# Patient Record
Sex: FEMALE | Race: WHITE | NOT HISPANIC OR LATINO | Employment: OTHER | URBAN - METROPOLITAN AREA
[De-identification: names, ages, dates, MRNs, and addresses within clinical notes are randomized per-mention and may not be internally consistent; named-entity substitution may affect disease eponyms.]

---

## 2017-01-18 ENCOUNTER — HOSPITAL ENCOUNTER (OUTPATIENT)
Dept: RADIOLOGY | Facility: MEDICAL CENTER | Age: 82
End: 2017-01-18
Attending: FAMILY MEDICINE
Payer: MEDICARE

## 2017-01-18 DIAGNOSIS — Z12.31 VISIT FOR SCREENING MAMMOGRAM: ICD-10-CM

## 2017-01-18 PROCEDURE — 77063 BREAST TOMOSYNTHESIS BI: CPT

## 2017-01-23 RX ORDER — DIAZEPAM 5 MG/1
5 TABLET ORAL
Qty: 20 TAB | Refills: 0 | OUTPATIENT
Start: 2017-01-23

## 2017-01-24 ENCOUNTER — TELEPHONE (OUTPATIENT)
Dept: MEDICAL GROUP | Age: 82
End: 2017-01-24

## 2017-01-24 DIAGNOSIS — F41.1 GAD (GENERALIZED ANXIETY DISORDER): ICD-10-CM

## 2017-01-24 NOTE — TELEPHONE ENCOUNTER
Was the patient seen in the last year in this department? Yes     Does patient have an active prescription for medications requested? No     Received Request Via: Patient       1. Caller Name: Ellen Srivastava                      Call Back Number: [phone] (home) [phone] (work)    2. Message: Patient states she is still having anxiety and would like to try Valium    3. Patient approves office to leave a detailed voicemail/MyChart message: N\A

## 2017-01-24 NOTE — TELEPHONE ENCOUNTER
Phone Number Called: [phone] (home) [phone] (work)      Message: left message to call back    Left Message for patient to call back: yes

## 2017-01-24 NOTE — TELEPHONE ENCOUNTER
I'm sorry.  I can not prescribe valium because it interacts with her other medicines.  I am happy to refer to a psychiatrist if she is interested in other options for her anxiety.  TAMARA

## 2017-01-24 NOTE — TELEPHONE ENCOUNTER
1. Caller Name: Ellen                                         Call Back Number: [phone] (home) [phone] (work)        Patient approves a detailed voicemail message: no    Patient requesting to have Dr. Page take her off Paxil and Buspirone meds and wants to try Lexapro due to her anxiety.  Please advise.

## 2017-01-24 NOTE — TELEPHONE ENCOUNTER
1. Caller Name: self                                         Call Back Number: [phone] (home) [phone] (work)      Patient approves a detailed voicemail message: N\A    pt called back and was notified of information below, she does not want to go to a Psychiatrist.

## 2017-01-26 RX ORDER — ESCITALOPRAM OXALATE 10 MG/1
10 TABLET ORAL DAILY
Qty: 30 TAB | Refills: 2 | Status: SHIPPED | OUTPATIENT
Start: 2017-01-26 | End: 2017-04-24 | Stop reason: SDUPTHER

## 2017-01-26 NOTE — TELEPHONE ENCOUNTER
Ok - pt can stop Paxil and Buspirone and start Lexapro.  It is not necessary to taper medicines  Nidhi Page MD  [facility] [address] [place] [address]

## 2017-01-26 NOTE — TELEPHONE ENCOUNTER
C[facility] ONLY      Phone Number Called: [phone]     Message: Pt notified of Dr. Page's message below. Pt is very thankful.    Left Message for patient to call back: no

## 2017-03-06 ENCOUNTER — OFFICE VISIT (OUTPATIENT)
Dept: MEDICAL GROUP | Age: 82
End: 2017-03-06
Payer: MEDICARE

## 2017-03-06 ENCOUNTER — HOSPITAL ENCOUNTER (OUTPATIENT)
Dept: RADIOLOGY | Facility: MEDICAL CENTER | Age: 82
End: 2017-03-06
Attending: FAMILY MEDICINE
Payer: MEDICARE

## 2017-03-06 VITALS
WEIGHT: 173.6 LBS | SYSTOLIC BLOOD PRESSURE: 138 MMHG | TEMPERATURE: 99.7 F | BODY MASS INDEX: 25.71 KG/M2 | OXYGEN SATURATION: 95 % | DIASTOLIC BLOOD PRESSURE: 88 MMHG | HEART RATE: 81 BPM | HEIGHT: 69 IN

## 2017-03-06 DIAGNOSIS — R06.09 DOE (DYSPNEA ON EXERTION): ICD-10-CM

## 2017-03-06 DIAGNOSIS — F11.20 NARCOTIC DEPENDENCE (HCC): ICD-10-CM

## 2017-03-06 DIAGNOSIS — F41.1 GAD (GENERALIZED ANXIETY DISORDER): ICD-10-CM

## 2017-03-06 DIAGNOSIS — M54.50 CHRONIC BILATERAL LOW BACK PAIN WITHOUT SCIATICA: ICD-10-CM

## 2017-03-06 DIAGNOSIS — G89.29 CHRONIC BILATERAL LOW BACK PAIN WITHOUT SCIATICA: ICD-10-CM

## 2017-03-06 PROCEDURE — G8432 DEP SCR NOT DOC, RNG: HCPCS | Performed by: FAMILY MEDICINE

## 2017-03-06 PROCEDURE — 99214 OFFICE O/P EST MOD 30 MIN: CPT | Performed by: FAMILY MEDICINE

## 2017-03-06 PROCEDURE — 4040F PNEUMOC VAC/ADMIN/RCVD: CPT | Performed by: FAMILY MEDICINE

## 2017-03-06 PROCEDURE — G8482 FLU IMMUNIZE ORDER/ADMIN: HCPCS | Performed by: FAMILY MEDICINE

## 2017-03-06 PROCEDURE — G8420 CALC BMI NORM PARAMETERS: HCPCS | Performed by: FAMILY MEDICINE

## 2017-03-06 PROCEDURE — 0518F FALL PLAN OF CARE DOCD: CPT | Mod: 8P | Performed by: FAMILY MEDICINE

## 2017-03-06 PROCEDURE — 3288F FALL RISK ASSESSMENT DOCD: CPT | Performed by: FAMILY MEDICINE

## 2017-03-06 PROCEDURE — 1036F TOBACCO NON-USER: CPT | Performed by: FAMILY MEDICINE

## 2017-03-06 PROCEDURE — 1100F PTFALLS ASSESS-DOCD GE2>/YR: CPT | Performed by: FAMILY MEDICINE

## 2017-03-06 RX ORDER — HYDROCODONE BITARTRATE AND ACETAMINOPHEN 10; 325 MG/1; MG/1
1-2 TABLET ORAL
Qty: 60 TAB | Refills: 0 | Status: SHIPPED | OUTPATIENT
Start: 2017-03-06 | End: 2017-03-06 | Stop reason: SDUPTHER

## 2017-03-06 RX ORDER — PANTOPRAZOLE SODIUM 40 MG/1
40 TABLET, DELAYED RELEASE ORAL DAILY
COMMUNITY
End: 2018-03-05 | Stop reason: SDUPTHER

## 2017-03-06 RX ORDER — HYDROCODONE BITARTRATE AND ACETAMINOPHEN 10; 325 MG/1; MG/1
1-2 TABLET ORAL
Qty: 60 TAB | Refills: 0 | Status: SHIPPED | OUTPATIENT
Start: 2017-03-06 | End: 2017-06-14 | Stop reason: SDUPTHER

## 2017-03-06 RX ORDER — BUSPIRONE HYDROCHLORIDE 15 MG/1
15 TABLET ORAL 2 TIMES DAILY
Qty: 60 TAB | Refills: 5 | Status: SHIPPED | OUTPATIENT
Start: 2017-03-06 | End: 2017-06-14

## 2017-03-06 ASSESSMENT — LIFESTYLE VARIABLES: HISTORY_ALCOHOL_USE: 0

## 2017-03-06 ASSESSMENT — ENCOUNTER SYMPTOMS: DEPRESSION: 0

## 2017-03-06 NOTE — PATIENT INSTRUCTIONS
Current Outpatient Prescriptions   Medication Sig Dispense Refill   • pantoprazole (PROTONIX) 40 MG Tablet Delayed Response Take 40 mg by mouth every day.     • escitalopram (LEXAPRO) 10 MG Tab Take 1 Tab by mouth every day. 30 Tab 2   • hydrocodone/acetaminophen (NORCO)  MG Tab Take 1-2 Tabs by mouth 1 time daily as needed. 60 Tab 0   • rosuvastatin (CRESTOR) 20 MG Tab TAKE 1 TABLET BY MOUTH EVERY EVENING 30 Tab 3   • metoprolol (LOPRESSOR) 50 MG Tab Take 25 mg by mouth 2 times a day.     • tizanidine (ZANAFLEX) 4 MG Tab Take 1 Tab by mouth 2 times a day as needed (SPASM). 60 Tab 5   • cyanocobalamin (VITAMIN B-12) 100 MCG Tab TAKE ONE TABLET BY MOUTH DAILY 30 Tab 11   • vitamin D (VITAMIND D3) 1000 UNIT Tab Take 1 Tab by mouth every day. 30 Tab      No current facility-administered medications for this visit.

## 2017-03-06 NOTE — MR AVS SNAPSHOT
"        Ellen Srivastava   3/6/2017 9:50 AM   Office Visit   MRN: [id number]    Department:  [address] [place] [address]   Dept Phone:  [phone]    Description:  Female : 1935   Provider:  Nidhi Page M.D.           Reason for Visit     Back Pain med refill norco      Allergies as of 3/6/2017     Allergen Noted Reactions    Asa [Aspirin] 09/15/2015   Unspecified    Headache      You were diagnosed with     LEVY (dyspnea on exertion)   [657403]       ANUPAMA (generalized anxiety disorder)   [682640]   discussed need to limit benzo use and discussed optimizing doses of non-benzo meds    Chronic bilateral low back pain without sciatica   [7201765]   continue analgesics      Vital Signs     Blood Pressure Pulse Temperature Height Weight Body Mass Index    138/88 mmHg 81 37.6 °C (99.7 °F) 1.752 m (5' 8.98\") 78.744 kg (173 lb 9.6 oz) 25.65 kg/m2    Oxygen Saturation Last Menstrual Period Breastfeeding? Smoking Status          95% 1970 No Never Smoker         Basic Information     Date Of Birth Sex Race Ethnicity Preferred Language    1935 Female White Non- English      Your appointments     2017 10:30 AM   Established Patient with Nidhi Page M.D.   [facility] [address] [place] [id number] [phone]           You will be receiving a confirmation call a few days before your appointment from our automated call confirmation system.              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   2010 - Present    Preventative health care Z00.00   3/16/2010 - " Present    Osteopenia M85.80   4/26/2010 - Present    Chronic back pain (Chronic) M54.9, G89.29   8/23/2011 - Present    Narcotic dependence (CMS-[facility]) F11.20   4/28/2012 - Present    GERD (gastroesophageal reflux disease) K21.9   10/12/2015 - Present    Paraesophageal hiatal hernia s/p robotic repair 10/12 K44.9 Low  10/22/2015 - Present    Depression with anxiety F41.8 High  10/22/2015 - Present    Esophageal perforation K22.3 High  10/22/2015 - Present    Vitamin D deficiency E55.9 Medium  12/2/2015 - Present    Falls W19.XXXA   12/9/2015 - Present    Chronic use of opiate drugs therapeutic purposes Z79.899   9/16/2016 - Present    Syncope R55 High  9/20/2016 - Present    Closed right ankle fracture S82.891A High  9/20/2016 - Present    Leukocytosis D72.829   9/20/2016 - Present    Risk for falls Z91.81   12/16/2016 - Present      Health Maintenance        Date Due Completion Dates    IMM DTaP/Tdap/Td Vaccine (1 - Tdap) [birth year]/13/1956 ---    BONE DENSITY 3/26/2018 3/26/2013, 4/23/2010    MAMMOGRAM 12/18/2018 1/18/2017, 7/31/2015, 5/21/2012 (Done)    Override on 5/21/2012: Done            Current Immunizations     13-VALENT PCV PREVNAR 11/13/2014    Influenza TIV (IM) 9/16/2009    Influenza Vaccine Adult HD 10/21/2016, 10/13/2015  4:39 PM, 11/13/2014    Pneumococcal polysaccharide vaccine (PPSV-23) 12/5/2015  9:53 AM, 12/5/2015    SHINGLES VACCINE 2/10/2009    Tetanus Vaccine 10/29/2012      Below and/or attached are the medications your provider expects you to take. Review all of your home medications and newly ordered medications with your provider and/or pharmacist. Follow medication instructions as directed by your provider and/or pharmacist. Please keep your medication list with you and share with your provider. Update the information when medications are discontinued, doses are changed, or new medications (including over-the-counter products) are added; and carry medication information at all times in the event of  emergency situations     Allergies:  ASA - Unspecified               Medications  Valid as of: March 06, 2017 - 10:21 AM    Generic Name Brand Name Tablet Size Instructions for use    BusPIRone HCl (Tab) BUSPAR 15 MG Take 1 Tab by mouth 2 times a day.        Cholecalciferol (Tab) VITAMIND D3 1000 UNIT Take 1 Tab by mouth every day.        Cyanocobalamin (Tab) VITAMIN B-12 100 MCG TAKE ONE TABLET BY MOUTH DAILY        Escitalopram Oxalate (Tab) LEXAPRO 10 MG Take 1 Tab by mouth every day.        Hydrocodone-Acetaminophen (Tab) NORCO  MG Take 1-2 Tabs by mouth 1 time daily as needed.        Metoprolol Tartrate (Tab) LOPRESSOR 50 MG Take 25 mg by mouth 2 times a day.        Pantoprazole Sodium (Tablet Delayed Response) PROTONIX 40 MG Take 40 mg by mouth every day.        Rosuvastatin Calcium (Tab) CRESTOR 20 MG TAKE 1 TABLET BY MOUTH EVERY EVENING        TiZANidine HCl (Tab) ZANAFLEX 4 MG Take 1 Tab by mouth 2 times a day as needed (SPASM).        .                 Medicines prescribed today were sent to:     [facility] #[id number] [place], [place] - [address] [address]    Phone: [phone] Fax: [phone]    Open 24 Hours?: No      Medication refill instructions:       If your prescription bottle indicates you have medication refills left, it is not necessary to call your provider’s office. Please contact your pharmacy and they will refill your medication.    If your prescription bottle indicates you do not have any refills left, you may request refills at any time through one of the following ways: The online [employer] system (except Urgent Care), by calling your provider’s office, or by asking your pharmacy to contact your provider’s office with a refill request. Medication refills are processed only during regular business hours and may not be available until the next business day. Your provider may request additional information or to have a follow-up visit with  you prior to refilling your medication.   *Please Note: Medication refills are assigned a new Rx number when refilled electronically. Your pharmacy may indicate that no refills were authorized even though a new prescription for the same medication is available at the pharmacy. Please request the medicine by name with the pharmacy before contacting your provider for a refill.        Your To Do List     Future Labs/Procedures Complete By Expires    DX-CHEST-2 VIEWS  As directed 3/6/2018      Instructions    Current Outpatient Prescriptions   Medication Sig Dispense Refill   • pantoprazole (PROTONIX) 40 MG Tablet Delayed Response Take 40 mg by mouth every day.     • escitalopram (LEXAPRO) 10 MG Tab Take 1 Tab by mouth every day. 30 Tab 2   • hydrocodone/acetaminophen (NORCO)  MG Tab Take 1-2 Tabs by mouth 1 time daily as needed. 60 Tab 0   • rosuvastatin (CRESTOR) 20 MG Tab TAKE 1 TABLET BY MOUTH EVERY EVENING 30 Tab 3   • metoprolol (LOPRESSOR) 50 MG Tab Take 25 mg by mouth 2 times a day.     • tizanidine (ZANAFLEX) 4 MG Tab Take 1 Tab by mouth 2 times a day as needed (SPASM). 60 Tab 5   • cyanocobalamin (VITAMIN B-12) 100 MCG Tab TAKE ONE TABLET BY MOUTH DAILY 30 Tab 11   • vitamin D (VITAMIND D3) 1000 UNIT Tab Take 1 Tab by mouth every day. 30 Tab      No current facility-administered medications for this visit.            Other Notes About Your Plan     Update HCM dates.            The Local Access Code: Activation code not generated  Current The Local Status: Active

## 2017-03-06 NOTE — PROGRESS NOTES
"Subjective:     Chief Complaint   Patient presents with   • Back Pain     med refill norco     Ellen Srivastava is a 81 y.o. female here today for issues listed below    Difficulty breathing a few months. Getting worse.   Happens with walking or exertion (walks up 1 flight stairs). No cough or wheeze. NO CP or pressure.   No asthma as a child, never smoked. Dad smoked. Never Rx inhaler. Does not feel ill. No cough.    Throat does not feel right. Last night had choking and emesis. After eating rice. Was chewing well. Has tried to cut down PPI because she is on \"too many meds\"      Still having anxiety. Takes lexapro every day. Mood is overall improved with this med. No side effects. No SI. No yelena.Also having breakthrough anxiety. Buspar was maybe better. Than current hydroxyzine.     Chronic pain recheck: chronic low back pain.   Last dose of controlled substance: 4:30 yesterday  Chronic pain treated with Norco taken 1-2 times a day (mostly 2 times per day)    She  reports that she does not drink alcohol.  She  reports that she does not use illicit drugs.    Consequences of Chronic Opiate therapy:  (5 A's)  Analgesia: Compared to no treatment or prior treatment, pain is currently not changed  Activity: not changed  Adverse Events: She denies constipation, dry mouth, itchy skin, nausea and sedation  Aberrant Behaviors: She reports she is taking medication as prescribed and is not veering from agreed treatment regimen. There have been no inappropriate refills or lost/stolen meds reported.   Affect/Mood: Pain is impacting patient's mood.  Patient reports depression/anxiety.- taking Lexapro.    Nonnarcotic treatments that are being used: muscle relaxers.   Treatment goals discussed.    Opioid Risk Score: 0    Interpretation of Opioid Risk Score   Score 0-3 = Low risk of abuse. Do UDS at least once per year.    Last order of CONTROLLED SUBSTANCE TREATMENT AGREEMENT was found on 12/16/2016 from Office Visit on " 12/16/2016     UDS Summary                URINE DRUG SCREEN Next Due 9/15/2017      Done 9/20/2016 URINE DRUG SCREEN (A)     Patient has more history with this topic...        Most recent UDS reviewed today and is consistent with prescribed medications.    I have reviewed the medical records, the Prescription Monitoring Program and I have determined that controlled substance treatment is medically indicated.     HTN - Chronic condition stable. Currently taking all meds as directed.   She is not monitoring BP at home.   Denies symptoms low BP: light-headed, tunnel-vision, unusual fatigue.   Denies symptoms high BP:pounding headache, visual changes, palpitations, flushed face.   Denies medicine side effects: unusual fatigue, slow heartbeat, foot/leg swelling, cough.        Allergies: Asa  Current medicines (including changes today)  Current Outpatient Prescriptions   Medication Sig Dispense Refill   • pantoprazole (PROTONIX) 40 MG Tablet Delayed Response Take 40 mg by mouth every day.     • busPIRone (BUSPAR) 15 MG tablet Take 1 Tab by mouth 2 times a day. 60 Tab 5   • hydrocodone/acetaminophen (NORCO)  MG Tab Take 1-2 Tabs by mouth 1 time daily as needed. 60 Tab 0   • escitalopram (LEXAPRO) 10 MG Tab Take 1 Tab by mouth every day. 30 Tab 2   • rosuvastatin (CRESTOR) 20 MG Tab TAKE 1 TABLET BY MOUTH EVERY EVENING 30 Tab 3   • metoprolol (LOPRESSOR) 50 MG Tab Take 25 mg by mouth 2 times a day.     • tizanidine (ZANAFLEX) 4 MG Tab Take 1 Tab by mouth 2 times a day as needed (SPASM). 60 Tab 5   • cyanocobalamin (VITAMIN B-12) 100 MCG Tab TAKE ONE TABLET BY MOUTH DAILY 30 Tab 11   • vitamin D (VITAMIND D3) 1000 UNIT Tab Take 1 Tab by mouth every day. 30 Tab      No current facility-administered medications for this visit.       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  Health Maintenance: mammo is current  ROS  Per HPI     Objective:     Blood pressure 138/88, pulse 81, temperature 37.6 °C (99.7 °F), height 1.752 m (5' 8.98\"), weight 78.744 kg (173 lb 9.6 oz), last menstrual period 03/13/1970, SpO2 95 %, not currently breastfeeding. Body mass index is 25.65 kg/(m^2).  Physical Exam:  Alert, oriented in no acute distress.  Eye contact is good, speech goal directed, affect calm  HEENT: conjunctiva non-injected, sclera non-icteric.  Pinna normal without skin lesions. TM pearly gray.   Oral mucous membranes pink and moist with no lesions.  Neck No adenopathy or masses in the neck or supraclavicular regions.  No carotid bruits. No thyromegaly  Lungs: clear to auscultation bilaterally with good excursion.  CV: regular rate and rhythm.  Abdomen No CVAT  Ext: no edema, no tenderness to palpation over shins    Has not done labs ordered 2016.    Assessment and Plan:     Patient generally stable but needs to do labs to assess control of IFG, lipids, thyroid.     Treatment Changes: None  Ellen was seen today for back pain.    Diagnoses and all orders for this visit:    LEVY (dyspnea on exertion)  Comments:  suspect multifactorial. Advise CXR and spirometry. Pt will consider these.   Orders:  -     DX-CHEST-2 VIEWS; Future  -     PULMONARY FUNCTION TESTS Test requested: Spirometry with-out & with Bronchodilator    ANUPAMA (generalized anxiety disorder)  Comments:  continue to optimize doses of non-benzo meds  Orders:  -     busPIRone (BUSPAR) " 15 MG tablet; Take 1 Tab by mouth 2 times a day.    Chronic bilateral low back pain without sciatica  Comments:  continue analgesics  Orders:  -     Discontinue: hydrocodone/acetaminophen (NORCO)  MG Tab; Take 1-2 Tabs by mouth 1 time daily as needed.  -     Discontinue: hydrocodone/acetaminophen (NORCO)  MG Tab; Take 1-2 Tabs by mouth 1 time daily as needed.  -     hydrocodone/acetaminophen (NORCO)  MG Tab; Take 1-2 Tabs by mouth 1 time daily as needed.    Narcotic dependence (CMS-[facility])  Comments:  pt aware of dependence on med.        Followup: Return in about 3 months (around 6/6/2017) for Short - pain med RF. sooner should new symptoms or problems arise.

## 2017-03-14 ENCOUNTER — HOSPITAL ENCOUNTER (OUTPATIENT)
Dept: RADIOLOGY | Facility: MEDICAL CENTER | Age: 82
End: 2017-03-14
Attending: FAMILY MEDICINE
Payer: MEDICARE

## 2017-03-14 ENCOUNTER — HOSPITAL ENCOUNTER (OUTPATIENT)
Dept: LAB | Facility: MEDICAL CENTER | Age: 82
End: 2017-03-14
Attending: FAMILY MEDICINE
Payer: MEDICARE

## 2017-03-14 DIAGNOSIS — I10 ESSENTIAL HYPERTENSION: ICD-10-CM

## 2017-03-14 DIAGNOSIS — R73.01 IFG (IMPAIRED FASTING GLUCOSE): ICD-10-CM

## 2017-03-14 DIAGNOSIS — E78.2 MIXED HYPERLIPIDEMIA: ICD-10-CM

## 2017-03-14 LAB
ALBUMIN SERPL BCP-MCNC: 3.7 G/DL (ref 3.2–4.9)
ALBUMIN/GLOB SERPL: 1.1 G/DL
ALP SERPL-CCNC: 79 U/L (ref 30–99)
ALT SERPL-CCNC: 19 U/L (ref 2–50)
ANION GAP SERPL CALC-SCNC: 9 MMOL/L (ref 0–11.9)
AST SERPL-CCNC: 23 U/L (ref 12–45)
BILIRUB SERPL-MCNC: 0.6 MG/DL (ref 0.1–1.5)
BUN SERPL-MCNC: 13 MG/DL (ref 8–22)
CALCIUM SERPL-MCNC: 8.8 MG/DL (ref 8.4–10.2)
CHLORIDE SERPL-SCNC: 104 MMOL/L (ref 96–112)
CHOLEST SERPL-MCNC: 102 MG/DL (ref 100–199)
CO2 SERPL-SCNC: 23 MMOL/L (ref 20–33)
CREAT SERPL-MCNC: 0.84 MG/DL (ref 0.5–1.4)
CREAT UR-MCNC: 161.5 MG/DL
EST. AVERAGE GLUCOSE BLD GHB EST-MCNC: 123 MG/DL
GFR SERPL CREATININE-BSD FRML MDRD: >60 ML/MIN/1.73 M 2
GLOBULIN SER CALC-MCNC: 3.3 G/DL (ref 1.9–3.5)
GLUCOSE SERPL-MCNC: 108 MG/DL (ref 65–99)
HBA1C MFR BLD: 5.9 % (ref 0–5.6)
HDLC SERPL-MCNC: 37 MG/DL
LDLC SERPL CALC-MCNC: 42 MG/DL
MICROALBUMIN UR-MCNC: 1.3 MG/DL
MICROALBUMIN/CREAT UR: 8 MG/G (ref 0–30)
POTASSIUM SERPL-SCNC: 3.6 MMOL/L (ref 3.6–5.5)
PROT SERPL-MCNC: 7 G/DL (ref 6–8.2)
SODIUM SERPL-SCNC: 136 MMOL/L (ref 135–145)
TRIGL SERPL-MCNC: 115 MG/DL (ref 0–149)
TSH SERPL DL<=0.005 MIU/L-ACNC: 1.58 UIU/ML (ref 0.35–5.5)

## 2017-03-14 PROCEDURE — 83036 HEMOGLOBIN GLYCOSYLATED A1C: CPT | Mod: GA

## 2017-03-14 PROCEDURE — 71020 DX-CHEST-2 VIEWS: CPT

## 2017-03-14 PROCEDURE — 82043 UR ALBUMIN QUANTITATIVE: CPT

## 2017-03-14 PROCEDURE — 82570 ASSAY OF URINE CREATININE: CPT

## 2017-03-14 PROCEDURE — 80061 LIPID PANEL: CPT

## 2017-03-14 PROCEDURE — 83525 ASSAY OF INSULIN: CPT

## 2017-03-14 PROCEDURE — 36415 COLL VENOUS BLD VENIPUNCTURE: CPT

## 2017-03-14 PROCEDURE — 84443 ASSAY THYROID STIM HORMONE: CPT

## 2017-03-14 PROCEDURE — 80053 COMPREHEN METABOLIC PANEL: CPT

## 2017-03-15 LAB — INSULIN P FAST SERPL-ACNC: 18 UIU/ML (ref 3–19)

## 2017-03-17 ENCOUNTER — OFFICE VISIT (OUTPATIENT)
Dept: MEDICAL GROUP | Age: 82
End: 2017-03-17
Payer: MEDICARE

## 2017-03-17 VITALS
SYSTOLIC BLOOD PRESSURE: 100 MMHG | BODY MASS INDEX: 24.31 KG/M2 | DIASTOLIC BLOOD PRESSURE: 89 MMHG | OXYGEN SATURATION: 94 % | WEIGHT: 169.8 LBS | HEART RATE: 95 BPM | HEIGHT: 70 IN | TEMPERATURE: 98.7 F

## 2017-03-17 DIAGNOSIS — J69.0 ASPIRATION PNEUMONIA, UNSPECIFIED ASPIRATION PNEUMONIA TYPE, UNSPECIFIED LATERALITY, UNSPECIFIED PART OF LUNG (HCC): ICD-10-CM

## 2017-03-17 DIAGNOSIS — Z87.19 H/O HIATAL HERNIA: ICD-10-CM

## 2017-03-17 PROCEDURE — 99214 OFFICE O/P EST MOD 30 MIN: CPT | Performed by: FAMILY MEDICINE

## 2017-03-17 RX ORDER — AMOXICILLIN AND CLAVULANATE POTASSIUM 875; 125 MG/1; MG/1
1 TABLET, FILM COATED ORAL 2 TIMES DAILY
Qty: 20 TAB | Refills: 0 | Status: SHIPPED | OUTPATIENT
Start: 2017-03-17 | End: 2017-03-27

## 2017-03-18 NOTE — PROGRESS NOTES
"Chief Complaint   Patient presents with   • Cough     since monday    • Faint     since monday   • Body Aches     since monday    • Chills     monday   • Sweats     monday   • Immunizations     in neeed of tdap       HPI: 5 days of illness including: nasal congestion, sore throat, cough, myalgias, drenching sweats. Initially had significant nausea and diarrhea. Has not been able to eat solid foods. She felt faint and very lightheaded when standing earlier this week but did not pass out or lose consciousness.  Symptoms negative for chest pain, or new difficulty swallowing.  On office visit last week she was complaining of increased dyspnea. Chest x-ray showed recurrence of her hiatal hernia which was repaired and a very complex operation in 2015. She denies any regurgitation or upper abdominal pain. She does continue to take pantoprazole daily.    Similarly ill exposures: no  Medical history negative for asthma but she has high risk for aspiration due to hiatal hernia  She  reports that she has never smoked. She has quit using smokeless tobacco.    ROS  No skin rashes.  Last diarrhea was yesterday. No emesis no black or bloody stools.      Blood pressure 100/89, pulse 95, temperature 37.1 °C (98.7 °F), height 1.765 m (5' 9.5\"), weight 77.021 kg (169 lb 12.8 oz), last menstrual period 03/13/1970, SpO2 94 %.  Patient thought to be at high risk for communicable respiratory infection. Safety precautions taken during this visit:  Patient mask worn: Yes  Provider mask worn:No   Gen: alert, No conversational dyspnea. No audible wheeze, nontoxic.  PERRL, conjunctiva slightly injected. No photophobia. No eye discharge.  Ears: normal pinnae. TM: gilmar colored  Nares patent with thin mucus.  Sinuses non tender over maxillary / frontal sinuses  Throat: erythematous injection. No exudate.   Neck: supple, with  no adenopathy  Lungs:  bibasilar rales  Skin: warm and dry. No rash.    A/P  Suspect aspiration/aspiration pneumonia. " Discussed chewing food carefully and remaining upright after eating. Continue PPI. Antibiotics prescribed with instructions for use. ER precautions: Proceed to ER if increasing dyspnea or chest pain  1. H/O hiatal hernia     2. Aspiration pneumonia, unspecified aspiration pneumonia type, unspecified laterality, unspecified part of lung (CMS-HCC)  amoxicillin-clavulanate (AUGMENTIN) 875-125 MG Tab    just had CXR in last few weeks. Showed hiatal hernia.        Followup for worsening symptoms, difficulty breathing, lack of expected recovery, or should new symptoms or problems arise.

## 2017-04-24 RX ORDER — ESCITALOPRAM OXALATE 10 MG/1
TABLET ORAL
Qty: 30 TAB | Refills: 1 | Status: SHIPPED | OUTPATIENT
Start: 2017-04-24 | End: 2017-06-14 | Stop reason: SDUPTHER

## 2017-04-25 RX ORDER — ROSUVASTATIN CALCIUM 20 MG/1
TABLET, COATED ORAL
Qty: 30 TAB | Refills: 2 | Status: SHIPPED | OUTPATIENT
Start: 2017-04-25 | End: 2017-08-01 | Stop reason: SDUPTHER

## 2017-05-24 RX ORDER — HYDROXYZINE HYDROCHLORIDE 25 MG/1
TABLET, FILM COATED ORAL
Refills: 0 | OUTPATIENT
Start: 2017-05-24

## 2017-05-24 NOTE — TELEPHONE ENCOUNTER
Refill for Atarax declined  Pt previously indicated med was not helpful therefore it was discontinued  Nidhi Page MD

## 2017-06-14 ENCOUNTER — OFFICE VISIT (OUTPATIENT)
Dept: MEDICAL GROUP | Age: 82
End: 2017-06-14
Payer: MEDICARE

## 2017-06-14 VITALS
BODY MASS INDEX: 25.18 KG/M2 | OXYGEN SATURATION: 96 % | WEIGHT: 170 LBS | SYSTOLIC BLOOD PRESSURE: 124 MMHG | HEIGHT: 69 IN | HEART RATE: 84 BPM | DIASTOLIC BLOOD PRESSURE: 70 MMHG | TEMPERATURE: 99 F

## 2017-06-14 DIAGNOSIS — D48.9 NEOPLASM OF UNCERTAIN BEHAVIOR: ICD-10-CM

## 2017-06-14 DIAGNOSIS — G89.29 CHRONIC BILATERAL LOW BACK PAIN WITHOUT SCIATICA: ICD-10-CM

## 2017-06-14 DIAGNOSIS — Z23 NEED FOR VACCINATION: ICD-10-CM

## 2017-06-14 DIAGNOSIS — Z79.891 CHRONIC USE OF OPIATE DRUGS THERAPEUTIC PURPOSES: ICD-10-CM

## 2017-06-14 DIAGNOSIS — Z00.00 PREVENTATIVE HEALTH CARE: ICD-10-CM

## 2017-06-14 DIAGNOSIS — M54.50 CHRONIC BILATERAL LOW BACK PAIN WITHOUT SCIATICA: ICD-10-CM

## 2017-06-14 DIAGNOSIS — Z91.81 RISK FOR FALLS: ICD-10-CM

## 2017-06-14 DIAGNOSIS — F11.20 NARCOTIC DEPENDENCE (HCC): ICD-10-CM

## 2017-06-14 DIAGNOSIS — K44.9 PARAESOPHAGEAL HIATAL HERNIA: ICD-10-CM

## 2017-06-14 DIAGNOSIS — M85.80 OSTEOPENIA, UNSPECIFIED LOCATION: ICD-10-CM

## 2017-06-14 DIAGNOSIS — F41.8 DEPRESSION WITH ANXIETY: ICD-10-CM

## 2017-06-14 DIAGNOSIS — E78.5 HYPERLIPIDEMIA WITH TARGET LDL LESS THAN 130: ICD-10-CM

## 2017-06-14 DIAGNOSIS — I10 ESSENTIAL HYPERTENSION: ICD-10-CM

## 2017-06-14 DIAGNOSIS — K21.9 GASTROESOPHAGEAL REFLUX DISEASE WITHOUT ESOPHAGITIS: ICD-10-CM

## 2017-06-14 PROCEDURE — 99215 OFFICE O/P EST HI 40 MIN: CPT | Performed by: FAMILY MEDICINE

## 2017-06-14 RX ORDER — HYDROCODONE BITARTRATE AND ACETAMINOPHEN 10; 325 MG/1; MG/1
1 TABLET ORAL 2 TIMES DAILY PRN
Qty: 60 TAB | Refills: 0 | Status: SHIPPED | OUTPATIENT
Start: 2017-06-14 | End: 2017-06-14 | Stop reason: SDUPTHER

## 2017-06-14 RX ORDER — ESCITALOPRAM OXALATE 10 MG/1
TABLET ORAL
Refills: 0 | OUTPATIENT
Start: 2017-06-14

## 2017-06-14 RX ORDER — ESCITALOPRAM OXALATE 10 MG/1
10 TABLET ORAL DAILY
Qty: 30 TAB | Refills: 1 | Status: SHIPPED | OUTPATIENT
Start: 2017-06-14 | End: 2017-08-09 | Stop reason: SDUPTHER

## 2017-06-14 RX ORDER — HYDROCODONE BITARTRATE AND ACETAMINOPHEN 10; 325 MG/1; MG/1
1 TABLET ORAL 2 TIMES DAILY PRN
Qty: 60 TAB | Refills: 0 | Status: SHIPPED | OUTPATIENT
Start: 2017-06-14 | End: 2017-07-11 | Stop reason: SDUPTHER

## 2017-06-14 NOTE — ASSESSMENT & PLAN NOTE
Says that she has PTSD from ICU admission  Her son has PTSD from [place] deployment  Does not want therapy from mental health  Takes lexapro 10mg 1/2 tab daily and is doing fine  She denies any suicidal or homicidal ideations  States that she has good support with her  at home

## 2017-06-14 NOTE — ASSESSMENT & PLAN NOTE
Patient has been diagnosed with essential hypertension for years. Has been compliant with medications and tolerating them with no mention of any adverse events.  The patient is not on a baby ASPIRIN and a  STATIN.  The patient has been tollerating the BP meds without any issues. No tunnel vision, no cough, no changes in vision, no lightheadedness, no fatigue, no syncopal or presyncopal episodes, no edema, no new rashes. Patient also  denied chest pain, headache, change in vision, dyspnea on exertion, shortness of breath, PND, back pain, numbness or tingling anywhere. He is tolerating his medication well, he denies any lightheadedness, no tunnel vision, no syncopal episodes, no fatigue, no leg weakness no falls.

## 2017-06-14 NOTE — ASSESSMENT & PLAN NOTE
Patient has been taking Norco 2 tablets 10/325 mg daily. This is due to chronic back pain as well as restless leg syndrome. She states that she never takes more, she has never had respiratory depression. She no longer takes Valium.

## 2017-06-14 NOTE — MR AVS SNAPSHOT
"Ellen Srivastava   2017 8:00 AM   Office Visit   MRN: [id number]    Department:  [address] [place] [address]   Dept Phone:  [phone]    Description:  Female : 1935   Provider:  Shivani Patel M.D.           Reason for Visit     Other see reason for visit      Allergies as of 2017     Allergen Noted Reactions    Asa [Aspirin] 09/15/2015   Unspecified    Headache      You were diagnosed with     Gastroesophageal reflux disease without esophagitis   [549744]       Narcotic dependence (CMS-[facility])   [474467]       Preventative health care   [131471]       Depression with anxiety   [594767]       Chronic use of opiate drugs therapeutic purposes   [5274925]       Chronic bilateral low back pain without sciatica   [3909840]   continue analgesics    Hyperlipidemia with target LDL less than 130   [329293]       Essential hypertension   [3687794]       Need for vaccination   [790640]       Paraesophageal hiatal hernia   [714900]       Osteopenia, unspecified location   [9236926]       Risk for falls   [468931]       Neoplasm of uncertain behavior   [045714]         Vital Signs     Blood Pressure Pulse Temperature Height Weight Body Mass Index    124/70 mmHg 84 37.2 °C (99 °F) 1.765 m (5' 9.49\") 77.111 kg (170 lb) 24.75 kg/m2    Oxygen Saturation Last Menstrual Period Smoking Status             96% 1970 Never Smoker          Basic Information     Date Of Birth Sex Race Ethnicity Preferred Language    1935 Female White Non- English      Your appointments     Sep 14, 2017 10:00 AM   Established Patient with Shivani Patel M.D.   [address] [address]   [phone]           You will be receiving a confirmation call a few days before your appointment from our automated call confirmation system.              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   1/30/2010 - Present    Preventative health care Z00.00   3/16/2010 - Present    Osteopenia M85.80   4/26/2010 - Present    Chronic back pain (Chronic) M54.9, G89.29   8/23/2011 - Present    Narcotic dependence (CMS-HCC) F11.20   4/28/2012 - Present    GERD (gastroesophageal reflux disease) K21.9   10/12/2015 - Present    Paraesophageal hiatal hernia s/p robotic repair 10/12 K44.9 Low  10/22/2015 - Present    Depression with anxiety F41.8 High  10/22/2015 - Present    Vitamin D deficiency E55.9 Medium  12/2/2015 - Present    Chronic use of opiate drugs therapeutic purposes Z79.891   9/16/2016 - Present    Syncope R55 High  9/20/2016 - Present    Leukocytosis D72.829   9/20/2016 - Present    Risk for falls Z91.81   12/16/2016 - Present    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   6/14/2017 - Present      Health Maintenance        Date Due Completion Dates    IMM DTaP/Tdap/Td Vaccine (1 - Tdap) [birth year]/13/1956 ---    BONE DENSITY 3/26/2018 3/26/2013, 4/23/2010    MAMMOGRAM 12/18/2018 1/18/2017, 5/21/2012 (Done)    Override on 5/21/2012: Done            Current Immunizations     13-VALENT PCV PREVNAR 11/13/2014    Influenza TIV (IM) 9/16/2009    Influenza Vaccine Adult HD 10/21/2016, 10/13/2015  4:39 PM, 11/13/2014    Pneumococcal polysaccharide vaccine (PPSV-23) 12/5/2015  9:53 AM, 12/5/2015    SHINGLES VACCINE 2/10/2009    Tetanus Vaccine 10/29/2012      Below and/or attached are the medications your provider expects you to take. Review all of your home medications and newly ordered medications with your provider and/or pharmacist. Follow medication instructions as directed by your provider  and/or pharmacist. Please keep your medication list with you and share with your provider. Update the information when medications are discontinued, doses are changed, or new medications (including over-the-counter products) are added; and carry medication information at all times in the event of emergency situations     Allergies:  ASA - Unspecified               Medications  Valid as of: June 14, 2017 -  9:05 AM    Generic Name Brand Name Tablet Size Instructions for use    Cholecalciferol (Tab) VITAMIND D3 1000 UNIT Take 1 Tab by mouth every day.        Cyanocobalamin (Tab) VITAMIN B-12 100 MCG TAKE ONE TABLET BY MOUTH DAILY        Escitalopram Oxalate (Tab) LEXAPRO 10 MG TAKE ONE TABLET BY MOUTH DAILY        Hydrocodone-Acetaminophen (Tab) NORCO  MG Take 1 Tab by mouth 2 times a day as needed.        Metoprolol Tartrate (Tab) LOPRESSOR 50 MG Take 25 mg by mouth 2 times a day.        Pantoprazole Sodium (Tablet Delayed Response) PROTONIX 40 MG Take 40 mg by mouth every day.        Rosuvastatin Calcium (Tab) CRESTOR 20 MG TAKE ONE TABLET BY MOUTH EVERY EVENING        TiZANidine HCl (Tab) ZANAFLEX 4 MG TAKE 1 TABLET BY MOUTH 2 TIMES A DAY AS NEEDED FOR SPASM        .                 Medicines prescribed today were sent to:     [facility] #[id number] [place], [place] - [address] [address]    Phone: [phone] Fax: [phone]    Open 24 Hours?: No      Medication refill instructions:       If your prescription bottle indicates you have medication refills left, it is not necessary to call your provider’s office. Please contact your pharmacy and they will refill your medication.    If your prescription bottle indicates you do not have any refills left, you may request refills at any time through one of the following ways: The online [employer] system (except Urgent Care), by calling your provider’s office, or by asking your pharmacy to contact your provider’s office  with a refill request. Medication refills are processed only during regular business hours and may not be available until the next business day. Your provider may request additional information or to have a follow-up visit with you prior to refilling your medication.   *Please Note: Medication refills are assigned a new Rx number when refilled electronically. Your pharmacy may indicate that no refills were authorized even though a new prescription for the same medication is available at the pharmacy. Please request the medicine by name with the pharmacy before contacting your provider for a refill.        Other Notes About Your Plan     Update HCM dates.            [employer] Access Code: Activation code not generated  Current [employer] Status: Active

## 2017-06-14 NOTE — ASSESSMENT & PLAN NOTE
The patient is a very pleasant 82-year-old female who presents to clinic to establish care. She has a significant history of chronic use of opiates, chronic back pain, hypertension, GERD, depression, restless leg syndrome, osteopenia, esophageal diverticuli, history of esophageal rupture, syncope, vitamin D deficiency. The patient denied any chest pain, no sob, no rodríguez, no  pnd, no orthopnea, no headache, no changes in vision, no numbness or tingling, no nausea, no diarrhea, no abdominal pain, no fevers, no chills, no bright red blood per rectum, no  difficulty urinating, no burning during micturition, no depressed mood, no other concerns.    Lives with , children nearby in [place] see's them once per month    Swims for exercise daily in [facility] for about 2 hours with walking    Good relationship with , he drives her everywhere

## 2017-06-14 NOTE — ASSESSMENT & PLAN NOTE
The patient has been on a statin for years and tolerating this fine. The patient denies any muscle aches, no abdominal pain and no history of elevated liver enzymes.    Results for ELIA ZARTAE (MRN [id number]) as of 6/14/2017 08:29   Ref. Range 3/14/2017 09:11   Cholesterol,Tot Latest Ref Range: 100-199 mg/dL 102   Triglycerides Latest Ref Range: 0-149 mg/dL 115   HDL Latest Ref Range: >=40 mg/dL 37 (A)   LDL Latest Ref Range: <100 mg/dL 42

## 2017-06-14 NOTE — PROGRESS NOTES
This medical record contains text that has been entered with the assistance of computer voice recognition and dictation software.  Therefore, it may contain unintended errors in text, spelling, punctuation, or grammar    Chief Complaint   Patient presents with   • Other     see reason for visit       Ellen Srivastava is a 82 y.o. female here evaluation and management of: Establish care, GERD, depression, chronic use of opiates, hypertension, back pain, osteopenia    HPI:     Preventative health care  The patient is a very pleasant 82-year-old female who presents to clinic to establish care. She has a significant history of chronic use of opiates, chronic back pain, hypertension, GERD, depression, restless leg syndrome, osteopenia, esophageal diverticuli, history of esophageal rupture, syncope, vitamin D deficiency. The patient denied any chest pain, no sob, no rodríguez, no  pnd, no orthopnea, no headache, no changes in vision, no numbness or tingling, no nausea, no diarrhea, no abdominal pain, no fevers, no chills, no bright red blood per rectum, no  difficulty urinating, no burning during micturition, no depressed mood, no other concerns.    Lives with , children nearby in [place] see's them once per month    Swims for exercise daily in [facility] for about 2 hours with walking    Good relationship with , he drives her everywhere    Narcotic dependence  For chronic back pain and RLS  Takes 2 norco per day 10mg/325        GERD (gastroesophageal reflux disease)  Patient has been taking her pantoprazole 40 mg 2 daily. Without it she will get severe GERD symptoms. She also has a significant history of esophageal diverticuli. Patient denies any difficulty swallowing, no regurgitation, no eructation, no weight loss, no nocturnal asthma no food getting stuck in the chest.     Depression with anxiety  Says that she has PTSD from ICU admission  Her son has PTSD from [place] deployment  Does not want therapy  from mental health  Takes lexapro 10mg 1/2 tab daily and is doing fine  She denies any suicidal or homicidal ideations  States that she has good support with her  at home      Paraesophageal hiatal hernia s/p robotic repair 10/12  10/12/2014 led to esophageal rupture      Hyperlipidemia with target LDL less than 130  The patient has been on a statin for years and tolerating this fine. The patient denies any muscle aches, no abdominal pain and no history of elevated liver enzymes.    Results for ELIA ZARATE (MRN [id number]) as of 6/14/2017 08:29   Ref. Range 3/14/2017 09:11   Cholesterol,Tot Latest Ref Range: 100-199 mg/dL 102   Triglycerides Latest Ref Range: 0-149 mg/dL 115   HDL Latest Ref Range: >=40 mg/dL 37 (A)   LDL Latest Ref Range: <100 mg/dL 42         HTN (hypertension)    Patient has been diagnosed with essential hypertension for years. Has been compliant with medications and tolerating them with no mention of any adverse events.  The patient is not on a baby ASPIRIN and a  STATIN.  The patient has been tollerating the BP meds without any issues. No tunnel vision, no cough, no changes in vision, no lightheadedness, no fatigue, no syncopal or presyncopal episodes, no edema, no new rashes. Patient also  denied chest pain, headache, change in vision, dyspnea on exertion, shortness of breath, PND, back pain, numbness or tingling anywhere. He is tolerating his medication well, he denies any lightheadedness, no tunnel vision, no syncopal episodes, no fatigue, no leg weakness no falls.        Osteopenia  Takes vitamin D  Cannot take calcium due to h/o hypercalcemia per patient      Risk for falls  Fell about one year ago, passed out because of pain      Chronic use of opiate drugs therapeutic purposes  Patient has been taking Norco 2 tablets 10/325 mg daily. This is due to chronic back pain as well as restless leg syndrome. She states that she never takes more, she has never had respiratory  depression. She no longer takes Valium.      Current medicines (including changes today)  Current Outpatient Prescriptions   Medication Sig Dispense Refill   • hydrocodone/acetaminophen (NORCO)  MG Tab Take 1 Tab by mouth 2 times a day as needed. 60 Tab 0   • rosuvastatin (CRESTOR) 20 MG Tab TAKE ONE TABLET BY MOUTH EVERY EVENING 30 Tab 2   • escitalopram (LEXAPRO) 10 MG Tab TAKE ONE TABLET BY MOUTH DAILY 30 Tab 1   • tizanidine (ZANAFLEX) 4 MG Tab TAKE 1 TABLET BY MOUTH 2 TIMES A DAY AS NEEDED FOR SPASM 60 Tab 5   • pantoprazole (PROTONIX) 40 MG Tablet Delayed Response Take 40 mg by mouth every day.     • metoprolol (LOPRESSOR) 50 MG Tab Take 25 mg by mouth 2 times a day.     • cyanocobalamin (VITAMIN B-12) 100 MCG Tab TAKE ONE TABLET BY MOUTH DAILY 30 Tab 11   • vitamin D (VITAMIND D3) 1000 UNIT Tab Take 1 Tab by mouth every day. 30 Tab      No current facility-administered medications for this visit.     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  She  has past surgical history that includes breast biopsy (Left, 2015); lumbar decompression (1969, 1979); hysterectomy, total abdominal (1977); appendectomy (1945); parathyroid exploration (6/16/2010); paraesophageal hernia robotic (N/A, 10/12/2015); gastroscopy (10/23/2015); thoracoscopy (Left, 10/30/2015); gastroscopy-endo (11/1/2015); gastroscopy-endo (N/A,  "12/10/2015); gastroscopy-endo (N/A, 2016); and ankle orif (Right, 2016).  Social History   Substance Use Topics   • Smoking status: Never Smoker    • Smokeless tobacco: Former User      Comment: Nicotine    • Alcohol Use: No     Social History     Social History Narrative    Lives in [facility] and in [place].     Family History   Problem Relation Age of Onset   • Hypertension Other    • Lung Disease       Family Status   Relation Status Death Age   • Mother  86     Old Age   • Father  83     Lung Disease         ROS  Please see history of present illness    All other systems reviewed and are negative     Objective:     Blood pressure 124/70, pulse 84, temperature 37.2 °C (99 °F), height 1.765 m (5' 9.49\"), weight 77.111 kg (170 lb), last menstrual period 1970, SpO2 96 %. Body mass index is 24.75 kg/(m^2).  Physical Exam:    Constitutional: Alert, no distress.  Skin: Warm, dry, good turgor, no rashes in visible areas.  Eye: Equal, round and reactive, conjunctiva clear, lids normal.  ENMT: Lips without lesions, good dentition, oropharynx clear.  Neck: Trachea midline, no masses, no thyromegaly. No cervical or supraclavicular lymphadenopathy.  Respiratory: Unlabored respiratory effort, lungs clear to auscultation, no wheezes, no ronchi.  Cardiovascular: Normal S1, S2, no murmur, no edema.  Abdomen: Soft, non-tender, no masses, no hepatosplenomegaly.  Psych: Alert and oriented x3, normal affect and mood.          Assessment and Plan:   The following treatment plan was discussed, again this medical record contains text that has been entered with the assistance of computer voice recognition and dictation software.  Therefore, it may contain unintended errors in text, spelling, punctuation, or grammar      1. Gastroesophageal reflux disease without esophagitis     Gerd precautions given (avoid the supine position after eating, avoid tight fitting clothing, head of bed elevation by raisin legs " of bed,  avoid eating prior to sleeping, avoid reflux-inducing foods (foods high in fat, chocolate, peppermint, and excessive alcohol, which can decrease LES pressure), promote salivation by chewing gum or lozenges,    2. Narcotic dependence (CMS-[facility])  Reviewed pain contract with patient -- it was signed  The patient  may have to give random urine drug screens    will not have the pain meds refilled early, whether he lost meds or not    We reviewed all side effects including but not limited to respiratory depression and death    3. Preventative health care  Care has been established  We need baseline labs to establish a clinical profile    We reviewed USPSTF guidelines  The current evidence is insufficient to assess the balance of benefits and harms of initiating aspirin use for the primary prevention of CVD and CRC in adults aged 70 years or older.    This patient is up to date for mammogram  Up to date on colonoscopy   Requested Medical records to be sent to us  Denies intimate partner viloence        4. Depression with anxiety  Patient has been stable with current management  We will make no changes for now    5. Chronic use of opiate drugs therapeutic purposes  Reviewed pain contract with patient -- it was signed  The patient  may have to give random urine drug screens    will not have the pain meds refilled early, whether he lost meds or not    We reviewed all side effects including but not limited to respiratory depression and death    6. Chronic bilateral low back pain without sciatica    We talked about the addictive nature of this medication and all side effects including but not limited to respiratory depression and death. We also reviewed of the association of chronic benzodiazepine use and Alzheimer's disease  Would like to continue his medication    .  - hydrocodone/acetaminophen (NORCO)  MG Tab; Take 1 Tab by mouth 2 times a day as needed.  Dispense: 60 Tab; Refill: 0    7. Hyperlipidemia with  target LDL less than 130  Patient has been stable with current management  We will make no changes for now      8. Essential hypertension    Patient has been diagnosed with essential hypertension for years. Has been compliant with medications and tolerating them with no mention of any adverse events.   The patient has been tollerating the BP meds without any issues. No tunnel vision, no cough, no changes in vision, no lightheadedness, no fatigue, no syncopal or presyncopal episodes, no edema, no new rashes. Patient also  denied chest pain, headache, change in vision, dyspnea on exertion, shortness of breath, PND, back pain, numbness or tingling anywhere. He is tolerating his medication well, he denies any lightheadedness, no tunnel vision, no syncopal episodes, no fatigue, no leg weakness no falls.    9. Need for vaccination  Her insurance does not cover it      10. Paraesophageal hiatal hernia s/p robotic repair 10/12  Patient has been stable with current management  We will make no changes for now      11. Osteopenia, unspecified location  Continue vitamin d and weight bearing excercises    12. Risk for falls  Recommended PT for strengthening and coordination    13. Neoplasm of uncertain behavior  She wants to return for shave biopsy      HEALTH MAINTENANCE: due for AWV      Over 40 minutes spent with patient face to face, greater than 50% time spent with plan/cordination of care as above in my A/P.    Followup: Return in about 3 months (around 9/14/2017) for Reevaluation.

## 2017-06-14 NOTE — ASSESSMENT & PLAN NOTE
Patient has been taking her pantoprazole 40 mg 2 daily. Without it she will get severe GERD symptoms. She also has a significant history of esophageal diverticuli. Patient denies any difficulty swallowing, no regurgitation, no eructation, no weight loss, no nocturnal asthma no food getting stuck in the chest.

## 2017-06-15 RX ORDER — METOPROLOL TARTRATE 50 MG/1
TABLET, FILM COATED ORAL
Qty: 180 TAB | Refills: 1 | Status: SHIPPED | OUTPATIENT
Start: 2017-06-15 | End: 2018-04-23 | Stop reason: SDUPTHER

## 2017-06-15 NOTE — TELEPHONE ENCOUNTER
Refill X 6 months, sent to pharmacy.Pt. Seen in the last 6 months per protocol.   Lab Results   Component Value Date/Time    SODIUM 136 03/14/2017 09:11 AM    POTASSIUM 3.6 03/14/2017 09:11 AM    CHLORIDE 104 03/14/2017 09:11 AM    CO2 23 03/14/2017 09:11 AM    GLUCOSE 108* 03/14/2017 09:11 AM    BUN 13 03/14/2017 09:11 AM    CREATININE 0.84 03/14/2017 09:11 AM

## 2017-07-11 ENCOUNTER — TELEPHONE (OUTPATIENT)
Dept: MEDICAL GROUP | Age: 82
End: 2017-07-11

## 2017-07-11 DIAGNOSIS — G89.29 CHRONIC BILATERAL LOW BACK PAIN WITHOUT SCIATICA: ICD-10-CM

## 2017-07-11 DIAGNOSIS — M54.50 CHRONIC BILATERAL LOW BACK PAIN WITHOUT SCIATICA: ICD-10-CM

## 2017-07-11 RX ORDER — HYDROCODONE BITARTRATE AND ACETAMINOPHEN 10; 325 MG/1; MG/1
1 TABLET ORAL 2 TIMES DAILY PRN
Qty: 60 TAB | Refills: 0 | Status: SHIPPED | OUTPATIENT
Start: 2017-07-11 | End: 2017-09-14 | Stop reason: SDUPTHER

## 2017-07-11 RX ORDER — UBIDECARENONE 75 MG
CAPSULE ORAL
Qty: 30 TAB | Refills: 0 | Status: SHIPPED | OUTPATIENT
Start: 2017-07-11 | End: 2017-08-22 | Stop reason: SDUPTHER

## 2017-07-11 NOTE — TELEPHONE ENCOUNTER
1. Caller Name: Ellen Srivastava        Call Back Number: [phone] (home) [phone] (work)      Pt is going on vacation and is wanting authorization to  her Norco Rx 2 days earlier then authorized. Please advise

## 2017-07-23 RX ORDER — HYDROXYZINE HYDROCHLORIDE 25 MG/1
TABLET, FILM COATED ORAL
Refills: 3 | OUTPATIENT
Start: 2017-07-23

## 2017-07-24 NOTE — TELEPHONE ENCOUNTER
Dr. Patel - Patient is now under your care. Please refill as you see fit. Also med is not currently active on pt's med list.

## 2017-07-25 RX ORDER — HYDROXYZINE HYDROCHLORIDE 25 MG/1
12.5-25 TABLET, FILM COATED ORAL 3 TIMES DAILY PRN
Qty: 60 TAB | Refills: 4 | Status: SHIPPED | OUTPATIENT
Start: 2017-07-25 | End: 2018-03-14 | Stop reason: SDUPTHER

## 2017-08-01 RX ORDER — ROSUVASTATIN CALCIUM 20 MG/1
TABLET, COATED ORAL
Refills: 0 | OUTPATIENT
Start: 2017-08-01

## 2017-08-02 ENCOUNTER — TELEPHONE (OUTPATIENT)
Dept: MEDICAL GROUP | Age: 82
End: 2017-08-02

## 2017-08-02 RX ORDER — ROSUVASTATIN CALCIUM 20 MG/1
TABLET, COATED ORAL
Qty: 90 TAB | Refills: 1 | Status: SHIPPED | OUTPATIENT
Start: 2017-08-02 | End: 2018-02-06 | Stop reason: SDUPTHER

## 2017-08-20 ENCOUNTER — OFFICE VISIT (OUTPATIENT)
Dept: URGENT CARE | Facility: CLINIC | Age: 82
End: 2017-08-20
Payer: MEDICARE

## 2017-08-20 VITALS
HEART RATE: 72 BPM | TEMPERATURE: 97.8 F | DIASTOLIC BLOOD PRESSURE: 88 MMHG | BODY MASS INDEX: 24.44 KG/M2 | OXYGEN SATURATION: 98 % | WEIGHT: 165 LBS | HEIGHT: 69 IN | SYSTOLIC BLOOD PRESSURE: 140 MMHG | RESPIRATION RATE: 18 BRPM

## 2017-08-20 DIAGNOSIS — L02.91 SOFT TISSUE ABSCESS: ICD-10-CM

## 2017-08-20 PROCEDURE — 10060 I&D ABSCESS SIMPLE/SINGLE: CPT | Performed by: NURSE PRACTITIONER

## 2017-08-20 RX ORDER — DOXYCYCLINE HYCLATE 100 MG
100 TABLET ORAL 2 TIMES DAILY
Qty: 14 TAB | Refills: 0 | Status: SHIPPED | OUTPATIENT
Start: 2017-08-20 | End: 2017-08-27

## 2017-08-20 ASSESSMENT — ENCOUNTER SYMPTOMS
ROS SKIN COMMENTS: CYST ON BACK
FEVER: 0

## 2017-08-20 NOTE — MR AVS SNAPSHOT
"        Ellen Srivastava   2017 1:30 PM   Office Visit   MRN: [id number]    Department:  [facility]   Dept Phone:  [phone]    Description:  Female : 1935   Provider:  CONCEPCIÓN Thomas           Reason for Visit     Cyst Notes yesterday lump on middle back      Allergies as of 2017     Allergen Noted Reactions    Asa [Aspirin] 09/15/2015   Unspecified    Headache      You were diagnosed with     Soft tissue abscess   [[id number]]         Vital Signs     Blood Pressure Pulse Temperature Respirations Height Weight    140/88 mmHg 72 36.6 °C (97.8 °F) 18 1.753 m (5' 9\") 74.844 kg (165 lb)    Body Mass Index Oxygen Saturation Last Menstrual Period Smoking Status          24.36 kg/m2 98% 1970 Never Smoker         Basic Information     Date Of Birth Sex Race Ethnicity Preferred Language    1935 Female White Non- English      Your appointments     Sep 14, 2017 10:00 AM   Established Patient with Shivani Patel M.D.   [facility] [address] [place] [id number] [phone]           You will be receiving a confirmation call a few days before your appointment from our automated call confirmation system.              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   2010 - Present    Preventative health care Z00.00   3/16/2010 - Present    Osteopenia M85.80   2010 - Present    Chronic back pain (Chronic) M54.9, G89.29   2011 - Present    Narcotic dependence (CMS-HCC) F11.20   2012 - Present    GERD (gastroesophageal " reflux disease) K21.9   10/12/2015 - Present    Paraesophageal hiatal hernia s/p robotic repair 10/12 K44.9 Low  10/22/2015 - Present    Depression with anxiety F41.8 High  10/22/2015 - Present    Vitamin D deficiency E55.9 Medium  12/2/2015 - Present    Chronic use of opiate drugs therapeutic purposes Z79.891   9/16/2016 - Present    Syncope R55 High  9/20/2016 - Present    Leukocytosis D72.829   9/20/2016 - Present    Risk for falls Z91.81   12/16/2016 - Present    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   6/14/2017 - Present      Health Maintenance        Date Due Completion Dates    IMM DTaP/Tdap/Td Vaccine (1 - Tdap) 3/13/1956 ---    IMM INFLUENZA (1) 9/1/2017 10/21/2016, 10/13/2015, 11/13/2014, 9/16/2009    BONE DENSITY 3/26/2018 3/26/2013, 4/23/2010    MAMMOGRAM 12/18/2018 1/18/2017, 5/21/2012 (Done)    Override on 5/21/2012: Done            Current Immunizations     13-VALENT PCV PREVNAR 11/13/2014    Influenza TIV (IM) 9/16/2009    Influenza Vaccine Adult HD 10/21/2016, 10/13/2015  4:39 PM, 11/13/2014    Pneumococcal polysaccharide vaccine (PPSV-23) 12/5/2015  9:53 AM, 12/5/2015    SHINGLES VACCINE 2/10/2009    Tetanus Vaccine 10/29/2012      Below and/or attached are the medications your provider expects you to take. Review all of your home medications and newly ordered medications with your provider and/or pharmacist. Follow medication instructions as directed by your provider and/or pharmacist. Please keep your medication list with you and share with your provider. Update the information when medications are discontinued, doses are changed, or new medications (including over-the-counter products) are added; and carry medication information at all times in the event of emergency situations     Allergies:  ASA - Unspecified               Medications  Valid as of: August 20, 2017 -  2:16 PM    Generic Name Brand Name Tablet Size Instructions for use    Cholecalciferol (Tab) VITAMIND D3 1000 UNIT Take 1 Tab by  mouth every day.        Cyanocobalamin (Tab) VITAMIN B-12 100 MCG TAKE ONE TABLET BY MOUTH DAILY        Doxycycline Hyclate (Tab) VIBRAMYCIN 100 MG Take 1 Tab by mouth 2 times a day for 7 days.        Escitalopram Oxalate (Tab) LEXAPRO 10 MG TAKE ONE TABLET BY MOUTH DAILY        Hydrocodone-Acetaminophen (Tab) NORCO  MG Take 1 Tab by mouth 2 times a day as needed.        HydrOXYzine HCl (Tab) ATARAX 25 MG Take 0.5-1 Tabs by mouth 3 times a day as needed for Anxiety. Max 3 tablets per 24 hours        Metoprolol Tartrate (Tab) LOPRESSOR 50 MG TAKE ONE TABLET BY MOUTH TWICE A DAY        Pantoprazole Sodium (Tablet Delayed Response) PROTONIX 40 MG Take 40 mg by mouth every day.        Rosuvastatin Calcium (Tab) CRESTOR 20 MG TAKE ONE TABLET BY MOUTH EVERY EVENING        TiZANidine HCl (Tab) ZANAFLEX 4 MG TAKE 1 TABLET BY MOUTH 2 TIMES A DAY AS NEEDED FOR SPASM        .                 Medicines prescribed today were sent to:     \[facility]\"" [facility] #[id number] [address] [address]    Phone: [phone] Fax: [phone]    Open 24 Hours?: No      Medication refill instructions:       If your prescription bottle indicates you have medication refills left, it is not necessary to call your provider’s office. Please contact your pharmacy and they will refill your medication.    If your prescription bottle indicates you do not have any refills left, you may request refills at any time through one of the following ways: The online [email or web address] system (except Urgent Care), by calling your provider’s office, or by asking your pharmacy to contact your provider’s office with a refill request. Medication refills are processed only during regular business hours and may not be available until the next business day. Your provider may request additional information or to have a follow-up visit with you prior to refilling your medication.   *Please Note: Medication refills are  assigned a new Rx number when refilled electronically. Your pharmacy may indicate that no refills were authorized even though a new prescription for the same medication is available at the pharmacy. Please request the medicine by name with the pharmacy before contacting your provider for a refill.        Other Notes About Your Plan     Update HCM dates.            Seabagst Access Code: Activation code not generated  Current Passbox Status: Active

## 2017-08-20 NOTE — PROGRESS NOTES
Subjective:      Ellen Srivastava is a 82 y.o. female who presents with Cyst            Cyst  This is a new problem. Episode onset: Pt states she noticed a bump on her back yesterday. She thinks maybe she was bit by an insect because the bump is painful. The problem occurs constantly. The problem has been unchanged. Pertinent negatives include no fever. Associated symptoms comments: Denies any drainage from the bump. Exacerbated by: more painful when her bra strap rubs over the top of it, irritates it. She has tried nothing for the symptoms.       Review of Systems   Constitutional: Negative for fever.   Skin:        Cyst on back   All other systems reviewed and are negative.    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   • Osteopenia 4/26/2010   • Parathyroid hormone excess (CMS-HCC) 4/26/2010   • Narcotic dependence (CMS-[facility]) 4/28/2012   • Vertigo, intermittent 4/28/2012   • Anemia    • High cholesterol    • Indigestion    • Hiatus hernia syndrome    • Disorder of thyroid    • Cataract    • Esophageal diverticulum       Past Surgical History   Procedure Laterality Date   • Breast biopsy Left 2015     Benign x3 surgeries   • Lumbar decompression  1969, 1979   • Hysterectomy, total abdominal  1977   • Appendectomy  1945   • Parathyroid exploration  6/16/2010     Performed by AYSE CARROLL at SURGERY SAME DAY [facility]   • Paraesophageal hernia robotic N/A 10/12/2015     Procedure: PARAESOPHAGEAL HERNIA REPAIR ROBOTIC resection of esophageal diverticulum  repair of hiatal hernia for anterior fundoplasty with mesh  ;  Surgeon: Ulysses Simpson M.D.;   "Location: SURGERY [facility];  Service:    • Gastroscopy  10/23/2015     Procedure: GASTROSCOPY- EGD with esophageal stent placement ;  Surgeon: Jossue Ruggiero M.D.;  Location: SURGERY [facility];  Service:    • Thoracoscopy Left 10/30/2015     Procedure: THORACOSCOPY;  Surgeon: Blaine Waterman D.O.;  Location: SURGERY [facility];  Service:    • Gastroscopy-endo  11/1/2015     Procedure: GASTROSCOPY-ENDO;  Surgeon: Conner Joe M.D.;  Location: ENDOSCOPY [facility];  Service:    • Gastroscopy-endo N/A 12/10/2015     Procedure: GASTROSCOPY-ENDO;  Surgeon: Heri Velasco M.D.;  Location: SURGERY [facility];  Service:    • Gastroscopy-endo N/A 2/26/2016     Procedure: GASTROSCOPY-ENDO W/ESOPHAGEAL METAL STENT REMOVAL;  Surgeon: Jossue Ruggiero M.D.;  Location: SURGERY [facility];  Service:    • Ankle orif Right 9/20/2016     Procedure: ANKLE ORIF;  Surgeon: Stef Bray M.D.;  Location: SURGERY [facility];  Service:       Social History     Social History   • Marital Status:      Spouse Name: N/A   • Number of Children: N/A   • Years of Education: N/A     Occupational History   • Not on file.     Social History Main Topics   • Smoking status: Never Smoker    • Smokeless tobacco: Former User      Comment: Nicotine    • Alcohol Use: No   • Drug Use: No   • Sexual Activity: No     Other Topics Concern   • Not on file     Social History Narrative    Lives in [facility] and in [place].          Objective:     /88 mmHg  Pulse 72  Temp(Src) 36.6 °C (97.8 °F)  Resp 18  Ht 1.753 m (5' 9\")  Wt 74.844 kg (165 lb)  BMI 24.36 kg/m2  SpO2 98%  LMP 03/13/1970     Physical Exam   Constitutional: She is oriented to person, place, and time. Vital signs are normal. She appears well-developed and well-nourished.   HENT:   Head: Normocephalic and atraumatic.   Eyes: EOM are normal. Pupils are equal, round, and reactive to light.   Neck: Normal range of " motion.   Cardiovascular: Normal rate and regular rhythm.    Pulmonary/Chest: Effort normal.       Musculoskeletal: Normal range of motion.   Neurological: She is alert and oriented to person, place, and time.   Skin: Skin is warm and dry.   Psychiatric: She has a normal mood and affect. Her speech is normal and behavior is normal. Thought content normal.   Vitals reviewed.         Procedure: Incision and Drainage  -Risks, benefits, and alternatives discussed. Risks including infection, bleeding, nerve damage, and poor cosmetic outcome  -Sterile technique throughout  -Local anesthesia with 2% lidocaine with epinephrine  -Incision with #11 blade into fluctuant area with purulent material expressed  -Cavity probed and any loculations bluntly taken down with hemostat  -Irrigated copiously with NS  -Minimal bleeding with good hemostasis achieved  -The patient tolerated the procedure well         Assessment/Plan:     1. Soft tissue abscess  - doxycycline (VIBRAMYCIN) 100 MG Tab; Take 1 Tab by mouth 2 times a day for 7 days.  Dispense: 14 Tab; Refill: 0    Change bandage PRN drainage  No swimming until incision closes  Finish ABX completely  Supportive care, differential diagnoses, and indications for immediate follow-up discussed with patient.    Pathogenesis of diagnosis discussed including typical length and natural progression.      Instructed to return to  or nearest emergency department if symptoms fail to improve, for any change in condition, further concerns, or new concerning symptoms.  Patient states understanding of the plan of care and discharge instructions.

## 2017-08-21 RX ORDER — UBIDECARENONE 75 MG
CAPSULE ORAL
Refills: 0 | OUTPATIENT
Start: 2017-08-21

## 2017-08-21 NOTE — TELEPHONE ENCOUNTER
CALLED [facility] 08/21 AND ASKED FOR THIS REQUEST TO BE RE-ROUTED TO CORRECT PCP OFFICE. AGUILA

## 2017-08-23 RX ORDER — UBIDECARENONE 75 MG
CAPSULE ORAL
Qty: 30 TAB | Refills: 0 | Status: SHIPPED | OUTPATIENT
Start: 2017-08-23 | End: 2017-09-29 | Stop reason: SDUPTHER

## 2017-08-30 DIAGNOSIS — K21.00 GASTROESOPHAGEAL REFLUX DISEASE WITH ESOPHAGITIS: ICD-10-CM

## 2017-08-30 RX ORDER — PANTOPRAZOLE SODIUM 40 MG/1
40 TABLET, DELAYED RELEASE ORAL DAILY
Qty: 90 TAB | Refills: 1 | Status: SHIPPED | OUTPATIENT
Start: 2017-08-30 | End: 2018-04-09 | Stop reason: SDUPTHER

## 2017-09-08 RX ORDER — ESCITALOPRAM OXALATE 10 MG/1
TABLET ORAL
Qty: 30 TAB | Refills: 0 | Status: SHIPPED | OUTPATIENT
Start: 2017-09-08 | End: 2017-10-09 | Stop reason: SDUPTHER

## 2017-09-13 ENCOUNTER — TELEPHONE (OUTPATIENT)
Dept: MEDICAL GROUP | Age: 82
End: 2017-09-13

## 2017-09-13 NOTE — TELEPHONE ENCOUNTER
ESTABLISHED PATIENT PRE-VISIT PLANNING     Note: Patient will not be contacted if there is no indication to call.     1.  Reviewed notes from the last few office visits within the medical group: Yes    2.  If any orders were placed at last visit or intended to be done for this visit (i.e. 6 mos follow-up), do we have Results/Consult Notes?        •  Labs - Labs ordered, completed and results are in chart.       •  Imaging - Imaging was not ordered at last office visit.       •  Referrals - No referrals were ordered at last office visit.    3. Is this appointment scheduled as a Hospital Follow-Up? No    4.  Immunizations were updated in Epic using WebIZ?: Epic matches WebIZ       •  Web Iz Recommendations: FLU and TDAP    5.  Patient is due for the following Health Maintenance Topics:   Health Maintenance Due   Topic Date Due   • Annual Wellness Visit  1935   • IMM DTaP/Tdap/Td Vaccine (1 - Tdap) 03/13/1956   • IMM INFLUENZA (1) 09/01/2017   • URINE DRUG SCREEN  09/15/2017       - Patient is up-to-date on all Health Maintenance topics. No records have been requested at this time.    6.  Patient was NOT informed to arrive 15 min prior to their scheduled appointment and bring in their medication bottles.

## 2017-09-14 ENCOUNTER — HOSPITAL ENCOUNTER (OUTPATIENT)
Facility: MEDICAL CENTER | Age: 82
End: 2017-09-14
Attending: FAMILY MEDICINE
Payer: MEDICARE

## 2017-09-14 ENCOUNTER — OFFICE VISIT (OUTPATIENT)
Dept: MEDICAL GROUP | Age: 82
End: 2017-09-14
Payer: MEDICARE

## 2017-09-14 VITALS
DIASTOLIC BLOOD PRESSURE: 80 MMHG | HEART RATE: 80 BPM | BODY MASS INDEX: 24.38 KG/M2 | HEIGHT: 69 IN | OXYGEN SATURATION: 97 % | TEMPERATURE: 98.6 F | SYSTOLIC BLOOD PRESSURE: 142 MMHG | WEIGHT: 164.6 LBS

## 2017-09-14 DIAGNOSIS — M54.50 CHRONIC BILATERAL LOW BACK PAIN WITHOUT SCIATICA: ICD-10-CM

## 2017-09-14 DIAGNOSIS — D48.9 NEOPLASM OF UNCERTAIN BEHAVIOR: ICD-10-CM

## 2017-09-14 DIAGNOSIS — G89.29 CHRONIC BILATERAL LOW BACK PAIN WITHOUT SCIATICA: ICD-10-CM

## 2017-09-14 DIAGNOSIS — G62.9 NEUROPATHY: ICD-10-CM

## 2017-09-14 PROCEDURE — 99212 OFFICE O/P EST SF 10 MIN: CPT | Mod: 25 | Performed by: FAMILY MEDICINE

## 2017-09-14 PROCEDURE — 11400 EXC TR-EXT B9+MARG 0.5 CM<: CPT | Performed by: FAMILY MEDICINE

## 2017-09-14 PROCEDURE — 88305 TISSUE EXAM BY PATHOLOGIST: CPT

## 2017-09-14 RX ORDER — HYDROCODONE BITARTRATE AND ACETAMINOPHEN 10; 325 MG/1; MG/1
1 TABLET ORAL 2 TIMES DAILY PRN
Qty: 60 TAB | Refills: 0 | Status: SHIPPED | OUTPATIENT
Start: 2017-09-14 | End: 2017-12-14 | Stop reason: SDUPTHER

## 2017-09-14 RX ORDER — HYDROCODONE BITARTRATE AND ACETAMINOPHEN 10; 325 MG/1; MG/1
1 TABLET ORAL 2 TIMES DAILY PRN
Qty: 60 TAB | Refills: 0 | Status: SHIPPED | OUTPATIENT
Start: 2017-09-14 | End: 2017-09-14 | Stop reason: SDUPTHER

## 2017-09-14 RX ORDER — GABAPENTIN 300 MG/1
300 CAPSULE ORAL 3 TIMES DAILY
Qty: 90 CAP | Refills: 0 | Status: SHIPPED | OUTPATIENT
Start: 2017-09-14 | End: 2017-11-08 | Stop reason: SDUPTHER

## 2017-09-14 NOTE — PROGRESS NOTES
This medical record contains text that has been entered with the assistance of computer voice recognition and dictation software.  Therefore, it may contain unintended errors in text, spelling, punctuation, or grammar    Chief Complaint   Patient presents with   • Other     see reason for visit       Ellen Srivastava is a 82 y.o. female here evaluation and management of: excision x 2, neuropathy,       HPI:     Neuropathy (CMS-HCC)  Patient states that every time she gets up in the morning her feet are very numb sometimes she can't feel them and the Norco does help but she would like to try something else as well.    Neoplasm of uncertain behavior  Patient states that she has this very severe itching and rash on her left hamstring which is been there for more than a year the lesion does not go away.    We also noticed an irregular hyperpigmented macule on her upper back.    Current medicines (including changes today)  Current Outpatient Prescriptions   Medication Sig Dispense Refill   • gabapentin (NEURONTIN) 300 MG Cap Take 1 Cap by mouth 3 times a day. 90 Cap 0   • hydrocodone/acetaminophen (NORCO)  MG Tab Take 1 Tab by mouth 2 times a day as needed. 60 Tab 0   • escitalopram (LEXAPRO) 10 MG Tab TAKE ONE TABLET BY MOUTH DAILY 30 Tab 0   • pantoprazole (PROTONIX) 40 MG Tablet Delayed Response Take 1 Tab by mouth every day. 90 Tab 1   • cyanocobalamin (VITAMIN B-12) 100 MCG Tab TAKE ONE TABLET BY MOUTH DAILY 30 Tab 0   • rosuvastatin (CRESTOR) 20 MG Tab TAKE ONE TABLET BY MOUTH EVERY EVENING 90 Tab 1   • hydrOXYzine (ATARAX) 25 MG Tab Take 0.5-1 Tabs by mouth 3 times a day as needed for Anxiety. Max 3 tablets per 24 hours 60 Tab 4   • metoprolol (LOPRESSOR) 50 MG Tab TAKE ONE TABLET BY MOUTH TWICE A  Tab 1   • tizanidine (ZANAFLEX) 4 MG Tab TAKE 1 TABLET BY MOUTH 2 TIMES A DAY AS NEEDED FOR SPASM 60 Tab 5   • pantoprazole (PROTONIX) 40 MG Tablet Delayed Response Take 40 mg by mouth every day.    "  • vitamin D (VITAMIND D3) 1000 UNIT Tab Take 1 Tab by mouth every day. 30 Tab      No current facility-administered medications for this visit.      She  has a past medical history of Anemia; Atrophic vaginitis (2010); Cataract; Cerumen Impaction Recurrent (2010); Depressive disorder, not elsewhere classified (2010); Disorder of thyroid; Esophageal diverticulum; HDL lipoprotein deficiency (2010); Hiatus hernia syndrome; High cholesterol; HTN (hypertension) (2010); Hyperlipidemia LDL goal < 130 (10/21/2009); Hypertriglyceridemia (2010); Indigestion; Narcotic dependence (CMS-HCC) (2012); Osteopenia (2010); Parathyroid hormone excess (CMS-HCC) (2010); Serum calcium elevated (2010); Tinnitus (2010); and Vertigo, intermittent (2012).  She  has a past surgical history that includes breast biopsy (Left, ); lumbar decompression (, ); hysterectomy, total abdominal (); appendectomy (); parathyroid exploration (2010); paraesophageal hernia robotic (N/A, 10/12/2015); gastroscopy (10/23/2015); thoracoscopy (Left, 10/30/2015); gastroscopy-endo (2015); gastroscopy-endo (N/A, 12/10/2015); gastroscopy-endo (N/A, 2016); and ankle orif (Right, 2016).  Social History   Substance Use Topics   • Smoking status: Never Smoker   • Smokeless tobacco: Former User      Comment: Nicotine    • Alcohol use No     Social History     Social History Narrative    Lives in [facility] and in [place].     Family History   Problem Relation Age of Onset   • Hypertension Other    • Lung Disease       Family Status   Relation Status   • Mother  at age 86    Old Age   • Father  at age 83    Lung Disease         ROS    Please see hpi     All other systems reviewed and are negative     Objective:     Height 1.753 m (5' 9.02\"), last menstrual period 1970. There is no height or weight on file to calculate BMI.  Physical " Exam:    Excision---    #1 3 mm, irregular, assymmetric, hyperpigmented macule located on mid back    #2 Left Hamstring--there is a flaky raised erythematous angry-appearing lesion about 3 cm on left hamstring    After informed written consent was obtained, using Betadine for cleansing and 1% Lidocaine w- epinephrine for anesthetic, with sterile technique a 6 mm punch tool was used to obtain and excise  the lesion through dermis (full thickness) . Intent was to remove entire lesion with margins . Hemostasis was obtained by pressure and wound was  Sutured  With 6.0 Nylon x1. Antibiotic dressing is applied, and wound care instructions provided. Be alert for any signs of cutaneous infection. The specimen is labeled and sent to pathology for evaluation. The procedure was well tolerated without complications.    Eye: Equal, round and reactive, conjunctiva clear, lids normal.  ENMT: Lips without lesions, good dentition, oropharynx clear.  Neck: Trachea midline, no masses, no thyromegaly. No cervical or supraclavicular lymphadenopathy.  Respiratory: Unlabored respiratory effort, lungs clear to auscultation, no wheezes, no ronchi.  Cardiovascular: Normal S1, S2, no murmur, no edema.  Abdomen: Soft, non-tender, no masses, no hepatosplenomegaly.  Psych: Alert and oriented x3, normal affect and mood.          Assessment and Plan:   The following treatment plan was discussed      1. Neuropathy (CMS-HCC)  We will try Gabapentin    - gabapentin (NEURONTIN) 300 MG Cap; Take 1 Cap by mouth 3 times a day.  Dispense: 90 Cap; Refill: 0  - hydrocodone/acetaminophen (NORCO)  MG Tab; Take 1 Tab by mouth 2 times a day as needed.  Dispense: 60 Tab; Refill: 0    2. Chronic bilateral low back pain without sciatica      We talked about the addictive nature of this medication and all side effects including but not limited to respiratory depression and death.     .  - hydrocodone/acetaminophen (NORCO)  MG Tab; Take 1 Tab by mouth 2  times a day as needed.  Dispense: 60 Tab; Refill: 0    3. Neoplasm of uncertain behavior  Patient tollerrated procedure well  There were no adverse events  Patient was given post procedure precautions     - PATHOLOGY SPECIMEN; Future        Over 40 minutes spent with patient face to face, greater than 50% time spent with plan/cordination of care as above in my A/P.      HEALTH MAINTENANCE:    Instructed to Follow up in clinic or ER for worsening symptoms, difficulty breathing, lack of expected recovery, or should new symptoms or problems arise.    Followup: Return for Suture Removal.       Once again this medical record contains text that has been entered with the assistance of computer voice recognition and dictation software.  Therefore, it may contain unintended errors in text, spelling, punctuation, or grammar

## 2017-09-14 NOTE — ASSESSMENT & PLAN NOTE
Patient states that every time she gets up in the morning her feet are very numb sometimes she can't feel them and the Norco does help but she would like to try something else as well.

## 2017-10-03 ENCOUNTER — OFFICE VISIT (OUTPATIENT)
Dept: MEDICAL GROUP | Age: 82
End: 2017-10-03
Payer: MEDICARE

## 2017-10-03 VITALS
BODY MASS INDEX: 24.88 KG/M2 | TEMPERATURE: 98.2 F | DIASTOLIC BLOOD PRESSURE: 70 MMHG | HEART RATE: 86 BPM | OXYGEN SATURATION: 97 % | SYSTOLIC BLOOD PRESSURE: 138 MMHG | WEIGHT: 168 LBS | HEIGHT: 69 IN

## 2017-10-03 DIAGNOSIS — I73.00 RAYNAUD'S PHENOMENON WITHOUT GANGRENE: ICD-10-CM

## 2017-10-03 DIAGNOSIS — E55.9 VITAMIN D DEFICIENCY: ICD-10-CM

## 2017-10-03 DIAGNOSIS — Z23 NEED FOR VACCINATION: ICD-10-CM

## 2017-10-03 DIAGNOSIS — Z48.02 VISIT FOR SUTURE REMOVAL: ICD-10-CM

## 2017-10-03 PROCEDURE — 99214 OFFICE O/P EST MOD 30 MIN: CPT | Mod: 25 | Performed by: FAMILY MEDICINE

## 2017-10-03 PROCEDURE — G0008 ADMIN INFLUENZA VIRUS VAC: HCPCS | Performed by: FAMILY MEDICINE

## 2017-10-03 PROCEDURE — 90662 IIV NO PRSV INCREASED AG IM: CPT | Performed by: FAMILY MEDICINE

## 2017-10-03 RX ORDER — UBIDECARENONE 75 MG
CAPSULE ORAL
Qty: 30 TAB | Refills: 0 | Status: SHIPPED
Start: 2017-10-03 | End: 2020-07-04

## 2017-10-03 NOTE — ASSESSMENT & PLAN NOTE
NEW PROBLEM    Patient states that she's noticed that her legs become very cold and numb anytime they're exposed to cold weather. She was worried that this is a circulation problem related to her severe neuropathy. She has not tried any medications for this. The symptoms seem to worsen during the winter months and are gone and the summer.

## 2017-10-03 NOTE — PROGRESS NOTES
This medical record contains text that has been entered with the assistance of computer voice recognition and dictation software.  Therefore, it may contain unintended errors in text, spelling, punctuation, or grammar    Chief Complaint   Patient presents with   • Other     see reason for visit       Ellen Srivastava is a 82 y.o. female here evaluation and management of: cold legs, suture removal      HPI:     Raynaud's phenomenon without gangrene  NEW PROBLEM    Patient states that she's noticed that her legs become very cold and numb anytime they're exposed to cold weather. She was worried that this is a circulation problem related to her severe neuropathy. She has not tried any medications for this. The symptoms seem to worsen during the winter months and are gone and the summer.    Visit for suture removal    The patient underwent excision and returns for suture removal.    Current medicines (including changes today)  Current Outpatient Prescriptions   Medication Sig Dispense Refill   • escitalopram (LEXAPRO) 10 MG Tab TAKE ONE TABLET BY MOUTH DAILY 30 Tab 0   • pantoprazole (PROTONIX) 40 MG Tablet Delayed Response Take 1 Tab by mouth every day. 90 Tab 1   • rosuvastatin (CRESTOR) 20 MG Tab TAKE ONE TABLET BY MOUTH EVERY EVENING 90 Tab 1   • hydrOXYzine (ATARAX) 25 MG Tab Take 0.5-1 Tabs by mouth 3 times a day as needed for Anxiety. Max 3 tablets per 24 hours 60 Tab 4   • metoprolol (LOPRESSOR) 50 MG Tab TAKE ONE TABLET BY MOUTH TWICE A  Tab 1   • tizanidine (ZANAFLEX) 4 MG Tab TAKE 1 TABLET BY MOUTH 2 TIMES A DAY AS NEEDED FOR SPASM 60 Tab 5   • pantoprazole (PROTONIX) 40 MG Tablet Delayed Response Take 40 mg by mouth every day.     • vitamin D (VITAMIND D3) 1000 UNIT Tab Take 1 Tab by mouth every day. 30 Tab    • cyanocobalamin (VITAMIN B-12) 100 MCG Tab TAKE ONE TABLET BY MOUTH DAILY (CYANOCOBALAMIN) 30 Tab 0   • gabapentin (NEURONTIN) 300 MG Cap Take 1 Cap by mouth 3 times a day. 90 Cap 0   •  "hydrocodone/acetaminophen (NORCO)  MG Tab Take 1 Tab by mouth 2 times a day as needed. 60 Tab 0     No current facility-administered medications for this visit.      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  She  has a past surgical history that includes breast biopsy (Left, ); lumbar decompression (, ); hysterectomy, total abdominal (); appendectomy (); parathyroid exploration (2010); paraesophageal hernia robotic (N/A, 10/12/2015); gastroscopy (10/23/2015); thoracoscopy (Left, 10/30/2015); gastroscopy-endo (2015); gastroscopy-endo (N/A, 12/10/2015); gastroscopy-endo (N/A, 2016); and ankle orif (Right, 2016).  Social History   Substance Use Topics   • Smoking status: Never Smoker   • Smokeless tobacco: Former User      Comment: Nicotine    • Alcohol use No     Social History     Social History Narrative    Lives in [facility] and in [place].     Family History   Problem Relation Age of Onset   • Hypertension Other    • Lung Disease       Family Status   Relation Status   • Mother  at age 86    Old Age   • Father  at age 83    Lung Disease         ROS    Please see hpi     All other systems reviewed and are negative     Objective:     Blood pressure 138/70, pulse 86, temperature 36.8 °C (98.2 °F), height 1.753 m (5' 9.02\"), weight 76.2 kg (168 lb), last " menstrual period 03/13/1970, SpO2 97 %. Body mass index is 24.8 kg/m².  Physical Exam:    Constitutional: Alert, no distress.  Skin: Warm, dry, good turgor, no rashes in visible areas.  Eye: Equal, round and reactive, conjunctiva clear, lids normal.  ENMT: Lips without lesions, good dentition, oropharynx clear.  Neck: Trachea midline, no masses, no thyromegaly. No cervical or supraclavicular lymphadenopathy.  Respiratory: Unlabored respiratory effort, lungs clear to auscultation, no wheezes, no ronchi.  Cardiovascular: Normal S1, S2, no murmur, no edema.  Abdomen: Soft, non-tender, no masses, no hepatosplenomegaly.  Psych: Alert and oriented x3, normal affect and mood.  EXT--warm, scarce hair present, 2+dorsal pedal pulses, bilaterally        Assessment and Plan:   The following treatment plan was discussed      1. Need for vaccination  Given today    - INFLUENZA VACCINE, HIGH DOSE (65+ ONLY)    2. Raynaud's phenomenon without gangrene  We will try conservative rx  Before considering calcium channel blocker        HEALTH MAINTENANCE:    Instructed to Follow up in clinic or ER for worsening symptoms, difficulty breathing, lack of expected recovery, or should new symptoms or problems arise.    Followup: No Follow-up on file.       Once again this medical record contains text that has been entered with the assistance of computer voice recognition and dictation software.  Therefore, it may contain unintended errors in text, spelling, punctuation, or grammar

## 2017-10-10 RX ORDER — ESCITALOPRAM OXALATE 10 MG/1
TABLET ORAL
Qty: 90 TAB | Refills: 0 | Status: SHIPPED | OUTPATIENT
Start: 2017-10-10 | End: 2018-01-08 | Stop reason: SDUPTHER

## 2017-11-07 RX ORDER — TIZANIDINE 4 MG/1
TABLET ORAL
Qty: 60 TAB | Refills: 4 | Status: SHIPPED | OUTPATIENT
Start: 2017-11-07 | End: 2018-05-31 | Stop reason: SDUPTHER

## 2017-11-08 DIAGNOSIS — G62.9 NEUROPATHY: ICD-10-CM

## 2017-11-08 RX ORDER — GABAPENTIN 300 MG/1
CAPSULE ORAL
Qty: 90 CAP | Refills: 0 | Status: SHIPPED | OUTPATIENT
Start: 2017-11-08 | End: 2017-12-11 | Stop reason: SDUPTHER

## 2017-12-11 DIAGNOSIS — G62.9 NEUROPATHY: ICD-10-CM

## 2017-12-11 RX ORDER — GABAPENTIN 300 MG/1
CAPSULE ORAL
Qty: 90 CAP | Refills: 3 | Status: SHIPPED | OUTPATIENT
Start: 2017-12-11 | End: 2018-04-10 | Stop reason: SDUPTHER

## 2017-12-14 ENCOUNTER — OFFICE VISIT (OUTPATIENT)
Dept: MEDICAL GROUP | Age: 82
End: 2017-12-14
Payer: MEDICARE

## 2017-12-14 VITALS
DIASTOLIC BLOOD PRESSURE: 80 MMHG | HEIGHT: 69 IN | TEMPERATURE: 98.6 F | WEIGHT: 169 LBS | SYSTOLIC BLOOD PRESSURE: 136 MMHG | HEART RATE: 81 BPM | BODY MASS INDEX: 25.03 KG/M2 | OXYGEN SATURATION: 95 %

## 2017-12-14 DIAGNOSIS — G89.29 CHRONIC BILATERAL LOW BACK PAIN WITHOUT SCIATICA: ICD-10-CM

## 2017-12-14 DIAGNOSIS — I10 ESSENTIAL HYPERTENSION: ICD-10-CM

## 2017-12-14 DIAGNOSIS — Z79.891 CHRONIC USE OF OPIATE DRUGS THERAPEUTIC PURPOSES: ICD-10-CM

## 2017-12-14 DIAGNOSIS — M54.50 CHRONIC BILATERAL LOW BACK PAIN WITHOUT SCIATICA: ICD-10-CM

## 2017-12-14 DIAGNOSIS — G62.9 NEUROPATHY: ICD-10-CM

## 2017-12-14 DIAGNOSIS — E78.5 HYPERLIPIDEMIA WITH TARGET LDL LESS THAN 130: ICD-10-CM

## 2017-12-14 DIAGNOSIS — L57.0 AK (ACTINIC KERATOSIS): ICD-10-CM

## 2017-12-14 DIAGNOSIS — K21.00 GASTROESOPHAGEAL REFLUX DISEASE WITH ESOPHAGITIS: ICD-10-CM

## 2017-12-14 PROCEDURE — 17000 DESTRUCT PREMALG LESION: CPT | Performed by: FAMILY MEDICINE

## 2017-12-14 PROCEDURE — 99214 OFFICE O/P EST MOD 30 MIN: CPT | Mod: 25 | Performed by: FAMILY MEDICINE

## 2017-12-14 RX ORDER — HYDROCODONE BITARTRATE AND ACETAMINOPHEN 10; 325 MG/1; MG/1
1 TABLET ORAL 2 TIMES DAILY PRN
Qty: 60 TAB | Refills: 0 | Status: SHIPPED | OUTPATIENT
Start: 2017-12-14 | End: 2017-12-14 | Stop reason: SDUPTHER

## 2017-12-14 RX ORDER — HYDROCODONE BITARTRATE AND ACETAMINOPHEN 10; 325 MG/1; MG/1
1 TABLET ORAL 2 TIMES DAILY PRN
Qty: 60 TAB | Refills: 0 | Status: SHIPPED | OUTPATIENT
Start: 2017-12-14 | End: 2018-01-13

## 2017-12-14 NOTE — ASSESSMENT & PLAN NOTE
Metoprolol 50mg po q24h    The patient has been tollerating the BP meds without any issues. No tunnel vision, no cough, no changes in vision, no lightheadedness, no fatigue, no syncopal or presyncopal episodes, no edema, no new rashes.

## 2017-12-14 NOTE — ASSESSMENT & PLAN NOTE
Patient continues to be compliant with her medication Norco 10 mg by mouth twice a day, she's been on this regimen for years for her severe low back pain as well as restless leg syndrome. She states that she never takes more than prescribed and never shares her medications.

## 2017-12-14 NOTE — PROGRESS NOTES
This medical record contains text that has been entered with the assistance of computer voice recognition and dictation software.  Therefore, it may contain unintended errors in text, spelling, punctuation, or grammar    Chief Complaint   Patient presents with   • Other     see reason for visit       Ellen Srivastava is a 82 y.o. female here evaluation and management of: AK, med refill, HLD, HTN      HPI:     AK (actinic keratosis)  Patient is really some damaged skin from years of sun exposure and tanning.    Chronic use of opiate drugs therapeutic purposes  Patient continues to be compliant with her medication Norco 10 mg by mouth twice a day, she's been on this regimen for years for her severe low back pain as well as restless leg syndrome. She states that she never takes more than prescribed and never shares her medications.    Hyperlipidemia with target LDL less than 130  Rosuvastatin 20 mg by mouth daily at bedtime  The patient has been on a statin for years and tolerating this fine. The patient denies any muscle aches, no abdominal pain and no history of elevated liver enzymes.    HTN (hypertension)  Metoprolol 50mg po q24h    The patient has been tollerating the BP meds without any issues. No tunnel vision, no cough, no changes in vision, no lightheadedness, no fatigue, no syncopal or presyncopal episodes, no edema, no new rashes.     GERD (gastroesophageal reflux disease)    Patient denies any difficulty swallowing, no regurgitation, no eructation, no weight loss, no nocturnal asthma no food getting stuck in the chest.    Current medicines (including changes today)  Current Outpatient Prescriptions   Medication Sig Dispense Refill   • hydrocodone/acetaminophen (NORCO)  MG Tab Take 1 Tab by mouth 2 times a day as needed for up to 30 days. 60 Tab 0   • gabapentin (NEURONTIN) 300 MG Cap TAKE ONE CAPSULE BY MOUTH THREE TIMES A DAY 90 Cap 3   • pantoprazole (PROTONIX) 40 MG Tablet Delayed Response  Take 1 Tab by mouth every day. 90 Tab 1   • rosuvastatin (CRESTOR) 20 MG Tab TAKE ONE TABLET BY MOUTH EVERY EVENING 90 Tab 1   • hydrOXYzine (ATARAX) 25 MG Tab Take 0.5-1 Tabs by mouth 3 times a day as needed for Anxiety. Max 3 tablets per 24 hours 60 Tab 4   • metoprolol (LOPRESSOR) 50 MG Tab TAKE ONE TABLET BY MOUTH TWICE A  Tab 1   • pantoprazole (PROTONIX) 40 MG Tablet Delayed Response Take 40 mg by mouth every day.     • vitamin D (VITAMIND D3) 1000 UNIT Tab Take 1 Tab by mouth every day. 30 Tab    • tizanidine (ZANAFLEX) 4 MG Tab TAKE ONE TABLET BY MOUTH TWICE A DAY AS NEEDED FOR SPASM 60 Tab 4   • escitalopram (LEXAPRO) 10 MG Tab TAKE ONE TABLET BY MOUTH DAILY 90 Tab 0   • cyanocobalamin (VITAMIN B-12) 100 MCG Tab TAKE ONE TABLET BY MOUTH DAILY (CYANOCOBALAMIN) 30 Tab 0     No current facility-administered medications for this visit.      She  has a past medical history of Anemia; Atrophic vaginitis (1/30/2010); Cataract; Cerumen Impaction Recurrent (1/30/2010); Depressive disorder, not elsewhere classified (1/30/2010); Disorder of thyroid; Esophageal diverticulum; HDL lipoprotein deficiency (1/30/2010); Hiatus hernia syndrome; High cholesterol; HTN (hypertension) (1/26/2010); Hyperlipidemia LDL goal < 130 (10/21/2009); Hypertriglyceridemia (1/30/2010); Indigestion; Narcotic dependence (CMS-[facility]) (4/28/2012); Osteopenia (4/26/2010); Parathyroid hormone excess (CMS-[facility]) (4/26/2010); Serum calcium elevated (1/14/2010); Tinnitus (1/14/2010); and Vertigo, intermittent (4/28/2012).  She  has a past surgical history that includes breast biopsy (Left, 2015); lumbar decompression (1969, 1979); hysterectomy, total abdominal (1977); appendectomy (1945); parathyroid exploration (6/16/2010); paraesophageal hernia robotic (N/A, 10/12/2015); gastroscopy (10/23/2015); thoracoscopy (Left, 10/30/2015); gastroscopy-endo (11/1/2015); gastroscopy-endo (N/A, 12/10/2015); gastroscopy-endo (N/A, 2/26/2016); and ankle orif  "(Right, 2016).  Social History   Substance Use Topics   • Smoking status: Never Smoker   • Smokeless tobacco: Former User      Comment: Nicotine    • Alcohol use No     Social History     Social History Narrative    Lives in [facility] and in [place].     Family History   Problem Relation Age of Onset   • Hypertension Other    • Lung Disease       Family Status   Relation Status   • Mother  at age 86    Old Age   • Father  at age 83    Lung Disease         ROS    Please see hpi     All other systems reviewed and are negative     Objective:     Blood pressure 136/80, pulse 81, temperature 37 °C (98.6 °F), height 1.753 m (5' 9.02\"), weight 76.7 kg (169 lb), last menstrual period 1970, SpO2 95 %. Body mass index is 24.95 kg/m².  Physical Exam:    Constitutional: Alert, no distress.  Skin: Warm, dry, good turgor, no rashes in visible areas.  Eye: Equal, round and reactive, conjunctiva clear, lids normal.  ENMT: Lips without lesions, good dentition, oropharynx clear.  Neck: Trachea midline, no masses, no thyromegaly. No cervical or supraclavicular lymphadenopathy.  Respiratory: Unlabored respiratory effort, lungs clear to auscultation, no wheezes, no ronchi.  Cardiovascular: Normal S1, S2, no murmur, no edema.  Abdomen: Soft, non-tender, no masses, no hepatosplenomegaly.  Psych: Alert and oriented x3, normal affect and mood.      SKIN EXAM      multiple lesions on bilateral forearms, hands, and chest, with evidence of of solar damage present , spotty hyperpigmentation, scattered telangiectasias, and  Xerosis      PROCEDURE: CRYOTHERAPY  Discussed risks and benefits of cryotherapy including but not limited to scarring, hyperpigmentation, hypopigmentation, hypertrophic scarring, keiloid scarring, incomplete or no resolution of lesions treated,pain, undesirable cosemetic result, blistering, potential need for additional treatment including more invasive treatment. Patient expresses understanding and " verbally acknowledges risks and consent to treatment. 2  applications of cryotherapy with 3 second freeze thaw cycle was applied to  1 AK's. Patient tolerated procedure well. There were no complications. Aftercare instructions given.            Assessment and Plan:   The following treatment plan was discussed      1. Neuropathy (CMS-[facility])  Reviewed pain contract with patient -- it was signed  The patient  may have to give random urine drug screens    will not have the pain meds refilled early, whether he lost meds or not    We reviewed all side effects including but not limited to respiratory depression and death    Due for UDS    - hydrocodone/acetaminophen (NORCO)  MG Tab; Take 1 Tab by mouth 2 times a day as needed for up to 30 days.  Dispense: 60 Tab; Refill: 0    2. Chronic bilateral low back pain without sciatica    Same as above    - hydrocodone/acetaminophen (NORCO)  MG Tab; Take 1 Tab by mouth 2 times a day as needed for up to 30 days.  Dispense: 60 Tab; Refill: 0    3. Gastroesophageal reflux disease with esophagitis     Gerd precautions given (avoid the supine position after eating, avoid tight fitting clothing, head of bed elevation by raisin legs of bed,  avoid eating prior to sleeping, avoid reflux-inducing foods (foods high in fat, chocolate, peppermint, and excessive alcohol, which can decrease LES pressure), promote salivation by chewing gum or lozenges,      4. Essential hypertension  Patient has been stable with current management  We will make no changes for now      5. Hyperlipidemia with target LDL less than 130  Patient has been stable with current management  We will make no changes for now      6. AK (actinic keratosis)  Patient tollerrated procedure well  There were no adverse events  Patient was given post procedure precautions           HEALTH MAINTENANCE:    Instructed to Follow up in clinic or ER for worsening symptoms, difficulty breathing, lack of expected recovery, or  should new symptoms or problems arise.    Followup: Return in about 2 weeks (around 12/28/2017) for RE-EXAMINE MACULES UNDER DERMOSCOPY.       Once again this medical record contains text that has been entered with the assistance of computer voice recognition and dictation software.  Therefore, it may contain unintended errors in text, spelling, punctuation, or grammar

## 2017-12-14 NOTE — ASSESSMENT & PLAN NOTE
Patient denies any difficulty swallowing, no regurgitation, no eructation, no weight loss, no nocturnal asthma no food getting stuck in the chest.

## 2017-12-14 NOTE — ASSESSMENT & PLAN NOTE
Rosuvastatin 20 mg by mouth daily at bedtime  The patient has been on a statin for years and tolerating this fine. The patient denies any muscle aches, no abdominal pain and no history of elevated liver enzymes.

## 2018-01-09 RX ORDER — ESCITALOPRAM OXALATE 10 MG/1
TABLET ORAL
Qty: 90 TAB | Refills: 0 | Status: SHIPPED | OUTPATIENT
Start: 2018-01-09 | End: 2018-04-12 | Stop reason: SDUPTHER

## 2018-02-06 RX ORDER — ROSUVASTATIN CALCIUM 20 MG/1
TABLET, COATED ORAL
Qty: 90 TAB | Refills: 0 | Status: SHIPPED | OUTPATIENT
Start: 2018-02-06 | End: 2018-05-14 | Stop reason: SDUPTHER

## 2018-02-13 ENCOUNTER — PATIENT OUTREACH (OUTPATIENT)
Dept: HEALTH INFORMATION MANAGEMENT | Facility: OTHER | Age: 83
End: 2018-02-13

## 2018-02-14 NOTE — PROGRESS NOTES
Outcome: no answer    Please transfer to Patient Outreach Team at [phone] when patient returns call.    Attempt # 1

## 2018-03-07 RX ORDER — PANTOPRAZOLE SODIUM 40 MG/1
40 TABLET, DELAYED RELEASE ORAL DAILY
Qty: 30 TAB | Refills: 0 | Status: SHIPPED | OUTPATIENT
Start: 2018-03-07 | End: 2018-10-11 | Stop reason: SDUPTHER

## 2018-03-12 ENCOUNTER — HOSPITAL ENCOUNTER (OUTPATIENT)
Dept: RADIOLOGY | Facility: MEDICAL CENTER | Age: 83
End: 2018-03-12
Attending: FAMILY MEDICINE
Payer: MEDICARE

## 2018-03-12 DIAGNOSIS — Z12.39 SCREENING BREAST EXAMINATION: ICD-10-CM

## 2018-03-12 PROCEDURE — 77067 SCR MAMMO BI INCL CAD: CPT

## 2018-03-14 ENCOUNTER — OFFICE VISIT (OUTPATIENT)
Dept: MEDICAL GROUP | Age: 83
End: 2018-03-14
Payer: MEDICARE

## 2018-03-14 VITALS
BODY MASS INDEX: 24.59 KG/M2 | OXYGEN SATURATION: 91 % | HEART RATE: 72 BPM | DIASTOLIC BLOOD PRESSURE: 68 MMHG | SYSTOLIC BLOOD PRESSURE: 158 MMHG | WEIGHT: 166 LBS | TEMPERATURE: 98.2 F | HEIGHT: 69 IN

## 2018-03-14 DIAGNOSIS — I10 ESSENTIAL HYPERTENSION: ICD-10-CM

## 2018-03-14 DIAGNOSIS — L57.0 AK (ACTINIC KERATOSIS): ICD-10-CM

## 2018-03-14 DIAGNOSIS — E78.5 HYPERLIPIDEMIA WITH TARGET LDL LESS THAN 130: ICD-10-CM

## 2018-03-14 DIAGNOSIS — L29.9 PRURITUS: ICD-10-CM

## 2018-03-14 DIAGNOSIS — L82.0 INFLAMED SEBORRHEIC KERATOSIS: ICD-10-CM

## 2018-03-14 DIAGNOSIS — G62.9 NEUROPATHY: ICD-10-CM

## 2018-03-14 DIAGNOSIS — G89.29 CHRONIC LOW BACK PAIN WITH SCIATICA, SCIATICA LATERALITY UNSPECIFIED, UNSPECIFIED BACK PAIN LATERALITY: Chronic | ICD-10-CM

## 2018-03-14 DIAGNOSIS — M54.40 CHRONIC LOW BACK PAIN WITH SCIATICA, SCIATICA LATERALITY UNSPECIFIED, UNSPECIFIED BACK PAIN LATERALITY: Chronic | ICD-10-CM

## 2018-03-14 DIAGNOSIS — Z79.891 CHRONIC USE OF OPIATE DRUGS THERAPEUTIC PURPOSES: ICD-10-CM

## 2018-03-14 PROCEDURE — 99214 OFFICE O/P EST MOD 30 MIN: CPT | Mod: 25 | Performed by: FAMILY MEDICINE

## 2018-03-14 PROCEDURE — 17110 DESTRUCTION B9 LES UP TO 14: CPT | Performed by: FAMILY MEDICINE

## 2018-03-14 PROCEDURE — 17000 DESTRUCT PREMALG LESION: CPT | Mod: 59 | Performed by: FAMILY MEDICINE

## 2018-03-14 PROCEDURE — 17003 DESTRUCT PREMALG LES 2-14: CPT | Performed by: FAMILY MEDICINE

## 2018-03-14 RX ORDER — HYDROCODONE BITARTRATE AND ACETAMINOPHEN 10; 325 MG/1; MG/1
1-2 TABLET ORAL 2 TIMES DAILY
Qty: 60 TAB | Refills: 0 | Status: SHIPPED | OUTPATIENT
Start: 2018-03-14 | End: 2018-03-14 | Stop reason: SDUPTHER

## 2018-03-14 RX ORDER — HYDROCODONE BITARTRATE AND ACETAMINOPHEN 10; 325 MG/1; MG/1
1-2 TABLET ORAL 2 TIMES DAILY
Qty: 60 TAB | Refills: 0 | Status: SHIPPED | OUTPATIENT
Start: 2018-03-14 | End: 2018-04-13

## 2018-03-14 RX ORDER — HYDROXYZINE HYDROCHLORIDE 25 MG/1
12.5-25 TABLET, FILM COATED ORAL 3 TIMES DAILY PRN
Qty: 60 TAB | Refills: 0 | Status: SHIPPED | OUTPATIENT
Start: 2018-03-14 | End: 2018-04-03 | Stop reason: SDUPTHER

## 2018-03-14 ASSESSMENT — PATIENT HEALTH QUESTIONNAIRE - PHQ9: CLINICAL INTERPRETATION OF PHQ2 SCORE: 0

## 2018-03-14 NOTE — PROGRESS NOTES
This medical record contains text that has been entered with the assistance of computer voice recognition and dictation software.  Therefore, it may contain unintended errors in text, spelling, punctuation, or grammar    Chief Complaint   Patient presents with   • Medication Refill     Norco   • Nasal Congestion     Nausea yesterday and trouble swallowing       Ellen Srivastava is a 83 y.o. female here evaluation and management of: isk, ak, htn, chronic pain, gerd, HLD, itching    HPI:     Inflamed seborrheic keratosis  Patient has been complaining of raised rash that is flaky itchy off and bleeds when scratching scabs up.    HTN (hypertension)  Metoprolol, 50 mg by mouth daily    The patient has been tollerating the BP meds without any issues. No tunnel vision, no cough, no changes in vision, no lightheadedness, no fatigue, no syncopal or presyncopal episodes, no edema, no new rashes.       Chronic back pain  Norco 10mg po bid  States since the 1980's  For severe back pain, and leg pain  Pain contract signed today  Uds this year due      Hyperlipidemia with target LDL less than 130  Rosuvastatin 20mg     The patient has been on a statin for years and tolerating this fine. The patient denies any muscle aches, no abdominal pain and no history of elevated liver enzymes.  Results for ELLEN SRIVASTAVA (MRN [id number]) as of 3/14/2018 10:43   Ref. Range 3/14/2017 09:11   Cholesterol,Tot Latest Ref Range: 100 - 199 mg/dL 102   Triglycerides Latest Ref Range: 0 - 149 mg/dL 115   HDL Latest Ref Range: >=40 mg/dL 37 (A)   LDL Latest Ref Range: <100 mg/dL 42         Pruritus  Patient continues to have scratch marks on her back and bilateral arms, she has been taking the tizanidine and it is effective when she takes it.    Current medicines (including changes today)  Current Outpatient Prescriptions   Medication Sig Dispense Refill   • HYDROcodone/acetaminophen (NORCO)  MG Tab Take 1-2 Tabs by mouth 2 Times  a Day for 30 days. 60 Tab 0   • hydrOXYzine HCl (ATARAX) 25 MG Tab Take 0.5-1 Tabs by mouth 3 times a day as needed for Anxiety. Max 3 tablets per 24 hours 60 Tab 0   • pantoprazole (PROTONIX) 40 MG Tablet Delayed Response Take 1 Tab by mouth every day. 30 Tab 0   • rosuvastatin (CRESTOR) 20 MG Tab TAKE ONE TABLET BY MOUTH EVERY EVENING 90 Tab 0   • escitalopram (LEXAPRO) 10 MG Tab TAKE ONE TABLET BY MOUTH DAILY 90 Tab 0   • gabapentin (NEURONTIN) 300 MG Cap TAKE ONE CAPSULE BY MOUTH THREE TIMES A DAY 90 Cap 3   • tizanidine (ZANAFLEX) 4 MG Tab TAKE ONE TABLET BY MOUTH TWICE A DAY AS NEEDED FOR SPASM 60 Tab 4   • cyanocobalamin (VITAMIN B-12) 100 MCG Tab TAKE ONE TABLET BY MOUTH DAILY (CYANOCOBALAMIN) 30 Tab 0   • pantoprazole (PROTONIX) 40 MG Tablet Delayed Response Take 1 Tab by mouth every day. 90 Tab 1   • metoprolol (LOPRESSOR) 50 MG Tab TAKE ONE TABLET BY MOUTH TWICE A  Tab 1   • vitamin D (VITAMIND D3) 1000 UNIT Tab Take 1 Tab by mouth every day. 30 Tab      No current facility-administered medications for this visit.      She  has a past medical history of Anemia; Atrophic vaginitis (1/30/2010); Cataract; Cerumen Impaction Recurrent (1/30/2010); Depressive disorder, not elsewhere classified (1/30/2010); Disorder of thyroid; Esophageal diverticulum; HDL lipoprotein deficiency (1/30/2010); Hiatus hernia syndrome; High cholesterol; HTN (hypertension) (1/26/2010); Hyperlipidemia LDL goal < 130 (10/21/2009); Hypertriglyceridemia (1/30/2010); Indigestion; Narcotic dependence (CMS-[facility]) (4/28/2012); Osteopenia (4/26/2010); Parathyroid hormone excess (CMS-[facility]) (4/26/2010); Serum calcium elevated (1/14/2010); Tinnitus (1/14/2010); and Vertigo, intermittent (4/28/2012).  She  has a past surgical history that includes breast biopsy (Left, 2015); lumbar decompression (1969, 1979); hysterectomy, total abdominal (1977); appendectomy (1945); parathyroid exploration (6/16/2010); paraesophageal hernia robotic (N/A,  "10/12/2015); gastroscopy (10/23/2015); thoracoscopy (Left, 10/30/2015); gastroscopy-endo (2015); gastroscopy-endo (N/A, 12/10/2015); gastroscopy-endo (N/A, 2016); and ankle orif (Right, 2016).  Social History   Substance Use Topics   • Smoking status: Never Smoker   • Smokeless tobacco: Former User      Comment: Nicotine    • Alcohol use No     Social History     Social History Narrative    Lives in [facility] and in [place].     Family History   Problem Relation Age of Onset   • Hypertension Other    • Lung Disease       Family Status   Relation Status   • Mother  at age 86    Old Age   • Father  at age 83    Lung Disease   • Other    •           ROS    Please see hpi     All other systems reviewed and are negative     Objective:     Blood pressure 158/68, pulse 72, temperature 36.8 °C (98.2 °F), height 1.753 m (5' 9.02\"), weight 75.3 kg (166 lb), last menstrual period 1970, SpO2 91 %, not currently breastfeeding. Body mass index is 24.5 kg/m².  Physical Exam:    Constitutional: Alert, no distress.  Skin: Warm, dry, good turgor, no rashes in visible areas.  Eye: Equal, round and reactive, conjunctiva clear, lids normal.  ENMT: Lips without lesions, good dentition, oropharynx clear.  Neck: Trachea midline, no masses, no thyromegaly. No cervical or supraclavicular lymphadenopathy.  Respiratory: Unlabored respiratory effort, lungs clear to auscultation, no wheezes, no ronchi.  Cardiovascular: Normal S1, S2, no murmur, no edema.  Abdomen: Soft, non-tender, no masses, no hepatosplenomegaly.  Psych: Alert and oriented x3, normal affect and mood.      SKIN EXAM      Skin exam--reveals raised,red dried hemmorhage, and scabbed plaques        multiple lesions on bilateral forearms, hands, and chest, with evidence of of solar damage present , spotty hyperpigmentation, scattered telangiectasias, and  Xerosis      PROCEDURE: CRYOTHERAPY  Discussed risks and benefits of cryotherapy including but " not limited to scarring, hyperpigmentation, hypopigmentation, hypertrophic scarring, keiloid scarring, incomplete or no resolution of lesions treated,pain, undesirable cosemetic result, blistering, potential need for additional treatment including more invasive treatment. Patient expresses understanding and verbally acknowledges risks and consent to treatment. 2  applications of cryotherapy with 3 second freeze thaw cycle was applied to  6AK's and  all irritated and inflamed Seborrheic Keratoses. Patient tolerated procedure well. There were no complications. Aftercare instructions given.            Assessment and Plan:   The following treatment plan was discussed      1. Essential hypertension    Patient has been stable with current management  We will make no changes for now    - COMP METABOLIC PANEL; Future  - CBC WITH DIFFERENTIAL; Future  - LIPID PROFILE; Future    2. Chronic use of opiate drugs therapeutic purposes      We talked about the addictive nature of this medication and all side effects including but not limited to respiratory depression and death.   .    - CONSENT FOR OPIATE PRESCRIPTION signed today    - HYDROcodone/acetaminophen (NORCO)  MG Tab; Take 1-2 Tabs by mouth 2 Times a Day for 30 days.  Dispense: 60 Tab; Refill: 0    3. Chronic low back pain with sciatica, sciatica laterality unspecified, unspecified back pain laterality    - CONSENT FOR OPIATE PRESCRIPTION  - HYDROcodone/acetaminophen (NORCO)  MG Tab; Take 1-2 Tabs by mouth 2 Times a Day for 30 days.  Dispense: 60 Tab; Refill: 0    4. AK (actinic keratosis)  Patient tollerrated procedure well  There were no adverse events  Patient was given post procedure precautions       5. Inflamed seborrheic keratosis  Patient tollerrated procedure well  There were no adverse events  Patient was given post procedure precautions       6. Neuropathy (CMS-[facility])  Patient has been stable with current management  We will make no changes for  now      7. Hyperlipidemia with target LDL less than 130          HEALTH MAINTENANCE:    Instructed to Follow up in clinic or ER for worsening symptoms, difficulty breathing, lack of expected recovery, or should new symptoms or problems arise.    Followup: Return in about 3 months (around 6/14/2018) for Medication refill.       Once again this medical record contains text that has been entered with the assistance of computer voice recognition and dictation software.  Therefore, it may contain unintended errors in text, spelling, punctuation, or grammar

## 2018-03-14 NOTE — ASSESSMENT & PLAN NOTE
Patient continues to have scratch marks on her back and bilateral arms, she has been taking the tizanidine and it is effective when she takes it.

## 2018-03-14 NOTE — ASSESSMENT & PLAN NOTE
Norco 10mg po bid  States since the 1980's  For severe back pain, and leg pain  Pain contract signed today  Uds this year due

## 2018-03-14 NOTE — ASSESSMENT & PLAN NOTE
Patient has been complaining of raised rash that is flaky itchy off and bleeds when scratching scabs up.

## 2018-03-14 NOTE — ASSESSMENT & PLAN NOTE
Metoprolol, 50 mg by mouth daily    The patient has been tollerating the BP meds without any issues. No tunnel vision, no cough, no changes in vision, no lightheadedness, no fatigue, no syncopal or presyncopal episodes, no edema, no new rashes.

## 2018-03-14 NOTE — ASSESSMENT & PLAN NOTE
Rosuvastatin 20mg     The patient has been on a statin for years and tolerating this fine. The patient denies any muscle aches, no abdominal pain and no history of elevated liver enzymes.  Results for ELIA ZARATE (MRN [id number]) as of 3/14/2018 10:43   Ref. Range 3/14/2017 09:11   Cholesterol,Tot Latest Ref Range: 100 - 199 mg/dL 102   Triglycerides Latest Ref Range: 0 - 149 mg/dL 115   HDL Latest Ref Range: >=40 mg/dL 37 (A)   LDL Latest Ref Range: <100 mg/dL 42

## 2018-04-04 ENCOUNTER — HOSPITAL ENCOUNTER (OUTPATIENT)
Dept: LAB | Facility: MEDICAL CENTER | Age: 83
End: 2018-04-04
Attending: FAMILY MEDICINE
Payer: MEDICARE

## 2018-04-04 DIAGNOSIS — I10 ESSENTIAL HYPERTENSION: ICD-10-CM

## 2018-04-04 LAB
ALBUMIN SERPL BCP-MCNC: 4.1 G/DL (ref 3.2–4.9)
ALBUMIN/GLOB SERPL: 1.3 G/DL
ALP SERPL-CCNC: 75 U/L (ref 30–99)
ALT SERPL-CCNC: 12 U/L (ref 2–50)
ANION GAP SERPL CALC-SCNC: 6 MMOL/L (ref 0–11.9)
AST SERPL-CCNC: 18 U/L (ref 12–45)
BASOPHILS # BLD AUTO: 1.1 % (ref 0–1.8)
BASOPHILS # BLD: 0.1 K/UL (ref 0–0.12)
BILIRUB SERPL-MCNC: 0.6 MG/DL (ref 0.1–1.5)
BUN SERPL-MCNC: 15 MG/DL (ref 8–22)
CALCIUM SERPL-MCNC: 9.8 MG/DL (ref 8.5–10.5)
CHLORIDE SERPL-SCNC: 107 MMOL/L (ref 96–112)
CHOLEST SERPL-MCNC: 119 MG/DL (ref 100–199)
CO2 SERPL-SCNC: 26 MMOL/L (ref 20–33)
CREAT SERPL-MCNC: 0.75 MG/DL (ref 0.5–1.4)
EOSINOPHIL # BLD AUTO: 0.15 K/UL (ref 0–0.51)
EOSINOPHIL NFR BLD: 1.7 % (ref 0–6.9)
ERYTHROCYTE [DISTWIDTH] IN BLOOD BY AUTOMATED COUNT: 43.6 FL (ref 35.9–50)
GLOBULIN SER CALC-MCNC: 3.2 G/DL (ref 1.9–3.5)
GLUCOSE SERPL-MCNC: 92 MG/DL (ref 65–99)
HCT VFR BLD AUTO: 44 % (ref 37–47)
HDLC SERPL-MCNC: 42 MG/DL
HGB BLD-MCNC: 13.8 G/DL (ref 12–16)
IMM GRANULOCYTES # BLD AUTO: 0.01 K/UL (ref 0–0.11)
IMM GRANULOCYTES NFR BLD AUTO: 0.1 % (ref 0–0.9)
LDLC SERPL CALC-MCNC: 40 MG/DL
LYMPHOCYTES # BLD AUTO: 3.15 K/UL (ref 1–4.8)
LYMPHOCYTES NFR BLD: 35.3 % (ref 22–41)
MCH RBC QN AUTO: 25.6 PG (ref 27–33)
MCHC RBC AUTO-ENTMCNC: 31.4 G/DL (ref 33.6–35)
MCV RBC AUTO: 81.6 FL (ref 81.4–97.8)
MONOCYTES # BLD AUTO: 0.5 K/UL (ref 0–0.85)
MONOCYTES NFR BLD AUTO: 5.6 % (ref 0–13.4)
NEUTROPHILS # BLD AUTO: 5.02 K/UL (ref 2–7.15)
NEUTROPHILS NFR BLD: 56.2 % (ref 44–72)
NRBC # BLD AUTO: 0 K/UL
NRBC BLD-RTO: 0 /100 WBC
PLATELET # BLD AUTO: 237 K/UL (ref 164–446)
PMV BLD AUTO: 10.6 FL (ref 9–12.9)
POTASSIUM SERPL-SCNC: 3.8 MMOL/L (ref 3.6–5.5)
PROT SERPL-MCNC: 7.3 G/DL (ref 6–8.2)
RBC # BLD AUTO: 5.39 M/UL (ref 4.2–5.4)
SODIUM SERPL-SCNC: 139 MMOL/L (ref 135–145)
TRIGL SERPL-MCNC: 185 MG/DL (ref 0–149)
WBC # BLD AUTO: 8.9 K/UL (ref 4.8–10.8)

## 2018-04-04 PROCEDURE — 80053 COMPREHEN METABOLIC PANEL: CPT

## 2018-04-04 PROCEDURE — 85025 COMPLETE CBC W/AUTO DIFF WBC: CPT

## 2018-04-04 PROCEDURE — 80061 LIPID PANEL: CPT

## 2018-04-04 PROCEDURE — 36415 COLL VENOUS BLD VENIPUNCTURE: CPT

## 2018-04-04 RX ORDER — HYDROXYZINE HYDROCHLORIDE 25 MG/1
12.5-25 TABLET, FILM COATED ORAL 3 TIMES DAILY PRN
Qty: 60 TAB | Refills: 0 | Status: SHIPPED | OUTPATIENT
Start: 2018-04-04 | End: 2018-12-12

## 2018-04-09 DIAGNOSIS — K21.00 GASTROESOPHAGEAL REFLUX DISEASE WITH ESOPHAGITIS: ICD-10-CM

## 2018-04-10 ENCOUNTER — OFFICE VISIT (OUTPATIENT)
Dept: MEDICAL GROUP | Age: 83
End: 2018-04-10
Payer: MEDICARE

## 2018-04-10 VITALS
OXYGEN SATURATION: 90 % | DIASTOLIC BLOOD PRESSURE: 86 MMHG | HEART RATE: 89 BPM | WEIGHT: 164.4 LBS | TEMPERATURE: 97 F | SYSTOLIC BLOOD PRESSURE: 162 MMHG | HEIGHT: 69 IN | BODY MASS INDEX: 24.35 KG/M2

## 2018-04-10 DIAGNOSIS — K21.9 GASTROESOPHAGEAL REFLUX DISEASE WITHOUT ESOPHAGITIS: ICD-10-CM

## 2018-04-10 DIAGNOSIS — L82.0 INFLAMED SEBORRHEIC KERATOSIS: ICD-10-CM

## 2018-04-10 DIAGNOSIS — I73.00 RAYNAUD'S PHENOMENON WITHOUT GANGRENE: ICD-10-CM

## 2018-04-10 DIAGNOSIS — Z00.00 MEDICARE ANNUAL WELLNESS VISIT, INITIAL: ICD-10-CM

## 2018-04-10 DIAGNOSIS — F41.8 DEPRESSION WITH ANXIETY: ICD-10-CM

## 2018-04-10 DIAGNOSIS — G62.9 NEUROPATHY: ICD-10-CM

## 2018-04-10 DIAGNOSIS — I10 ESSENTIAL HYPERTENSION: ICD-10-CM

## 2018-04-10 DIAGNOSIS — L57.0 AK (ACTINIC KERATOSIS): ICD-10-CM

## 2018-04-10 PROCEDURE — 17000 DESTRUCT PREMALG LESION: CPT | Mod: 59 | Performed by: FAMILY MEDICINE

## 2018-04-10 PROCEDURE — 17110 DESTRUCTION B9 LES UP TO 14: CPT | Performed by: FAMILY MEDICINE

## 2018-04-10 PROCEDURE — 17003 DESTRUCT PREMALG LES 2-14: CPT | Performed by: FAMILY MEDICINE

## 2018-04-10 PROCEDURE — G0439 PPPS, SUBSEQ VISIT: HCPCS | Mod: 25 | Performed by: FAMILY MEDICINE

## 2018-04-10 ASSESSMENT — ACTIVITIES OF DAILY LIVING (ADL)
TRANSPORTATION COMMENTS: HUSBAND DRIVES
BATHING_REQUIRES_ASSISTANCE: 0

## 2018-04-10 ASSESSMENT — PATIENT HEALTH QUESTIONNAIRE - PHQ9
CLINICAL INTERPRETATION OF PHQ2 SCORE: 0
5. POOR APPETITE OR OVEREATING: 0 - NOT AT ALL

## 2018-04-10 ASSESSMENT — PAIN SCALES - GENERAL: PAINLEVEL: 8=MODERATE-SEVERE PAIN

## 2018-04-10 NOTE — PROGRESS NOTES
Chief Complaint   Patient presents with   • Annual Wellness Visit              HPI:  Ellen is a 83 y.o. here for Medicare Annual Wellness Visit        Patient Active Problem List    Diagnosis Date Noted   • Syncope 09/20/2016     Priority: High   • Depression with anxiety 10/22/2015     Priority: High   • Vitamin D deficiency 12/02/2015     Priority: Medium   • HTN (hypertension) 01/26/2010     Priority: Medium   • Paraesophageal hiatal hernia s/p robotic repair 10/12 10/22/2015     Priority: Low   • Hyperlipidemia with target LDL less than 130 10/21/2009     Priority: Low   • Medicare annual wellness visit, initial 04/10/2018   • Inflamed seborrheic keratosis 03/14/2018   • Pruritus 03/14/2018   • AK (actinic keratosis) 12/14/2017   • Need for vaccination 10/03/2017   • Raynaud's phenomenon without gangrene 10/03/2017   • Visit for suture removal 10/03/2017   • Neuropathy (CMS-HCC) 09/14/2017   • Neoplasm of uncertain behavior 06/14/2017   • Risk for falls 12/16/2016   • Leukocytosis 09/20/2016   • Chronic use of opiate drugs therapeutic purposes 09/16/2016   • GERD (gastroesophageal reflux disease) 10/12/2015   • Narcotic dependence (CMS-HCC) 04/28/2012   • Chronic back pain 08/23/2011   • Osteopenia 04/26/2010   • Preventative health care 03/16/2010   • Atrophic vaginitis 01/30/2010   • Hypertriglyceridemia 01/30/2010   • 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 60 Tab 0   • pantoprazole (PROTONIX) 40 MG Tablet Delayed Response Take 1 Tab by mouth every day. 30 Tab 0   • rosuvastatin (CRESTOR) 20 MG Tab TAKE ONE TABLET BY MOUTH EVERY EVENING 90 Tab 0   • escitalopram (LEXAPRO) 10 MG Tab TAKE ONE TABLET BY MOUTH DAILY 90 Tab 0   • gabapentin (NEURONTIN) 300 MG Cap TAKE ONE CAPSULE  BY MOUTH THREE TIMES A DAY 90 Cap 3   • tizanidine (ZANAFLEX) 4 MG Tab TAKE ONE TABLET BY MOUTH TWICE A DAY AS NEEDED FOR SPASM 60 Tab 4   • cyanocobalamin (VITAMIN B-12) 100 MCG Tab TAKE ONE TABLET BY MOUTH DAILY (CYANOCOBALAMIN) 30 Tab 0   • metoprolol (LOPRESSOR) 50 MG Tab TAKE ONE TABLET BY MOUTH TWICE A  Tab 1   • vitamin D (VITAMIND D3) 1000 UNIT Tab Take 1 Tab by mouth every day. 30 Tab    • hydrOXYzine HCl (ATARAX) 25 MG Tab Take 0.5-1 Tabs by mouth 3 times a day as needed for Anxiety. Max 3 tablets per 24 hours 60 Tab 0   • pantoprazole (PROTONIX) 40 MG Tablet Delayed Response Take 1 Tab by mouth every day. 90 Tab 1     No current facility-administered medications for this visit.         Patient is taking medications as noted in medication list.  Current supplements as per medication list.     Allergies: Asa [aspirin]    Current social contact/activities: swim/play cards/go to parties   Patient's perception of their health: fair    Is patient current with immunizations? No, due for TDAP. Patient is interested in receiving NONE today.    She  reports that she has never smoked. She has quit using smokeless tobacco. She reports that she does not drink alcohol or use drugs.  Counseling given: Not Answered        DPA/Advanced directive: Patient has Advanced Directive, but it is not on file. Instructed to bring in a copy to scan into their chart.    ROS:    Gait: Uses a walker   Ostomy: no   Other tubes: no   Amputations: no   Chronic oxygen use no   Last eye exam 2015   Wears hearing aids: no   : Denies any urinary leakage during the last 6 months      Screening:        Depression Screening    Little interest or pleasure in doing things?  0 - not at all  Feeling down, depressed, or hopeless? 0 - not at all  Patient Health Questionnaire Score: 0    If depressive symptoms identified deferred to follow up visit unless specifically addressed in assessment and plan.    Interpretation of PHQ-9 Total Score    Score Severity   1-4 No Depression   5-9 Mild Depression   10-14 Moderate Depression   15-19 Moderately Severe Depression   20-27 Severe Depression    Screening for Cognitive Impairment    Three Minute Recall (apple, watch, delilah)   /3 Patient declined  Draw clock face with all 12 numbers set to the hand to show 10 minutes past 11 o'clock    Patient declined  If cognitive concerns identified, deferred for follow up unless specifically addressed in assessment and plan.    Fall Risk Assessment    Has the patient had two or more falls in the last year or any fall with injury in the last year?  No  If fall risk identified, deferred for follow up unless specifically addressed in assessment and plan.    Safety Assessment    Throw rugs on floor.  No  Handrails on all stairs.  Yes  Good lighting in all hallways.  Yes  Difficulty hearing.  No  Patient counseled about all safety risks that were identified.    Functional Assessment ADLs    Are there any barriers preventing you from cooking for yourself or meeting nutritional needs?  No.    Are there any barriers preventing you from driving safely or obtaining transportation?  Yes.  drives  Are there any barriers preventing you from using a telephone or calling for help?  No.    Are there any barriers preventing you from shopping?  No.    Are there any barriers preventing you from taking care of your own finances?  No.    Are there any barriers preventing you from managing your medications?  No.    Are there any barriers preventing you from showering, bathing or dressing yourself?  No.    Are you currently engaging any exercise or physical activity?  Yes.  [facility] Maintenance Summary                Annual Wellness Visit Overdue 1935     IMM DTaP/Tdap/Td Vaccine Overdue 3/13/1956     URINE DRUG SCREEN Overdue 9/15/2017      Done 9/20/2016 URINE DRUG SCREEN      Patient has more history with this topic...    BONE DENSITY Overdue 3/26/2018      Done 3/26/2013  DS-BONE DENSITY STUDY (DEXA)     Patient has more history with this topic...    MAMMOGRAM Next Due 2/12/2020      Done 3/12/2018 MA-SCREEN MAMMO W/CAD-BILAT     Patient has more history with this topic...          Patient Care Team:  Denzel Parrish M.D. as PCP - General (Family Medicine)    Social History   Substance Use Topics   • Smoking status: Never Smoker   • Smokeless tobacco: Former User      Comment: Nicotine    • Alcohol use No     Family History   Problem Relation Age of Onset   • Hypertension Mother    • Lung Disease Father    • Hypertension Other    • Lung Disease     • Lung Disease Brother      She  has a past medical history of Anemia; Atrophic vaginitis (1/30/2010); Cataract; Cerumen Impaction Recurrent (1/30/2010); Depressive disorder, not elsewhere classified (1/30/2010); Disorder of thyroid; Esophageal diverticulum; HDL lipoprotein deficiency (1/30/2010); Hiatus hernia syndrome; High cholesterol; HTN (hypertension) (1/26/2010); Hyperlipidemia LDL goal < 130 (10/21/2009); Hypertriglyceridemia (1/30/2010); Indigestion; Narcotic dependence (CMS-[facility]) (4/28/2012); Osteopenia (4/26/2010); Parathyroid hormone excess (CMS-HCC) (4/26/2010); Serum calcium elevated (1/14/2010); Tinnitus (1/14/2010); and Vertigo, intermittent (4/28/2012).   Past Surgical History:   Procedure Laterality Date   • ANKLE ORIF Right 9/20/2016    Procedure: ANKLE ORIF;  Surgeon: Stef Bray M.D.;  Location: SURGERY [facility];  Service:    • GASTROSCOPY-ENDO N/A 2/26/2016    Procedure: GASTROSCOPY-ENDO W/ESOPHAGEAL METAL STENT REMOVAL;  Surgeon: Jossue Ruggiero M.D.;  Location: SURGERY [facility];  Service:    • GASTROSCOPY-ENDO N/A 12/10/2015    Procedure: GASTROSCOPY-ENDO;  Surgeon: Heri Velasco M.D.;  Location: SURGERY [facility];  Service:    • GASTROSCOPY-ENDO  11/1/2015    Procedure: GASTROSCOPY-ENDO;  Surgeon: Conner Joe M.D.;  Location: [facility];   "Service:    • THORACOSCOPY Left 10/30/2015    Procedure: THORACOSCOPY;  Surgeon: Blaine Waterman D.O.;  Location: SURGERY [facility];  Service:    • GASTROSCOPY  10/23/2015    Procedure: GASTROSCOPY- EGD with esophageal stent placement ;  Surgeon: Jossue Ruggiero M.D.;  Location: SURGERY [facility];  Service:    • PARAESOPHAGEAL HERNIA ROBOTIC N/A 10/12/2015    Procedure: PARAESOPHAGEAL HERNIA REPAIR ROBOTIC resection of esophageal diverticulum  repair of hiatal hernia for anterior fundoplasty with mesh  ;  Surgeon: Ulysses Simpson M.D.;  Location: SURGERY [facility];  Service:    • BREAST BIOPSY Left 2015    Benign x3 surgeries   • PARATHYROID EXPLORATION  6/16/2010    Performed by AYSE CARROLL at SURGERY SAME DAY [facility]   • HYSTERECTOMY, TOTAL ABDOMINAL  1977   • APPENDECTOMY  1945   • LUMBAR DECOMPRESSION  1969, 1979           Exam:     Blood pressure (!) 162/86, pulse 89, temperature 36.1 °C (97 °F), height 1.753 m (5' 9\"), weight 74.6 kg (164 lb 6.4 oz), last menstrual period 03/13/1970, SpO2 90 %. Body mass index is 24.28 kg/m².    Hearing good.    Dentition good  Alert, oriented in no acute distress.  Eye contact is good, speech goal directed, affect calm      SKIN EXAM  Several irregular, pigmented, verrucous surface plaques with dried hemmorhage , on back and chest,       multiple lesions on bilateral forearms, hands, and chest, with evidence of of solar damage present , spotty hyperpigmentation, scattered telangiectasias, and  Xerosis      PROCEDURE: CRYOTHERAPY  Discussed risks and benefits of cryotherapy including but not limited to scarring, hyperpigmentation, hypopigmentation, hypertrophic scarring, keiloid scarring, incomplete or no resolution of lesions treated,pain, undesirable cosemetic result, blistering, potential need for additional treatment including more invasive treatment. Patient expresses understanding and verbally acknowledges risks and consent to " treatment. 2  applications of cryotherapy with 3 second freeze thaw cycle was applied to  6AK's and  all irritated and inflamed Seborrheic Keratoses. Patient tolerated procedure well. There were no complications. Aftercare instructions given.        Assessment and Plan. The following treatment and monitoring plan is recommended:    1. AK (actinic keratosis)  Patient tollerrated procedure well  There were no adverse events  Patient was given post procedure precautions      2. Depression with anxiety  Patient has been stable with current management  We will make no changes for now     3. Inflamed seborrheic keratosis  Patient tollerrated procedure well  There were no adverse events  Patient was given post procedure precautions      4. Medicare annual wellness visit, initial  Initial Wellness Visit - Includes PPPS ()   5. Essential hypertension  Not controlled, increase metoprolol to one tab po bid (she states she was taking 1/2 tab bid)  Patient was given instructions to make a two-week blood pressure log  To measure his blood pressure morning and evening same time  Then send results back to us  We will consider specific management at that time     6. Gastroesophageal reflux disease without esophagitis     Gerd precautions given (avoid the supine position after eating, avoid tight fitting clothing, head of bed elevation by raisin legs of bed,  avoid eating prior to sleeping, avoid reflux-inducing foods (foods high in fat, chocolate, peppermint, and excessive alcohol, which can decrease LES pressure), promote salivation by chewing gum or lozenges,   7. Raynaud's phenomenon without gangrene  Patient has been stable with current management  We will make no changes for now           Services suggested: No services needed at this time  Health Care Screening: Age-appropriate preventive services recommended by USPTF and ACIP covered by Medicare were discussed today. Services ordered if indicated and agreed upon by the  patient.  Referrals offered: PT/OT/Nutrition counseling/Behavioral Health/Smoking cessation as per orders if indicated.    Discussion today about general wellness and lifestyle habits:    · Prevent falls and reduce trip hazards; Cautioned about securing or removing rugs.  · Have a working fire alarm and carbon monoxide detector;   · Engage in regular physical activity and social activities       Follow-up: Return in about 3 months (around 7/10/2018) for Medication refill.

## 2018-04-11 RX ORDER — PANTOPRAZOLE SODIUM 40 MG/1
40 TABLET, DELAYED RELEASE ORAL DAILY
Qty: 90 TAB | Refills: 0 | Status: SHIPPED | OUTPATIENT
Start: 2018-04-11 | End: 2018-07-10 | Stop reason: SDUPTHER

## 2018-04-11 RX ORDER — GABAPENTIN 300 MG/1
CAPSULE ORAL
Qty: 90 CAP | Refills: 0 | Status: SHIPPED | OUTPATIENT
Start: 2018-04-11 | End: 2018-07-12 | Stop reason: SDUPTHER

## 2018-04-13 RX ORDER — ESCITALOPRAM OXALATE 10 MG/1
TABLET ORAL
Qty: 90 TAB | Refills: 0 | Status: SHIPPED | OUTPATIENT
Start: 2018-04-13 | End: 2018-07-11 | Stop reason: SDUPTHER

## 2018-04-17 ENCOUNTER — NON-PROVIDER VISIT (OUTPATIENT)
Dept: MEDICAL GROUP | Age: 83
End: 2018-04-17
Payer: MEDICARE

## 2018-04-17 VITALS — SYSTOLIC BLOOD PRESSURE: 124 MMHG | DIASTOLIC BLOOD PRESSURE: 62 MMHG

## 2018-04-17 NOTE — PROGRESS NOTES
Ellen Srivastava is a 83 y.o. female here for a non-provider visit for BP check    Vitals:    04/17/18 1016   BP: 124/62     If abnormal, was an in office provider notified today? Not Indicated  Routed to PCP? Yes

## 2018-04-24 RX ORDER — METOPROLOL TARTRATE 50 MG/1
TABLET, FILM COATED ORAL
Qty: 180 TAB | Refills: 0 | Status: SHIPPED | OUTPATIENT
Start: 2018-04-24 | End: 2018-07-27 | Stop reason: SDUPTHER

## 2018-05-14 RX ORDER — ROSUVASTATIN CALCIUM 20 MG/1
TABLET, COATED ORAL
Qty: 90 TAB | Refills: 0 | Status: SHIPPED | OUTPATIENT
Start: 2018-05-14 | End: 2018-08-09 | Stop reason: SDUPTHER

## 2018-05-31 RX ORDER — TIZANIDINE 4 MG/1
TABLET ORAL
Qty: 60 TAB | Refills: 4 | Status: SHIPPED | OUTPATIENT
Start: 2018-05-31 | End: 2018-12-17 | Stop reason: SDUPTHER

## 2018-06-14 ENCOUNTER — OFFICE VISIT (OUTPATIENT)
Dept: MEDICAL GROUP | Age: 83
End: 2018-06-14
Payer: MEDICARE

## 2018-06-14 VITALS
TEMPERATURE: 99.3 F | HEIGHT: 69 IN | OXYGEN SATURATION: 92 % | BODY MASS INDEX: 23.85 KG/M2 | DIASTOLIC BLOOD PRESSURE: 84 MMHG | HEART RATE: 82 BPM | WEIGHT: 161 LBS | SYSTOLIC BLOOD PRESSURE: 128 MMHG

## 2018-06-14 DIAGNOSIS — K21.00 GASTROESOPHAGEAL REFLUX DISEASE WITH ESOPHAGITIS: ICD-10-CM

## 2018-06-14 DIAGNOSIS — E78.5 HYPERLIPIDEMIA WITH TARGET LDL LESS THAN 130: ICD-10-CM

## 2018-06-14 DIAGNOSIS — F41.8 DEPRESSION WITH ANXIETY: ICD-10-CM

## 2018-06-14 DIAGNOSIS — Z13.820 SCREENING FOR OSTEOPOROSIS: ICD-10-CM

## 2018-06-14 DIAGNOSIS — I10 ESSENTIAL HYPERTENSION: ICD-10-CM

## 2018-06-14 DIAGNOSIS — L82.0 INFLAMED SEBORRHEIC KERATOSIS: ICD-10-CM

## 2018-06-14 DIAGNOSIS — L57.0 AK (ACTINIC KERATOSIS): ICD-10-CM

## 2018-06-14 DIAGNOSIS — Z79.891 CHRONIC USE OF OPIATE DRUGS THERAPEUTIC PURPOSES: ICD-10-CM

## 2018-06-14 PROCEDURE — 17003 DESTRUCT PREMALG LES 2-14: CPT | Performed by: FAMILY MEDICINE

## 2018-06-14 PROCEDURE — 17000 DESTRUCT PREMALG LESION: CPT | Mod: 59 | Performed by: FAMILY MEDICINE

## 2018-06-14 PROCEDURE — 99000 SPECIMEN HANDLING OFFICE-LAB: CPT | Performed by: FAMILY MEDICINE

## 2018-06-14 PROCEDURE — 99214 OFFICE O/P EST MOD 30 MIN: CPT | Mod: 25 | Performed by: FAMILY MEDICINE

## 2018-06-14 PROCEDURE — 17110 DESTRUCTION B9 LES UP TO 14: CPT | Performed by: FAMILY MEDICINE

## 2018-06-14 RX ORDER — HYDROCODONE BITARTRATE AND ACETAMINOPHEN 10; 325 MG/1; MG/1
1-2 TABLET ORAL 2 TIMES DAILY PRN
Qty: 60 TAB | Refills: 0 | Status: SHIPPED | OUTPATIENT
Start: 2018-06-14 | End: 2018-06-14 | Stop reason: SDUPTHER

## 2018-06-14 RX ORDER — HYDROCODONE BITARTRATE AND ACETAMINOPHEN 10; 325 MG/1; MG/1
1 TABLET ORAL 2 TIMES DAILY PRN
Qty: 60 TAB | Refills: 0 | Status: SHIPPED | OUTPATIENT
Start: 2018-06-14 | End: 2018-09-12 | Stop reason: SDUPTHER

## 2018-06-14 NOTE — ASSESSMENT & PLAN NOTE
Patient states that these raised barnacles are very itchy they flake and they often bleeds when she scratches them.

## 2018-06-14 NOTE — ASSESSMENT & PLAN NOTE
The patient states that she has been taking Norco 10 mg twice a day taking since 1983, she does try to avoid taking it every day twice a day only when she really needs it specifically prior to swimming.  And sometimes right before sleep.  She denies any escalation of use no accidental overdoses.  She does not share her medication with anyone.

## 2018-06-14 NOTE — ASSESSMENT & PLAN NOTE
Patient has been compliant with her beta-blocker metoprolol 50 mg p.o. daily  The patient has been tollerating the BP meds without any issues. No tunnel vision, no cough, no changes in vision, no lightheadedness, no fatigue, no syncopal or presyncopal episodes, no edema, no new rashes.     The patient also denies chest pain, no dyspnea on exertion, no headaches, no numbness or tingling, no changes in vision, no weakness in any extremity, no back pain no changes in micturition.

## 2018-06-14 NOTE — ASSESSMENT & PLAN NOTE
Compliant with Lexapro 10 mg p.o. daily  She states that she has PTSD abdomen is ICU admission  Her son also has PTSD from his [place] deployment  She does not want to proceed with mental health still  She states he has good support her  is very kind she denies any suicidal or homicidal ideations.

## 2018-06-14 NOTE — ASSESSMENT & PLAN NOTE
Compliant with her rosuvastatin 20 mg p.o. at bedtime    She has never complained of muscle aches never had a history of elevated liver enzymes  She is tolerating it just fine.    Results for ELIA ZARATE (MRN [id number]) as of 6/14/2018 10:22   Ref. Range 4/4/2018 10:15   Cholesterol,Tot Latest Ref Range: 100 - 199 mg/dL 119   Triglycerides Latest Ref Range: 0 - 149 mg/dL 185 (H)   HDL Latest Ref Range: >=40 mg/dL 42   LDL Latest Ref Range: <100 mg/dL 40
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
## 2018-07-06 DIAGNOSIS — Z79.891 CHRONIC USE OF OPIATE DRUGS THERAPEUTIC PURPOSES: ICD-10-CM

## 2018-07-10 DIAGNOSIS — K21.00 GASTROESOPHAGEAL REFLUX DISEASE WITH ESOPHAGITIS: ICD-10-CM

## 2018-07-11 RX ORDER — PANTOPRAZOLE SODIUM 40 MG/1
TABLET, DELAYED RELEASE ORAL
Qty: 90 TAB | Refills: 0 | Status: SHIPPED | OUTPATIENT
Start: 2018-07-11 | End: 2019-01-10 | Stop reason: SDUPTHER

## 2018-07-12 DIAGNOSIS — G62.9 NEUROPATHY: ICD-10-CM

## 2018-07-12 RX ORDER — ESCITALOPRAM OXALATE 10 MG/1
TABLET ORAL
Qty: 90 TAB | Refills: 0 | Status: SHIPPED | OUTPATIENT
Start: 2018-07-12 | End: 2018-08-16 | Stop reason: SDUPTHER

## 2018-07-13 RX ORDER — GABAPENTIN 300 MG/1
300 CAPSULE ORAL 3 TIMES DAILY
Qty: 90 CAP | Refills: 1 | Status: SHIPPED | OUTPATIENT
Start: 2018-07-13 | End: 2018-09-17 | Stop reason: SDUPTHER

## 2018-07-27 RX ORDER — METOPROLOL TARTRATE 50 MG/1
TABLET, FILM COATED ORAL
Qty: 180 TAB | Refills: 1 | Status: SHIPPED | OUTPATIENT
Start: 2018-07-27 | End: 2019-01-09

## 2018-08-09 RX ORDER — ROSUVASTATIN CALCIUM 20 MG/1
TABLET, COATED ORAL
Qty: 90 TAB | Refills: 0 | Status: SHIPPED | OUTPATIENT
Start: 2018-08-09 | End: 2018-11-09 | Stop reason: SDUPTHER

## 2018-08-16 RX ORDER — ESCITALOPRAM OXALATE 10 MG/1
TABLET ORAL
Qty: 90 TAB | Refills: 0 | Status: SHIPPED | OUTPATIENT
Start: 2018-08-16 | End: 2018-11-09 | Stop reason: SDUPTHER

## 2018-09-12 ENCOUNTER — OFFICE VISIT (OUTPATIENT)
Dept: MEDICAL GROUP | Age: 83
End: 2018-09-12
Payer: MEDICARE

## 2018-09-12 VITALS
DIASTOLIC BLOOD PRESSURE: 92 MMHG | WEIGHT: 160 LBS | OXYGEN SATURATION: 93 % | SYSTOLIC BLOOD PRESSURE: 160 MMHG | HEART RATE: 60 BPM | TEMPERATURE: 97.7 F | HEIGHT: 69 IN | BODY MASS INDEX: 23.7 KG/M2

## 2018-09-12 DIAGNOSIS — I10 ESSENTIAL HYPERTENSION: ICD-10-CM

## 2018-09-12 DIAGNOSIS — Z23 NEED FOR VACCINATION: ICD-10-CM

## 2018-09-12 DIAGNOSIS — E78.5 HYPERLIPIDEMIA WITH TARGET LDL LESS THAN 130: ICD-10-CM

## 2018-09-12 DIAGNOSIS — Z79.891 CHRONIC USE OF OPIATE DRUGS THERAPEUTIC PURPOSES: ICD-10-CM

## 2018-09-12 PROCEDURE — 90662 IIV NO PRSV INCREASED AG IM: CPT | Performed by: FAMILY MEDICINE

## 2018-09-12 PROCEDURE — G0010 ADMIN HEPATITIS B VACCINE: HCPCS | Performed by: FAMILY MEDICINE

## 2018-09-12 PROCEDURE — G0008 ADMIN INFLUENZA VIRUS VAC: HCPCS | Performed by: FAMILY MEDICINE

## 2018-09-12 PROCEDURE — 90746 HEPB VACCINE 3 DOSE ADULT IM: CPT | Performed by: FAMILY MEDICINE

## 2018-09-12 PROCEDURE — 99214 OFFICE O/P EST MOD 30 MIN: CPT | Mod: 25 | Performed by: FAMILY MEDICINE

## 2018-09-12 RX ORDER — HYDROCODONE BITARTRATE AND ACETAMINOPHEN 10; 325 MG/1; MG/1
1 TABLET ORAL 2 TIMES DAILY PRN
Qty: 60 TAB | Refills: 0 | Status: SHIPPED | OUTPATIENT
Start: 2018-09-12 | End: 2018-09-12 | Stop reason: SDUPTHER

## 2018-09-12 RX ORDER — HYDROCODONE BITARTRATE AND ACETAMINOPHEN 10; 325 MG/1; MG/1
1 TABLET ORAL 2 TIMES DAILY PRN
Qty: 60 TAB | Refills: 0 | Status: SHIPPED | OUTPATIENT
Start: 2018-09-12 | End: 2018-12-12 | Stop reason: SDUPTHER

## 2018-09-12 NOTE — PROGRESS NOTES
This medical record contains text that has been entered with the assistance of computer voice recognition and dictation software.  Therefore, it may contain unintended errors in text, spelling, punctuation, or grammar    Chief Complaint   Patient presents with   • Pain     medication refill         Ellen Srivastava is a 83 y.o. female here evaluation and management of: Medication refill hypertension, hyperlipidemia      HPI:     Chronic use of opiate drugs therapeutic purposes  Norco 10 mg p.o. twice daily    She states she has been taking this since the early 80s, this allows her to continue swimming.  She also used for sleep after a day of working out.  No escalation of use no accidental overdoses.  Patient denies any opiate-induced constipation, no shortness of breath no respiratory complaints in general.    HTN (hypertension)  Metoprolol 50 mg p.o. daily    Patient states that she is currently in pain labs white blood pressure is elevated  She states her home BP readings are normal.    The patient has been tollerating the BP meds without any issues. No tunnel vision, no cough, no changes in vision, no lightheadedness, no fatigue, no syncopal or presyncopal episodes, no edema, no new rashes.     The patient also denies chest pain, no dyspnea on exertion, no headaches, no numbness or tingling, no changes in vision, no weakness in any extremity, no back pain no changes in micturition.      Hyperlipidemia with target LDL less than 130  Rosuvastatin 20 mg p.o. Nightly    Patient has been stable with current management  We will make no changes for now      Results for ELLEN SRIVASTAVA (MRN [id number]) as of 9/12/2018 12:30   Ref. Range 4/4/2018 10:15   Cholesterol,Tot Latest Ref Range: 100 - 199 mg/dL 119   Triglycerides Latest Ref Range: 0 - 149 mg/dL 185 (H)   HDL Latest Ref Range: >=40 mg/dL 42   LDL Latest Ref Range: <100 mg/dL 40     Current medicines (including changes today)  Current Outpatient  Prescriptions   Medication Sig Dispense Refill   • HYDROcodone/acetaminophen (NORCO)  MG Tab Take 1 Tab by mouth 2 times a day as needed for up to 30 days. 60 Tab 0   • escitalopram (LEXAPRO) 10 MG Tab TAKE ONE TABLET BY MOUTH DAILY 90 Tab 0   • rosuvastatin (CRESTOR) 20 MG Tab TAKE ONE TABLET BY MOUTH EVERY EVENING 90 Tab 0   • metoprolol (LOPRESSOR) 50 MG Tab TAKE ONE TABLET BY MOUTH TWICE A DAY (LOPRESSOR) 180 Tab 1   • tizanidine (ZANAFLEX) 4 MG Tab TAKE ONE TABLET BY MOUTH TWICE A DAY AS NEEDED FOR SPASM 60 Tab 4   • pantoprazole (PROTONIX) 40 MG Tablet Delayed Response Take 1 Tab by mouth every day. 30 Tab 0   • cyanocobalamin (VITAMIN B-12) 100 MCG Tab TAKE ONE TABLET BY MOUTH DAILY (CYANOCOBALAMIN) 30 Tab 0   • vitamin D (VITAMIND D3) 1000 UNIT Tab Take 1 Tab by mouth every day. 30 Tab    • gabapentin (NEURONTIN) 300 MG Cap Take 1 Cap by mouth 3 times a day. 90 Cap 1   • pantoprazole (PROTONIX) 40 MG Tablet Delayed Response TAKE ONE TABLET BY MOUTH DAILY 90 Tab 0   • hydrOXYzine HCl (ATARAX) 25 MG Tab Take 0.5-1 Tabs by mouth 3 times a day as needed for Anxiety. Max 3 tablets per 24 hours 60 Tab 0     No current facility-administered medications for this visit.      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  She  has a past surgical history that includes breast biopsy (Left, 2015); lumbar decompression (1969,  "); hysterectomy, total abdominal (); appendectomy (194); parathyroid exploration (2010); paraesophageal hernia robotic (N/A, 10/12/2015); gastroscopy (10/23/2015); thoracoscopy (Left, 10/30/2015); gastroscopy-endo (2015); gastroscopy-endo (N/A, 12/10/2015); gastroscopy-endo (N/A, 2016); and ankle orif (Right, 2016).  Social History   Substance Use Topics   • Smoking status: Never Smoker   • Smokeless tobacco: Former User      Comment: Nicotine    • Alcohol use No     Social History     Social History Narrative    Lives in [facility] and in [place].     Family History   Problem Relation Age of Onset   • Hypertension Mother    • Lung Disease Father    • Hypertension Other    • Lung Disease Unknown    • Lung Disease Brother      Family Status   Relation Status   • Mo  at age 86        Old Age   • Fa  at age 83        Lung Disease   • OTHER (Not Specified)   • Unknown (Not Specified)   • Bro          ROS    Please see hpi     All other systems reviewed and are negative     Objective:     Blood pressure 160/92, pulse 60, temperature 36.5 °C (97.7 °F), height 1.74 m (5' 8.5\"), weight 72.6 kg (160 lb), last menstrual period 1970, SpO2 93 %, not currently breastfeeding. Body mass index is 23.97 kg/m².  Physical Exam:    Constitutional: Alert, no distress.  Skin: Warm, dry, good turgor, no rashes in visible areas.  Eye: Equal, round and reactive, conjunctiva clear, lids normal.  ENMT: Lips without lesions, good dentition, oropharynx clear.  Neck: Trachea midline, no masses, no thyromegaly. No cervical or supraclavicular lymphadenopathy.  Respiratory: Unlabored respiratory effort, lungs clear to auscultation, no wheezes, no ronchi.  Cardiovascular: Normal S1, S2, no murmur, no edema.  Abdomen: Soft, non-tender, no masses, no hepatosplenomegaly.  Psych: Alert and oriented x3, normal affect and mood.          Assessment and Plan:   The following treatment plan was " discussed      1. Need for vaccination    Vaccine was administered today without adverse event.    - INFLUENZA VACCINE, HIGH DOSE (65+ ONLY)  - Hepatitis B Vaccine Adult IM    2. Essential hypertension  Patient  was given instructions to make a two-week home BP log  Then provide this to our clinic, we will compare to our office manual measurement    instructed patient that the BP should be taken with the patient in a seated position with the back supported and legs uncrossed. The diastolic pressure may be higher by 6 mmHg if the back is unsupported, and the systolic pressure may be raised by 2 to 8 mmHg if the legs are crossed.  The arm should be supported at the level of the heart . If the arm is allowed to hang down unsupported, the measured BP will be elevated by 10 to 12 mmHg due to the added hydrostatic pressure induced by gravity        We will then consider appropriate treatment      3. Hyperlipidemia with target LDL less than 130  Patient has been stable with current management  We will make no changes for now      4. Chronic use of opiate drugs therapeutic purposes  Reviewed pain contract with patient --   The patient  may have to give random urine drug screens    will not have the pain meds refilled early, whether he lost meds or not    We reviewed all side effects including but not limited to respiratory depression and death    - HYDROcodone/acetaminophen (NORCO)  MG Tab; Take 1 Tab by mouth 2 times a day as needed for up to 30 days.  Dispense: 60 Tab; Refill: 0        HEALTH MAINTENANCE:    Instructed to Follow up in clinic or ER for worsening symptoms, difficulty breathing, lack of expected recovery, or should new symptoms or problems arise.    Followup: Return in about 6 months (around 3/12/2019) for Reevaluation.       Once again this medical record contains text that has been entered with the assistance of computer voice recognition and dictation software.  Therefore, it may contain unintended  errors in text, spelling, punctuation, or grammar

## 2018-09-12 NOTE — ASSESSMENT & PLAN NOTE
Norco 10 mg p.o. twice daily    She states she has been taking this since the early 80s, this allows her to continue swimming.  She also used for sleep after a day of working out.  No escalation of use no accidental overdoses.  Patient denies any opiate-induced constipation, no shortness of breath no respiratory complaints in general.

## 2018-09-12 NOTE — ASSESSMENT & PLAN NOTE
Rosuvastatin 20 mg p.o. Nightly    Patient has been stable with current management  We will make no changes for now      Results for ELIA ZARATE (MRN [id number]) as of 9/12/2018 12:30   Ref. Range 4/4/2018 10:15   Cholesterol,Tot Latest Ref Range: 100 - 199 mg/dL 119   Triglycerides Latest Ref Range: 0 - 149 mg/dL 185 (H)   HDL Latest Ref Range: >=40 mg/dL 42   LDL Latest Ref Range: <100 mg/dL 40

## 2018-09-12 NOTE — ASSESSMENT & PLAN NOTE
Metoprolol 50 mg p.o. daily    Patient states that she is currently in pain labs white blood pressure is elevated  She states her home BP readings are normal.    The patient has been tollerating the BP meds without any issues. No tunnel vision, no cough, no changes in vision, no lightheadedness, no fatigue, no syncopal or presyncopal episodes, no edema, no new rashes.     The patient also denies chest pain, no dyspnea on exertion, no headaches, no numbness or tingling, no changes in vision, no weakness in any extremity, no back pain no changes in micturition.

## 2018-09-17 DIAGNOSIS — G62.9 NEUROPATHY: ICD-10-CM

## 2018-09-18 RX ORDER — GABAPENTIN 300 MG/1
300 CAPSULE ORAL 3 TIMES DAILY
Qty: 90 CAP | Refills: 1 | Status: SHIPPED | OUTPATIENT
Start: 2018-09-18 | End: 2018-12-03 | Stop reason: SDUPTHER

## 2018-10-11 DIAGNOSIS — K21.9 GASTROESOPHAGEAL REFLUX DISEASE, ESOPHAGITIS PRESENCE NOT SPECIFIED: ICD-10-CM

## 2018-10-11 RX ORDER — PANTOPRAZOLE SODIUM 40 MG/1
40 TABLET, DELAYED RELEASE ORAL DAILY
Qty: 90 TAB | Refills: 0 | Status: SHIPPED | OUTPATIENT
Start: 2018-10-11 | End: 2018-12-12

## 2018-10-25 ENCOUNTER — TELEPHONE (OUTPATIENT)
Dept: MEDICAL GROUP | Age: 83
End: 2018-10-25

## 2018-10-25 NOTE — TELEPHONE ENCOUNTER
1. Caller Name: [facility]                                         Call Back Number: Letter received      Patient approves a detailed voicemail message: N\A    Received a notification about possible over utilization of pantoprazole.    Scanned in to media.  - FYI

## 2018-11-02 ENCOUNTER — PATIENT OUTREACH (OUTPATIENT)
Dept: HEALTH INFORMATION MANAGEMENT | Facility: OTHER | Age: 83
End: 2018-11-02

## 2018-11-02 ENCOUNTER — TELEPHONE (OUTPATIENT)
Dept: MEDICAL GROUP | Age: 83
End: 2018-11-02

## 2018-11-02 DIAGNOSIS — T74.91XA ELDER ABUSE, INITIAL ENCOUNTER: ICD-10-CM

## 2018-11-02 DIAGNOSIS — F41.9 ANXIETY: ICD-10-CM

## 2018-11-02 NOTE — TELEPHONE ENCOUNTER
1. Name: erick Srivastava    Call Back Number: [phone]   Patient approves a detailed voicemail message: yes    2. What are the patient's symptoms (location & severity)? May be experiencing a Blood Clot     3. Is this a new symptom I don't know    4. When did it start? Last Wednesday 10/24/18    5. Action taken per Active Symptom Guide: NONE    Pt was told MA would follow up with her.
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
## 2018-11-03 NOTE — TELEPHONE ENCOUNTER
Phone Number Called: [phone] (home)       Message: Called LVM for patient to see hoe she was feeling, LVM to advise she should be seen in ER for evaluation.     Left Message for patient to call back: yes

## 2018-11-07 ENCOUNTER — PATIENT OUTREACH (OUTPATIENT)
Dept: HEALTH INFORMATION MANAGEMENT | Facility: OTHER | Age: 83
End: 2018-11-07

## 2018-11-07 ENCOUNTER — TELEPHONE (OUTPATIENT)
Dept: MEDICAL GROUP | Age: 83
End: 2018-11-07
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
## 2018-11-07 NOTE — LETTER
November 7, 2018        Ellen Evy [id number]  [facility] [address]        Dear Ellen:    Welcome to [facility] Community Care Management! Your team of Registered Nurses, Social Workers, and Pharmacists are partnered with your [facility] Providers to assist you with accessing resources and education to support your individual needs. As you work with your Community Care Management team, you will be empowered to be successful with learning how to self-manage your health with the Patient Centered goals of helping you to feel better and staying out of the hospital. This program is at no cost to you as this is a part of [facility]’s ongoing commitment to serve the people of our community.    Benefits of working with your Community Care Management team includes:  - Comprehensive assessment by a Registered Nurse on the telephone to identify your medical and health needs.   - Telephonic review of your medications by a Care Coordination Pharmacist to answer any questions you may have about your medications.  - Evaluation of social needs, such as, transportation; financial; food; housing; etc. by a Care Coordination  to connect you with eligible resources.    Please contact me at [phone] to start on the road to your Community Care Management services.  I am available 5 days a week, Monday through Friday from 8:00 a.m. - 5:00 p.m. I look forward to speaking with you soon.    Sincerely,       Your Care Coordination Team  DORINA Lizama, MSW  Electronically Signed            DORINA Lizama, MSW    Electronically Signed
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
## 2018-11-09 ENCOUNTER — PATIENT OUTREACH (OUTPATIENT)
Dept: HEALTH INFORMATION MANAGEMENT | Facility: OTHER | Age: 83
End: 2018-11-09

## 2018-11-09 DIAGNOSIS — F41.8 DEPRESSION WITH ANXIETY: ICD-10-CM

## 2018-11-09 DIAGNOSIS — E78.5 HYPERLIPIDEMIA, UNSPECIFIED HYPERLIPIDEMIA TYPE: ICD-10-CM

## 2018-11-09 DIAGNOSIS — E78.5 HYPERLIPIDEMIA LDL GOAL <130: ICD-10-CM

## 2018-11-09 RX ORDER — ESCITALOPRAM OXALATE 10 MG/1
TABLET ORAL
Qty: 90 TAB | Refills: 0 | Status: SHIPPED | OUTPATIENT
Start: 2018-11-09 | End: 2019-03-18 | Stop reason: SDUPTHER

## 2018-11-09 RX ORDER — ROSUVASTATIN CALCIUM 20 MG/1
TABLET, COATED ORAL
Qty: 90 TAB | Refills: 0 | Status: SHIPPED | OUTPATIENT
Start: 2018-11-09 | End: 2019-02-11 | Stop reason: SDUPTHER

## 2018-11-16 ENCOUNTER — PATIENT OUTREACH (OUTPATIENT)
Dept: HEALTH INFORMATION MANAGEMENT | Facility: OTHER | Age: 83
End: 2018-11-16

## 2018-11-16 NOTE — PROGRESS NOTES
Outreach call to pt to follow up on therapy/counseling resources. Pt did not answer, phone went to  and message was left requesting call back.     Plan:  · LSW will attempt to reach pt at later time/date, if LSW does not hear back.
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
## 2018-11-30 ENCOUNTER — PATIENT OUTREACH (OUTPATIENT)
Dept: HEALTH INFORMATION MANAGEMENT | Facility: OTHER | Age: 83
End: 2018-11-30

## 2018-11-30 NOTE — PROGRESS NOTES
2nd attempt to reach pt to follow up on therapy/counseling resources. Pt did not answer, phone went to  and message was left requesting call back.      Plan:  · LSW will make final attempt to reach pt at later time/date, if LSW does not hear back. If LSW remains unable to reach pt referral will be closed.   · LSW will mail out letter with therapy/counseling resources.

## 2018-12-03 DIAGNOSIS — G62.9 NEUROPATHY: ICD-10-CM

## 2018-12-04 RX ORDER — GABAPENTIN 300 MG/1
300 CAPSULE ORAL 3 TIMES DAILY
Qty: 90 CAP | Refills: 0 | Status: SHIPPED | OUTPATIENT
Start: 2018-12-04 | End: 2019-01-09

## 2018-12-12 ENCOUNTER — OFFICE VISIT (OUTPATIENT)
Dept: MEDICAL GROUP | Age: 83
End: 2018-12-12
Payer: MEDICARE

## 2018-12-12 VITALS
OXYGEN SATURATION: 94 % | BODY MASS INDEX: 23.4 KG/M2 | WEIGHT: 158 LBS | DIASTOLIC BLOOD PRESSURE: 92 MMHG | TEMPERATURE: 97.5 F | SYSTOLIC BLOOD PRESSURE: 164 MMHG | HEART RATE: 65 BPM | HEIGHT: 69 IN

## 2018-12-12 DIAGNOSIS — I10 ESSENTIAL HYPERTENSION: ICD-10-CM

## 2018-12-12 DIAGNOSIS — Z79.891 CHRONIC USE OF OPIATE DRUGS THERAPEUTIC PURPOSES: ICD-10-CM

## 2018-12-12 DIAGNOSIS — Z23 NEED FOR VACCINATION: ICD-10-CM

## 2018-12-12 PROCEDURE — 99214 OFFICE O/P EST MOD 30 MIN: CPT | Performed by: FAMILY MEDICINE

## 2018-12-12 RX ORDER — HYDROCODONE BITARTRATE AND ACETAMINOPHEN 10; 325 MG/1; MG/1
1 TABLET ORAL 2 TIMES DAILY PRN
Qty: 60 TAB | Refills: 0 | Status: SHIPPED | OUTPATIENT
Start: 2018-12-12 | End: 2018-12-12 | Stop reason: SDUPTHER

## 2018-12-12 RX ORDER — HYDROCODONE BITARTRATE AND ACETAMINOPHEN 10; 325 MG/1; MG/1
1 TABLET ORAL 2 TIMES DAILY PRN
Qty: 60 TAB | Refills: 0 | Status: SHIPPED | OUTPATIENT
Start: 2018-12-12 | End: 2019-03-13 | Stop reason: SDUPTHER

## 2018-12-12 RX ORDER — LISINOPRIL 5 MG/1
5 TABLET ORAL DAILY
Qty: 90 TAB | Refills: 0 | Status: SHIPPED | OUTPATIENT
Start: 2018-12-12 | End: 2019-01-09

## 2018-12-12 NOTE — ASSESSMENT & PLAN NOTE
Patient has been taking metoprolol 50 mg p.o. twice daily  She returns with a 2-week home blood pressure log which reveals elevated blood pressures ranging from 144-197/      The patient has been tollerating the BP meds without any issues. No tunnel vision, no cough, no changes in vision, no lightheadedness, no fatigue, no syncopal or presyncopal episodes, no edema, no new rashes.     The patient also denies chest pain, no dyspnea on exertion, no headaches, no numbness or tingling, no changes in vision, no weakness in any extremity, no back pain no changes in micturition.

## 2018-12-12 NOTE — PROGRESS NOTES
This medical record contains text that has been entered with the assistance of computer voice recognition and dictation software.  Therefore, it may contain unintended errors in text, spelling, punctuation, or grammar    Chief Complaint   Patient presents with   • Hypertension     follow up   • Pain     medication refill         Ellen Srivastava is a 83 y.o. female here evaluation and management of: HTN, medication refill      HPI:     HTN (hypertension)  Patient has been taking metoprolol 50 mg p.o. twice daily  She returns with a 2-week home blood pressure log which reveals elevated blood pressures ranging from 144-197/      The patient has been tollerating the BP meds without any issues. No tunnel vision, no cough, no changes in vision, no lightheadedness, no fatigue, no syncopal or presyncopal episodes, no edema, no new rashes.     The patient also denies chest pain, no dyspnea on exertion, no headaches, no numbness or tingling, no changes in vision, no weakness in any extremity, no back pain no changes in micturition.      Chronic use of opiate drugs therapeutic purposes  Patient returns today for her Norco refill  Norco 10 mg p.o. twice daily  Opiate contract is up-to-date 7/14/2019  Urine drug screen is up-to-date    There has been no escalation of use, no accidental overdoses.  Patient denies any opiate-induced constipation.  There has been no episodes of respiratory depression      Current medicines (including changes today)  Current Outpatient Prescriptions   Medication Sig Dispense Refill   • lisinopril (PRINIVIL) 5 MG Tab Take 1 Tab by mouth every day. 90 Tab 0   • HYDROcodone/acetaminophen (NORCO)  MG Tab Take 1 Tab by mouth 2 times a day as needed for up to 30 days. 60 Tab 0   • gabapentin (NEURONTIN) 300 MG Cap Take 1 Cap by mouth 3 times a day. 90 Cap 0   • escitalopram (LEXAPRO) 10 MG Tab TAKE ONE TABLET BY MOUTH DAILY 90 Tab 0   • rosuvastatin (CRESTOR) 20 MG Tab TAKE ONE TABLET  BY MOUTH EVERY EVENING 90 Tab 0   • metoprolol (LOPRESSOR) 50 MG Tab TAKE ONE TABLET BY MOUTH TWICE A DAY (LOPRESSOR) 180 Tab 1   • pantoprazole (PROTONIX) 40 MG Tablet Delayed Response TAKE ONE TABLET BY MOUTH DAILY 90 Tab 0   • tizanidine (ZANAFLEX) 4 MG Tab TAKE ONE TABLET BY MOUTH TWICE A DAY AS NEEDED FOR SPASM 60 Tab 4   • cyanocobalamin (VITAMIN B-12) 100 MCG Tab TAKE ONE TABLET BY MOUTH DAILY (CYANOCOBALAMIN) 30 Tab 0   • vitamin D (VITAMIND D3) 1000 UNIT Tab Take 1 Tab by mouth every day. 30 Tab      No current facility-administered medications for this visit.      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  She  has a past surgical history that includes breast biopsy (Left, 2015); lumbar decompression (1969, 1979); hysterectomy, total abdominal (1977); appendectomy (1945); parathyroid exploration (6/16/2010); paraesophageal hernia robotic (N/A, 10/12/2015); gastroscopy (10/23/2015); thoracoscopy (Left, 10/30/2015); gastroscopy-endo (11/1/2015); gastroscopy-endo (N/A, 12/10/2015); gastroscopy-endo (N/A, 2/26/2016); and ankle orif (Right, 9/20/2016).  Social History   Substance Use Topics   • Smoking status: Never Smoker   • Smokeless tobacco: Former User      Comment: Nicotine    • Alcohol use No     Social History     Social History Narrative    Lives in [place] and in [place].     Family History   Problem Relation Age  "of Onset   • Hypertension Mother    • Lung Disease Father    • Hypertension Other    • Lung Disease Unknown    • Lung Disease Brother      Family Status   Relation Status   • Mo  at age 86        Old Age   • Fa  at age 83        Lung Disease   • OTHER (Not Specified)   • Unknown (Not Specified)   • Bro          ROS    Please see hpi     All other systems reviewed and are negative     Objective:     Blood pressure (!) 164/92, pulse 65, temperature 36.4 °C (97.5 °F), temperature source Temporal, height 1.74 m (5' 8.5\"), weight 71.7 kg (158 lb), last menstrual period 1970, SpO2 94 %, not currently breastfeeding. Body mass index is 23.67 kg/m².  Physical Exam:    Constitutional: Alert, no distress.  Skin: Warm, dry, good turgor, no rashes in visible areas.  Eye: Equal, round and reactive, conjunctiva clear, lids normal.  ENMT: Lips without lesions, good dentition, oropharynx clear.  Neck: Trachea midline, no masses, no thyromegaly. No cervical or supraclavicular lymphadenopathy.  Respiratory: Unlabored respiratory effort, lungs clear to auscultation, no wheezes, no ronchi.  Cardiovascular: Normal S1, S2, no murmur, no edema.  Abdomen: Soft, non-tender, no masses, no hepatosplenomegaly.  Psych: Alert and oriented x3, normal affect and mood.          Assessment and Plan:   The following treatment plan was discussed      1. Need for vaccination    Left before getting    - Hepatitis B Vaccine Adult IM    2. Chronic use of opiate drugs therapeutic purposes    Reviewed pain contract with patient --   The patient  may have to give random urine drug screens    will not have the pain meds refilled early, whether he lost meds or not    We reviewed all side effects including but not limited to respiratory depression and death  - HYDROcodone/acetaminophen (NORCO)  MG Tab; Take 1 Tab by mouth 2 times a day as needed for up to 30 days.  Dispense: 60 Tab; Refill: 0    3. Essential hypertension  Not " at goal  Continue metoprolol 50 mg p.o. twice daily  Add lisinopril 5 mg p.o. Daily  Return to clinic with a 2-week blood pressure log        HEALTH MAINTENANCE:    Instructed to Follow up in clinic or ER for worsening symptoms, difficulty breathing, lack of expected recovery, or should new symptoms or problems arise.    Followup: Return in about 3 months (around 3/12/2019) for Medication refill.       Once again this medical record contains text that has been entered with the assistance of computer voice recognition and dictation software.  Therefore, it may contain unintended errors in text, spelling, punctuation, or grammar

## 2018-12-12 NOTE — ASSESSMENT & PLAN NOTE
Patient returns today for her Norco refill  Norco 10 mg p.o. twice daily  Opiate contract is up-to-date 7/14/2019  Urine drug screen is up-to-date    There has been no escalation of use, no accidental overdoses.  Patient denies any opiate-induced constipation.  There has been no episodes of respiratory depression

## 2018-12-18 ENCOUNTER — PATIENT OUTREACH (OUTPATIENT)
Dept: HEALTH INFORMATION MANAGEMENT | Facility: OTHER | Age: 83
End: 2018-12-18

## 2018-12-18 RX ORDER — TIZANIDINE 4 MG/1
TABLET ORAL
Qty: 60 TAB | Refills: 0 | Status: SHIPPED | OUTPATIENT
Start: 2018-12-18 | End: 2019-02-06 | Stop reason: SDUPTHER

## 2018-12-18 NOTE — PROGRESS NOTES
MSW received incoming call from pt reported she received resource list mailed to her for behavioral/talk-therapy services. She reported her daughter is currently in town and she will plan to look into resources likely after the holidays in the new year. Encouraged pt to follow up with MSW if she has any additional questions and discussed following up with her in 2019 regarding establishing with a therapist/counselor. Pt agreeable.     Plan:  · MSW will follow up with pt in Jan 2019.

## 2018-12-26 ENCOUNTER — TELEPHONE (OUTPATIENT)
Dept: MEDICAL GROUP | Age: 83
End: 2018-12-26

## 2018-12-26 NOTE — TELEPHONE ENCOUNTER
1. Caller Name: Ellen Srivastava                                           Call Back Number: [phone] (home)         Patient approves a detailed voicemail message: N\A    Pt brought in BP log- see media

## 2019-01-03 ENCOUNTER — PATIENT OUTREACH (OUTPATIENT)
Dept: HEALTH INFORMATION MANAGEMENT | Facility: OTHER | Age: 84
End: 2019-01-03

## 2019-01-03 NOTE — PROGRESS NOTES
Outreach call to pt to follow up on  care plan and resources mailed for behavioral health. Pt did not answer. MSW left  requesting call back.     Plan:  · MSW will attempt to follow up with pt at later time/date, if MSW does not hear back from pt.

## 2019-01-07 NOTE — TELEPHONE ENCOUNTER
Phone Number Called: [phone] (home)        Message: lvm for pt to call back to schedule an appt with PCP    Left Message for patient to call back: yes

## 2019-01-08 NOTE — TELEPHONE ENCOUNTER
1. Caller Name: Ellen Srivastava                                            Call Back Number: [phone] (home)         Patient approves a detailed voicemail message: N\A    Pt scheduled appt

## 2019-01-09 ENCOUNTER — OFFICE VISIT (OUTPATIENT)
Dept: MEDICAL GROUP | Age: 84
End: 2019-01-09
Payer: MEDICARE

## 2019-01-09 VITALS
SYSTOLIC BLOOD PRESSURE: 154 MMHG | TEMPERATURE: 97.3 F | HEIGHT: 69 IN | BODY MASS INDEX: 23.55 KG/M2 | OXYGEN SATURATION: 96 % | HEART RATE: 61 BPM | DIASTOLIC BLOOD PRESSURE: 92 MMHG | WEIGHT: 159 LBS

## 2019-01-09 DIAGNOSIS — I10 ESSENTIAL HYPERTENSION: ICD-10-CM

## 2019-01-09 PROCEDURE — 99213 OFFICE O/P EST LOW 20 MIN: CPT | Performed by: FAMILY MEDICINE

## 2019-01-09 RX ORDER — METOPROLOL SUCCINATE 50 MG/1
50 TABLET, EXTENDED RELEASE ORAL DAILY
Qty: 90 TAB | Refills: 0 | Status: SHIPPED | OUTPATIENT
Start: 2019-01-09 | End: 2019-04-08 | Stop reason: SDUPTHER

## 2019-01-09 RX ORDER — LISINOPRIL 10 MG/1
10 TABLET ORAL DAILY
Qty: 90 TAB | Refills: 0 | Status: SHIPPED | OUTPATIENT
Start: 2019-01-09 | End: 2019-01-30

## 2019-01-09 ASSESSMENT — PATIENT HEALTH QUESTIONNAIRE - PHQ9: CLINICAL INTERPRETATION OF PHQ2 SCORE: 0

## 2019-01-09 NOTE — PROGRESS NOTES
This medical record contains text that has been entered with the assistance of computer voice recognition and dictation software.  Therefore, it may contain unintended errors in text, spelling, punctuation, or grammar    Chief Complaint   Patient presents with   • Hypertension     follow up          Ellen Srivastava is a 83 y.o. female here evaluation and management of: htn      HPI:     HTN (hypertension)  Metoprolol 50 mg p.o. twice daily  Lisinopril 5 mg p.o. daily      Home BP readings--- not monitoring    The patient has been tollerating the BP meds without any issues. No tunnel vision, no cough, no changes in vision, no lightheadedness, no fatigue, no syncopal or presyncopal episodes, no edema, no new rashes.     The patient also denies chest pain, no dyspnea on exertion, no headaches, no numbness or tingling, no changes in vision, no weakness in any extremity, no back pain no changes in micturition.        Current medicines (including changes today)  Current Outpatient Prescriptions   Medication Sig Dispense Refill   • metoprolol SR (TOPROL XL) 50 MG TABLET SR 24 HR Take 1 Tab by mouth every day. 90 Tab 0   • lisinopril (PRINIVIL) 10 MG Tab Take 1 Tab by mouth every day. 90 Tab 0   • tizanidine (ZANAFLEX) 4 MG Tab TAKE ONE TABLET BY MOUTH TWICE A DAY AS NEEDED FOR SPASMS 60 Tab 0   • HYDROcodone/acetaminophen (NORCO)  MG Tab Take 1 Tab by mouth 2 times a day as needed for up to 30 days. 60 Tab 0   • escitalopram (LEXAPRO) 10 MG Tab TAKE ONE TABLET BY MOUTH DAILY 90 Tab 0   • rosuvastatin (CRESTOR) 20 MG Tab TAKE ONE TABLET BY MOUTH EVERY EVENING 90 Tab 0   • pantoprazole (PROTONIX) 40 MG Tablet Delayed Response TAKE ONE TABLET BY MOUTH DAILY 90 Tab 0   • cyanocobalamin (VITAMIN B-12) 100 MCG Tab TAKE ONE TABLET BY MOUTH DAILY (CYANOCOBALAMIN) 30 Tab 0   • vitamin D (VITAMIND D3) 1000 UNIT Tab Take 1 Tab by mouth every day. 30 Tab      No current facility-administered medications for this visit.   "    She  has a past medical history of Anemia; Atrophic vaginitis (2010); Cataract; Cerumen Impaction Recurrent (2010); Depressive disorder, not elsewhere classified (2010); Disorder of thyroid; Esophageal diverticulum; HDL lipoprotein deficiency (2010); Hiatus hernia syndrome; High cholesterol; HTN (hypertension) (2010); Hyperlipidemia LDL goal < 130 (10/21/2009); Hypertriglyceridemia (2010); Indigestion; Narcotic dependence (HCC) (2012); Osteopenia (2010); Parathyroid hormone excess (HCC) (2010); Serum calcium elevated (2010); Tinnitus (2010); and Vertigo, intermittent (2012).  She  has a past surgical history that includes breast biopsy (Left, ); lumbar decompression (, ); hysterectomy, total abdominal (); appendectomy (); parathyroid exploration (2010); paraesophageal hernia robotic (N/A, 10/12/2015); gastroscopy (10/23/2015); thoracoscopy (Left, 10/30/2015); gastroscopy-endo (2015); gastroscopy-endo (N/A, 12/10/2015); gastroscopy-endo (N/A, 2016); and ankle orif (Right, 2016).  Social History   Substance Use Topics   • Smoking status: Never Smoker   • Smokeless tobacco: Former User      Comment: Nicotine    • Alcohol use No     Social History     Social History Narrative    Lives in [facility] and in [place].     Family History   Problem Relation Age of Onset   • Hypertension Mother    • Lung Disease Father    • Hypertension Other    • Lung Disease Unknown    • Lung Disease Brother      Family Status   Relation Status   • Mo  at age 86        Old Age   • Fa  at age 83        Lung Disease   • OTHER (Not Specified)   • Unknown (Not Specified)   • Bro          ROS    Please see hpi     All other systems reviewed and are negative     Objective:     Blood pressure 154/92, pulse 61, temperature 36.3 °C (97.3 °F), temperature source Temporal, height 1.74 m (5' 8.5\"), weight 72.1 kg (159 lb), last " menstrual period 03/13/1970, SpO2 96 %, not currently breastfeeding. Body mass index is 23.82 kg/m².  Physical Exam:    Constitutional: Alert, no distress.  Skin: Warm, dry, good turgor, no rashes in visible areas.  Eye: Equal, round and reactive, conjunctiva clear, lids normal.  ENMT: Lips without lesions, good dentition, oropharynx clear.  Neck: Trachea midline, no masses, no thyromegaly. No cervical or supraclavicular lymphadenopathy.  Respiratory: Unlabored respiratory effort, lungs clear to auscultation, no wheezes, no ronchi.  Cardiovascular: Normal S1, S2, no murmur, no edema.  Abdomen: Soft, non-tender, no masses, no hepatosplenomegaly.  Psych: Alert and oriented x3, normal affect and mood.          Assessment and Plan:   The following treatment plan was discussed      1. Essential hypertension  Not at goal   Change metoprolol to extended release 50 mg p.o. daily  Increase lisinopril to 10 mg p.o. daily  Return to clinic with a 2-week home BP log      - metoprolol SR (TOPROL XL) 50 MG TABLET SR 24 HR; Take 1 Tab by mouth every day.  Dispense: 90 Tab; Refill: 0  - lisinopril (PRINIVIL) 10 MG Tab; Take 1 Tab by mouth every day.  Dispense: 90 Tab; Refill: 0            Instructed to Follow up in clinic or ER for worsening symptoms, difficulty breathing, lack of expected recovery, or should new symptoms or problems arise.    Followup: Return in about 2 weeks (around 1/23/2019) for 2 week BP log.       Once again this medical record contains text that has been entered with the assistance of computer voice recognition and dictation software.  Therefore, it may contain unintended errors in text, spelling, punctuation, or grammar

## 2019-01-09 NOTE — ASSESSMENT & PLAN NOTE
Metoprolol 50 mg p.o. twice daily  Lisinopril 5 mg p.o. daily      Home BP readings--- not monitoring    The patient has been tollerating the BP meds without any issues. No tunnel vision, no cough, no changes in vision, no lightheadedness, no fatigue, no syncopal or presyncopal episodes, no edema, no new rashes.     The patient also denies chest pain, no dyspnea on exertion, no headaches, no numbness or tingling, no changes in vision, no weakness in any extremity, no back pain no changes in micturition.

## 2019-01-10 DIAGNOSIS — K21.00 GASTROESOPHAGEAL REFLUX DISEASE WITH ESOPHAGITIS: ICD-10-CM

## 2019-01-10 RX ORDER — PANTOPRAZOLE SODIUM 40 MG/1
TABLET, DELAYED RELEASE ORAL
Qty: 90 TAB | Refills: 0 | Status: SHIPPED | OUTPATIENT
Start: 2019-01-10 | End: 2019-04-11 | Stop reason: SDUPTHER

## 2019-01-21 ENCOUNTER — PATIENT OUTREACH (OUTPATIENT)
Dept: HEALTH INFORMATION MANAGEMENT | Facility: OTHER | Age: 84
End: 2019-01-21

## 2019-01-22 NOTE — PROGRESS NOTES
"Outreach call to pt to follow up on  care plan and determine if pt has followed up with Behavioral Health Resources mailed out. Pt answered. She reported she has been \"doing much better\" and explained at this time she has decided not to follow up with therapy services but confirmed she still has resources should she change her mind in the future. Pt denied any additional social needs at this time. She reported she feels comfortable with reaching out to MSW in the future should additional services be needed and confirmed she has MSW contact information.     Plan:  · Pt graduated from Community Care Management-Social Work Coordination services. While on service pt has been able to identify and has resources for local behavioral health service needs should she choose to utilize in the future.   "

## 2019-01-23 ENCOUNTER — TELEPHONE (OUTPATIENT)
Dept: MEDICAL GROUP | Age: 84
End: 2019-01-23

## 2019-01-24 NOTE — TELEPHONE ENCOUNTER
Phone Number Called: [phone] (home), [address] [place] ([place])   [place]  Home Phone: [phone]  Relation: Spouse    Message: VM left on both numbers in Pt's chart.  Pt advised to seek immediate medical attention.    Pt needs to be contacted ASAP    Left Message for patient to call back: yes

## 2019-01-24 NOTE — TELEPHONE ENCOUNTER
"VOICEMAIL  1. Caller Name: Ellen Srivastava                        Call Back Number: [phone] (home)       2. Message: Pt left VM stating she can't swallow and is vomiting everything up.  Pt also said she is experiencing numbness of her right side and her blood pressure has been high for the past week.   Pt said she went to the ED, but they \"could not get her in right away\" so she left.     3. Patient approves office to leave a detailed voicemail/MyChart message: N\A        "

## 2019-01-30 ENCOUNTER — OFFICE VISIT (OUTPATIENT)
Dept: MEDICAL GROUP | Age: 84
End: 2019-01-30
Payer: MEDICARE

## 2019-01-30 VITALS
HEART RATE: 66 BPM | SYSTOLIC BLOOD PRESSURE: 150 MMHG | DIASTOLIC BLOOD PRESSURE: 86 MMHG | BODY MASS INDEX: 23.11 KG/M2 | OXYGEN SATURATION: 94 % | TEMPERATURE: 98.4 F | HEIGHT: 69 IN | WEIGHT: 156 LBS

## 2019-01-30 DIAGNOSIS — I10 ESSENTIAL HYPERTENSION: ICD-10-CM

## 2019-01-30 PROBLEM — Z48.02 VISIT FOR SUTURE REMOVAL: Status: RESOLVED | Noted: 2017-10-03 | Resolved: 2019-01-30

## 2019-01-30 PROCEDURE — 99213 OFFICE O/P EST LOW 20 MIN: CPT | Performed by: FAMILY MEDICINE

## 2019-01-30 RX ORDER — LISINOPRIL AND HYDROCHLOROTHIAZIDE 12.5; 1 MG/1; MG/1
TABLET ORAL
Qty: 60 TAB | Refills: 0 | Status: SHIPPED | OUTPATIENT
Start: 2019-01-30 | End: 2019-03-13 | Stop reason: SDUPTHER

## 2019-01-30 RX ORDER — HYDROCODONE BITARTRATE AND ACETAMINOPHEN 10; 325 MG/1; MG/1
1 TABLET ORAL EVERY 6 HOURS PRN
Status: ON HOLD | COMMUNITY
Start: 2019-01-24 | End: 2020-08-24

## 2019-01-30 NOTE — ASSESSMENT & PLAN NOTE
Metoprolol sustained release 50 mg p.o. daily  Lisinopril 10 mg p.o. daily    Home BP readings-- 153-188/      The patient has been tollerating the BP meds without any issues. No tunnel vision, no cough, no changes in vision, no lightheadedness, no fatigue, no syncopal or presyncopal episodes, no edema, no new rashes.     The patient also denies chest pain, no dyspnea on exertion, no headaches, no numbness or tingling, no changes in vision, no weakness in any extremity, no back pain no changes in micturition.

## 2019-01-30 NOTE — PROGRESS NOTES
This medical record contains text that has been entered with the assistance of computer voice recognition and dictation software.  Therefore, it may contain unintended errors in text, spelling, punctuation, or grammar    Chief Complaint   Patient presents with   • Hypertension     follow up         Ellen Srivastava is a 83 y.o. female here evaluation and management of: htn follow up      HPI:     HTN (hypertension)  Metoprolol sustained release 50 mg p.o. daily  Lisinopril 10 mg p.o. daily    Home BP readings-- 153-188/      The patient has been tollerating the BP meds without any issues. No tunnel vision, no cough, no changes in vision, no lightheadedness, no fatigue, no syncopal or presyncopal episodes, no edema, no new rashes.     The patient also denies chest pain, no dyspnea on exertion, no headaches, no numbness or tingling, no changes in vision, no weakness in any extremity, no back pain no changes in micturition.      Current medicines (including changes today)  Current Outpatient Prescriptions   Medication Sig Dispense Refill   • HYDROcodone-acetaminophen (NORCO) 5-325 MG Tab per tablet      • lisinopril-hydrochlorothiazide (PRINZIDE, ZESTORETIC) 10-12.5 MG per tablet Take one tab po q24h  (STOP LISINOPRIL) 60 Tab 0   • pantoprazole (PROTONIX) 40 MG Tablet Delayed Response TAKE ONE TABLET BY MOUTH DAILY 90 Tab 0   • metoprolol SR (TOPROL XL) 50 MG TABLET SR 24 HR Take 1 Tab by mouth every day. 90 Tab 0   • tizanidine (ZANAFLEX) 4 MG Tab TAKE ONE TABLET BY MOUTH TWICE A DAY AS NEEDED FOR SPASMS 60 Tab 0   • escitalopram (LEXAPRO) 10 MG Tab TAKE ONE TABLET BY MOUTH DAILY 90 Tab 0   • rosuvastatin (CRESTOR) 20 MG Tab TAKE ONE TABLET BY MOUTH EVERY EVENING 90 Tab 0   • cyanocobalamin (VITAMIN B-12) 100 MCG Tab TAKE ONE TABLET BY MOUTH DAILY (CYANOCOBALAMIN) 30 Tab 0   • vitamin D (VITAMIND D3) 1000 UNIT Tab Take 1 Tab by mouth every day. 30 Tab      No current facility-administered medications for  "this visit.      She  has a past medical history of Anemia; Atrophic vaginitis (2010); Cataract; Cerumen Impaction Recurrent (2010); Depressive disorder, not elsewhere classified (2010); Disorder of thyroid; Esophageal diverticulum; HDL lipoprotein deficiency (2010); Hiatus hernia syndrome; High cholesterol; HTN (hypertension) (2010); Hyperlipidemia LDL goal < 130 (10/21/2009); Hypertriglyceridemia (2010); Indigestion; Narcotic dependence (HCC) (2012); Osteopenia (2010); Parathyroid hormone excess (HCC) (2010); Serum calcium elevated (2010); Tinnitus (2010); and Vertigo, intermittent (2012).  She  has a past surgical history that includes breast biopsy (Left, ); lumbar decompression (, ); hysterectomy, total abdominal (); appendectomy (); parathyroid exploration (2010); paraesophageal hernia robotic (N/A, 10/12/2015); gastroscopy (10/23/2015); thoracoscopy (Left, 10/30/2015); gastroscopy-endo (2015); gastroscopy-endo (N/A, 12/10/2015); gastroscopy-endo (N/A, 2016); and ankle orif (Right, 2016).  Social History   Substance Use Topics   • Smoking status: Never Smoker   • Smokeless tobacco: Former User      Comment: Nicotine    • Alcohol use No     Social History     Social History Narrative    Lives in [facility] and in [place].     Family History   Problem Relation Age of Onset   • Hypertension Mother    • Lung Disease Father    • Hypertension Other    • Lung Disease Unknown    • Lung Disease Brother      Family Status   Relation Status   • Mo  at age 86        Old Age   • Fa  at age 83        Lung Disease   • OTHER (Not Specified)   • Unknown (Not Specified)   • Bro          ROS    Please see hpi     All other systems reviewed and are negative     Objective:     Blood pressure 150/86, pulse 66, temperature 36.9 °C (98.4 °F), temperature source Temporal, height 1.74 m (5' 8.5\"), weight 70.8 kg (156 " lb), last menstrual period 03/13/1970, SpO2 94 %, not currently breastfeeding. Body mass index is 23.37 kg/m².  Physical Exam:    Constitutional: Alert, no distress.  Skin: Warm, dry, good turgor, no rashes in visible areas.  Eye: Equal, round and reactive, conjunctiva clear, lids normal.  ENMT: Lips without lesions, good dentition, oropharynx clear.  Neck: Trachea midline, no masses, no thyromegaly. No cervical or supraclavicular lymphadenopathy.  Respiratory: Unlabored respiratory effort, lungs clear to auscultation, no wheezes, no ronchi.  Cardiovascular: Normal S1, S2, no murmur, no edema.  Abdomen: Soft, non-tender, no masses, no hepatosplenomegaly.  Psych: Alert and oriented x3, normal affect and mood.          Assessment and Plan:   The following treatment plan was discussed      1. Essential hypertension    Not at goal based on home BP readings  Continue metoprolol sustained release 50 mg p.o. Daily  Change lisinopril to Zestoretic 10/12.5 mg p.o. Daily    Not increasing BB because of HR    Return to clinic with 2-week home BP log  Stroke and ER precautions given    - lisinopril-hydrochlorothiazide (PRINZIDE, ZESTORETIC) 10-12.5 MG per tablet; Take one tab po q24h  (STOP LISINOPRIL)  Dispense: 60 Tab; Refill: 0            Instructed to Follow up in clinic or ER for worsening symptoms, difficulty breathing, lack of expected recovery, or should new symptoms or problems arise.    Followup: Return in about 2 weeks (around 2/13/2019) for 2 week BP log.       Once again this medical record contains text that has been entered with the assistance of computer voice recognition and dictation software.  Therefore, it may contain unintended errors in text, spelling, punctuation, or grammar

## 2019-02-06 DIAGNOSIS — R25.2 SPASM: ICD-10-CM

## 2019-02-06 RX ORDER — TIZANIDINE 4 MG/1
TABLET ORAL
Qty: 60 TAB | Refills: 0 | Status: SHIPPED | OUTPATIENT
Start: 2019-02-06 | End: 2019-03-08 | Stop reason: SDUPTHER

## 2019-02-11 DIAGNOSIS — E78.5 HYPERLIPIDEMIA, UNSPECIFIED HYPERLIPIDEMIA TYPE: ICD-10-CM

## 2019-02-12 RX ORDER — ROSUVASTATIN CALCIUM 20 MG/1
TABLET, COATED ORAL
Qty: 90 TAB | Refills: 2 | Status: SHIPPED | OUTPATIENT
Start: 2019-02-12 | End: 2019-11-08 | Stop reason: SDUPTHER

## 2019-02-14 ENCOUNTER — HOSPITAL ENCOUNTER (OUTPATIENT)
Dept: LAB | Facility: MEDICAL CENTER | Age: 84
End: 2019-02-14
Attending: ORAL & MAXILLOFACIAL SURGERY
Payer: MEDICARE

## 2019-02-14 PROCEDURE — 88305 TISSUE EXAM BY PATHOLOGIST: CPT

## 2019-02-15 LAB — PATHOLOGY CONSULT NOTE: NORMAL

## 2019-02-16 LAB — AMBIGUOUS DTTM AMBI4: NORMAL

## 2019-03-05 DIAGNOSIS — J30.9 ALLERGIC RHINITIS, UNSPECIFIED SEASONALITY, UNSPECIFIED TRIGGER: ICD-10-CM

## 2019-03-06 RX ORDER — HYDROXYZINE HYDROCHLORIDE 25 MG/1
12.5-25 TABLET, FILM COATED ORAL 3 TIMES DAILY PRN
Qty: 90 TAB | Refills: 2 | Status: SHIPPED | OUTPATIENT
Start: 2019-03-06 | End: 2019-10-17 | Stop reason: SDUPTHER

## 2019-03-08 DIAGNOSIS — R25.2 SPASM: ICD-10-CM

## 2019-03-08 RX ORDER — TIZANIDINE 4 MG/1
TABLET ORAL
Qty: 60 TAB | Refills: 0 | Status: SHIPPED | OUTPATIENT
Start: 2019-03-08 | End: 2019-05-08 | Stop reason: SDUPTHER

## 2019-03-12 DIAGNOSIS — G62.9 NEUROPATHY: ICD-10-CM

## 2019-03-13 ENCOUNTER — OFFICE VISIT (OUTPATIENT)
Dept: MEDICAL GROUP | Age: 84
End: 2019-03-13
Payer: MEDICARE

## 2019-03-13 VITALS
SYSTOLIC BLOOD PRESSURE: 130 MMHG | HEIGHT: 68 IN | DIASTOLIC BLOOD PRESSURE: 80 MMHG | BODY MASS INDEX: 22.73 KG/M2 | HEART RATE: 62 BPM | OXYGEN SATURATION: 94 % | WEIGHT: 150 LBS | TEMPERATURE: 98.6 F

## 2019-03-13 DIAGNOSIS — I10 ESSENTIAL HYPERTENSION: ICD-10-CM

## 2019-03-13 DIAGNOSIS — Z79.891 CHRONIC USE OF OPIATE DRUGS THERAPEUTIC PURPOSES: ICD-10-CM

## 2019-03-13 DIAGNOSIS — R47.02 DYSPHASIA: ICD-10-CM

## 2019-03-13 PROCEDURE — 99214 OFFICE O/P EST MOD 30 MIN: CPT | Performed by: FAMILY MEDICINE

## 2019-03-13 RX ORDER — LISINOPRIL AND HYDROCHLOROTHIAZIDE 12.5; 1 MG/1; MG/1
TABLET ORAL
Qty: 90 TAB | Refills: 2 | Status: SHIPPED | OUTPATIENT
Start: 2019-03-13 | End: 2019-12-23

## 2019-03-13 RX ORDER — HYDROCODONE BITARTRATE AND ACETAMINOPHEN 10; 325 MG/1; MG/1
1 TABLET ORAL 2 TIMES DAILY PRN
Qty: 60 TAB | Refills: 0 | Status: SHIPPED | OUTPATIENT
Start: 2019-03-13 | End: 2019-03-13 | Stop reason: SDUPTHER

## 2019-03-13 RX ORDER — GABAPENTIN 300 MG/1
CAPSULE ORAL
Qty: 90 CAP | Refills: 0 | Status: SHIPPED | OUTPATIENT
Start: 2019-03-13 | End: 2019-04-09 | Stop reason: SDUPTHER

## 2019-03-13 RX ORDER — HYDROCODONE BITARTRATE AND ACETAMINOPHEN 10; 325 MG/1; MG/1
1 TABLET ORAL 2 TIMES DAILY PRN
Qty: 60 TAB | Refills: 0 | Status: SHIPPED | OUTPATIENT
Start: 2019-03-13 | End: 2019-06-12 | Stop reason: SDUPTHER

## 2019-03-13 ASSESSMENT — PAIN SCALES - GENERAL: PAINLEVEL: NO PAIN

## 2019-03-13 NOTE — PROGRESS NOTES
This medical record contains text that has been entered with the assistance of computer voice recognition and dictation software.  Therefore, it may contain unintended errors in text, spelling, punctuation, or grammar    Chief Complaint   Patient presents with   • Medication Refill     Norco, Lisinopril- HCTZ         Ellen Srivastava is a 84 y.o. female here evaluation and management of: medication refill      HPI:     Chronic use of opiate drugs therapeutic purposes  Patient has been taking Norco 10 mg p.o. BID daily  The  pain contract was signed on last year, due for update  Due  for urinary drug screen  There has been no escalation of use, no accidental overdoses.  Patient denies any opiate-induced constipation.  There has been no episodes of respiratory depression    Dysphasia  NEW PROBLEM    The patient states that she is getting to have difficulty swallowing again.  She does have a significant past medical history of esophageal diverticuli which led to rupture and an ICU admission.    Current medicines (including changes today)  Current Outpatient Prescriptions   Medication Sig Dispense Refill   • gabapentin (NEURONTIN) 300 MG Cap TAKE ONE CAPSULE BY MOUTH THREE TIMES A DAY 90 Cap 0   • HYDROcodone/acetaminophen (NORCO)  MG Tab Take 1 Tab by mouth 2 times a day as needed for up to 30 days. 60 Tab 0   • tizanidine (ZANAFLEX) 4 MG Tab TAKE ONE TABLET BY MOUTH TWICE A DAY AS NEEDED FOR SPASMS 60 Tab 0   • hydrOXYzine HCl (ATARAX) 25 MG Tab Take 0.5-1 Tabs by mouth 3 times a day as needed for Anxiety. Max 3 tablets per 24 hours 90 Tab 2   • rosuvastatin (CRESTOR) 20 MG Tab TAKE ONE TABLET BY MOUTH EVERY EVENING 90 Tab 2   • HYDROcodone-acetaminophen (NORCO) 5-325 MG Tab per tablet      • lisinopril-hydrochlorothiazide (PRINZIDE, ZESTORETIC) 10-12.5 MG per tablet Take one tab po q24h  (STOP LISINOPRIL) 60 Tab 0   • pantoprazole (PROTONIX) 40 MG Tablet Delayed Response TAKE ONE TABLET BY MOUTH DAILY  90 Tab 0   • metoprolol SR (TOPROL XL) 50 MG TABLET SR 24 HR Take 1 Tab by mouth every day. 90 Tab 0   • escitalopram (LEXAPRO) 10 MG Tab TAKE ONE TABLET BY MOUTH DAILY 90 Tab 0   • cyanocobalamin (VITAMIN B-12) 100 MCG Tab TAKE ONE TABLET BY MOUTH DAILY (CYANOCOBALAMIN) 30 Tab 0   • vitamin D (VITAMIND D3) 1000 UNIT Tab Take 1 Tab by mouth every day. 30 Tab      No current facility-administered medications for this visit.      She  has a past medical history of Anemia; Atrophic vaginitis (2010); Cerumen Impaction Recurrent (2010); Depressive disorder, not elsewhere classified (2010); HDL lipoprotein deficiency (2010); HTN (hypertension) (2010); Hypertriglyceridemia (2010); Narcotic dependence (HCC) (2012); Osteopenia (2010); Parathyroid hormone excess (HCC) (2010); Serum calcium elevated (2010); Tinnitus (2010); and Vertigo, intermittent (2012).  She  has a past surgical history that includes breast biopsy (Left, ); lumbar decompression (, ); hysterectomy, total abdominal (); appendectomy (); parathyroid exploration (2010); paraesophageal hernia robotic (N/A, 10/12/2015); gastroscopy (10/23/2015); thoracoscopy (Left, 10/30/2015); gastroscopy-endo (2015); gastroscopy-endo (N/A, 12/10/2015); gastroscopy-endo (N/A, 2016); and ankle orif (Right, 2016).  Social History   Substance Use Topics   • Smoking status: Never Smoker   • Smokeless tobacco: Former User      Comment: Nicotine    • Alcohol use No     Social History     Social History Narrative    Lives in [facility] and in [place].     Family History   Problem Relation Age of Onset   • Hypertension Mother    • Lung Disease Father    • Hypertension Other    • Lung Disease Unknown    • Lung Disease Brother      Family Status   Relation Status   • Mo  at age 86        Old Age   • Fa  at age 83        Lung Disease   • OTHER (Not Specified)   • Unknown (Not  "Specified)   • Bro          ROS    Please see hpi     All other systems reviewed and are negative     Objective:     Blood pressure 130/80, pulse 62, temperature 37 °C (98.6 °F), temperature source Tympanic, height 1.727 m (5' 8\"), weight 68 kg (150 lb), last menstrual period 1970, SpO2 94 %, not currently breastfeeding. Body mass index is 22.81 kg/m².  Physical Exam:    Constitutional: Alert, no distress.  Skin: Warm, dry, good turgor, no rashes in visible areas  Eye: Equal, round and reactive, conjunctiva clear, lids normal.  ENMT: Lips without lesions, good dentition, oropharynx clear.  Neck: Trachea midline, no masses, no thyromegaly. No cervical or supraclavicular lymphadenopathy.  Respiratory: Unlabored respiratory effort, lungs clear to auscultation, no wheezes, no ronchi.  Cardiovascular: Normal S1, S2, no murmur, no edema  Abdomen: Soft, non-tender, no masses, no hepatosplenomegaly.  Psych: Alert and oriented x3, normal affect and mood.          Assessment and Plan:   The following treatment plan was discussed      1. Chronic use of opiate drugs therapeutic purposes    Left before updating pain contract  The patient  may have to give random urine drug screens    will not have the pain meds refilled early, whether he lost meds or not    We reviewed all side effects including but not limited to respiratory depression and death    - HYDROcodone/acetaminophen (NORCO)  MG Tab; Take 1 Tab by mouth 2 times a day as needed for up to 30 days.  Dispense: 60 Tab; Refill: 0    2. Dysphasia    Return to Dr. Wadsworth    - REFERRAL TO GASTROENTEROLOGY        Instructed to Follow up in clinic or ER for worsening symptoms, difficulty breathing, lack of expected recovery, or should new symptoms or problems arise.    Followup: Return in about 3 months (around 2019) for Medication refill.       Once again this medical record contains text that has been entered with the assistance of computer voice " recognition and dictation software.  Therefore, it may contain unintended errors in text, spelling, punctuation, or grammar

## 2019-03-13 NOTE — ASSESSMENT & PLAN NOTE
NEW PROBLEM    The patient states that she is getting to have difficulty swallowing again.  She does have a significant past medical history of esophageal diverticuli which led to rupture and an ICU admission.

## 2019-03-13 NOTE — ASSESSMENT & PLAN NOTE
Patient has been taking Norco 10 mg p.o. BID daily  The  pain contract was signed on last year, due for update  Due  for urinary drug screen  There has been no escalation of use, no accidental overdoses.  Patient denies any opiate-induced constipation.  There has been no episodes of respiratory depression

## 2019-03-18 DIAGNOSIS — F41.8 DEPRESSION WITH ANXIETY: ICD-10-CM

## 2019-03-19 ENCOUNTER — TELEPHONE (OUTPATIENT)
Dept: MEDICAL GROUP | Age: 84
End: 2019-03-19

## 2019-03-19 NOTE — TELEPHONE ENCOUNTER
Phone Number Called: [phone] (home)       Message: Left voicemail for patient to call back to follow up on medication tizanidine 4MG.    Left Message for patient to call back: yes

## 2019-03-21 RX ORDER — ESCITALOPRAM OXALATE 10 MG/1
TABLET ORAL
Qty: 90 TAB | Refills: 0 | Status: SHIPPED | OUTPATIENT
Start: 2019-03-21 | End: 2019-06-25 | Stop reason: SDUPTHER

## 2019-04-08 DIAGNOSIS — I10 ESSENTIAL HYPERTENSION: ICD-10-CM

## 2019-04-09 DIAGNOSIS — G62.9 NEUROPATHY: ICD-10-CM

## 2019-04-09 RX ORDER — METOPROLOL SUCCINATE 50 MG/1
50 TABLET, EXTENDED RELEASE ORAL DAILY
Qty: 90 TAB | Refills: 3 | Status: SHIPPED | OUTPATIENT
Start: 2019-04-09 | End: 2020-04-07

## 2019-04-10 RX ORDER — GABAPENTIN 300 MG/1
300 CAPSULE ORAL 3 TIMES DAILY
Qty: 90 CAP | Refills: 0 | Status: SHIPPED | OUTPATIENT
Start: 2019-04-10 | End: 2019-05-15 | Stop reason: SDUPTHER

## 2019-04-11 DIAGNOSIS — K21.00 GASTROESOPHAGEAL REFLUX DISEASE WITH ESOPHAGITIS: ICD-10-CM

## 2019-04-12 RX ORDER — PANTOPRAZOLE SODIUM 40 MG/1
TABLET, DELAYED RELEASE ORAL
Qty: 90 TAB | Refills: 1 | Status: SHIPPED | OUTPATIENT
Start: 2019-04-12 | End: 2019-10-08 | Stop reason: SDUPTHER

## 2019-04-15 ENCOUNTER — HOSPITAL ENCOUNTER (OUTPATIENT)
Dept: RADIOLOGY | Facility: MEDICAL CENTER | Age: 84
End: 2019-04-15
Attending: NURSE PRACTITIONER
Payer: MEDICARE

## 2019-04-15 DIAGNOSIS — K21.9 GASTROESOPHAGEAL REFLUX DISEASE, ESOPHAGITIS PRESENCE NOT SPECIFIED: ICD-10-CM

## 2019-04-15 DIAGNOSIS — K22.5 DIVERTICULUM OF ESOPHAGUS, ACQUIRED: ICD-10-CM

## 2019-04-15 DIAGNOSIS — K22.2 STRICTURE AND STENOSIS OF ESOPHAGUS: ICD-10-CM

## 2019-04-15 DIAGNOSIS — R13.19 CONSTANT LOW-GRADE DYSPHAGIA: ICD-10-CM

## 2019-04-15 PROCEDURE — 74220 X-RAY XM ESOPHAGUS 1CNTRST: CPT

## 2019-04-15 PROCEDURE — 700101 HCHG RX REV CODE 250: Performed by: NURSE PRACTITIONER

## 2019-04-15 RX ADMIN — BARIUM SULFATE 700 MG: 700 TABLET ORAL at 14:45

## 2019-05-08 DIAGNOSIS — R25.2 SPASM: ICD-10-CM

## 2019-05-08 RX ORDER — TIZANIDINE 4 MG/1
TABLET ORAL
Qty: 60 TAB | Refills: 0 | Status: SHIPPED | OUTPATIENT
Start: 2019-05-08 | End: 2019-06-13 | Stop reason: SDUPTHER

## 2019-05-10 ENCOUNTER — HOSPITAL ENCOUNTER (OUTPATIENT)
Dept: LAB | Facility: MEDICAL CENTER | Age: 84
End: 2019-05-10
Attending: INTERNAL MEDICINE
Payer: MEDICARE

## 2019-05-10 LAB
BUN SERPL-MCNC: 20 MG/DL (ref 8–22)
CREAT SERPL-MCNC: 1.15 MG/DL (ref 0.5–1.4)

## 2019-05-10 PROCEDURE — 84520 ASSAY OF UREA NITROGEN: CPT

## 2019-05-10 PROCEDURE — 36415 COLL VENOUS BLD VENIPUNCTURE: CPT

## 2019-05-10 PROCEDURE — 82565 ASSAY OF CREATININE: CPT

## 2019-05-15 DIAGNOSIS — G62.9 NEUROPATHY: ICD-10-CM

## 2019-05-16 DIAGNOSIS — I10 ESSENTIAL HYPERTENSION: ICD-10-CM

## 2019-05-16 RX ORDER — GABAPENTIN 300 MG/1
300 CAPSULE ORAL 3 TIMES DAILY
Qty: 90 CAP | Refills: 0 | Status: SHIPPED | OUTPATIENT
Start: 2019-05-16 | End: 2019-08-13 | Stop reason: SDUPTHER

## 2019-05-17 RX ORDER — LISINOPRIL 10 MG/1
10 TABLET ORAL DAILY
Qty: 90 TAB | Refills: 0 | Status: SHIPPED
Start: 2019-05-17 | End: 2020-07-04

## 2019-06-07 ENCOUNTER — HOSPITAL ENCOUNTER (OUTPATIENT)
Dept: RADIOLOGY | Facility: MEDICAL CENTER | Age: 84
End: 2019-06-07
Attending: INTERNAL MEDICINE
Payer: MEDICARE

## 2019-06-07 DIAGNOSIS — R13.19 CONSTANT LOW-GRADE DYSPHAGIA: ICD-10-CM

## 2019-06-07 DIAGNOSIS — S27.819S: ICD-10-CM

## 2019-06-07 PROCEDURE — 700117 HCHG RX CONTRAST REV CODE 255: Performed by: INTERNAL MEDICINE

## 2019-06-07 PROCEDURE — 71260 CT THORAX DX C+: CPT

## 2019-06-07 RX ADMIN — IOHEXOL 75 ML: 350 INJECTION, SOLUTION INTRAVENOUS at 11:45

## 2019-06-12 ENCOUNTER — OFFICE VISIT (OUTPATIENT)
Dept: MEDICAL GROUP | Age: 84
End: 2019-06-12
Payer: MEDICARE

## 2019-06-12 VITALS
HEIGHT: 68 IN | TEMPERATURE: 97.1 F | OXYGEN SATURATION: 95 % | HEART RATE: 59 BPM | BODY MASS INDEX: 22.58 KG/M2 | SYSTOLIC BLOOD PRESSURE: 120 MMHG | WEIGHT: 149 LBS | DIASTOLIC BLOOD PRESSURE: 72 MMHG

## 2019-06-12 DIAGNOSIS — Z79.891 CHRONIC USE OF OPIATE DRUGS THERAPEUTIC PURPOSES: ICD-10-CM

## 2019-06-12 DIAGNOSIS — R47.02 DYSPHASIA: ICD-10-CM

## 2019-06-12 DIAGNOSIS — L30.9 DERMATITIS: ICD-10-CM

## 2019-06-12 PROCEDURE — 99214 OFFICE O/P EST MOD 30 MIN: CPT | Performed by: FAMILY MEDICINE

## 2019-06-12 RX ORDER — HYDROCODONE BITARTRATE AND ACETAMINOPHEN 10; 325 MG/1; MG/1
1 TABLET ORAL 2 TIMES DAILY PRN
Qty: 60 TAB | Refills: 0 | Status: SHIPPED | OUTPATIENT
Start: 2019-06-14 | End: 2019-06-12 | Stop reason: SDUPTHER

## 2019-06-12 RX ORDER — HYDROCODONE BITARTRATE AND ACETAMINOPHEN 10; 325 MG/1; MG/1
1 TABLET ORAL 2 TIMES DAILY PRN
Qty: 60 TAB | Refills: 0 | Status: SHIPPED | OUTPATIENT
Start: 2019-07-14 | End: 2019-06-12 | Stop reason: SDUPTHER

## 2019-06-12 RX ORDER — CLOBETASOL PROPIONATE 0.5 MG/G
CREAM TOPICAL
Qty: 50 G | Refills: 1 | Status: SHIPPED
Start: 2019-06-12 | End: 2020-07-04

## 2019-06-12 RX ORDER — HYDROCODONE BITARTRATE AND ACETAMINOPHEN 10; 325 MG/1; MG/1
1 TABLET ORAL 2 TIMES DAILY PRN
Qty: 60 TAB | Refills: 0 | Status: SHIPPED | OUTPATIENT
Start: 2019-08-14 | End: 2019-09-13

## 2019-06-12 NOTE — PROGRESS NOTES
This medical record contains text that has been entered with the assistance of computer voice recognition and dictation software.  Therefore, it may contain unintended errors in text, spelling, punctuation, or grammar    Chief Complaint   Patient presents with   • Pain     medication refill         Ellen Srivastava is a 84 y.o. female here evaluation and management of: medication refill, new rash, and follow up       HPI:     Chronic use of opiate drugs therapeutic purposes  Patient is prescribed Norco 10 mg p.o. BID daily.  The pain contract was signed on last year, due for update. She left before signing the contract during our last visit. She is due for urinary drug screen.There has been no escalation in use or accidental overdoses. Patient denies any opiate-induced constipation. There has been no episodes of respiratory depression.    Dysphasia  She complains that her dysphagia is not improved. After swallowing she feels as though something is caught in her throat and may vomit up to 5-6 times. She is established with Dr. Ruggiero, GI, and and had a barium swallow study on 4/15/2019 and chest CT five days ago. Her history is significant for esophageal diverticuli which led to rupture and an ICU admission.  She denies any vomiting of blood, no abdominal pain no chest pain or shortness of breath.    Dermatitis  NEW PROBLEM  She complains of a red rash across her upper back.  She states that the rash is very itchy dry, worse after hot shower, she uses topical over-the-counter anti-H cream with little benefit.  She states there was no pain in that area, no new detergents lotions or creams, no fevers no chills.      Current medicines (including changes today)  Current Outpatient Prescriptions   Medication Sig Dispense Refill   • Esomeprazole Magnesium (NEXIUM PO) Take  by mouth.     • [START ON 8/14/2019] HYDROcodone/acetaminophen (NORCO)  MG Tab Take 1 Tab by mouth 2 times a day as needed for up to 30  days. 60 Tab 0   • clobetasol (TEMOVATE) 0.05 % Cream AAA BID UP TO 14 DAYS THEN STOP 50 g 1   • gabapentin (NEURONTIN) 300 MG Cap Take 1 Cap by mouth 3 times a day. 90 Cap 0   • tizanidine (ZANAFLEX) 4 MG Tab TAKE ONE TABLET BY MOUTH TWICE A DAY AS NEEDED FOR SPASMS 60 Tab 0   • pantoprazole (PROTONIX) 40 MG Tablet Delayed Response TAKE ONE TABLET BY MOUTH DAILY 90 Tab 1   • metoprolol SR (TOPROL XL) 50 MG TABLET SR 24 HR Take 1 Tab by mouth every day. 90 Tab 3   • escitalopram (LEXAPRO) 10 MG Tab TAKE ONE TABLET BY MOUTH DAILY 90 Tab 0   • lisinopril-hydrochlorothiazide (PRINZIDE, ZESTORETIC) 10-12.5 MG per tablet Take one tab po q24h  (STOP LISINOPRIL) 90 Tab 2   • hydrOXYzine HCl (ATARAX) 25 MG Tab Take 0.5-1 Tabs by mouth 3 times a day as needed for Anxiety. Max 3 tablets per 24 hours 90 Tab 2   • rosuvastatin (CRESTOR) 20 MG Tab TAKE ONE TABLET BY MOUTH EVERY EVENING 90 Tab 2   • HYDROcodone-acetaminophen (NORCO) 5-325 MG Tab per tablet      • cyanocobalamin (VITAMIN B-12) 100 MCG Tab TAKE ONE TABLET BY MOUTH DAILY (CYANOCOBALAMIN) 30 Tab 0   • vitamin D (VITAMIND D3) 1000 UNIT Tab Take 1 Tab by mouth every day. 30 Tab    • lisinopril (PRINIVIL) 10 MG Tab Take 1 Tab by mouth every day. (Patient not taking: Reported on 6/12/2019) 90 Tab 0     No current facility-administered medications for this visit.      She  has a past medical history of Anemia; Atrophic vaginitis (1/30/2010); Cerumen Impaction Recurrent (1/30/2010); Depressive disorder, not elsewhere classified (1/30/2010); HDL lipoprotein deficiency (1/30/2010); HTN (hypertension) (1/26/2010); Hypertriglyceridemia (1/30/2010); Narcotic dependence (HCC) (4/28/2012); Osteopenia (4/26/2010); Parathyroid hormone excess (HCC) (4/26/2010); Serum calcium elevated (1/14/2010); Tinnitus (1/14/2010); and Vertigo, intermittent (4/28/2012).  She  has a past surgical history that includes breast biopsy (Left, 2015); lumbar decompression (1969, 1979); hysterectomy,  "total abdominal (); appendectomy (1945); parathyroid exploration (2010); paraesophageal hernia robotic (N/A, 10/12/2015); gastroscopy (10/23/2015); thoracoscopy (Left, 10/30/2015); gastroscopy-endo (2015); gastroscopy-endo (N/A, 12/10/2015); gastroscopy-endo (N/A, 2016); and ankle orif (Right, 2016).  Social History   Substance Use Topics   • Smoking status: Never Smoker   • Smokeless tobacco: Former User      Comment: Nicotine    • Alcohol use No     Social History     Social History Narrative    Lives in [facility] and in [place].     Family History   Problem Relation Age of Onset   • Hypertension Mother    • Lung Disease Father    • Hypertension Other    • Lung Disease Unknown    • Lung Disease Brother      Family Status   Relation Status   • Mo  at age 86        Old Age   • Fa  at age 83        Lung Disease   • OTHER (Not Specified)   • Unknown (Not Specified)   • Bro          ROS    Please see hpi     All other systems reviewed and are negative     Objective:     /72 (BP Location: Left arm, Patient Position: Sitting, BP Cuff Size: Adult)   Pulse (!) 59   Temp 36.2 °C (97.1 °F) (Temporal)   Ht 1.727 m (5' 8\")   Wt 67.6 kg (149 lb)   SpO2 95%  Body mass index is 22.66 kg/m².  Physical Exam:    Constitutional: Alert, no distress.  Skin: Warm, dry, good turgor. Three diffusely spread areas on upper back reavealing excoration marks of raised, erythematous papules.  Eye: Equal, round and reactive, conjunctiva clear, lids normal.  ENMT: Lips without lesions, good dentition, oropharynx clear.  Neck: Trachea midline, no masses, no thyromegaly. No cervical or supraclavicular lymphadenopathy.  Respiratory: Unlabored respiratory effort, lungs clear to auscultation, no wheezes, no ronchi.  Cardiovascular: Normal S1, S2, no murmur, no edema.  Psych: Alert and oriented x3, normal affect and mood.      Assessment and Plan:   The following treatment plan was discussed    1. " Chronic use of opiate drugs therapeutic purposes  We discussed referral to pain management. Patient's pain is well controlled with current dosage and she is interested in having her opiate therapy managed by pain management. UDS is not up to date.  She is provided with a refill and referral to pain management. We reviewed all side effects including but not limited to respiratory depression and death  - REFERRAL TO PAIN CLINIC  - HYDROcodone/acetaminophen (NORCO)  MG Tab; Take 1 Tab by mouth 2 times a day as needed for up to 30 days.  Dispense: 60 Tab; Refill: 0    2. Dysphasia  Patient began to experience her dysphagia symptoms again in March of this year. Symptoms have persisted since then.  She was instructed to follow up with Dr. Wadsworth GI.    Her barium swallow study from 4/15/2019 demonstrated moderate-severe esophageal dysmotility as well as a lucent structure in the left chest that could be a hernia. CT from five days ago demonstrated a type III hiatal hernia, containing 50% of the stomach, contrast within the esophagus, consistent with esophageal reflux. There is also nonspecific distal esophageal wall thickening    3. Dermatitis  New problem. Three diffusely spread areas on upper back reavealing excoration marks of raised, erythematous papules. Appears to be a dermatitis and will prescribe clobetasol for 14 days.  - clobetasol (TEMOVATE) 0.05 % Cream; AAA BID UP TO 14 DAYS THEN STOP  Dispense: 50 g; Refill: 1      HEALTH MAINTENANCE: due for Tdap, Shingrex, Bone density screening, Hepatitis B    Instructed to Follow up in clinic or ER for worsening symptoms, difficulty breathing, lack of expected recovery, or should new symptoms or problems arise.    Followup: Return in about 3 months (around 9/12/2019) for Reevaluation.      Once again this medical record contains text that has been entered with the assistance of computer voice recognition, dictation software, and medical scribes.  Therefore, it may  contain unintended errors in text, spelling, punctuation, or grammar.    I, Keyshawn Ge (Scribe), am scribing for, and in the presence of, Denzel Patel M.D.    Electronically signed by: Keyshawn Ge (Scribe), 6/12/2019     IDenzel M.D. personally performed the services described in this documentation, as scribed by Keyhsawn Ge in my presence, and it is both accurate and complete.

## 2019-06-13 DIAGNOSIS — R25.2 SPASM: ICD-10-CM

## 2019-06-13 RX ORDER — TIZANIDINE 4 MG/1
TABLET ORAL
Qty: 100 TAB | Refills: 2 | Status: SHIPPED | OUTPATIENT
Start: 2019-06-13 | End: 2019-11-21 | Stop reason: SDUPTHER

## 2019-06-13 NOTE — TELEPHONE ENCOUNTER
Was the patient seen in the last year in this department? Yes    Does patient have an active prescription for medications requested? Yes    Received Request Via: Pharmacy     Silver Nitrate Text: The wound bed was treated with silver nitrate after the biopsy was performed.

## 2019-06-25 DIAGNOSIS — F41.8 DEPRESSION WITH ANXIETY: ICD-10-CM

## 2019-06-25 RX ORDER — ESCITALOPRAM OXALATE 10 MG/1
TABLET ORAL
Qty: 90 TAB | Refills: 0 | Status: SHIPPED | OUTPATIENT
Start: 2019-06-25 | End: 2019-09-22 | Stop reason: SDUPTHER

## 2019-07-10 ENCOUNTER — TELEPHONE (OUTPATIENT)
Dept: MEDICAL GROUP | Age: 84
End: 2019-07-10

## 2019-07-10 DIAGNOSIS — Z79.891 CHRONIC USE OF OPIATE DRUGS THERAPEUTIC PURPOSES: ICD-10-CM

## 2019-07-10 NOTE — TELEPHONE ENCOUNTER
1. Caller Name: Ellen Srivastava  Call Back Number: [phone] (home)     2. SPECIFIC Action To Be Taken: Referral pending, please sign.    3. Diagnosis/Clinical Reason for Request: Chronic use of opiate drugs therapeutic purposes    4. Specialty & Provider Name/Lab/Imaging Location: Patient declined previous referral stated the reviews to that pain management were not good and wanted a different referral.     5. Is appointment scheduled for requested order/referral: no    Patient was informed they will receive a return phone call from the office ONLY if there are any questions before processing their request. Advised to call back if they haven't received a call from the referral department in 5 days.

## 2019-08-12 DIAGNOSIS — G62.9 NEUROPATHY: ICD-10-CM

## 2019-08-12 NOTE — TELEPHONE ENCOUNTER
Was the patient seen in the last year in this department? Yes    Does patient have an active prescription for medications requested? Yes    Received Request Via: Pharmacy

## 2019-08-13 RX ORDER — GABAPENTIN 300 MG/1
300 CAPSULE ORAL 3 TIMES DAILY
Qty: 90 CAP | Refills: 0 | Status: SHIPPED | OUTPATIENT
Start: 2019-08-13 | End: 2019-09-09 | Stop reason: SDUPTHER

## 2019-09-07 ENCOUNTER — HOSPITAL ENCOUNTER (OUTPATIENT)
Dept: RADIOLOGY | Facility: MEDICAL CENTER | Age: 84
End: 2019-09-07
Attending: PHYSICAL MEDICINE & REHABILITATION
Payer: MEDICARE

## 2019-09-07 DIAGNOSIS — M54.16 LUMBAR RADICULOPATHY: ICD-10-CM

## 2019-09-07 PROCEDURE — 72110 X-RAY EXAM L-2 SPINE 4/>VWS: CPT

## 2019-09-09 DIAGNOSIS — G62.9 NEUROPATHY: ICD-10-CM

## 2019-09-10 RX ORDER — GABAPENTIN 300 MG/1
CAPSULE ORAL
Qty: 90 CAP | Refills: 0 | Status: SHIPPED | OUTPATIENT
Start: 2019-09-10 | End: 2019-10-16 | Stop reason: SDUPTHER

## 2019-09-20 ENCOUNTER — HOSPITAL ENCOUNTER (OUTPATIENT)
Dept: RADIOLOGY | Facility: MEDICAL CENTER | Age: 84
End: 2019-09-20
Attending: FAMILY MEDICINE
Payer: MEDICARE

## 2019-09-20 DIAGNOSIS — Z12.31 BREAST CANCER SCREENING BY MAMMOGRAM: ICD-10-CM

## 2019-09-20 PROCEDURE — 77063 BREAST TOMOSYNTHESIS BI: CPT

## 2019-09-22 DIAGNOSIS — F41.8 DEPRESSION WITH ANXIETY: ICD-10-CM

## 2019-09-24 RX ORDER — ESCITALOPRAM OXALATE 10 MG/1
TABLET ORAL
Qty: 90 TAB | Refills: 0 | Status: SHIPPED | OUTPATIENT
Start: 2019-09-24 | End: 2020-01-13

## 2019-10-08 DIAGNOSIS — K21.00 GASTROESOPHAGEAL REFLUX DISEASE WITH ESOPHAGITIS: ICD-10-CM

## 2019-10-09 RX ORDER — PANTOPRAZOLE SODIUM 40 MG/1
TABLET, DELAYED RELEASE ORAL
Qty: 90 TAB | Refills: 2 | Status: SHIPPED
Start: 2019-10-09 | End: 2020-07-04

## 2019-10-16 DIAGNOSIS — G62.9 NEUROPATHY: ICD-10-CM

## 2019-10-16 RX ORDER — GABAPENTIN 300 MG/1
300 CAPSULE ORAL 3 TIMES DAILY
Qty: 90 CAP | Refills: 0 | Status: SHIPPED | OUTPATIENT
Start: 2019-10-16 | End: 2019-11-13 | Stop reason: SDUPTHER

## 2019-10-16 NOTE — TELEPHONE ENCOUNTER
Was the patient seen in the last year in this department? Yes  LOV 6/12/19  Does patient have an active prescription for medications requested? No     Received Request Via: Pharmacy

## 2019-10-17 DIAGNOSIS — J30.9 ALLERGIC RHINITIS, UNSPECIFIED SEASONALITY, UNSPECIFIED TRIGGER: ICD-10-CM

## 2019-10-17 RX ORDER — HYDROXYZINE HYDROCHLORIDE 25 MG/1
12.5-25 TABLET, FILM COATED ORAL 3 TIMES DAILY PRN
Qty: 90 TAB | Refills: 2 | Status: SHIPPED
Start: 2019-10-17 | End: 2020-07-04

## 2019-11-08 DIAGNOSIS — E78.5 HYPERLIPIDEMIA WITH TARGET LDL LESS THAN 130: ICD-10-CM

## 2019-11-08 DIAGNOSIS — D72.829 LEUKOCYTOSIS, UNSPECIFIED TYPE: ICD-10-CM

## 2019-11-08 DIAGNOSIS — E55.9 VITAMIN D DEFICIENCY: ICD-10-CM

## 2019-11-08 DIAGNOSIS — E78.1 HYPERTRIGLYCERIDEMIA: ICD-10-CM

## 2019-11-08 DIAGNOSIS — I10 ESSENTIAL HYPERTENSION: ICD-10-CM

## 2019-11-08 DIAGNOSIS — D51.9 ANEMIA DUE TO VITAMIN B12 DEFICIENCY, UNSPECIFIED B12 DEFICIENCY TYPE: ICD-10-CM

## 2019-11-08 DIAGNOSIS — E78.6 HDL LIPOPROTEIN DEFICIENCY: ICD-10-CM

## 2019-11-08 DIAGNOSIS — E78.5 HYPERLIPIDEMIA, UNSPECIFIED HYPERLIPIDEMIA TYPE: ICD-10-CM

## 2019-11-09 RX ORDER — ROSUVASTATIN CALCIUM 20 MG/1
TABLET, COATED ORAL
Qty: 90 TAB | Refills: 0 | Status: SHIPPED | OUTPATIENT
Start: 2019-11-09 | End: 2020-03-09

## 2019-11-09 NOTE — TELEPHONE ENCOUNTER
Covering for Dr. Patel.  3 month supply refilled. Please advise pt to do fasting labs and make appt for follow-up and additional fills.

## 2019-11-13 DIAGNOSIS — G62.9 NEUROPATHY: ICD-10-CM

## 2019-11-14 NOTE — TELEPHONE ENCOUNTER
Phone Number Called: [phone] (home)     Call outcome: spoke to patient regarding message below

## 2019-11-15 RX ORDER — GABAPENTIN 300 MG/1
CAPSULE ORAL
Qty: 90 CAP | Refills: 0 | Status: SHIPPED | OUTPATIENT
Start: 2019-11-15 | End: 2019-12-20

## 2019-12-17 ENCOUNTER — OFFICE VISIT (OUTPATIENT)
Dept: MEDICAL GROUP | Age: 84
End: 2019-12-17
Payer: MEDICARE

## 2019-12-17 VITALS
HEART RATE: 66 BPM | OXYGEN SATURATION: 98 % | BODY MASS INDEX: 21.89 KG/M2 | TEMPERATURE: 98.8 F | HEIGHT: 69 IN | WEIGHT: 147.8 LBS | DIASTOLIC BLOOD PRESSURE: 58 MMHG | SYSTOLIC BLOOD PRESSURE: 106 MMHG

## 2019-12-17 DIAGNOSIS — R47.02 DYSPHASIA: ICD-10-CM

## 2019-12-17 DIAGNOSIS — K21.9 GASTROESOPHAGEAL REFLUX DISEASE WITHOUT ESOPHAGITIS: ICD-10-CM

## 2019-12-17 DIAGNOSIS — I10 ESSENTIAL HYPERTENSION: ICD-10-CM

## 2019-12-17 PROCEDURE — 99213 OFFICE O/P EST LOW 20 MIN: CPT | Performed by: FAMILY MEDICINE

## 2019-12-17 NOTE — PROGRESS NOTES
This medical record contains text that has been entered with the assistance of computer voice recognition and dictation software.  Therefore, it may contain unintended errors in text, spelling, punctuation, or grammar    Chief Complaint   Patient presents with   • Follow-Up   • Other     skin check   • Medication Refill         Ellen Srivastava is a 84 y.o. female here evaluation and management of: Gastroesophageal reflux disease without esophagitis, dysphasia, and essential hypertension.       HPI:      1. Gastroesophageal reflux disease without esophagitis  Chronic history. The patient reports that she is having difficulty swallowing her food recently. When she does swallow her food, she spits it back up out of fear that she will choke. She had had a procedure in the past to have her esophagus stretched with great improvement to her symptoms. She has an appointment with her GI specialist tomorrow.     2. Dysphasia  Chronic history.  Patient states she continues to have difficulty with swallowing solid food gets stuck in her chest and then she agitates it back up undigested.  She does have an appointment with gastroenterology.    3. Essential hypertension   Patient has a history of chronic hypertension and is taking Toprol XL 50 mg once daily. No medication side effects were reported. She reportedly monitors her blood pressure regularly at home. Blood pressure is within goal today at 106/58.  She reports following a low sodium diet and denies any related complaints including chronic cough, chest pain, headaches or dizziness.       Current medicines (including changes today)  Current Outpatient Medications   Medication Sig Dispense Refill   • tizanidine (ZANAFLEX) 4 MG Tab TAKE ONE TABLET BY MOUTH TWICE A DAY AS NEEDED FOR SPASMS 100 Tab 1   • gabapentin (NEURONTIN) 300 MG Cap TAKE ONE CAPSULE BY MOUTH THREE TIMES A DAY 90 Cap 0   • rosuvastatin (CRESTOR) 20 MG Tab TAKE ONE TABLET BY MOUTH EVERY EVENING 90  Tab 0   • hydrOXYzine HCl (ATARAX) 25 MG Tab Take 0.5-1 Tabs by mouth 3 times a day as needed for Anxiety. Max 3 tablets per 24 hours 90 Tab 2   • pantoprazole (PROTONIX) 40 MG Tablet Delayed Response TAKE ONE TABLET BY MOUTH DAILY 90 Tab 2   • escitalopram (LEXAPRO) 10 MG Tab TAKE ONE TABLET BY MOUTH DAILY 90 Tab 0   • Esomeprazole Magnesium (NEXIUM PO) Take  by mouth.     • metoprolol SR (TOPROL XL) 50 MG TABLET SR 24 HR Take 1 Tab by mouth every day. 90 Tab 3   • lisinopril-hydrochlorothiazide (PRINZIDE, ZESTORETIC) 10-12.5 MG per tablet Take one tab po q24h  (STOP LISINOPRIL) 90 Tab 2   • HYDROcodone-acetaminophen (NORCO) 5-325 MG Tab per tablet      • cyanocobalamin (VITAMIN B-12) 100 MCG Tab TAKE ONE TABLET BY MOUTH DAILY (CYANOCOBALAMIN) 30 Tab 0   • vitamin D (VITAMIND D3) 1000 UNIT Tab Take 1 Tab by mouth every day. 30 Tab    • clobetasol (TEMOVATE) 0.05 % Cream AAA BID UP TO 14 DAYS THEN STOP (Patient not taking: Reported on 12/17/2019) 50 g 1   • lisinopril (PRINIVIL) 10 MG Tab Take 1 Tab by mouth every day. (Patient not taking: Reported on 6/12/2019) 90 Tab 0     No current facility-administered medications for this visit.      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  She  has a past surgical history that includes breast biopsy (Left, 2015); lumbar decompression (1969, 1979); hysterectomy, total abdominal (1977); appendectomy (1945); parathyroid exploration (6/16/2010); paraesophageal hernia robotic (N/A, 10/12/2015); gastroscopy (10/23/2015); thoracoscopy (Left,  "10/30/2015); gastroscopy-endo (2015); gastroscopy-endo (N/A, 12/10/2015); gastroscopy-endo (N/A, 2016); and ankle orif (Right, 2016).  Social History     Tobacco Use   • Smoking status: Never Smoker   • Smokeless tobacco: Former User   • Tobacco comment: Nicotine    Substance Use Topics   • Alcohol use: No     Alcohol/week: 0.0 oz   • Drug use: No     Social History     Patient does not qualify to have social determinant information on file (likely too young).   Social History Narrative    Lives in [facility] and in [place].     Family History   Problem Relation Age of Onset   • Hypertension Mother    • Lung Disease Father    • Hypertension Other    • Lung Disease Other    • Lung Disease Brother      Family Status   Relation Name Status   • Mo   at age 86        Old Age   • Fa   at age 83        Lung Disease   • OTHER  (Not Specified)   • OTHER  (Not Specified)   • Bro           ROS    Please see hpi     All other systems reviewed and are negative     Objective:     /58 (BP Location: Left arm, Patient Position: Sitting, BP Cuff Size: Adult)   Pulse 66   Temp 37.1 °C (98.8 °F) (Temporal)   Ht 1.74 m (5' 8.5\")   Wt 67 kg (147 lb 12.8 oz)   SpO2 98%  Body mass index is 22.15 kg/m².  Physical Exam:    Constitutional: Alert, no distress.  Skin: Warm, dry, good turgor, no rashes in visible areas.  Eye: Equal, round and reactive, conjunctiva clear, lids normal.  ENMT: Lips without lesions, good dentition, oropharynx clear.  Neck: Trachea midline, no masses, no thyromegaly. No cervical or supraclavicular lymphadenopathy.  Respiratory: Unlabored respiratory effort, lungs clear to auscultation, no wheezes, no ronchi.  Cardiovascular: Normal S1, S2, no murmur, no edema.  Skin: Right upper mid back with 1 cm erythematous raised ulcer.  Psych: Alert and oriented x3, normal affect and mood.      Assessment and Plan:   The following treatment plan was discussed      1. Gastroesophageal " reflux disease without esophagitis  Chronic, stable history. The patient has an appointment with her GI specialist tomorrow. She will inquire about having her esophagus stretched. Other her dysphagia, she does not report any other acute GI symptoms. Continue same regimen.     2. Dysphasia  Chronic history. Under good control. Continue same regimen.    3. Essential hypertension  Chronic, stable history. Under good control with current medication regimen: Toprol XL 50 mg. No changes in dosage today. Blood pressure today was 106/58. Patient was asked to continue monitoring her blood pressure at home. She was encouraged to follow a low sodium diet.   - Repeat labs in 1-2 weeks prior to their next appointment.       Instructed to Follow up in clinic or ER for worsening symptoms, difficulty breathing, lack of expected recovery, or should new symptoms or problems arise.    Followup: Return in about 6 months (around 6/17/2020) for Reevaluation.      Once again this medical record contains text that has been entered with the assistance of computer voice recognition, dictation software, and medical scribes.  Therefore, it may contain unintended errors in text, spelling, punctuation, or grammar.    Humberto BAUM (Kurtibsylvia), am scribing for, and in the presence of, Denzel Patel M.D.    Electronically signed by: Humberto De La Rosa (Jourdan), 12/17/2019     Denzel BAUM M.D. personally performed the services described in this documentation, as scribed by Humberto De La Rosa in my presence, and it is both accurate and complete.

## 2019-12-21 DIAGNOSIS — I10 ESSENTIAL HYPERTENSION: ICD-10-CM

## 2019-12-23 RX ORDER — LISINOPRIL AND HYDROCHLOROTHIAZIDE 12.5; 1 MG/1; MG/1
TABLET ORAL
Qty: 90 TAB | Refills: 0 | Status: SHIPPED | OUTPATIENT
Start: 2019-12-23 | End: 2020-03-27

## 2020-01-04 ENCOUNTER — HOSPITAL ENCOUNTER (OUTPATIENT)
Dept: RADIOLOGY | Facility: MEDICAL CENTER | Age: 85
End: 2020-01-04
Attending: NURSE PRACTITIONER
Payer: MEDICARE

## 2020-01-04 DIAGNOSIS — K44.9 DIAPHRAGMATIC HERNIA WITHOUT OBSTRUCTION OR GANGRENE: ICD-10-CM

## 2020-01-04 DIAGNOSIS — K21.9 GASTROESOPHAGEAL REFLUX DISEASE, ESOPHAGITIS PRESENCE NOT SPECIFIED: ICD-10-CM

## 2020-01-04 DIAGNOSIS — J98.4 ABNORMALITY OF LUNG: ICD-10-CM

## 2020-01-04 PROCEDURE — 71250 CT THORAX DX C-: CPT

## 2020-01-13 DIAGNOSIS — F41.8 DEPRESSION WITH ANXIETY: ICD-10-CM

## 2020-01-14 ENCOUNTER — HOSPITAL ENCOUNTER (OUTPATIENT)
Dept: LAB | Facility: MEDICAL CENTER | Age: 85
End: 2020-01-14
Attending: NURSE PRACTITIONER
Payer: MEDICARE

## 2020-01-14 LAB
BUN SERPL-MCNC: 21 MG/DL (ref 8–22)
CREAT SERPL-MCNC: 1.56 MG/DL (ref 0.5–1.4)

## 2020-01-14 PROCEDURE — 84520 ASSAY OF UREA NITROGEN: CPT

## 2020-01-14 PROCEDURE — 36415 COLL VENOUS BLD VENIPUNCTURE: CPT

## 2020-01-14 PROCEDURE — 82565 ASSAY OF CREATININE: CPT

## 2020-01-14 RX ORDER — ESCITALOPRAM OXALATE 10 MG/1
TABLET ORAL
Qty: 90 TAB | Refills: 0 | Status: SHIPPED | OUTPATIENT
Start: 2020-01-14 | End: 2020-04-10

## 2020-03-08 DIAGNOSIS — E78.5 HYPERLIPIDEMIA, UNSPECIFIED HYPERLIPIDEMIA TYPE: ICD-10-CM

## 2020-03-11 RX ORDER — ROSUVASTATIN CALCIUM 20 MG/1
TABLET, COATED ORAL
Qty: 90 TAB | Refills: 4 | Status: SHIPPED
Start: 2020-03-11 | End: 2020-07-04

## 2020-03-25 DIAGNOSIS — I10 ESSENTIAL HYPERTENSION: ICD-10-CM

## 2020-03-27 DIAGNOSIS — R25.2 SPASM: ICD-10-CM

## 2020-03-27 RX ORDER — LISINOPRIL AND HYDROCHLOROTHIAZIDE 12.5; 1 MG/1; MG/1
TABLET ORAL
Qty: 90 TAB | Refills: 0 | Status: SHIPPED | OUTPATIENT
Start: 2020-03-27 | End: 2020-06-16

## 2020-03-30 RX ORDER — TIZANIDINE 4 MG/1
TABLET ORAL
Qty: 100 TAB | Refills: 0 | Status: SHIPPED | OUTPATIENT
Start: 2020-03-30 | End: 2020-06-12

## 2020-04-04 DIAGNOSIS — I10 ESSENTIAL HYPERTENSION: ICD-10-CM

## 2020-04-07 RX ORDER — METOPROLOL SUCCINATE 50 MG/1
TABLET, EXTENDED RELEASE ORAL
Qty: 90 TAB | Refills: 2 | Status: SHIPPED
Start: 2020-04-07 | End: 2020-07-04

## 2020-04-10 DIAGNOSIS — F41.8 DEPRESSION WITH ANXIETY: ICD-10-CM

## 2020-04-10 RX ORDER — ESCITALOPRAM OXALATE 10 MG/1
TABLET ORAL
Qty: 90 TAB | Refills: 0 | Status: SHIPPED
Start: 2020-04-10 | End: 2020-07-04

## 2020-04-13 DIAGNOSIS — G62.9 NEUROPATHY: ICD-10-CM

## 2020-04-13 RX ORDER — GABAPENTIN 300 MG/1
CAPSULE ORAL
Qty: 90 CAP | Refills: 0 | Status: SHIPPED | OUTPATIENT
Start: 2020-04-13 | End: 2020-05-13

## 2020-05-13 DIAGNOSIS — G62.9 NEUROPATHY: ICD-10-CM

## 2020-05-13 RX ORDER — GABAPENTIN 300 MG/1
CAPSULE ORAL
Qty: 90 CAP | Refills: 1 | Status: SHIPPED
Start: 2020-05-13 | End: 2020-07-04

## 2020-05-13 NOTE — TELEPHONE ENCOUNTER
Received request via: Patient    Was the patient seen in the last year in this department? Yes  12/17/2019  Does the patient have an active prescription (recently filled or refills available) for medication(s) requested? No

## 2020-06-08 ENCOUNTER — APPOINTMENT (OUTPATIENT)
Dept: RADIOLOGY | Facility: MEDICAL CENTER | Age: 85
End: 2020-06-08
Attending: NURSE PRACTITIONER
Payer: MEDICARE

## 2020-06-12 DIAGNOSIS — R25.2 SPASM: ICD-10-CM

## 2020-06-12 RX ORDER — TIZANIDINE 4 MG/1
TABLET ORAL
Qty: 100 TAB | Refills: 0 | Status: SHIPPED
Start: 2020-06-12 | End: 2020-07-04

## 2020-06-15 ENCOUNTER — HOSPITAL ENCOUNTER (OUTPATIENT)
Dept: RADIOLOGY | Facility: MEDICAL CENTER | Age: 85
End: 2020-06-15
Attending: NURSE PRACTITIONER
Payer: MEDICARE

## 2020-06-15 DIAGNOSIS — K22.2 ESOPHAGEAL OBSTRUCTION: ICD-10-CM

## 2020-06-15 DIAGNOSIS — I10 ESSENTIAL HYPERTENSION: ICD-10-CM

## 2020-06-15 DIAGNOSIS — K22.5 DIVERTICULUM OF ESOPHAGUS, ACQUIRED: ICD-10-CM

## 2020-06-15 DIAGNOSIS — R93.3 ABNORMAL FINDINGS ON DX IMAGING OF PRT DIGESTIVE TRACT: ICD-10-CM

## 2020-06-15 DIAGNOSIS — R13.19 OTHER DYSPHAGIA: ICD-10-CM

## 2020-06-15 DIAGNOSIS — K44.9 DIAPHRAGMATIC HERNIA WITHOUT OBSTRUCTION OR GANGRENE: ICD-10-CM

## 2020-06-15 PROCEDURE — 74220 X-RAY XM ESOPHAGUS 1CNTRST: CPT

## 2020-06-15 PROCEDURE — 700117 HCHG RX CONTRAST REV CODE 255: Performed by: NURSE PRACTITIONER

## 2020-06-15 RX ADMIN — BARIUM SULFATE 700 MG: 700 TABLET ORAL at 15:20

## 2020-06-16 RX ORDER — LISINOPRIL AND HYDROCHLOROTHIAZIDE 12.5; 1 MG/1; MG/1
TABLET ORAL
Qty: 90 TAB | Refills: 0 | Status: SHIPPED
Start: 2020-06-16 | End: 2020-07-04

## 2020-07-04 ENCOUNTER — HOSPITAL ENCOUNTER (INPATIENT)
Facility: MEDICAL CENTER | Age: 85
LOS: 2 days | DRG: 683 | End: 2020-07-06
Attending: EMERGENCY MEDICINE | Admitting: HOSPITALIST
Payer: MEDICARE

## 2020-07-04 ENCOUNTER — APPOINTMENT (OUTPATIENT)
Dept: RADIOLOGY | Facility: MEDICAL CENTER | Age: 85
DRG: 683 | End: 2020-07-04
Attending: EMERGENCY MEDICINE
Payer: MEDICARE

## 2020-07-04 DIAGNOSIS — K21.00 GASTROESOPHAGEAL REFLUX DISEASE WITH ESOPHAGITIS: ICD-10-CM

## 2020-07-04 DIAGNOSIS — N17.9 ACUTE KIDNEY INJURY (HCC): ICD-10-CM

## 2020-07-04 DIAGNOSIS — R53.1 WEAKNESS: ICD-10-CM

## 2020-07-04 DIAGNOSIS — E86.0 DEHYDRATION: ICD-10-CM

## 2020-07-04 DIAGNOSIS — I95.9 HYPOTENSION, UNSPECIFIED HYPOTENSION TYPE: ICD-10-CM

## 2020-07-04 PROBLEM — E87.20 METABOLIC ACIDOSIS: Status: ACTIVE | Noted: 2020-07-04

## 2020-07-04 PROBLEM — R19.7 DIARRHEA: Status: ACTIVE | Noted: 2020-07-04

## 2020-07-04 LAB
ALBUMIN SERPL BCP-MCNC: 3.7 G/DL (ref 3.2–4.9)
ALBUMIN/GLOB SERPL: 1.2 G/DL
ALP SERPL-CCNC: 82 U/L (ref 30–99)
ALT SERPL-CCNC: 10 U/L (ref 2–50)
ANION GAP SERPL CALC-SCNC: 19 MMOL/L (ref 7–16)
AST SERPL-CCNC: 7 U/L (ref 12–45)
BASOPHILS # BLD AUTO: 0.6 % (ref 0–1.8)
BASOPHILS # BLD: 0.07 K/UL (ref 0–0.12)
BILIRUB SERPL-MCNC: 0.2 MG/DL (ref 0.1–1.5)
BUN SERPL-MCNC: 58 MG/DL (ref 8–22)
CA-I SERPL-SCNC: 1.08 MMOL/L (ref 1.1–1.3)
CALCIUM SERPL-MCNC: 8.9 MG/DL (ref 8.4–10.2)
CHLORIDE SERPL-SCNC: 100 MMOL/L (ref 96–112)
CO2 SERPL-SCNC: 16 MMOL/L (ref 20–33)
CREAT SERPL-MCNC: 5.85 MG/DL (ref 0.5–1.4)
CRP SERPL HS-MCNC: 0.57 MG/DL (ref 0–0.75)
EOSINOPHIL # BLD AUTO: 0.11 K/UL (ref 0–0.51)
EOSINOPHIL NFR BLD: 0.9 % (ref 0–6.9)
ERYTHROCYTE [DISTWIDTH] IN BLOOD BY AUTOMATED COUNT: 45.1 FL (ref 35.9–50)
ERYTHROCYTE [SEDIMENTATION RATE] IN BLOOD BY WESTERGREN METHOD: 23 MM/HOUR (ref 0–30)
GLOBULIN SER CALC-MCNC: 3.2 G/DL (ref 1.9–3.5)
GLUCOSE SERPL-MCNC: 101 MG/DL (ref 65–99)
HCT VFR BLD AUTO: 38.7 % (ref 37–47)
HGB BLD-MCNC: 12.4 G/DL (ref 12–16)
IMM GRANULOCYTES # BLD AUTO: 0.05 K/UL (ref 0–0.11)
IMM GRANULOCYTES NFR BLD AUTO: 0.4 % (ref 0–0.9)
LACTATE BLD-SCNC: 1.5 MMOL/L (ref 0.5–2)
LYMPHOCYTES # BLD AUTO: 3.21 K/UL (ref 1–4.8)
LYMPHOCYTES NFR BLD: 27.2 % (ref 22–41)
MAGNESIUM SERPL-MCNC: 2.7 MG/DL (ref 1.5–2.5)
MCH RBC QN AUTO: 29.6 PG (ref 27–33)
MCHC RBC AUTO-ENTMCNC: 32 G/DL (ref 33.6–35)
MCV RBC AUTO: 92.4 FL (ref 81.4–97.8)
MONOCYTES # BLD AUTO: 0.83 K/UL (ref 0–0.85)
MONOCYTES NFR BLD AUTO: 7 % (ref 0–13.4)
NEUTROPHILS # BLD AUTO: 7.51 K/UL (ref 2–7.15)
NEUTROPHILS NFR BLD: 63.9 % (ref 44–72)
NRBC # BLD AUTO: 0 K/UL
NRBC BLD-RTO: 0 /100 WBC
PHOSPHATE SERPL-MCNC: 8.9 MG/DL (ref 2.5–4.5)
PLATELET # BLD AUTO: 282 K/UL (ref 164–446)
PMV BLD AUTO: 9.7 FL (ref 9–12.9)
POTASSIUM SERPL-SCNC: 4.7 MMOL/L (ref 3.6–5.5)
PROT SERPL-MCNC: 6.9 G/DL (ref 6–8.2)
RBC # BLD AUTO: 4.19 M/UL (ref 4.2–5.4)
SODIUM SERPL-SCNC: 135 MMOL/L (ref 135–145)
URATE SERPL-MCNC: 7.2 MG/DL (ref 1.9–8.2)
WBC # BLD AUTO: 11.8 K/UL (ref 4.8–10.8)

## 2020-07-04 PROCEDURE — 87040 BLOOD CULTURE FOR BACTERIA: CPT

## 2020-07-04 PROCEDURE — 83735 ASSAY OF MAGNESIUM: CPT

## 2020-07-04 PROCEDURE — 770020 HCHG ROOM/CARE - TELE (206)

## 2020-07-04 PROCEDURE — 85652 RBC SED RATE AUTOMATED: CPT

## 2020-07-04 PROCEDURE — 80053 COMPREHEN METABOLIC PANEL: CPT

## 2020-07-04 PROCEDURE — 700105 HCHG RX REV CODE 258: Performed by: EMERGENCY MEDICINE

## 2020-07-04 PROCEDURE — 83605 ASSAY OF LACTIC ACID: CPT

## 2020-07-04 PROCEDURE — 86140 C-REACTIVE PROTEIN: CPT

## 2020-07-04 PROCEDURE — 85025 COMPLETE CBC W/AUTO DIFF WBC: CPT

## 2020-07-04 PROCEDURE — 84550 ASSAY OF BLOOD/URIC ACID: CPT

## 2020-07-04 PROCEDURE — 84100 ASSAY OF PHOSPHORUS: CPT

## 2020-07-04 PROCEDURE — 71045 X-RAY EXAM CHEST 1 VIEW: CPT

## 2020-07-04 PROCEDURE — 99223 1ST HOSP IP/OBS HIGH 75: CPT | Mod: AI | Performed by: HOSPITALIST

## 2020-07-04 PROCEDURE — 99285 EMERGENCY DEPT VISIT HI MDM: CPT

## 2020-07-04 PROCEDURE — 82330 ASSAY OF CALCIUM: CPT

## 2020-07-04 PROCEDURE — 700102 HCHG RX REV CODE 250 W/ 637 OVERRIDE(OP): Performed by: HOSPITALIST

## 2020-07-04 PROCEDURE — A9270 NON-COVERED ITEM OR SERVICE: HCPCS | Performed by: HOSPITALIST

## 2020-07-04 PROCEDURE — 700105 HCHG RX REV CODE 258: Performed by: HOSPITALIST

## 2020-07-04 RX ORDER — ESCITALOPRAM OXALATE 10 MG/1
10 TABLET ORAL DAILY
Status: DISCONTINUED | OUTPATIENT
Start: 2020-07-05 | End: 2020-07-06 | Stop reason: HOSPADM

## 2020-07-04 RX ORDER — SODIUM CHLORIDE 9 MG/ML
250 INJECTION, SOLUTION INTRAVENOUS ONCE
Status: COMPLETED | OUTPATIENT
Start: 2020-07-04 | End: 2020-07-04

## 2020-07-04 RX ORDER — ROSUVASTATIN CALCIUM 20 MG/1
20 TABLET, COATED ORAL EVERY EVENING
Status: ON HOLD | COMMUNITY
End: 2020-08-24

## 2020-07-04 RX ORDER — LISINOPRIL AND HYDROCHLOROTHIAZIDE 12.5; 1 MG/1; MG/1
1 TABLET ORAL DAILY
Status: ON HOLD | COMMUNITY
End: 2020-07-06

## 2020-07-04 RX ORDER — BISACODYL 10 MG
10 SUPPOSITORY, RECTAL RECTAL
Status: DISCONTINUED | OUTPATIENT
Start: 2020-07-04 | End: 2020-07-06 | Stop reason: HOSPADM

## 2020-07-04 RX ORDER — POLYETHYLENE GLYCOL 3350 17 G/17G
1 POWDER, FOR SOLUTION ORAL
Status: DISCONTINUED | OUTPATIENT
Start: 2020-07-04 | End: 2020-07-06 | Stop reason: HOSPADM

## 2020-07-04 RX ORDER — METOPROLOL SUCCINATE 50 MG/1
50 TABLET, EXTENDED RELEASE ORAL DAILY
COMMUNITY
End: 2021-01-05

## 2020-07-04 RX ORDER — ESCITALOPRAM OXALATE 10 MG/1
10 TABLET ORAL DAILY
COMMUNITY
End: 2020-07-14

## 2020-07-04 RX ORDER — ONDANSETRON 4 MG/1
4 TABLET, ORALLY DISINTEGRATING ORAL EVERY 4 HOURS PRN
Status: DISCONTINUED | OUTPATIENT
Start: 2020-07-04 | End: 2020-07-06 | Stop reason: HOSPADM

## 2020-07-04 RX ORDER — SODIUM CHLORIDE 9 MG/ML
INJECTION, SOLUTION INTRAVENOUS CONTINUOUS
Status: DISCONTINUED | OUTPATIENT
Start: 2020-07-04 | End: 2020-07-06 | Stop reason: HOSPADM

## 2020-07-04 RX ORDER — SODIUM CHLORIDE 9 MG/ML
1000 INJECTION, SOLUTION INTRAVENOUS ONCE
Status: COMPLETED | OUTPATIENT
Start: 2020-07-04 | End: 2020-07-04

## 2020-07-04 RX ORDER — AMOXICILLIN 250 MG
2 CAPSULE ORAL 2 TIMES DAILY
Status: DISCONTINUED | OUTPATIENT
Start: 2020-07-04 | End: 2020-07-06 | Stop reason: HOSPADM

## 2020-07-04 RX ORDER — GABAPENTIN 300 MG/1
300 CAPSULE ORAL 2 TIMES DAILY
Status: ON HOLD | COMMUNITY
End: 2020-07-06 | Stop reason: SDUPTHER

## 2020-07-04 RX ORDER — ACETAMINOPHEN 325 MG/1
650 TABLET ORAL EVERY 6 HOURS PRN
Status: DISCONTINUED | OUTPATIENT
Start: 2020-07-04 | End: 2020-07-06 | Stop reason: HOSPADM

## 2020-07-04 RX ORDER — ONDANSETRON 2 MG/ML
4 INJECTION INTRAMUSCULAR; INTRAVENOUS EVERY 4 HOURS PRN
Status: DISCONTINUED | OUTPATIENT
Start: 2020-07-04 | End: 2020-07-06 | Stop reason: HOSPADM

## 2020-07-04 RX ORDER — METOPROLOL SUCCINATE 25 MG/1
50 TABLET, EXTENDED RELEASE ORAL DAILY
Status: DISCONTINUED | OUTPATIENT
Start: 2020-07-05 | End: 2020-07-06 | Stop reason: HOSPADM

## 2020-07-04 RX ORDER — ROSUVASTATIN CALCIUM 10 MG/1
10 TABLET, COATED ORAL EVERY EVENING
Status: DISCONTINUED | OUTPATIENT
Start: 2020-07-04 | End: 2020-07-06 | Stop reason: HOSPADM

## 2020-07-04 RX ORDER — HYDROCODONE BITARTRATE AND ACETAMINOPHEN 5; 325 MG/1; MG/1
1 TABLET ORAL EVERY 6 HOURS PRN
Status: DISCONTINUED | OUTPATIENT
Start: 2020-07-04 | End: 2020-07-05

## 2020-07-04 RX ORDER — GABAPENTIN 300 MG/1
300 CAPSULE ORAL DAILY
Status: DISCONTINUED | OUTPATIENT
Start: 2020-07-05 | End: 2020-07-06 | Stop reason: HOSPADM

## 2020-07-04 RX ADMIN — ROSUVASTATIN CALCIUM 10 MG: 10 TABLET, FILM COATED ORAL at 17:43

## 2020-07-04 RX ADMIN — SODIUM CHLORIDE 250 ML: 9 INJECTION, SOLUTION INTRAVENOUS at 12:40

## 2020-07-04 RX ADMIN — SODIUM CHLORIDE: 9 INJECTION, SOLUTION INTRAVENOUS at 17:43

## 2020-07-04 RX ADMIN — SODIUM CHLORIDE 1000 ML: 9 INJECTION, SOLUTION INTRAVENOUS at 11:47

## 2020-07-04 ASSESSMENT — ENCOUNTER SYMPTOMS
RESPIRATORY NEGATIVE: 1
ROS GI COMMENTS: DIFFICULTY SWALLOWING
FEVER: 0
COUGH: 0
LOSS OF CONSCIOUSNESS: 0
BLOOD IN STOOL: 0
VOMITING: 0
CHILLS: 0
BRUISES/BLEEDS EASILY: 0
PALPITATIONS: 0
HEARTBURN: 0
MEMORY LOSS: 0
BACK PAIN: 1
DIARRHEA: 1
HEMOPTYSIS: 0
ABDOMINAL PAIN: 0
DEPRESSION: 1
CARDIOVASCULAR NEGATIVE: 1
NERVOUS/ANXIOUS: 0
DOUBLE VISION: 0
NAUSEA: 0
EYES NEGATIVE: 1
NEUROLOGICAL NEGATIVE: 1
DIAPHORESIS: 0
HEADACHES: 0
CONSTIPATION: 0
FOCAL WEAKNESS: 0
WHEEZING: 0
SEIZURES: 0
DIZZINESS: 0

## 2020-07-04 ASSESSMENT — LIFESTYLE VARIABLES
CONSUMPTION TOTAL: NEGATIVE
ON A TYPICAL DAY WHEN YOU DRINK ALCOHOL HOW MANY DRINKS DO YOU HAVE: 0
HAVE PEOPLE ANNOYED YOU BY CRITICIZING YOUR DRINKING: NO
TOTAL SCORE: 0
HOW MANY TIMES IN THE PAST YEAR HAVE YOU HAD 5 OR MORE DRINKS IN A DAY: 0
EVER_SMOKED: NEVER
AVERAGE NUMBER OF DAYS PER WEEK YOU HAVE A DRINK CONTAINING ALCOHOL: 0
HAVE YOU EVER FELT YOU SHOULD CUT DOWN ON YOUR DRINKING: NO
EVER FELT BAD OR GUILTY ABOUT YOUR DRINKING: NO
EVER HAD A DRINK FIRST THING IN THE MORNING TO STEADY YOUR NERVES TO GET RID OF A HANGOVER: NO
TOTAL SCORE: 0
TOTAL SCORE: 0
ALCOHOL_USE: NO
SUBSTANCE_ABUSE: 0

## 2020-07-04 ASSESSMENT — FIBROSIS 4 INDEX: FIB4 SCORE: 0.65

## 2020-07-04 ASSESSMENT — COGNITIVE AND FUNCTIONAL STATUS - GENERAL
MOVING TO AND FROM BED TO CHAIR: A LITTLE
WALKING IN HOSPITAL ROOM: A LOT
SUGGESTED CMS G CODE MODIFIER DAILY ACTIVITY: CJ
MOVING FROM LYING ON BACK TO SITTING ON SIDE OF FLAT BED: A LITTLE
DAILY ACTIVITIY SCORE: 20
DRESSING REGULAR LOWER BODY CLOTHING: A LITTLE
CLIMB 3 TO 5 STEPS WITH RAILING: A LOT
TOILETING: A LITTLE
SUGGESTED CMS G CODE MODIFIER MOBILITY: CK
MOBILITY SCORE: 17
DRESSING REGULAR UPPER BODY CLOTHING: A LITTLE
STANDING UP FROM CHAIR USING ARMS: A LITTLE
HELP NEEDED FOR BATHING: A LITTLE

## 2020-07-04 ASSESSMENT — PATIENT HEALTH QUESTIONNAIRE - PHQ9
2. FEELING DOWN, DEPRESSED, IRRITABLE, OR HOPELESS: NOT AT ALL
1. LITTLE INTEREST OR PLEASURE IN DOING THINGS: NOT AT ALL
SUM OF ALL RESPONSES TO PHQ9 QUESTIONS 1 AND 2: 0

## 2020-07-04 NOTE — ED NOTES
"Pt. Resting on cart in NAD. Updated to POC. Pt. Needs addressed at this time. Pt. Encouraged to let this RN know when she needs to urinate, needing urine sample. Pt. Stating \"I haven't been peeing much\".     "

## 2020-07-04 NOTE — ASSESSMENT & PLAN NOTE
Patient at this point does have swallowing difficulties.  She is at this point going to need a swallow evaluation  She is scheduled for an outpatient EGD with Dr Grant

## 2020-07-04 NOTE — H&P
Hospital Medicine History & Physical Note    Date of Service  7/4/2020    Primary Care Physician  Denzel Patel M.D.    Code Status  DNAR/DNI    Chief Complaint  Chief Complaint   Patient presents with   • Extremity Weakness   • Diarrhea   • Hypotension       History of Presenting Illness  85 y.o. female who presented 7/4/2020 with generalized weakness.  The patient says that on June 26 she had diarrhea for 5 days.  The patient then took some Imodium to make the diarrhea stop and this worked but then it worked to good and she was not able to have a bowel movement.  The patient then took laxatives and this resumed with her diarrhea yesterday.  Patient today comes in with worsening weakness and generalized fatigue.  The patient is evaluated and is found to be in acute renal failure with a creatinine of 5.8.  Her BUN is also elevated to 58.  The patient at this point will need fluid resuscitation.  We will need to evaluate the diarrhea.  Patient is scheduled to have esophageal dilation as she has dysphagia with it.  The patient at this point will need a full liquid diet.  She also is found to have a new heart murmur decided to get an echocardiogram.  The patient wishes to be DNR DNI CODE STATUS this was discussed with her in detail.  Monitor at this point electrolytes and supplement dose as necessary.    Review of Systems  Review of Systems   Constitutional: Positive for malaise/fatigue. Negative for chills, diaphoresis and fever.   HENT: Negative.    Eyes: Negative.  Negative for double vision.   Respiratory: Negative.  Negative for cough, hemoptysis and wheezing.    Cardiovascular: Negative.  Negative for chest pain, palpitations and leg swelling.   Gastrointestinal: Positive for diarrhea. Negative for abdominal pain, blood in stool, constipation, heartburn, nausea and vomiting.        Difficulty swallowing   Genitourinary: Negative.  Negative for frequency, hematuria and urgency.   Musculoskeletal: Positive for  back pain and joint pain (Both shoulders).   Skin: Negative.  Negative for itching and rash.   Neurological: Negative.  Negative for dizziness, focal weakness, seizures, loss of consciousness and headaches.   Endo/Heme/Allergies: Negative.  Does not bruise/bleed easily.   Psychiatric/Behavioral: Positive for depression. Negative for memory loss, substance abuse and suicidal ideas. The patient is not nervous/anxious.    All other systems reviewed and are negative.      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    Surgical History   has a past surgical history that includes breast biopsy (Left, 2015); lumbar decompression (1969, 1979); hysterectomy, total abdominal (1977); appendectomy (1945); parathyroid exploration (6/16/2010); paraesophageal hernia robotic (N/A, 10/12/2015); gastroscopy (10/23/2015); thoracoscopy (Left, 10/30/2015); gastroscopy-endo (11/1/2015); gastroscopy-endo (N/A, 12/10/2015); gastroscopy-endo (N/A, 2/26/2016); and ankle orif (Right, 9/20/2016).     Family History  family history includes Hypertension in her mother and another family member; Lung Disease in her brother, father, and another family member.     Social History   reports that she has never smoked. She has quit using smokeless tobacco. She reports that she does not drink alcohol or use drugs.    Allergies  Allergies   Allergen Reactions   • Asa [Aspirin] Unspecified     Headache   • Pcn [Penicillins] Unspecified     Pt reports that its been so long ago, she is not sure what  happens but knows that she has an allergie to this medications        Medications  Prior to Admission Medications   Prescriptions Last Dose Informant Patient Reported? Taking?   HYDROcodone-acetaminophen (NORCO) 5-325 MG Tab per tablet 7/4/2020 at 0900 Patient Yes No   Sig: Take 1 Tab by mouth every 6 hours as needed. Indications: Pain   Sennosides (EX-LAX PO) 7/3/2020 at 2100 Patient Yes Yes   Sig: Take 1 Tab by mouth as needed (For constipation). For constipation   escitalopram (LEXAPRO) 10 MG Tab 7/4/2020 at 0900 Patient Yes Yes   Sig: Take 10 mg by mouth every day.   gabapentin (NEURONTIN) 300 MG Cap 7/4/2020 at 0900 Patient Yes Yes   Sig: Take 300 mg by mouth 2 Times a Day.   lisinopril-hydrochlorothiazide (PRINZIDE) 10-12.5 MG per tablet 7/2/2020 at AM Patient Yes Yes   Sig: Take 1 Tab by mouth every day.   metoprolol SR (TOPROL XL) 50 MG TABLET SR 24 HR 7/4/2020 at 0900 Patient Yes Yes   Sig: Take 50 mg by mouth every day.   rosuvastatin (CRESTOR) 20 MG Tab 7/3/2020 at 1900 Patient Yes Yes   Sig: Take 20 mg by mouth every evening.      Facility-Administered Medications: None       Physical Exam  Temp:  [36.4 °C (97.5 °F)] 36.4 °C (97.5 °F)  Pulse:  [71-92] 71  Resp:  [16-20] 17  BP: ()/(47-68) 114/66  SpO2:  [94 %-100 %] 94 %    Physical Exam  Vitals signs and nursing note reviewed.   Constitutional:       Appearance: Normal appearance. She is well-developed. She is ill-appearing. She is not diaphoretic.   HENT:      Head: Normocephalic and atraumatic.      Right Ear: External ear normal.      Left Ear: External ear normal.      Nose: Nose normal.      Mouth/Throat:      Mouth: Mucous membranes are moist.      Pharynx: Oropharynx is clear.   Eyes:      Extraocular Movements: Extraocular movements intact.      Conjunctiva/sclera: Conjunctivae normal.      Pupils: Pupils are equal, round, and reactive to light.   Neck:      Musculoskeletal: Normal range of motion and neck supple.      Thyroid: No  thyromegaly.      Vascular: No JVD.   Cardiovascular:      Rate and Rhythm: Normal rate and regular rhythm.      Heart sounds: Murmur present.   Pulmonary:      Effort: Pulmonary effort is normal.      Breath sounds: Normal breath sounds.   Chest:      Chest wall: No tenderness.   Abdominal:      General: Abdomen is flat. Bowel sounds are normal. There is no distension.      Palpations: Abdomen is soft. There is no mass.      Tenderness: There is no abdominal tenderness. There is no guarding or rebound.   Musculoskeletal: Normal range of motion.      Right lower leg: No edema.      Left lower leg: No edema.   Lymphadenopathy:      Cervical: No cervical adenopathy.   Skin:     General: Skin is warm and dry.      Capillary Refill: Capillary refill takes 2 to 3 seconds.      Findings: No rash.   Neurological:      General: No focal deficit present.      Mental Status: She is alert and oriented to person, place, and time. Mental status is at baseline.      GCS: GCS eye subscore is 4. GCS verbal subscore is 5. GCS motor subscore is 6.      Cranial Nerves: No cranial nerve deficit.      Deep Tendon Reflexes: Reflexes are normal and symmetric.   Psychiatric:         Mood and Affect: Mood normal.         Behavior: Behavior normal.         Thought Content: Thought content normal.         Judgment: Judgment normal.         Laboratory:  Recent Labs     07/04/20  1145   WBC 11.8*   RBC 4.19*   HEMOGLOBIN 12.4   HEMATOCRIT 38.7   MCV 92.4   MCH 29.6   MCHC 32.0*   RDW 45.1   PLATELETCT 282   MPV 9.7     Recent Labs     07/04/20  1145   SODIUM 135   POTASSIUM 4.7   CHLORIDE 100   CO2 16*   GLUCOSE 101*   BUN 58*   CREATININE 5.85*   CALCIUM 8.9     Recent Labs     07/04/20  1145   ALTSGPT 10   ASTSGOT 7*   ALKPHOSPHAT 82   TBILIRUBIN 0.2   GLUCOSE 101*         No results for input(s): NTPROBNP in the last 72 hours.      No results for input(s): TROPONINT in the last 72 hours.    Imaging:  DX-CHEST-PORTABLE (1 VIEW)   Final Result       1.  There is no acute cardiopulmonary process.   2.  There is chronic left pleural thickening and parenchymal scarring.      US-RENAL    (Results Pending)         Assessment/Plan:    * Acute renal failure (ARF) ([facility])  Assessment & Plan  Patient has developed acute renal failure due to dehydration.  The patient has developed diarrhea on June 26.  She had it for 5 days.  The patient then took some Imodium to make the diarrhea stopped.  However then she became constipated so she took laxatives and again the diarrhea recurred yesterday.  At this point the patient will need a renal ultrasound.  Will need to evaluate renal functions.  Will avoid nephrotoxic medications.  Nephrology consultation may need to be requested.  Patient's renal functions will be monitored carefully.    Diarrhea  Assessment & Plan  Diarrhea at this point she says has recurred.  We will check at this point stool studies.  Give fluid resuscitation.    Metabolic acidosis  Assessment & Plan  Metabolic acidosis secondary to dehydration and the acute renal failure.  We should see an improvement of her anion gap as well as the CO2 level once fluid resuscitation has been optimized.    Leukocytosis- (present on admission)  Assessment & Plan  Leukocytosis secondary to the diarrhea.  This may be infectious in nature.  Monitor white blood cell count carefully.    Depression with anxiety- (present on admission)  Assessment & Plan  Chronic depression with anxiety continue at this point with Lexapro.  Currently patient is not suicidal or homicidal.    GERD (gastroesophageal reflux disease)- (present on admission)  Assessment & Plan  Chronic gastroesophageal reflux disease.  Continue at this point with monitoring her situation currently she does not have heartburn.  The patient does have a hiatal hernia.  The patient does have chronic dysphagia.  Apparently the patient sees Dr. Grant for recurrent esophageal dilation.  She had one scheduled for Tuesday  if possible she will still need to make that appointment.    HTN (hypertension)- (present on admission)  Assessment & Plan  Optimize blood pressure management keep systolic blood pressure less than 140 diastolic under 90.  Continue with metoprolol XL 50 mg daily  Hold lisinopril hydrochlorothiazide with the acute renal failure.    Hyperlipidemia with target LDL less than 130- (present on admission)  Assessment & Plan  Low-fat low-cholesterol diet  Statin, in the form of Crestor.  Fasting lipid panel

## 2020-07-04 NOTE — ASSESSMENT & PLAN NOTE
Low-fat low-cholesterol diet  Statin, in the form of Crestor.  Lab Results   Component Value Date/Time    CHOLSTRLTOT 119 04/04/2018 10:15 AM    LDL 40 04/04/2018 10:15 AM    HDL 42 04/04/2018 10:15 AM    TRIGLYCERIDE 185 (H) 04/04/2018 10:15 AM

## 2020-07-04 NOTE — ASSESSMENT & PLAN NOTE
Patient's renal failure at this point is improving with hydration.  The patient's renal failure secondary to prerenal azotemia with dehydration from the diarrhea.  Continue at this point with renal ultrasound  Nephrology to see the patient

## 2020-07-04 NOTE — ED TRIAGE NOTES
"Presents with a C/O extremity weakness, and persistent thin, watery diarrhea recurring for the past 4 days.  She reports inability to stand without falling.  She meets the sepsis R/O protocol. Pt denies any domestic high risk areas, or international travel within the past 14 days.   Chief Complaint   Patient presents with   • Extremity Weakness   • Diarrhea     BP (!) 79/47   Pulse 92   Temp 36.4 °C (97.5 °F)   Resp 20   Ht 1.727 m (5' 8\")   Wt 59 kg (130 lb 1.1 oz)   LMP 03/13/1970   SpO2 96%   BMI 19.78 kg/m²     "

## 2020-07-04 NOTE — ED PROVIDER NOTES
"ED Provider Note    CHIEF COMPLAINT  Chief Complaint   Patient presents with   • Extremity Weakness   • Diarrhea   • Hypotension     Here with her     REDD Srivastava is a 85 y.o. female who presents complaining of weakness.    Patient states she almost passed out, \"toppled over,\" today so she decided come to the ER.  She reports diarrhea that she was having 4 times daily earlier this week.  She reports one episode of watery stool this morning but states it has slowed down.  She states she does not take in a normal amount of food or liquids secondary to \"my esophagus being dead.\"    Patient denies syncope, fever, chills, chest pain, melena, hematochezia, sick contacts, urinary symptoms, recent antibiotic use, travel, nausea, vomiting.      ALLERGIES  Allergies   Allergen Reactions   • Asa [Aspirin] Unspecified     Headache   • Pcn [Penicillins] Unspecified     Pt reports that its been so long ago, she is not sure what happens but knows that she has an allergie to this medications        CURRENT MEDICATIONS  Home Medications     Reviewed by Lucrecia Carrillo M.D. (Physician) on 07/04/20 at 1437  Med List Status: Complete   Medication Last Dose Status   escitalopram (LEXAPRO) 10 MG Tab 7/4/2020 Active   gabapentin (NEURONTIN) 300 MG Cap 7/4/2020 Active   HYDROcodone-acetaminophen (NORCO) 5-325 MG Tab per tablet 7/4/2020 Active   lisinopril-hydrochlorothiazide (PRINZIDE) 10-12.5 MG per tablet 7/2/2020 Active   metoprolol SR (TOPROL XL) 50 MG TABLET SR 24 HR 7/4/2020 Active   rosuvastatin (CRESTOR) 20 MG Tab 7/3/2020 Active   Sennosides (EX-LAX PO) 7/3/2020 Active                PAST MEDICAL HISTORY   has a past medical history of Anemia, Atrophic vaginitis (1/30/2010), Cerumen Impaction Recurrent (1/30/2010), Depressive disorder, not elsewhere classified (1/30/2010), 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    SURGICAL HISTORY   has a past surgical history that includes breast biopsy (Left, 2015); lumbar decompression (1969, 1979); hysterectomy, total abdominal (1977); appendectomy (1945); parathyroid exploration (6/16/2010); paraesophageal hernia robotic (N/A, 10/12/2015); gastroscopy (10/23/2015); thoracoscopy (Left, 10/30/2015); gastroscopy-endo (11/1/2015); gastroscopy-endo (N/A, 12/10/2015); gastroscopy-endo (N/A, 2/26/2016); and ankle orif (Right, 9/20/2016).    SOCIAL HISTORY  Social History     Tobacco Use   • Smoking status: Never Smoker   • Smokeless tobacco: Former User   • Tobacco comment: Nicotine    Substance and Sexual Activity   • Alcohol use: No     Alcohol/week: 0.0 oz   • Drug use: No   • Sexual activity: Never     Partners: Male       REVIEW OF SYSTEMS  See HPI for further details.  All other systems are negative except as above in HPI.      PHYSICAL EXAM  VITAL SIGNS: /66   Pulse 71   Temp 36.4 °C (97.5 °F)   Resp 17   Ht 1.727 m (5' 8\")   Wt 59 kg (130 lb 1.1 oz)   LMP 03/13/1970   SpO2 94%   BMI 19.78 kg/m²     General:  WDWN, nontoxic appearing in NAD; A+Ox3; V/S as above; hypotensive in 70s at triage and on initial evaluation  Skin: warm and dry; good color; no rash  HEENT: NCAT; EOMs intact; PERRL; no scleral icterus   Neck: FROM; soft, no JVD  Cardiovascular: Regular heart rate and rhythm.  No murmurs, rubs, or gallops; pulses 2+ bilaterally radially and DP areas  Lungs: Clear to auscultation with good air movement bilaterally.  No wheezes, rhonchi, or rales.   Abdomen: BS present; soft; NTND; no rebound, guarding, or rigidity.  No organomegaly or pulsatile mass  Extremities: CROUCH x 4; no e/o trauma; no pedal edema  Neurologic: CNs III-XII grossly intact; speech clear; distal sensation intact; strength " 4/5 UE/LEs  Psychiatric: Appropriate affect, normal mood    LABS  Results for orders placed or performed during the hospital encounter of 07/04/20   CBC WITH DIFFERENTIAL   Result Value Ref Range    WBC 11.8 (H) 4.8 - 10.8 K/uL    RBC 4.19 (L) 4.20 - 5.40 M/uL    Hemoglobin 12.4 12.0 - 16.0 g/dL    Hematocrit 38.7 37.0 - 47.0 %    MCV 92.4 81.4 - 97.8 fL    MCH 29.6 27.0 - 33.0 pg    MCHC 32.0 (L) 33.6 - 35.0 g/dL    RDW 45.1 35.9 - 50.0 fL    Platelet Count 282 164 - 446 K/uL    MPV 9.7 9.0 - 12.9 fL    Neutrophils-Polys 63.90 44.00 - 72.00 %    Lymphocytes 27.20 22.00 - 41.00 %    Monocytes 7.00 0.00 - 13.40 %    Eosinophils 0.90 0.00 - 6.90 %    Basophils 0.60 0.00 - 1.80 %    Immature Granulocytes 0.40 0.00 - 0.90 %    Nucleated RBC 0.00 /100 WBC    Neutrophils (Absolute) 7.51 (H) 2.00 - 7.15 K/uL    Lymphs (Absolute) 3.21 1.00 - 4.80 K/uL    Monos (Absolute) 0.83 0.00 - 0.85 K/uL    Eos (Absolute) 0.11 0.00 - 0.51 K/uL    Baso (Absolute) 0.07 0.00 - 0.12 K/uL    Immature Granulocytes (abs) 0.05 0.00 - 0.11 K/uL    NRBC (Absolute) 0.00 K/uL   COMP METABOLIC PANEL   Result Value Ref Range    Sodium 135 135 - 145 mmol/L    Potassium 4.7 3.6 - 5.5 mmol/L    Chloride 100 96 - 112 mmol/L    Co2 16 (L) 20 - 33 mmol/L    Anion Gap 19.0 (H) 7.0 - 16.0    Glucose 101 (H) 65 - 99 mg/dL    Bun 58 (H) 8 - 22 mg/dL    Creatinine 5.85 (HH) 0.50 - 1.40 mg/dL    Calcium 8.9 8.4 - 10.2 mg/dL    AST(SGOT) 7 (L) 12 - 45 U/L    ALT(SGPT) 10 2 - 50 U/L    Alkaline Phosphatase 82 30 - 99 U/L    Total Bilirubin 0.2 0.1 - 1.5 mg/dL    Albumin 3.7 3.2 - 4.9 g/dL    Total Protein 6.9 6.0 - 8.2 g/dL    Globulin 3.2 1.9 - 3.5 g/dL    A-G Ratio 1.2 g/dL   LACTIC ACID   Result Value Ref Range    Lactic Acid 1.5 0.5 - 2.0 mmol/L   ESTIMATED GFR   Result Value Ref Range    GFR If  8 (A) >60 mL/min/1.73 m 2    GFR If Non African American 7 (A) >60 mL/min/1.73 m 2       IMAGING  DX-CHEST-PORTABLE (1 VIEW)   Final Result      1.   There is no acute cardiopulmonary process.   2.  There is chronic left pleural thickening and parenchymal scarring.      US-RENAL    (Results Pending)   EC-ECHOCARDIOGRAM COMPLETE W/ CONT    (Results Pending)         MEDICAL RECORD  I have reviewed patient's medical record and pertinent results are listed below.      COURSE & MEDICAL DECISION MAKING  I have reviewed any medical record information, laboratory studies and radiographic results as noted.    Ellen Srivastava is a 85 y.o. female who presents complaining of analyzed weakness following several days of diarrhea.    Appropriate PPE was worn at all times while interacting with the patient, including goggles, N95 mask, and surgical mask.    NS bolus was ordered for hypotension    Pt's BP improved after fluid bolus initiated      Pt was re-evaluated at 12:26 PM  BP now 100s    Patient's labs demonstrate acute kidney injury.  Lactic acid is normal at 1.5.  Patient has a slight elevation of white blood cells to 11.8 with CO2 of 16 and creatinine 5.85.  Patient will be admitted to the hospitalist service.  Blood cultures and urinalysis are pending.      The total critical care time on this patient is 40 minutes, resuscitating patient, speaking with admitting physician, and deciphering test results. This 40 minutes is exclusive of separately billable procedures.      FINAL IMPRESSION  1. Weakness     2. Dehydration     3. Hypotension, unspecified hypotension type     4. Acute kidney injury (HCC)         Electronically signed by: Libby Rubio M.D., 7/4/2020 12:25 PM

## 2020-07-04 NOTE — ED NOTES
Med rec updated and complete  Allergies reviewed  Interviewed pt with  at bedside with permission from pt.  Pts  had a list of medications at bedside, went over list of medications and returned list of medications back to pts .  Pt reports no antibiotics in the last 2 weeks  Pt reports no vitamins.

## 2020-07-05 ENCOUNTER — APPOINTMENT (OUTPATIENT)
Dept: RADIOLOGY | Facility: MEDICAL CENTER | Age: 85
DRG: 683 | End: 2020-07-05
Attending: HOSPITALIST
Payer: MEDICARE

## 2020-07-05 ENCOUNTER — APPOINTMENT (OUTPATIENT)
Dept: CARDIOLOGY | Facility: MEDICAL CENTER | Age: 85
DRG: 683 | End: 2020-07-05
Attending: HOSPITALIST
Payer: MEDICARE

## 2020-07-05 LAB
ANION GAP SERPL CALC-SCNC: 15 MMOL/L (ref 7–16)
APPEARANCE UR: CLEAR
BACTERIA #/AREA URNS HPF: ABNORMAL /HPF
BILIRUB UR QL STRIP.AUTO: NEGATIVE
BUN SERPL-MCNC: 51 MG/DL (ref 8–22)
CALCIUM SERPL-MCNC: 8.2 MG/DL (ref 8.4–10.2)
CHLORIDE SERPL-SCNC: 106 MMOL/L (ref 96–112)
CO2 SERPL-SCNC: 15 MMOL/L (ref 20–33)
COLOR UR: YELLOW
CREAT SERPL-MCNC: 3.77 MG/DL (ref 0.5–1.4)
EPI CELLS #/AREA URNS HPF: ABNORMAL /HPF
ERYTHROCYTE [DISTWIDTH] IN BLOOD BY AUTOMATED COUNT: 46 FL (ref 35.9–50)
GLUCOSE SERPL-MCNC: 90 MG/DL (ref 65–99)
GLUCOSE UR STRIP.AUTO-MCNC: NEGATIVE MG/DL
HCT VFR BLD AUTO: 34 % (ref 37–47)
HGB BLD-MCNC: 10.8 G/DL (ref 12–16)
HYALINE CASTS #/AREA URNS LPF: ABNORMAL /LPF
KETONES UR STRIP.AUTO-MCNC: NEGATIVE MG/DL
LEUKOCYTE ESTERASE UR QL STRIP.AUTO: ABNORMAL
LV EJECT FRACT  99904: 70
LV EJECT FRACT MOD 2C 99903: 76.79
LV EJECT FRACT MOD 4C 99902: 77.66
LV EJECT FRACT MOD BP 99901: 77
MCH RBC QN AUTO: 29.9 PG (ref 27–33)
MCHC RBC AUTO-ENTMCNC: 31.8 G/DL (ref 33.6–35)
MCV RBC AUTO: 94.2 FL (ref 81.4–97.8)
MICRO URNS: ABNORMAL
MUCOUS THREADS #/AREA URNS HPF: ABNORMAL /HPF
NITRITE UR QL STRIP.AUTO: NEGATIVE
PH UR STRIP.AUTO: 5 [PH] (ref 5–8)
PLATELET # BLD AUTO: 240 K/UL (ref 164–446)
PMV BLD AUTO: 10.2 FL (ref 9–12.9)
POTASSIUM SERPL-SCNC: 4.5 MMOL/L (ref 3.6–5.5)
PROT UR QL STRIP: 100 MG/DL
RBC # BLD AUTO: 3.61 M/UL (ref 4.2–5.4)
RBC # URNS HPF: ABNORMAL /HPF
RBC UR QL AUTO: ABNORMAL
SODIUM SERPL-SCNC: 136 MMOL/L (ref 135–145)
SP GR UR STRIP.AUTO: 1.02
WBC # BLD AUTO: 9.2 K/UL (ref 4.8–10.8)
WBC #/AREA URNS HPF: ABNORMAL /HPF

## 2020-07-05 PROCEDURE — 99232 SBSQ HOSP IP/OBS MODERATE 35: CPT | Performed by: HOSPITALIST

## 2020-07-05 PROCEDURE — 87086 URINE CULTURE/COLONY COUNT: CPT

## 2020-07-05 PROCEDURE — 87186 SC STD MICRODIL/AGAR DIL: CPT

## 2020-07-05 PROCEDURE — 770020 HCHG ROOM/CARE - TELE (206)

## 2020-07-05 PROCEDURE — 93306 TTE W/DOPPLER COMPLETE: CPT

## 2020-07-05 PROCEDURE — A9270 NON-COVERED ITEM OR SERVICE: HCPCS | Performed by: HOSPITALIST

## 2020-07-05 PROCEDURE — 80048 BASIC METABOLIC PNL TOTAL CA: CPT

## 2020-07-05 PROCEDURE — 700102 HCHG RX REV CODE 250 W/ 637 OVERRIDE(OP): Performed by: HOSPITALIST

## 2020-07-05 PROCEDURE — 87077 CULTURE AEROBIC IDENTIFY: CPT

## 2020-07-05 PROCEDURE — 85027 COMPLETE CBC AUTOMATED: CPT

## 2020-07-05 PROCEDURE — 93306 TTE W/DOPPLER COMPLETE: CPT | Mod: 26 | Performed by: INTERNAL MEDICINE

## 2020-07-05 PROCEDURE — 81001 URINALYSIS AUTO W/SCOPE: CPT

## 2020-07-05 PROCEDURE — 700105 HCHG RX REV CODE 258: Performed by: HOSPITALIST

## 2020-07-05 PROCEDURE — 76775 US EXAM ABDO BACK WALL LIM: CPT

## 2020-07-05 RX ORDER — SODIUM BICARBONATE 650 MG/1
650 TABLET ORAL 3 TIMES DAILY
Status: DISCONTINUED | OUTPATIENT
Start: 2020-07-05 | End: 2020-07-06 | Stop reason: HOSPADM

## 2020-07-05 RX ORDER — OXYCODONE AND ACETAMINOPHEN 10; 325 MG/1; MG/1
1 TABLET ORAL EVERY 4 HOURS PRN
Status: ON HOLD | COMMUNITY
End: 2020-07-05

## 2020-07-05 RX ORDER — HYDROCODONE BITARTRATE AND ACETAMINOPHEN 10; 325 MG/1; MG/1
1 TABLET ORAL EVERY 6 HOURS PRN
Status: DISCONTINUED | OUTPATIENT
Start: 2020-07-05 | End: 2020-07-06 | Stop reason: HOSPADM

## 2020-07-05 RX ADMIN — SODIUM CHLORIDE: 9 INJECTION, SOLUTION INTRAVENOUS at 03:44

## 2020-07-05 RX ADMIN — SODIUM BICARBONATE 650 MG TABLET 650 MG: at 17:07

## 2020-07-05 RX ADMIN — ROSUVASTATIN CALCIUM 10 MG: 10 TABLET, FILM COATED ORAL at 17:07

## 2020-07-05 RX ADMIN — GABAPENTIN 300 MG: 300 CAPSULE ORAL at 05:17

## 2020-07-05 RX ADMIN — HYDROCODONE BITARTRATE AND ACETAMINOPHEN 1 TABLET: 10; 325 TABLET ORAL at 13:04

## 2020-07-05 RX ADMIN — METOPROLOL SUCCINATE 50 MG: 25 TABLET, EXTENDED RELEASE ORAL at 05:16

## 2020-07-05 RX ADMIN — ESCITALOPRAM OXALATE 10 MG: 10 TABLET ORAL at 05:17

## 2020-07-05 RX ADMIN — HYDROCODONE BITARTRATE AND ACETAMINOPHEN 1 TABLET: 5; 325 TABLET ORAL at 05:20

## 2020-07-05 RX ADMIN — SODIUM BICARBONATE 650 MG TABLET 650 MG: at 14:37

## 2020-07-05 ASSESSMENT — LIFESTYLE VARIABLES: SUBSTANCE_ABUSE: 0

## 2020-07-05 ASSESSMENT — ENCOUNTER SYMPTOMS
POLYDIPSIA: 0
VOMITING: 0
EYES NEGATIVE: 1
DIARRHEA: 1
DEPRESSION: 0
NAUSEA: 1
SEIZURES: 0
CLAUDICATION: 0
BACK PAIN: 0
SHORTNESS OF BREATH: 0
MUSCULOSKELETAL NEGATIVE: 1
ABDOMINAL PAIN: 1
CARDIOVASCULAR NEGATIVE: 1
RESPIRATORY NEGATIVE: 1
EYE REDNESS: 0
EYE DISCHARGE: 0
INSOMNIA: 0
SORE THROAT: 0
FEVER: 0
CONSTIPATION: 0
NERVOUS/ANXIOUS: 1
HEADACHES: 1
COUGH: 0
PHOTOPHOBIA: 0
DIAPHORESIS: 0
WEIGHT LOSS: 1
WEAKNESS: 0
DIZZINESS: 0

## 2020-07-05 NOTE — CARE PLAN
Problem: Safety  Goal: Will remain free from injury  Outcome: PROGRESSING AS EXPECTED  Goal: Will remain free from falls  Outcome: PROGRESSING AS EXPECTED     Problem: Pain Management  Goal: Pain level will decrease to patient's comfort goal  Outcome: PROGRESSING SLOWER THAN EXPECTED  Note: Patient stating pain medications are not correct with her home medication list.

## 2020-07-05 NOTE — PROGRESS NOTES
Assumed care of patient. Received report from night RN. Patient awake and resting well in bed. Patient expressing some complaints of headache. Patient also stating she takes  tablets of NORCO at home, but is only receiving 5-325 tablets at hospital. Will address with attending during rounds. Bed in low position and call light within reach.

## 2020-07-05 NOTE — PROGRESS NOTES
Hospital Medicine Daily Progress Note    Date of Service  7/5/2020    Chief Complaint  85 y.o. female admitted 7/4/2020 with not feeling well.    Hospital Course    Patient is an 85-year-old female who came in initially because of generalized weakness and weight loss.  She has been having diarrhea for the past 5 days.  She then took some Imodium to stop the diarrhea.  Then she became constipated.  Afterwards the patient took some laxatives and again she started to have diarrhea.  The patient came in and is in acute renal failure with a creatinine initially 5.7.  The patient's creatinine at this point is down to 3.5 after fluid resuscitation.  We will continue at this point to monitor renal functions.  Renal ultrasound has been requested.  I am going to initiate her on bicarb replacement as well she has not improved after fluid resuscitation with her electrolyte balance.  She does have a new murmur that she apparently did not have before an echocardiogram was requested as pending.  Patient has difficult time swallowing and at this point will need a swallow evaluation.  She is also scheduled for an EGD versus colonoscopy as an outpatient which may need to be done as an inpatient or reschedule.      Interval Problem Update  Start bicarb  Continue beta-blocker for blood pressure management  Continue fluid resuscitation and monitor renal functions  Monitor acid-base balance  Nephrology consult  Speech evaluation  Lipid panel needs to be done more recently was from 2018      Consultants/Specialty  Nephrology  Gastroenterology    Code Status  DNR/DNI    Disposition  Possibly home tomorrow.    Review of Systems  Review of Systems   Constitutional: Positive for malaise/fatigue and weight loss. Negative for diaphoresis and fever.        Difficulty swallowing   HENT: Negative.  Negative for congestion, sore throat and tinnitus.    Eyes: Negative.  Negative for photophobia, discharge and redness.   Respiratory: Negative.   Negative for cough and shortness of breath.    Cardiovascular: Negative.  Negative for chest pain, claudication and leg swelling.   Gastrointestinal: Positive for abdominal pain, diarrhea (Improved diarrhea) and nausea. Negative for constipation, melena and vomiting.   Genitourinary: Negative.  Negative for dysuria, frequency and hematuria.   Musculoskeletal: Negative.  Negative for back pain and joint pain.   Skin: Negative.  Negative for itching and rash.   Neurological: Positive for headaches. Negative for dizziness, seizures and weakness.   Endo/Heme/Allergies: Negative.  Negative for environmental allergies and polydipsia.   Psychiatric/Behavioral: Negative for depression, substance abuse and suicidal ideas. The patient is nervous/anxious. The patient does not have insomnia.    All other systems reviewed and are negative.       Physical Exam  Temp:  [36.4 °C (97.6 °F)-36.9 °C (98.5 °F)] 36.7 °C (98.1 °F)  Pulse:  [65-75] 70  Resp:  [12-19] 18  BP: ()/(50-74) 119/62  SpO2:  [94 %-100 %] 97 %    Physical Exam  Vitals signs and nursing note reviewed.   Constitutional:       Appearance: Normal appearance. She is well-developed and normal weight.   HENT:      Head: Normocephalic and atraumatic.      Right Ear: External ear normal.      Left Ear: External ear normal.      Nose: Nose normal.      Mouth/Throat:      Mouth: Mucous membranes are moist.   Eyes:      Extraocular Movements: Extraocular movements intact.      Conjunctiva/sclera: Conjunctivae normal.      Pupils: Pupils are equal, round, and reactive to light.   Neck:      Musculoskeletal: Normal range of motion and neck supple.      Thyroid: No thyromegaly.      Vascular: No JVD.   Cardiovascular:      Rate and Rhythm: Normal rate and regular rhythm.      Heart sounds: Murmur present.   Pulmonary:      Effort: Pulmonary effort is normal.      Breath sounds: Normal breath sounds. No wheezing or rales.   Chest:      Chest wall: No tenderness.    Abdominal:      General: Abdomen is flat. Bowel sounds are normal. There is no distension.      Palpations: Abdomen is soft. There is no mass.      Tenderness: There is no abdominal tenderness. There is no guarding or rebound.   Musculoskeletal: Normal range of motion.         General: No tenderness.   Lymphadenopathy:      Cervical: No cervical adenopathy.   Skin:     General: Skin is warm and dry.      Capillary Refill: Capillary refill takes 2 to 3 seconds.      Findings: No erythema or rash.   Neurological:      General: No focal deficit present.      Mental Status: She is alert and oriented to person, place, and time.      GCS: GCS eye subscore is 4. GCS verbal subscore is 5.      Cranial Nerves: No cranial nerve deficit.      Deep Tendon Reflexes: Reflexes are normal and symmetric.   Psychiatric:         Mood and Affect: Mood normal.         Behavior: Behavior normal.         Thought Content: Thought content normal.         Judgment: Judgment normal.         Fluids    Intake/Output Summary (Last 24 hours) at 7/5/2020 1349  Last data filed at 7/5/2020 0625  Gross per 24 hour   Intake --   Output 500 ml   Net -500 ml       Laboratory  Recent Labs     07/04/20  1145 07/05/20  0444   WBC 11.8* 9.2   RBC 4.19* 3.61*   HEMOGLOBIN 12.4 10.8*   HEMATOCRIT 38.7 34.0*   MCV 92.4 94.2   MCH 29.6 29.9   MCHC 32.0* 31.8*   RDW 45.1 46.0   PLATELETCT 282 240   MPV 9.7 10.2     Recent Labs     07/04/20  1145 07/05/20  0444   SODIUM 135 136   POTASSIUM 4.7 4.5   CHLORIDE 100 106   CO2 16* 15*   GLUCOSE 101* 90   BUN 58* 51*   CREATININE 5.85* 3.77*   CALCIUM 8.9 8.2*                   Imaging  DX-CHEST-PORTABLE (1 VIEW)   Final Result      1.  There is no acute cardiopulmonary process.   2.  There is chronic left pleural thickening and parenchymal scarring.      US-RENAL    (Results Pending)   EC-ECHOCARDIOGRAM COMPLETE W/ CONT    (Results Pending)        Assessment/Plan  * Acute renal failure (ARF) ([facility])  Assessment &  Plan  Patient's renal failure at this point is improving with hydration.  The patient's renal failure secondary to prerenal azotemia with dehydration from the diarrhea.  Continue at this point with renal ultrasound  Nephrology to see the patient    Diarrhea  Assessment & Plan  Diarrhea at this point has improved.  Occult blood is negative  Stool WBCs and cultures are pending.  Gastroenterology was to see the patient on Tuesday for an EGD/colonoscopy.  May have to consult in-house if the patient is not discharged by then.      Metabolic acidosis  Assessment & Plan  Anion gap at this point has improved.  I am going to start her at this point on bicarb.    Leukocytosis- (present on admission)  Assessment & Plan  Resolved    Depression with anxiety- (present on admission)  Assessment & Plan  Continue with Lexapro.  She is not suicidal or homicidal.    GERD (gastroesophageal reflux disease)- (present on admission)  Assessment & Plan  Patient at this point does have swallowing difficulties.  She is at this point going to need a swallow evaluation  She is scheduled for an outpatient EGD with Dr Grant    HTN (hypertension)- (present on admission)  Assessment & Plan  Continue at this point with metoprolol XL.  Holding her lisinopril and hydrochlorothiazide.    Hyperlipidemia with target LDL less than 130- (present on admission)  Assessment & Plan  Low-fat low-cholesterol diet  Statin, in the form of Crestor.  Lab Results   Component Value Date/Time    CHOLSTRLTOT 119 04/04/2018 10:15 AM    LDL 40 04/04/2018 10:15 AM    HDL 42 04/04/2018 10:15 AM    TRIGLYCERIDE 185 (H) 04/04/2018 10:15 AM               VTE prophylaxis: SCDs

## 2020-07-05 NOTE — PROGRESS NOTES
2 RN Skin Check    2 RN skin check complete.   Devices in place: none.  Skin assessed under devices: N\A.  Confirmed pressure ulcers found on: none.  New potential pressure ulcers noted on none. Wound consult placed N/A.  The following interventions in place Pillows.  Blanching redness on sacral area, pt educated to frequently change position in bed to relieve pressure. Pt verbalized understanding and demonstrated ability

## 2020-07-05 NOTE — PROGRESS NOTES
Patient requesting pain medication to be corrected. Verified with patient that patient takes 10-325mg norco pain medication at home, but medication listed at 5-325mg. Med-rec updated. Attending paged to update medication.     1214- Attending listed paged, but not covering patient per MD. Attempting to verify attending at this time.

## 2020-07-05 NOTE — PROGRESS NOTES
Telemetry Shift Summary    Rhythm SR  HR Range 62-69  Ectopy rPVC  Measurements 0.16/0.10/0.42        Normal Values  Rhythm SR  HR Range    Measurements 0.12-0.20 / 0.06-0.10  / 0.30-0.52

## 2020-07-05 NOTE — PROGRESS NOTES
Received bedside report from SAGRARIO Salvador. Plan of care discussed. Safety precautions in place. Call light and personal belongings within reach. Patient wants to eat and is requesting applesauce. Will provide patient with applesauce.

## 2020-07-06 VITALS
RESPIRATION RATE: 18 BRPM | BODY MASS INDEX: 20.52 KG/M2 | DIASTOLIC BLOOD PRESSURE: 61 MMHG | SYSTOLIC BLOOD PRESSURE: 107 MMHG | WEIGHT: 135.36 LBS | HEIGHT: 68 IN | TEMPERATURE: 97.8 F | HEART RATE: 69 BPM | OXYGEN SATURATION: 94 %

## 2020-07-06 PROBLEM — I35.1 AORTIC INSUFFICIENCY: Status: ACTIVE | Noted: 2020-07-06

## 2020-07-06 PROBLEM — I07.1 TRICUSPID REGURGITATION: Status: ACTIVE | Noted: 2020-07-06

## 2020-07-06 LAB
ANION GAP SERPL CALC-SCNC: 12 MMOL/L (ref 7–16)
BUN SERPL-MCNC: 39 MG/DL (ref 8–22)
CALCIUM SERPL-MCNC: 8.5 MG/DL (ref 8.4–10.2)
CHLORIDE SERPL-SCNC: 111 MMOL/L (ref 96–112)
CHOLEST SERPL-MCNC: 94 MG/DL (ref 100–199)
CO2 SERPL-SCNC: 17 MMOL/L (ref 20–33)
CREAT SERPL-MCNC: 1.84 MG/DL (ref 0.5–1.4)
GLUCOSE SERPL-MCNC: 82 MG/DL (ref 65–99)
HDLC SERPL-MCNC: 40 MG/DL
LDLC SERPL CALC-MCNC: 21 MG/DL
POTASSIUM SERPL-SCNC: 4.5 MMOL/L (ref 3.6–5.5)
SODIUM SERPL-SCNC: 140 MMOL/L (ref 135–145)
TRIGL SERPL-MCNC: 166 MG/DL (ref 0–149)

## 2020-07-06 PROCEDURE — 92610 EVALUATE SWALLOWING FUNCTION: CPT

## 2020-07-06 PROCEDURE — 700102 HCHG RX REV CODE 250 W/ 637 OVERRIDE(OP): Performed by: HOSPITALIST

## 2020-07-06 PROCEDURE — 80048 BASIC METABOLIC PNL TOTAL CA: CPT

## 2020-07-06 PROCEDURE — 80061 LIPID PANEL: CPT

## 2020-07-06 PROCEDURE — 99239 HOSP IP/OBS DSCHRG MGMT >30: CPT | Performed by: HOSPITALIST

## 2020-07-06 PROCEDURE — A9270 NON-COVERED ITEM OR SERVICE: HCPCS | Performed by: HOSPITALIST

## 2020-07-06 RX ORDER — GABAPENTIN 300 MG/1
300 CAPSULE ORAL DAILY
Qty: 30 CAP | Refills: 0 | Status: ON HOLD
Start: 2020-07-06 | End: 2020-09-13

## 2020-07-06 RX ORDER — SODIUM BICARBONATE 650 MG/1
650 TABLET ORAL 3 TIMES DAILY
Qty: 120 TAB | Refills: 3 | Status: ON HOLD
Start: 2020-07-06 | End: 2020-08-24

## 2020-07-06 RX ADMIN — METOPROLOL SUCCINATE 50 MG: 25 TABLET, EXTENDED RELEASE ORAL at 05:09

## 2020-07-06 RX ADMIN — GABAPENTIN 300 MG: 300 CAPSULE ORAL at 05:08

## 2020-07-06 RX ADMIN — SODIUM BICARBONATE 650 MG TABLET 650 MG: at 05:09

## 2020-07-06 RX ADMIN — ESCITALOPRAM OXALATE 10 MG: 10 TABLET ORAL at 05:09

## 2020-07-06 RX ADMIN — SENNOSIDES-DOCUSATE SODIUM TAB 8.6-50 MG 2 TABLET: 8.6-5 TAB at 05:09

## 2020-07-06 RX ADMIN — HYDROCODONE BITARTRATE AND ACETAMINOPHEN 1 TABLET: 10; 325 TABLET ORAL at 10:16

## 2020-07-06 NOTE — PROGRESS NOTES
Telemetry Shift Summary    Rhythm: SR  HR: 60-70s  Ectopy: Braxton Francis MT, no ectopy    Measurements for strip printed 7/6/2020 at 0249  HR 61  0.16 / 0.08 / 0.42    Normal Values  Rhythm: SR  HR:   Measurements: 0.12-0.20 / 0.06-0.10 / 0.30-0.52

## 2020-07-06 NOTE — DISCHARGE INSTRUCTIONS
Discharge Instructions    Discharged to home by car with relative. Discharged via wheelchair, hospital escort: Yes.  Special equipment needed: Not Applicable    Be sure to schedule a follow-up appointment with your primary care doctor or any specialists as instructed.     Discharge Plan:   Diet Plan: Discussed  Activity Level: Discussed  Confirmed Follow up Appointment: Patient to Call and Schedule Appointment  Confirmed Symptoms Management: Discussed  Medication Reconciliation Updated: Yes    I understand that a diet low in cholesterol, fat, and sodium is recommended for good health. Unless I have been given specific instructions below for another diet, I accept this instruction as my diet prescription.   Other diet: full liquid    Special Instructions: None    · Is patient discharged on Warfarin / Coumadin?   No     Depression / Suicide Risk    As you are discharged from this [facility] facility, it is important to learn how to keep safe from harming yourself.    Recognize the warning signs:  · Abrupt changes in personality, positive or negative- including increase in energy   · Giving away possessions  · Change in eating patterns- significant weight changes-  positive or negative  · Change in sleeping patterns- unable to sleep or sleeping all the time   · Unwillingness or inability to communicate  · Depression  · Unusual sadness, discouragement and loneliness  · Talk of wanting to die  · Neglect of personal appearance   · Rebelliousness- reckless behavior  · Withdrawal from people/activities they love  · Confusion- inability to concentrate     If you or a loved one observes any of these behaviors or has concerns about self-harm, here's what you can do:  · Talk about it- your feelings and reasons for harming yourself  · Remove any means that you might use to hurt yourself (examples: pills, rope, extension cords, firearm)  · Get professional help from the community (Mental Health, Substance Abuse, psychological  counseling)  · Do not be alone:Call your Safe Contact- someone whom you trust who will be there for you.  · Call your local CRISIS HOTLINE 098-1788 or 009-460-5360  · Call your local Children's Mobile Crisis Response Team [place] (680) 638-0979 or www.Alter-G  · Call the toll free National Suicide Prevention Hotlines   · National Suicide Prevention Lifeline 920-812-VHQE (6496)  · [facility] Hope Line Network 800-SUICIDE (091-0847)        Urinary Tract Infection, Adult  A urinary tract infection (UTI) is an infection of any part of the urinary tract. The urinary tract includes:  · The kidneys.  · The ureters.  · The bladder.  · The urethra.  These organs make, store, and get rid of pee (urine) in the body.  What are the causes?  This is caused by germs (bacteria) in your genital area. These germs grow and cause swelling (inflammation) of your urinary tract.  What increases the risk?  You are more likely to develop this condition if:  · You have a small, thin tube (catheter) to drain pee.  · You cannot control when you pee or poop (incontinence).  · You are female, and:  ? You use these methods to prevent pregnancy:  ? A medicine that kills sperm (spermicide).  ? A device that blocks sperm (diaphragm).  ? You have low levels of a female hormone (estrogen).  ? You are pregnant.  · You have genes that add to your risk.  · You are sexually active.  · You take antibiotic medicines.  · You have trouble peeing because of:  ? A prostate that is bigger than normal, if you are male.  ? A blockage in the part of your body that drains pee from the bladder (urethra).  ? A kidney stone.  ? A nerve condition that affects your bladder (neurogenic bladder).  ? Not getting enough to drink.  ? Not peeing often enough.  · You have other conditions, such as:  ? Diabetes.  ? A weak disease-fighting system (immune system).  ? Sickle cell disease.  ? Gout.  ? Injury of the spine.  What are the signs or symptoms?  Symptoms of  this condition include:  · Needing to pee right away (urgently).  · Peeing often.  · Peeing small amounts often.  · Pain or burning when peeing.  · Blood in the pee.  · Pee that smells bad or not like normal.  · Trouble peeing.  · Pee that is cloudy.  · Fluid coming from the vagina, if you are female.  · Pain in the belly or lower back.  Other symptoms include:  · Throwing up (vomiting).  · No urge to eat.  · Feeling mixed up (confused).  · Being tired and grouchy (irritable).  · A fever.  · Watery poop (diarrhea).  How is this treated?  This condition may be treated with:  · Antibiotic medicine.  · Other medicines.  · Drinking enough water.  Follow these instructions at home:    Medicines  · Take over-the-counter and prescription medicines only as told by your doctor.  · If you were prescribed an antibiotic medicine, take it as told by your doctor. Do not stop taking it even if you start to feel better.  General instructions  · Make sure you:  ? Pee until your bladder is empty.  ? Do not hold pee for a long time.  ? Empty your bladder after sex.  ? Wipe from front to back after pooping if you are a female. Use each tissue one time when you wipe.  · Drink enough fluid to keep your pee pale yellow.  · Keep all follow-up visits as told by your doctor. This is important.  Contact a doctor if:  · You do not get better after 1-2 days.  · Your symptoms go away and then come back.  Get help right away if:  · You have very bad back pain.  · You have very bad pain in your lower belly.  · You have a fever.  · You are sick to your stomach (nauseous).  · You are throwing up.  Summary  · A urinary tract infection (UTI) is an infection of any part of the urinary tract.  · This condition is caused by germs in your genital area.  · There are many risk factors for a UTI. These include having a small, thin tube to drain pee and not being able to control when you pee or poop.  · Treatment includes antibiotic medicines for  germs.  · Drink enough fluid to keep your pee pale yellow.  This information is not intended to replace advice given to you by your health care provider. Make sure you discuss any questions you have with your health care provider.  Document Released: 06/05/2009 Document Revised: 12/05/2019 Document Reviewed: 06/27/2019  [employer] Patient Education © 2020 [employer]        Acute Kidney Injury, Adult    Acute kidney injury is a sudden worsening of kidney function. The kidneys are organs that have several jobs. They filter the blood to remove waste products and extra fluid. They also maintain a healthy balance of minerals and hormones in the body, which helps control blood pressure and keep bones strong. With this condition, your kidneys do not do their jobs as well as they should.  This condition ranges from mild to severe. Over time it may develop into long-lasting (chronic) kidney disease. Early detection and treatment may prevent acute kidney injury from developing into a chronic condition.  What are the causes?  Common causes of this condition include:  · A problem with blood flow to the kidneys. This may be caused by:  ? Low blood pressure (hypotension) or shock.  ? Blood loss.  ? Heart and blood vessel (cardiovascular) disease.  ? Severe burns.  ? Liver disease.  · Direct damage to the kidneys. This may be caused by:  ? Certain medicines.  ? A kidney infection.  ? Poisoning.  ? Being around or in contact with toxic substances.  ? A surgical wound.  ? A hard, direct hit to the kidney area.  · A sudden blockage of urine flow. This may be caused by:  ? Cancer.  ? Kidney stones.  ? An enlarged prostate in males.  What are the signs or symptoms?  Symptoms of this condition may not be obvious until the condition becomes severe. Symptoms of this condition can include:  · Tiredness (lethargy), or difficulty staying awake.  · Nausea or vomiting.  · Swelling (edema) of the face, legs, ankles, or feet.  · Problems with  urination, such as:  ? Abdominal pain, or pain along the side of your stomach (flank).  ? Decreased urine production.  ? Decrease in the force of urine flow.  · Muscle twitches and cramps, especially in the legs.  · Confusion or trouble concentrating.  · Loss of appetite.  · Fever.  How is this diagnosed?  This condition may be diagnosed with tests, including:  · Blood tests.  · Urine tests.  · Imaging tests.  · A test in which a sample of tissue is removed from the kidneys to be examined under a microscope (kidney biopsy).  How is this treated?  Treatment for this condition depends on the cause and how severe the condition is. In mild cases, treatment may not be needed. The kidneys may heal on their own. In more severe cases, treatment will involve:  · Treating the cause of the kidney injury. This may involve changing any medicines you are taking or adjusting your dosage.  · Fluids. You may need specialized IV fluids to balance your body's needs.  · Having a catheter placed to drain urine and prevent blockages.  · Preventing problems from occurring. This may mean avoiding certain medicines or procedures that can cause further injury to the kidneys.  In some cases treatment may also require:  · A procedure to remove toxic wastes from the body (dialysis or continuous renal replacement therapy - CRRT).  · Surgery. This may be done to repair a torn kidney, or to remove the blockage from the urinary system.  Follow these instructions at home:  Medicines  · Take over-the-counter and prescription medicines only as told by your health care provider.  · Do not take any new medicines without your health care provider's approval. Many medicines can worsen your kidney damage.  · Do not take any vitamin and mineral supplements without your health care provider's approval. Many nutritional supplements can worsen your kidney damage.  Lifestyle  · If your health care provider prescribed changes to your diet, follow them. You may  need to decrease the amount of protein you eat.  · Achieve and maintain a healthy weight. If you need help with this, ask your health care provider.  · Start or continue an exercise plan. Try to exercise at least 30 minutes a day, 5 days a week.  · Do not use any tobacco products, such as cigarettes, chewing tobacco, and e-cigarettes. If you need help quitting, ask your health care provider.  General instructions  · Keep track of your blood pressure. Report changes in your blood pressure as told by your health care provider.  · Stay up to date with immunizations. Ask your health care provider which immunizations you need.  · Keep all follow-up visits as told by your health care provider. This is important.  Where to find more information  · American Association of Kidney Patients: www.aakp.org  · National Kidney Foundation: www.kidney.org  · American Kidney Fund: www.akfinc.org  · Life Options Rehabilitation Program:  ? www.lifeoptions.org  ? www.kidneyschool.org  Contact a health care provider if:  · Your symptoms get worse.  · You develop new symptoms.  Get help right away if:  · You develop symptoms of worsening kidney disease, which include:  ? Headaches.  ? Abnormally dark or light skin.  ? Easy bruising.  ? Frequent hiccups.  ? Chest pain.  ? Shortness of breath.  ? End of menstruation in women.  ? Seizures.  ? Confusion or altered mental status.  ? Abdominal or back pain.  ? Itchiness.  · You have a fever.  · Your body is producing less urine.  · You have pain or bleeding when you urinate.  Summary  · Acute kidney injury is a sudden worsening of kidney function.  · Acute kidney injury can be caused by problems with blood flow to the kidneys, direct damage to the kidneys, and sudden blockage of urine flow.  · Symptoms of this condition may not be obvious until it becomes severe. Symptoms may include edema, lethargy, confusion, nausea or vomiting, and problems passing urine.  · This condition can usually be  diagnosed with blood tests, urine tests, and imaging tests. Sometimes a kidney biopsy is done to diagnose this condition.  · Treatment for this condition often involves treating the underlying cause. It is treated with fluids, medicines, dialysis, diet changes, or surgery.  This information is not intended to replace advice given to you by your health care provider. Make sure you discuss any questions you have with your health care provider.  Document Released: 07/02/2012 Document Revised: 11/30/2018 Document Reviewed: 12/08/2017  Elsevier Patient Education © 2020 [employer].

## 2020-07-06 NOTE — CARE PLAN
Problem: Bowel/Gastric:  7/5/2020 2255 by Margie Landis R.N.  Outcome: PROGRESSING SLOWER THAN EXPECTED  Note: Pt states LBM was 7/4/2020, pt states stool was loose and runny.     Problem: Fluid Volume:  Goal: Will maintain balanced intake and output  Outcome: PROGRESSING AS EXPECTED

## 2020-07-06 NOTE — DISCHARGE SUMMARY
Discharge Summary    CHIEF COMPLAINT ON ADMISSION  Chief Complaint   Patient presents with   • Extremity Weakness   • Diarrhea   • Hypotension       Reason for Admission  Weakness     Admission Date  7/4/2020    CODE STATUS  DNAR/DNI    HPI & HOSPITAL COURSE  This is a 85 y.o. female here initially because of generalized weakness and weight loss.  She has been having diarrhea for the past 5 days.  She then took some Imodium to stop the diarrhea.  Then she became constipated.  Afterwards the patient took some laxatives and again she started to have diarrhea.  The patient came in and is in acute renal failure with a creatinine initially 5.7.  On day 2 the patient's creatinine went down to 3.5 with adequate hydration.  Today the patient's creatinine is down to 1.9.  At this point the acute renal failure has really resolved and she is headed back to her baseline creatinine.  The patient was evaluated with an echocardiogram because of an abnormal murmur.  She is found to have aortic stenosis mild and tricuspid insufficiency the patient was value with renal ultrasound which is normal.  The patient was eval by the speech therapist for swallowing and she at this point has normal swallowing.  The patient does have a a distal esophageal stricture.  I have spoken with  and she is scheduled to have a appointment with him tomorrow for EGD which will be kept.  Patient otherwise is in a stable condition and at this point can be discharged home with outpatient follow-ups and monitoring.  Because of the renal failure have taken her off her lisinopril hydrochlorothiazide.  We have also added bicarb to her treatment regimen.  Patient at this point was previously seen by Dr. Curtis of surgery and he recommended a lower esophageal surgical repair patient has so far stayed away from that.  Depending on the findings of the EGD further decision will need to be made on her lower esophageal stricture situation.  Patient at this  point is to follow-up with gastroenterology and her primary care physician.    Therefore, she is discharged in good and stable condition to home with organized home healthcare and close outpatient follow-up.    The patient met 2-midnight criteria for an inpatient stay at the time of discharge.    Discharge Date  7/6/2020    FOLLOW UP ITEMS POST DISCHARGE  Follow-up with gastroenterology for EGD tomorrow    DISCHARGE DIAGNOSES  Principal Problem:    Acute renal failure (ARF) (HCC) POA: Unknown  Active Problems:    Diarrhea POA: Unknown    Leukocytosis POA: Yes    Metabolic acidosis POA: Unknown    Hyperlipidemia with target LDL less than 130 POA: Yes      Overview: ICD-10 transition    HTN (hypertension) POA: Yes    GERD (gastroesophageal reflux disease) POA: Yes    Depression with anxiety POA: Yes      Overview: Severe at Baseline       Continue current antidepressant/sedation regimen      Psych consulted 11/19, continues to follow      Does have capacity. Does not want to be intubated. Does not want       palliation at this time.    Aortic insufficiency POA: Unknown    Tricuspid regurgitation POA: Unknown  Resolved Problems:    * No resolved hospital problems. *      FOLLOW UP  No future appointments.  No follow-up provider specified.    MEDICATIONS ON DISCHARGE     Medication List      START taking these medications      Instructions   sodium bicarbonate 650 MG Tabs  Commonly known as:  SODIUM BICARBONATE   Take 1 Tab by mouth 3 times a day.  Dose:  650 mg        CHANGE how you take these medications      Instructions   gabapentin 300 MG Caps  What changed:  when to take this  Commonly known as:  NEURONTIN   Take 1 Cap by mouth every day.  Dose:  300 mg        CONTINUE taking these medications      Instructions   escitalopram 10 MG Tabs  Commonly known as:  LEXAPRO   Take 10 mg by mouth every day.  Dose:  10 mg     EX-LAX PO   Take 1 Tab by mouth as needed (For constipation). For constipation  Dose:  1 Tab      HYDROcodone/acetaminophen  MG Tabs  Commonly known as:  NORCO   Take 1 Tab by mouth every 6 hours as needed. Indications: Pain  Dose:  1 Tab     metoprolol SR 50 MG Tb24  Commonly known as:  TOPROL XL   Take 50 mg by mouth every day.  Dose:  50 mg     rosuvastatin 20 MG Tabs  Commonly known as:  CRESTOR   Take 20 mg by mouth every evening.  Dose:  20 mg        STOP taking these medications    lisinopril-hydrochlorothiazide 10-12.5 MG per tablet  Commonly known as:  PRINZIDE            Allergies  Allergies   Allergen Reactions   • Asa [Aspirin] Unspecified     Headache   • Pcn [Penicillins] Unspecified     Pt reports that its been so long ago, she is not sure what happens but knows that she has an allergie to this medications        DIET  Orders Placed This Encounter   Procedures   • Diet Order Full Liquid     Standing Status:   Standing     Number of Occurrences:   1     Order Specific Question:   Diet:     Answer:   Full Liquid [11]       ACTIVITY  As tolerated.  Weight bearing as tolerated    CONSULTATIONS  Gastroenterology    PROCEDURES  Speech therapy evaluation    LABORATORY  Lab Results   Component Value Date    SODIUM 140 07/06/2020    POTASSIUM 4.5 07/06/2020    CHLORIDE 111 07/06/2020    CO2 17 (L) 07/06/2020    GLUCOSE 82 07/06/2020    BUN 39 (H) 07/06/2020    CREATININE 1.84 (H) 07/06/2020    CREATININE 0.75 09/17/2014        Lab Results   Component Value Date    WBC 9.2 07/05/2020    HEMOGLOBIN 10.8 (L) 07/05/2020    HEMATOCRIT 34.0 (L) 07/05/2020    PLATELETCT 240 07/05/2020        Total time of the discharge process exceeds 35 minutes.

## 2020-07-06 NOTE — THERAPY
"Speech Language Pathology   Clinical Swallow Evaluation     Patient Name: Ellen Srivastava  AGE:  85 y.o., SEX:  female  Medical Record #: [id number]  Today's Date: 7/6/2020     Precautions: Fall Risk, Swallow Precautions (See Comments)    Assessment    Patient is 85 y.o. female with a diagnosis of lower extremity weakness, weight loss, diarrhea, and hypotension. Pt with PMHx notable for anemia, depression, HLD, HTN, narcotic dependence, and GERD. Pt with no SLP hx on file. CXR revealed: \"no acute cardiopulmonary process.\" Barium swallow exam done 6/15/20 revealed: \"high grade stricture, hiatal hernia, esophageal dysmotility and aspiration\" and \"oropharyngeal transit normal.\"      Pt seen today for CSE. Pt was AAOx3 (with confusion to date.) Pt endorsed hx of dysphagia to liquids, solids and pills. Pt reported that she coughs and chokes with whole foods, and often experiences globus sensation below the suprasternal notch (at the level of mid-esophagus.) Pt denied hx of PNA; however, endorsed unintentional weight loss of 20lbs over the last few months. Oral mech exam revealed reduced dentition (multiple missing molars), reduced lingual strength and ROM and reduced laryngeal elevation per palpation. Pt was agreeable to PO trials of thins, mildly thick liquids, and pudding only this session (due to concerns for coughing/choking after bolus gets stuck in esophagus and comes back up.) Pt demonstrated no subtle nor overt clinical s/sx of aspiration/penetration today. Pt used appropriate feeding rate and small bolus size independently. Pt endorsed need to \"hold foods/liquids in mouth\" prior to swallowing, and to \"take it easy\" so she does not throw up.    Given the aforementioned findings, Pt's symptoms appear to be consistent primarily with esophageal dysphagia. However, suspect Pt's reduced dentition and age-related swallowing changes are also contributing to Pt's current presentation. If new and/or worsening " symptoms arise, Pt may benefit from referral to outpatient skilled SLP services and/or outpatient Modified Barium Swallow Study. Recommend referral to Registered Dietician given weight loss. Recommend PO diet of Pureed solids, Thin liquids and meds whole/crushed in puree as tolerated given strict adherence to safe swallow precautions (including three-second prep) and reflux precautions.    Plan    Recommend Speech Therapy 2 times per week until therapy goals are met for the following treatments:  Dysphagia Training and Patient / Family / Caregiver Education.    Discharge recommendations:  Recommend outpatient or home health transitional care services for continued speech therapy services     Objective     07/06/20 1118   Prior Level Of Function   Communication Within Functional Limits   Swallow Impaired  (hx esophageal dysphagia)   Dentition Poor Quality    Dentures None   Patient's Primary Language English   Oral Motor Eval    Is Patient Able to Complete Oral Motor Eval Yes but Impaired   Labial Function   Labial Structure At Rest Within Functional Limits   Frown, Pucker Within Functional Limits   Lingual Function   Lingual Structure At Rest Within Functional Limits   Lingual Protrude Minimal   Lingual Retract Minimal   Elevate In Mouth Minimal   Elevate Outside Mouth Minimal   Lateralization No Impairment Right;No Impairment Left   Lick Lips (Circular) Minimal   Jaw   Jaw Structure At Rest Within Functional Limits   Bite ([place]) Within Functional Limits   Chew ([place]) Within Functional Limits   Velar Function   Velar Structure At Rest Within Functional Limits   Laryngeal Function   Voice Quality Within Functional Limits   Volutional Cough Within Functional Limits   Excursion Upon Swallow Complete   Oral Food Presentation   Single Swallow Mildly Thick (2) - (Nectar Thick)  Within Functional Limits   Single Swallow Thin (0) Within Functional Limits   Pureed (4) Within Functional Limits   Regular (7) Not  Tested  (Pt refused)   Self Feeding Independent   Dysphagia Strategies / Recommendations   Compensatory Strategies Head of Bed 90 Degrees During Eating / Drinking;Head of Bed 45 Degrees After Meals;Single Sips / Bites;No Talking During Eating / Drinking   Diet / Liquid Recommendation Puree (4);Thin (0)   Medication Administration  Float Whole with Puree;Crush all Medications in Puree   Therapy Interventions Dysphagia Therapy By Speech Language Pathologist   Dysphagia Rating   Nutritional Liquid Intake Rating Scale Non thickened beverages   Nutritional Food Intake Rating Scale Total oral diet of a single consistency   Short Term Goals   Short Term Goal # 1 Pt will consume PO diet of Puree/thins with no clinical s/sx of aspiration/penetration.

## 2020-07-06 NOTE — PROGRESS NOTES
Discharge order written. IV removed, patient tolerated. All personal belongings are in possession. AVS printed, reviewed and copy signed and placed on the chart. Patient has no further questions. Prescriptions sent to [facility] Discharged in satisfactory condition.PT left unit with self via wheelchair. Staff escort  CNA.

## 2020-07-06 NOTE — PROGRESS NOTES
1900 Received report from SAGRARIO العلي. Pt is awake, resting comfortably in bed. Call light in reach, safety precautions in place.    2010 Assessment completed. Pt AOx4 on RA, denies pain. POC discussed, pt aware of stool sample that needs to be collected. Bed low and locked, call light in reach. Will continue to monitor.    0200 Pt voided 600mL of clear yellow urine.

## 2020-07-06 NOTE — ASSESSMENT & PLAN NOTE
Patient's aortic insufficiency is mild however when patient was dehydrated the murmur is significant.  At this point no further intervention needed after hydration.

## 2020-07-06 NOTE — PROGRESS NOTES
Telemetry Strip     Strip printed: 1005  Measurements from am strip were as follows:  Rhythm: SR  HR: 63-69  Measurements: 0.16/ 0.10/ 0.36  Ectopy: r PVC             Normal Values  Rhythm SR  HR Range    Measurements 0.12-0.20 / 0.06-0.10  / 0.30-0.52

## 2020-07-06 NOTE — CARE PLAN
Problem: Pain Management  Goal: Pain level will decrease to patient's comfort goal  Outcome: PROGRESSING AS EXPECTED  Note: Pt reports 7/10 back pain, will medicate per MAR.      Problem: Bowel/Gastric:  Goal: Normal bowel function is maintained or improved  Outcome: PROGRESSING SLOWER THAN EXPECTED  Note: Pt last bowel movement 07/04/2020. Need stool sample pt aware.

## 2020-07-07 LAB
BACTERIA UR CULT: ABNORMAL
BACTERIA UR CULT: ABNORMAL
SIGNIFICANT IND 70042: ABNORMAL
SITE SITE: ABNORMAL
SOURCE SOURCE: ABNORMAL

## 2020-07-07 RX ORDER — PANTOPRAZOLE SODIUM 40 MG/1
TABLET, DELAYED RELEASE ORAL
Qty: 90 TAB | Refills: 1 | Status: ON HOLD
Start: 2020-07-07 | End: 2020-08-24

## 2020-07-09 LAB
BACTERIA BLD CULT: NORMAL
BACTERIA BLD CULT: NORMAL
SIGNIFICANT IND 70042: NORMAL
SIGNIFICANT IND 70042: NORMAL
SITE SITE: NORMAL
SITE SITE: NORMAL
SOURCE SOURCE: NORMAL
SOURCE SOURCE: NORMAL

## 2020-07-14 RX ORDER — ESCITALOPRAM OXALATE 10 MG/1
TABLET ORAL
Qty: 90 TAB | Refills: 0 | Status: ON HOLD
Start: 2020-07-14 | End: 2020-09-13

## 2020-08-02 DIAGNOSIS — R25.2 SPASM: ICD-10-CM

## 2020-08-04 RX ORDER — TIZANIDINE 4 MG/1
TABLET ORAL
Qty: 100 TAB | Refills: 0 | Status: ON HOLD
Start: 2020-08-04 | End: 2020-08-24

## 2020-08-13 DIAGNOSIS — L30.9 DERMATITIS: ICD-10-CM

## 2020-08-14 RX ORDER — CLOBETASOL PROPIONATE 0.5 MG/G
CREAM TOPICAL
Qty: 45 G | Refills: 1 | Status: ON HOLD | OUTPATIENT
Start: 2020-08-14 | End: 2020-08-24

## 2020-08-20 ENCOUNTER — APPOINTMENT (OUTPATIENT)
Dept: ADMISSIONS | Facility: MEDICAL CENTER | Age: 85
End: 2020-08-20
Attending: SURGERY
Payer: MEDICARE

## 2020-08-20 DIAGNOSIS — Z01.810 PRE-OPERATIVE CARDIOVASCULAR EXAMINATION: ICD-10-CM

## 2020-08-20 DIAGNOSIS — Z01.812 PRE-OPERATIVE LABORATORY EXAMINATION: ICD-10-CM

## 2020-08-20 LAB
ANION GAP SERPL CALC-SCNC: 9 MMOL/L (ref 7–16)
BUN SERPL-MCNC: 15 MG/DL (ref 8–22)
CALCIUM SERPL-MCNC: 9.4 MG/DL (ref 8.5–10.5)
CHLORIDE SERPL-SCNC: 101 MMOL/L (ref 96–112)
CO2 SERPL-SCNC: 27 MMOL/L (ref 20–33)
COVID ORDER STATUS COVID19: NORMAL
CREAT SERPL-MCNC: 1.05 MG/DL (ref 0.5–1.4)
EKG IMPRESSION: NORMAL
ERYTHROCYTE [DISTWIDTH] IN BLOOD BY AUTOMATED COUNT: 43.8 FL (ref 35.9–50)
GLUCOSE SERPL-MCNC: 101 MG/DL (ref 65–99)
HCT VFR BLD AUTO: 35.7 % (ref 37–47)
HGB BLD-MCNC: 11.2 G/DL (ref 12–16)
MCH RBC QN AUTO: 29.2 PG (ref 27–33)
MCHC RBC AUTO-ENTMCNC: 31.4 G/DL (ref 33.6–35)
MCV RBC AUTO: 93 FL (ref 81.4–97.8)
PLATELET # BLD AUTO: 259 K/UL (ref 164–446)
PMV BLD AUTO: 10.3 FL (ref 9–12.9)
POTASSIUM SERPL-SCNC: 4.1 MMOL/L (ref 3.6–5.5)
RBC # BLD AUTO: 3.84 M/UL (ref 4.2–5.4)
SARS-COV-2 RNA RESP QL NAA+PROBE: NOTDETECTED
SODIUM SERPL-SCNC: 137 MMOL/L (ref 135–145)
SPECIMEN SOURCE: NORMAL
WBC # BLD AUTO: 9.8 K/UL (ref 4.8–10.8)

## 2020-08-20 PROCEDURE — U0003 INFECTIOUS AGENT DETECTION BY NUCLEIC ACID (DNA OR RNA); SEVERE ACUTE RESPIRATORY SYNDROME CORONAVIRUS 2 (SARS-COV-2) (CORONAVIRUS DISEASE [COVID-19]), AMPLIFIED PROBE TECHNIQUE, MAKING USE OF HIGH THROUGHPUT TECHNOLOGIES AS DESCRIBED BY CMS-2020-01-R: HCPCS

## 2020-08-20 PROCEDURE — 85027 COMPLETE CBC AUTOMATED: CPT

## 2020-08-20 PROCEDURE — 80048 BASIC METABOLIC PNL TOTAL CA: CPT

## 2020-08-20 PROCEDURE — 36415 COLL VENOUS BLD VENIPUNCTURE: CPT

## 2020-08-20 PROCEDURE — 93005 ELECTROCARDIOGRAM TRACING: CPT

## 2020-08-20 PROCEDURE — 93010 ELECTROCARDIOGRAM REPORT: CPT | Performed by: INTERNAL MEDICINE

## 2020-08-23 NOTE — PROGRESS NOTES
Negative COVID on .    COVID-19 Pre-surgery screenin. Do you have an undiagnosed respiratory illness or symptoms such as coughing or sneezing? (No)  a. Onset of Sx  b. Acute vs. chronic respiratory illness      2. Do you have an unexplained fever greater than 100.4 degrees Fahrenheit or 38 degrees Celsius?                     (No)     3. Have you had direct exposure to a patient who tested positive for Covid-19?                          (No)     4. Have you traveled outside [facility] in the last 14 days? (No)     5. Have you had any loss of your sense of taste or smell? Have you had N/V or sore throat? (No)     Patient has been informed of visitor policy and asked to wear a mask upon entering the hospital   (Yes)

## 2020-08-24 ENCOUNTER — ANESTHESIA (OUTPATIENT)
Dept: SURGERY | Facility: MEDICAL CENTER | Age: 85
End: 2020-08-24
Payer: MEDICARE

## 2020-08-24 ENCOUNTER — ANESTHESIA EVENT (OUTPATIENT)
Dept: SURGERY | Facility: MEDICAL CENTER | Age: 85
End: 2020-08-24
Payer: MEDICARE

## 2020-08-24 ENCOUNTER — HOSPITAL ENCOUNTER (OUTPATIENT)
Facility: MEDICAL CENTER | Age: 85
End: 2020-08-25
Attending: SURGERY | Admitting: SURGERY
Payer: MEDICARE

## 2020-08-24 DIAGNOSIS — G89.18 POSTOPERATIVE PAIN: ICD-10-CM

## 2020-08-24 PROCEDURE — A9270 NON-COVERED ITEM OR SERVICE: HCPCS | Performed by: ANESTHESIOLOGY

## 2020-08-24 PROCEDURE — G0378 HOSPITAL OBSERVATION PER HR: HCPCS

## 2020-08-24 PROCEDURE — 160009 HCHG ANES TIME/MIN: Performed by: SURGERY

## 2020-08-24 PROCEDURE — 502240 HCHG MISC OR SUPPLY RC 0272: Performed by: SURGERY

## 2020-08-24 PROCEDURE — 500521 HCHG ENDOSTITCH LOAD UNIT: Performed by: SURGERY

## 2020-08-24 PROCEDURE — 501577 HCHG TROCAR, STEP 11MM: Performed by: SURGERY

## 2020-08-24 PROCEDURE — 160048 HCHG OR STATISTICAL LEVEL 1-5: Performed by: SURGERY

## 2020-08-24 PROCEDURE — 700102 HCHG RX REV CODE 250 W/ 637 OVERRIDE(OP): Performed by: PHYSICIAN ASSISTANT

## 2020-08-24 PROCEDURE — 700101 HCHG RX REV CODE 250: Performed by: ANESTHESIOLOGY

## 2020-08-24 PROCEDURE — 700111 HCHG RX REV CODE 636 W/ 250 OVERRIDE (IP): Performed by: ANESTHESIOLOGY

## 2020-08-24 PROCEDURE — 501838 HCHG SUTURE GENERAL: Performed by: SURGERY

## 2020-08-24 PROCEDURE — 500868 HCHG NEEDLE, SURGI(VARES): Performed by: SURGERY

## 2020-08-24 PROCEDURE — 700102 HCHG RX REV CODE 250 W/ 637 OVERRIDE(OP): Performed by: SURGERY

## 2020-08-24 PROCEDURE — 500522 HCHG ENDOSTITCH SUTURING DEVICE: Performed by: SURGERY

## 2020-08-24 PROCEDURE — 160002 HCHG RECOVERY MINUTES (STAT): Performed by: SURGERY

## 2020-08-24 PROCEDURE — 160036 HCHG PACU - EA ADDL 30 MINS PHASE I: Performed by: SURGERY

## 2020-08-24 PROCEDURE — 160035 HCHG PACU - 1ST 60 MINS PHASE I: Performed by: SURGERY

## 2020-08-24 PROCEDURE — 160029 HCHG SURGERY MINUTES - 1ST 30 MINS LEVEL 4: Performed by: SURGERY

## 2020-08-24 PROCEDURE — 501583 HCHG TROCAR, THRD CAN&SEAL 5X100: Performed by: SURGERY

## 2020-08-24 PROCEDURE — 700111 HCHG RX REV CODE 636 W/ 250 OVERRIDE (IP): Performed by: PHYSICIAN ASSISTANT

## 2020-08-24 PROCEDURE — 700105 HCHG RX REV CODE 258: Performed by: PHYSICIAN ASSISTANT

## 2020-08-24 PROCEDURE — 96374 THER/PROPH/DIAG INJ IV PUSH: CPT | Mod: XU

## 2020-08-24 PROCEDURE — C1781 MESH (IMPLANTABLE): HCPCS | Performed by: SURGERY

## 2020-08-24 PROCEDURE — 700105 HCHG RX REV CODE 258: Performed by: SURGERY

## 2020-08-24 PROCEDURE — 501664 HCHG TUBING, FILTER STRYKER: Performed by: SURGERY

## 2020-08-24 PROCEDURE — A9270 NON-COVERED ITEM OR SERVICE: HCPCS | Performed by: PHYSICIAN ASSISTANT

## 2020-08-24 PROCEDURE — A9270 NON-COVERED ITEM OR SERVICE: HCPCS | Performed by: SURGERY

## 2020-08-24 PROCEDURE — 96375 TX/PRO/DX INJ NEW DRUG ADDON: CPT | Mod: XU

## 2020-08-24 PROCEDURE — 700101 HCHG RX REV CODE 250: Performed by: SURGERY

## 2020-08-24 PROCEDURE — 160041 HCHG SURGERY MINUTES - EA ADDL 1 MIN LEVEL 4: Performed by: SURGERY

## 2020-08-24 PROCEDURE — 502571 HCHG PACK, LAP CHOLE: Performed by: SURGERY

## 2020-08-24 PROCEDURE — 501570 HCHG TROCAR, SEPARATOR: Performed by: SURGERY

## 2020-08-24 PROCEDURE — 700102 HCHG RX REV CODE 250 W/ 637 OVERRIDE(OP): Performed by: ANESTHESIOLOGY

## 2020-08-24 DEVICE — MESH SURGICAL PHASIX SEPRA 7X10CM: Type: IMPLANTABLE DEVICE | Site: ESOPHAGUS | Status: FUNCTIONAL

## 2020-08-24 RX ORDER — KETOROLAC TROMETHAMINE 30 MG/ML
15 INJECTION, SOLUTION INTRAMUSCULAR; INTRAVENOUS EVERY 6 HOURS PRN
Status: DISCONTINUED | OUTPATIENT
Start: 2020-08-24 | End: 2020-08-25 | Stop reason: HOSPADM

## 2020-08-24 RX ORDER — DIPHENHYDRAMINE HYDROCHLORIDE 50 MG/ML
12.5 INJECTION INTRAMUSCULAR; INTRAVENOUS
Status: DISCONTINUED | OUTPATIENT
Start: 2020-08-24 | End: 2020-08-24 | Stop reason: HOSPADM

## 2020-08-24 RX ORDER — SCOLOPAMINE TRANSDERMAL SYSTEM 1 MG/1
1 PATCH, EXTENDED RELEASE TRANSDERMAL
Status: DISCONTINUED | OUTPATIENT
Start: 2020-08-24 | End: 2020-08-25 | Stop reason: HOSPADM

## 2020-08-24 RX ORDER — CEFAZOLIN SODIUM 1 G/3ML
INJECTION, POWDER, FOR SOLUTION INTRAMUSCULAR; INTRAVENOUS PRN
Status: DISCONTINUED | OUTPATIENT
Start: 2020-08-24 | End: 2020-08-24 | Stop reason: SURG

## 2020-08-24 RX ORDER — LABETALOL HYDROCHLORIDE 5 MG/ML
5 INJECTION, SOLUTION INTRAVENOUS
Status: DISCONTINUED | OUTPATIENT
Start: 2020-08-24 | End: 2020-08-24 | Stop reason: HOSPADM

## 2020-08-24 RX ORDER — MEPERIDINE HYDROCHLORIDE 25 MG/ML
25 INJECTION INTRAMUSCULAR; INTRAVENOUS; SUBCUTANEOUS
Status: DISCONTINUED | OUTPATIENT
Start: 2020-08-24 | End: 2020-08-24 | Stop reason: HOSPADM

## 2020-08-24 RX ORDER — IPRATROPIUM BROMIDE AND ALBUTEROL SULFATE 2.5; .5 MG/3ML; MG/3ML
3 SOLUTION RESPIRATORY (INHALATION)
Status: DISCONTINUED | OUTPATIENT
Start: 2020-08-24 | End: 2020-08-24 | Stop reason: HOSPADM

## 2020-08-24 RX ORDER — SODIUM CHLORIDE, SODIUM LACTATE, POTASSIUM CHLORIDE, CALCIUM CHLORIDE 600; 310; 30; 20 MG/100ML; MG/100ML; MG/100ML; MG/100ML
INJECTION, SOLUTION INTRAVENOUS CONTINUOUS
Status: DISCONTINUED | OUTPATIENT
Start: 2020-08-24 | End: 2020-08-24 | Stop reason: HOSPADM

## 2020-08-24 RX ORDER — ONDANSETRON 2 MG/ML
INJECTION INTRAMUSCULAR; INTRAVENOUS PRN
Status: DISCONTINUED | OUTPATIENT
Start: 2020-08-24 | End: 2020-08-24 | Stop reason: SURG

## 2020-08-24 RX ORDER — PHENYLEPHRINE HCL IN 0.9% NACL 0.5 MG/5ML
SYRINGE (ML) INTRAVENOUS PRN
Status: DISCONTINUED | OUTPATIENT
Start: 2020-08-24 | End: 2020-08-24 | Stop reason: SURG

## 2020-08-24 RX ORDER — OXYCODONE HCL 5 MG/5 ML
5 SOLUTION, ORAL ORAL
Status: COMPLETED | OUTPATIENT
Start: 2020-08-24 | End: 2020-08-24

## 2020-08-24 RX ORDER — ROCURONIUM BROMIDE 10 MG/ML
INJECTION, SOLUTION INTRAVENOUS PRN
Status: DISCONTINUED | OUTPATIENT
Start: 2020-08-24 | End: 2020-08-24 | Stop reason: SURG

## 2020-08-24 RX ORDER — HYDRALAZINE HYDROCHLORIDE 20 MG/ML
10 INJECTION INTRAMUSCULAR; INTRAVENOUS EVERY 6 HOURS PRN
Status: DISCONTINUED | OUTPATIENT
Start: 2020-08-24 | End: 2020-08-25 | Stop reason: HOSPADM

## 2020-08-24 RX ORDER — OXYCODONE HCL 5 MG/5 ML
10 SOLUTION, ORAL ORAL
Status: COMPLETED | OUTPATIENT
Start: 2020-08-24 | End: 2020-08-24

## 2020-08-24 RX ORDER — MORPHINE SULFATE 4 MG/ML
1-4 INJECTION, SOLUTION INTRAMUSCULAR; INTRAVENOUS
Status: DISCONTINUED | OUTPATIENT
Start: 2020-08-24 | End: 2020-08-25 | Stop reason: HOSPADM

## 2020-08-24 RX ORDER — HYDROMORPHONE HYDROCHLORIDE 1 MG/ML
0.5 INJECTION, SOLUTION INTRAMUSCULAR; INTRAVENOUS; SUBCUTANEOUS
Status: DISCONTINUED | OUTPATIENT
Start: 2020-08-24 | End: 2020-08-24 | Stop reason: HOSPADM

## 2020-08-24 RX ORDER — BUPIVACAINE HYDROCHLORIDE AND EPINEPHRINE 5; 5 MG/ML; UG/ML
INJECTION, SOLUTION EPIDURAL; INTRACAUDAL; PERINEURAL
Status: DISCONTINUED | OUTPATIENT
Start: 2020-08-24 | End: 2020-08-24 | Stop reason: HOSPADM

## 2020-08-24 RX ORDER — ENALAPRILAT 1.25 MG/ML
2.5 INJECTION INTRAVENOUS EVERY 6 HOURS PRN
Status: DISCONTINUED | OUTPATIENT
Start: 2020-08-24 | End: 2020-08-25 | Stop reason: HOSPADM

## 2020-08-24 RX ORDER — HYDROMORPHONE HYDROCHLORIDE 1 MG/ML
0.2 INJECTION, SOLUTION INTRAMUSCULAR; INTRAVENOUS; SUBCUTANEOUS
Status: DISCONTINUED | OUTPATIENT
Start: 2020-08-24 | End: 2020-08-24 | Stop reason: HOSPADM

## 2020-08-24 RX ORDER — HYDROMORPHONE HYDROCHLORIDE 1 MG/ML
0.1 INJECTION, SOLUTION INTRAMUSCULAR; INTRAVENOUS; SUBCUTANEOUS
Status: DISCONTINUED | OUTPATIENT
Start: 2020-08-24 | End: 2020-08-24 | Stop reason: HOSPADM

## 2020-08-24 RX ORDER — ONDANSETRON 2 MG/ML
4 INJECTION INTRAMUSCULAR; INTRAVENOUS
Status: DISCONTINUED | OUTPATIENT
Start: 2020-08-24 | End: 2020-08-24 | Stop reason: HOSPADM

## 2020-08-24 RX ORDER — DEXAMETHASONE SODIUM PHOSPHATE 4 MG/ML
INJECTION, SOLUTION INTRA-ARTICULAR; INTRALESIONAL; INTRAMUSCULAR; INTRAVENOUS; SOFT TISSUE PRN
Status: DISCONTINUED | OUTPATIENT
Start: 2020-08-24 | End: 2020-08-24 | Stop reason: SURG

## 2020-08-24 RX ORDER — SODIUM CHLORIDE, SODIUM LACTATE, POTASSIUM CHLORIDE, CALCIUM CHLORIDE 600; 310; 30; 20 MG/100ML; MG/100ML; MG/100ML; MG/100ML
INJECTION, SOLUTION INTRAVENOUS CONTINUOUS
Status: ACTIVE | OUTPATIENT
Start: 2020-08-24 | End: 2020-08-24

## 2020-08-24 RX ORDER — MIDAZOLAM HYDROCHLORIDE 1 MG/ML
INJECTION INTRAMUSCULAR; INTRAVENOUS PRN
Status: DISCONTINUED | OUTPATIENT
Start: 2020-08-24 | End: 2020-08-24 | Stop reason: SURG

## 2020-08-24 RX ORDER — LISINOPRIL AND HYDROCHLOROTHIAZIDE 12.5; 1 MG/1; MG/1
1 TABLET ORAL DAILY
COMMUNITY
End: 2020-09-10

## 2020-08-24 RX ORDER — ESCITALOPRAM OXALATE 10 MG/1
10 TABLET ORAL DAILY
Status: DISCONTINUED | OUTPATIENT
Start: 2020-08-25 | End: 2020-08-25 | Stop reason: HOSPADM

## 2020-08-24 RX ORDER — GABAPENTIN 300 MG/1
300 CAPSULE ORAL DAILY
Status: DISCONTINUED | OUTPATIENT
Start: 2020-08-25 | End: 2020-08-25 | Stop reason: HOSPADM

## 2020-08-24 RX ORDER — CLOBETASOL PROPIONATE 0.5 MG/G
CREAM TOPICAL 2 TIMES DAILY
Status: DISCONTINUED | OUTPATIENT
Start: 2020-08-24 | End: 2020-08-25 | Stop reason: HOSPADM

## 2020-08-24 RX ORDER — KETOROLAC TROMETHAMINE 30 MG/ML
INJECTION, SOLUTION INTRAMUSCULAR; INTRAVENOUS PRN
Status: DISCONTINUED | OUTPATIENT
Start: 2020-08-24 | End: 2020-08-24 | Stop reason: SURG

## 2020-08-24 RX ORDER — LIDOCAINE HYDROCHLORIDE 20 MG/ML
INJECTION, SOLUTION EPIDURAL; INFILTRATION; INTRACAUDAL; PERINEURAL PRN
Status: DISCONTINUED | OUTPATIENT
Start: 2020-08-24 | End: 2020-08-24 | Stop reason: SURG

## 2020-08-24 RX ORDER — METOPROLOL SUCCINATE 50 MG/1
50 TABLET, EXTENDED RELEASE ORAL DAILY
Status: DISCONTINUED | OUTPATIENT
Start: 2020-08-25 | End: 2020-08-25 | Stop reason: HOSPADM

## 2020-08-24 RX ORDER — MIDAZOLAM HYDROCHLORIDE 1 MG/ML
1 INJECTION INTRAMUSCULAR; INTRAVENOUS
Status: DISCONTINUED | OUTPATIENT
Start: 2020-08-24 | End: 2020-08-24 | Stop reason: HOSPADM

## 2020-08-24 RX ORDER — ONDANSETRON 4 MG/1
4 TABLET, ORALLY DISINTEGRATING ORAL EVERY 4 HOURS PRN
Status: DISCONTINUED | OUTPATIENT
Start: 2020-08-24 | End: 2020-08-25 | Stop reason: HOSPADM

## 2020-08-24 RX ORDER — SODIUM CHLORIDE, SODIUM LACTATE, POTASSIUM CHLORIDE, CALCIUM CHLORIDE 600; 310; 30; 20 MG/100ML; MG/100ML; MG/100ML; MG/100ML
INJECTION, SOLUTION INTRAVENOUS CONTINUOUS
Status: DISCONTINUED | OUTPATIENT
Start: 2020-08-24 | End: 2020-08-25 | Stop reason: HOSPADM

## 2020-08-24 RX ORDER — ONDANSETRON 2 MG/ML
4 INJECTION INTRAMUSCULAR; INTRAVENOUS EVERY 4 HOURS PRN
Status: DISCONTINUED | OUTPATIENT
Start: 2020-08-24 | End: 2020-08-25 | Stop reason: HOSPADM

## 2020-08-24 RX ORDER — HYDRALAZINE HYDROCHLORIDE 20 MG/ML
10 INJECTION INTRAMUSCULAR; INTRAVENOUS
Status: DISCONTINUED | OUTPATIENT
Start: 2020-08-24 | End: 2020-08-24 | Stop reason: HOSPADM

## 2020-08-24 RX ADMIN — OXYCODONE HYDROCHLORIDE 5 MG: 5 SOLUTION ORAL at 10:39

## 2020-08-24 RX ADMIN — KETOROLAC TROMETHAMINE 30 MG: 30 INJECTION, SOLUTION INTRAMUSCULAR at 09:17

## 2020-08-24 RX ADMIN — SUGAMMADEX 200 MG: 100 INJECTION, SOLUTION INTRAVENOUS at 09:17

## 2020-08-24 RX ADMIN — SODIUM CHLORIDE, POTASSIUM CHLORIDE, SODIUM LACTATE AND CALCIUM CHLORIDE: 600; 310; 30; 20 INJECTION, SOLUTION INTRAVENOUS at 12:30

## 2020-08-24 RX ADMIN — MIDAZOLAM HYDROCHLORIDE 2 MG: 1 INJECTION, SOLUTION INTRAMUSCULAR; INTRAVENOUS at 07:31

## 2020-08-24 RX ADMIN — KETOROLAC TROMETHAMINE 15 MG: 30 INJECTION, SOLUTION INTRAMUSCULAR at 22:54

## 2020-08-24 RX ADMIN — ONDANSETRON 4 MG: 2 INJECTION INTRAMUSCULAR; INTRAVENOUS at 07:45

## 2020-08-24 RX ADMIN — MORPHINE SULFATE 4 MG: 4 INJECTION INTRAVENOUS at 13:16

## 2020-08-24 RX ADMIN — HYDRALAZINE HYDROCHLORIDE 10 MG: 20 INJECTION INTRAMUSCULAR; INTRAVENOUS at 16:52

## 2020-08-24 RX ADMIN — DEXAMETHASONE SODIUM PHOSPHATE 8 MG: 4 INJECTION, SOLUTION INTRA-ARTICULAR; INTRALESIONAL; INTRAMUSCULAR; INTRAVENOUS; SOFT TISSUE at 07:45

## 2020-08-24 RX ADMIN — HYDROCODONE BITARTRATE AND ACETAMINOPHEN 20 ML: 7.5; 325 SOLUTION ORAL at 21:39

## 2020-08-24 RX ADMIN — ROCURONIUM BROMIDE 10 MG: 10 INJECTION, SOLUTION INTRAVENOUS at 07:33

## 2020-08-24 RX ADMIN — PROPOFOL 120 MG: 10 INJECTION, EMULSION INTRAVENOUS at 07:33

## 2020-08-24 RX ADMIN — LIDOCAINE HYDROCHLORIDE 60 MG: 20 INJECTION, SOLUTION EPIDURAL; INFILTRATION; INTRACAUDAL at 07:33

## 2020-08-24 RX ADMIN — EPHEDRINE SULFATE 10 MG: 50 INJECTION, SOLUTION INTRAVENOUS at 09:05

## 2020-08-24 RX ADMIN — FENTANYL CITRATE 50 MCG: 50 INJECTION INTRAMUSCULAR; INTRAVENOUS at 07:33

## 2020-08-24 RX ADMIN — HYDROCODONE BITARTRATE AND ACETAMINOPHEN 20 ML: 7.5; 325 SOLUTION ORAL at 16:52

## 2020-08-24 RX ADMIN — SODIUM CHLORIDE, POTASSIUM CHLORIDE, SODIUM LACTATE AND CALCIUM CHLORIDE: 600; 310; 30; 20 INJECTION, SOLUTION INTRAVENOUS at 06:30

## 2020-08-24 RX ADMIN — FENTANYL CITRATE 50 MCG: 50 INJECTION INTRAMUSCULAR; INTRAVENOUS at 10:22

## 2020-08-24 RX ADMIN — Medication 100 MCG: at 07:49

## 2020-08-24 RX ADMIN — POVIDONE-IODINE 15 ML: 10 SOLUTION TOPICAL at 06:30

## 2020-08-24 RX ADMIN — CEFAZOLIN 1 G: 330 INJECTION, POWDER, FOR SOLUTION INTRAMUSCULAR; INTRAVENOUS at 07:31

## 2020-08-24 RX ADMIN — SODIUM CHLORIDE, POTASSIUM CHLORIDE, SODIUM LACTATE AND CALCIUM CHLORIDE: 600; 310; 30; 20 INJECTION, SOLUTION INTRAVENOUS at 22:37

## 2020-08-24 RX ADMIN — FENTANYL CITRATE 50 MCG: 50 INJECTION INTRAMUSCULAR; INTRAVENOUS at 10:35

## 2020-08-24 RX ADMIN — FENTANYL CITRATE 50 MCG: 50 INJECTION INTRAMUSCULAR; INTRAVENOUS at 08:43

## 2020-08-24 RX ADMIN — Medication 100 MCG: at 07:45

## 2020-08-24 RX ADMIN — FAMOTIDINE 20 MG: 10 INJECTION INTRAVENOUS at 13:16

## 2020-08-24 RX ADMIN — FENTANYL CITRATE 50 MCG: 50 INJECTION INTRAMUSCULAR; INTRAVENOUS at 07:59

## 2020-08-24 ASSESSMENT — PATIENT HEALTH QUESTIONNAIRE - PHQ9
1. LITTLE INTEREST OR PLEASURE IN DOING THINGS: NOT AT ALL
SUM OF ALL RESPONSES TO PHQ9 QUESTIONS 1 AND 2: 0
2. FEELING DOWN, DEPRESSED, IRRITABLE, OR HOPELESS: NOT AT ALL

## 2020-08-24 ASSESSMENT — COGNITIVE AND FUNCTIONAL STATUS - GENERAL
HELP NEEDED FOR BATHING: A LITTLE
MOBILITY SCORE: 18
CLIMB 3 TO 5 STEPS WITH RAILING: A LITTLE
DRESSING REGULAR UPPER BODY CLOTHING: A LITTLE
STANDING UP FROM CHAIR USING ARMS: A LITTLE
WALKING IN HOSPITAL ROOM: A LITTLE
SUGGESTED CMS G CODE MODIFIER DAILY ACTIVITY: CJ
TURNING FROM BACK TO SIDE WHILE IN FLAT BAD: A LITTLE
DRESSING REGULAR LOWER BODY CLOTHING: A LITTLE
DAILY ACTIVITIY SCORE: 20
MOVING FROM LYING ON BACK TO SITTING ON SIDE OF FLAT BED: A LITTLE
MOVING TO AND FROM BED TO CHAIR: A LITTLE
TOILETING: A LITTLE
SUGGESTED CMS G CODE MODIFIER MOBILITY: CK

## 2020-08-24 ASSESSMENT — LIFESTYLE VARIABLES
ON A TYPICAL DAY WHEN YOU DRINK ALCOHOL HOW MANY DRINKS DO YOU HAVE: 0
TOTAL SCORE: 0
HOW MANY TIMES IN THE PAST YEAR HAVE YOU HAD 5 OR MORE DRINKS IN A DAY: 0
TOTAL SCORE: 0
HAVE PEOPLE ANNOYED YOU BY CRITICIZING YOUR DRINKING: NO
EVER HAD A DRINK FIRST THING IN THE MORNING TO STEADY YOUR NERVES TO GET RID OF A HANGOVER: NO
HAVE YOU EVER FELT YOU SHOULD CUT DOWN ON YOUR DRINKING: NO
CONSUMPTION TOTAL: NEGATIVE
EVER FELT BAD OR GUILTY ABOUT YOUR DRINKING: NO
AVERAGE NUMBER OF DAYS PER WEEK YOU HAVE A DRINK CONTAINING ALCOHOL: 0
ALCOHOL_USE: NO
TOTAL SCORE: 0

## 2020-08-24 ASSESSMENT — FIBROSIS 4 INDEX: FIB4 SCORE: 0.71

## 2020-08-24 ASSESSMENT — PAIN SCALES - GENERAL: PAIN_LEVEL: 4

## 2020-08-24 NOTE — ANESTHESIA POSTPROCEDURE EVALUATION
Patient: Ellen Srivastava    Procedure Summary     Date: 08/24/20 Room / Location: [place] [address] / SURGERY [facility]    Anesthesia Start: 0725 Anesthesia Stop: 0925    Procedure: FUNDOPLICATION, NISSEN, LAPAROSCOPIC - REPAIR RECURRENT PARAESOPHGEAL HERNIA WITH MESH, POSS FUNDOPLICATION, POSS RESECTION DISTAL ESOPHAGUS (N/A Abdomen) Diagnosis: (PARAESOPHAGEAL HIATAL HERNIA, ESOPHAGEAL STRICTURE)    Surgeon: John H Ganser, M.D. Responsible Provider: Rayshawn Rodriguez M.D.    Anesthesia Type: general ASA Status: 2          Final Anesthesia Type: general  Last vitals  BP   Blood Pressure : (!) 171/88    Temp   36.4 °C (97.5 °F)    Pulse   Pulse: 73   Resp   14    SpO2   98 %      Anesthesia Post Evaluation    Patient location during evaluation: PACU  Patient participation: complete - patient participated  Level of consciousness: awake and alert  Pain score: 4    Airway patency: patent  Anesthetic complications: no  Cardiovascular status: hemodynamically stable  Respiratory status: acceptable  Hydration status: euvolemic    PONV: none           Nurse Pain Score: 0 (NPRS)

## 2020-08-24 NOTE — OR NURSING
"Patient complains of pain in shoulders; medicated.  Advised patient that she needed to walk and the \"gas\" would be absorbed and the pain will subside.  Patient declined to get out of bed  Tolerating the limited fluids given; no complaints of nausea  Patient is on room air  "

## 2020-08-24 NOTE — PROGRESS NOTES
Patient arrived to floor at 1210. RN encouraged pt to walk from gurney to bed but pt states she ins currently in too much pain to do so. Patient agreeable to getting OOB once pain under control.

## 2020-08-24 NOTE — OR SURGEON
Immediate Post OP Note    PreOp Diagnosis: Paraesophageal hiatal hernia    PostOp Diagnosis: Same    Procedure(s):  LAPAROSCOPIC REPAIR RECURRENT PARAESOPHGEAL HERNIA WITH MESH,  FUNDOPLICATION - Wound Class: Clean    Surgeon(s):  John H Ganser, M.D.    Anesthesiologist/Type of Anesthesia:  Anesthesiologist: Rayshawn Rodriguez M.D./General    Surgical Staff:  Assistant: TABITHA Mcgee  Circulator: Fawn Fox R.N.  Scrub Person: Chip Sanchez    Specimens removed if any:  * No specimens in log *    Estimated Blood Loss: 0    Findings: 0    Complications: 0        8/24/2020 9:13 AM John H Ganser, M.D.

## 2020-08-24 NOTE — ANESTHESIA PROCEDURE NOTES
Peripheral IV    Date/Time: 8/24/2020 7:30 AM  Performed by: Rayshawn Rodriguez M.D.  Authorized by: Rayshawn Rodriguez M.D.     Size:  20 G  Laterality:  Left  Site Prep:  Alcohol  Technique:  Direct puncture  Attempts:  1   IV in R-AC found to be inflitrated upon arrival to OR.         Please advise to stop Tradjenta and reduce Basaglar to 16 units daily   form filled please fax

## 2020-08-24 NOTE — OP REPORT
DATE OF SERVICE:  08/24/2020    PREOPERATIVE DIAGNOSES:  Large recurrent paraesophageal hiatal hernia with   history of esophageal diverticulectomy and leak followed by stent, residual   esophageal stricture.    POSTOPERATIVE DIAGNOSES:  Large recurrent paraesophageal hiatal hernia with   history of esophageal diverticulectomy and leak followed by stent, residual   esophageal stricture.    PROCEDURES:  Laparoscopic repair of recurrent large paraesophageal hiatal   hernia with mesh and partial fundoplication.    SURGEON:  John H. Ganser, MD    ASSISTANT:  Ankit Calderón PA-C    ANESTHESIA:  General.    ANESTHESIOLOGIST:  Raysahwn Rodriguez MD    INDICATIONS:  The patient is an 85-year-old female who about 5 years ago   underwent resection robotically of a large distal esophageal diverticulum and   repair of large paraesophageal hiatal hernia with mesh.  This unfortunately   resulted in perforation with infectious sequelae including a left empyema.    This was treated with stent, which was ultimately removed.  Since then, she   has had significant problems with dysphagia and distal esophageal stricture.    We discussed risks, benefits, and alternatives to laparoscopic repair and   partial fundoplication and dilation of the esophagus.  We discussed the   possibility of needing to resect her distal esophagus given her prior history.    All questions were answered and she wished to proceed.    FINDINGS:  There was a large paraesophageal hiatal hernia.  Once this was all   reduced and the esophagus mobilized, I was able to get plenty of esophageal   length below the diaphragm and 46-Slovak bougie passed easily.  Repair was   carried out with Phasix ST mesh support and a partial fundoplication carried   out over the 46-Slovak bougie.    PROCEDURE:  Patient identified, general anesthetic administered.  Her abdomen   was prepped and draped in the usual sterile fashion.  Local anesthesia of 0.5%   Marcaine with epinephrine  was injected prior to making skin incision.  Small   incision was made in the low epigastric region and the Veress needle passed.    The abdomen was insufflated with carbon dioxide without incident and a 5 mm   blunt trocar and 5 mm 30-degree scope inserted.  Zuleika liver retractor was   passed through a small subxiphoid incision, used to elevate the lateral   segment of liver.  Epigastric 5, left upper quadrant 11, left lateral   subcostal 5 mm trocars were placed.  Adhesions between the perigastric tissue   and lateral segment of liver and caudate lobe of the liver were taken down   with the Harmonic scalpel.  The hernia sac was mobilized from the hiatus   circumferentially using the Harmonic scalpel, which was used for all the   dissection.  I was actually able to peel the hernia sac out of the mediastinum   reducing its contents nicely.  Circumferential dissection was carried around   the esophagus, allowing the gastroesophageal junction to be brought well below   the hiatus under no tension upwards.  There was significant scarring on the   left lateral aspect of the site of previous perforation, but care was taken to   avoid injury to the esophagus.  Short gastric vessels were taken down on the   superior aspect of the fundus to allow for fundoplication.  Previous mesh was   balled up posteriorly, but adherent to the kirill muscles and was left intact.    A 46-Greek bougie was passed by anesthesia and passed easily with no   resistance.  This was backed up into the esophagus.  Posterior hiatal hernia   repair was carried out with 0 Surgidac using the EndoStitch device using   strips of the Phasix mesh as bolsters.  Four sutures were placed approximating   the kirill muscles nicely and closing down the hiatus to an adequate size.  The   rest of the 7x10 cm Phasix mesh was then notched to accommodate the esophagus   and placed posterior to the esophagus across the crural repair.    Fundoplication was then carried  out rotating the fundus from left to right   behind the esophagus, maintaining orientation of the esophagus.  A tacking   suture was placed between the right posterolateral aspect of the mesh to the   hiatus and esophagus as well as to the fundus.  A similar suture was placed in   the left posterolateral aspect, securing the mesh there.  The bougie was then   passed again and the right side of the fundus was brought up to the   anterolateral aspect of the esophagus where it was secured down with 3 sutures   using the EndoStitch device with the upper suture incorporating the mesh and   hiatus.  Similarly, the left side was brought anteriorly completing a partial   fundoplication with 3 sutures on that side, incorporating the mesh and hiatus   on the uppermost suture.  The bougie was removed and the fundoplication   maintained good orientation with no torsion or tension upwards.  The posterior   aspect of the mesh was secured down to the crural repair and also to the back   wall of the fundus with an additional suture.  The abdomen was irrigated,   hemostasis assured.  Liver retractor and trocars were withdrawn, the abdomen   deflated, the incisions closed with Vicryl.  Sterile dressings were applied   and the patient returned to recovery room in stable condition.       ____________________________________     JOHN H. GANSER, MD [facility] / CEE    DD:  08/24/2020 09:21:38  DT:  08/24/2020 09:56:56    D#:  [id number]  Job#:  [id number]    cc: PALOMO Moore MD

## 2020-08-24 NOTE — DISCHARGE INSTR - OTHER INFO
Clears without carbonation today. Please ensure patient able to tolerate several ounces (small sips at a time) of water/clears without dysphagia or vomiting/regurgitation before sending home.  Advance diet as tolerated to full liquids tomorrow. Follow diet progression handout given at preop appt.  Up ad jordin.  Ok to Shower over Tegaderms ; remove Tegaderms on 08/28/20.  No baths or soaks x 14 days.  No driving x 4-5 days.  No lifting > 15 lbs until x 4 weeks.  D/C home when alert, comfortable, ambulatory, and tolerating PO well  Pt Counseled re: I.S., diet, activity, home med's, and wound care.  Use Hycet for acute postop pain for 2-3 days, then transition to usual pain management regimen. If using norco 10/325mg, will need to crush tabs for 1-2 weeks.  Begin PPI at home (all home med's larger in size than eraser head should be crushed or changed to liquid form x 1-2 weeks, while on liquid diet)  Hold MVI/supplements until after postop check.  F/U with Dr. Ganser ~ 1-2 weeks.

## 2020-08-24 NOTE — OR NURSING
"1015  , Sarkis, notified of patient's status and room number  1022  Patient complained of pain in abdomen (7/10); medicated.    1045  Patient complained of pain in abdomen (7/10); medicated,  Patient states \"once it begins, it is hard to get a hold of\".  States she takes Norco every 4 hours during the day.  1050  Patient dozing at present  "

## 2020-08-24 NOTE — ANESTHESIA TIME REPORT
Anesthesia Start and Stop Event Times     Date Time Event    8/24/2020 0713 Ready for Procedure     0725 Anesthesia Start     0925 Anesthesia Stop        Responsible Staff  08/24/20    Name Role Begin End    Rayshawn Rodriguez M.D. Anesth 0725 0925        Preop Diagnosis (Free Text):  Pre-op Diagnosis     PARAESOPHAGEAL HIATAL HERNIA, ESOPHAGEAL STRICTURE        Preop Diagnosis (Codes):    Post op Diagnosis  Paraesophageal hernia      Premium Reason  Non-Premium    Comments:

## 2020-08-24 NOTE — ANESTHESIA PROCEDURE NOTES
Airway    Date/Time: 8/24/2020 7:33 AM  Performed by: Rayshawn Rodriguez M.D.  Authorized by: Rayshawn Rodriguez M.D.     Location:  OR  Urgency:  Elective  Indications for Airway Management:  Anesthesia      Spontaneous Ventilation: absent    Sedation Level:  Deep  Preoxygenated: Yes    Patient Position:  Sniffing  Final Airway Type:  Endotracheal airway  Final Endotracheal Airway:  ETT  Cuffed: Yes    Technique Used for Successful ETT Placement:  Direct laryngoscopy    Insertion Site:  Oral  Blade Type:  Davis  Laryngoscope Blade/Videolaryngoscope Blade Size:  3  ETT Size (mm):  6.5  Measured from:  Teeth  ETT to Teeth (cm):  21  Placement Verified by: auscultation and capnometry    Cormack-Lehane Classification:  Grade I - full view of glottis  Number of Attempts at Approach:  1

## 2020-08-24 NOTE — ANESTHESIA PREPROCEDURE EVALUATION
Relevant Problems   CARDIAC   (+) Aortic insufficiency   (+) HTN (hypertension)      GI   (+) GERD (gastroesophageal reflux disease)   (+) Paraesophageal hiatal hernia s/p robotic repair 10/12         (+) Acute renal failure (ARF) (HCC)       Physical Exam    Airway   Mallampati: II  TM distance: >3 FB  Neck ROM: full       Cardiovascular - normal exam  Rhythm: regular  Rate: normal  (-) murmur     Dental - normal exam           Pulmonary - normal exam  Breath sounds clear to auscultation     Abdominal    Neurological - normal exam                 Anesthesia Plan    ASA 2       Plan - general       Airway plan will be ETT        Induction: intravenous    Postoperative Plan: Postoperative administration of opioids is intended.    Pertinent diagnostic labs and testing reviewed    Informed Consent:    Anesthetic plan and risks discussed with patient.    Use of blood products discussed with: patient whom consented to blood products.

## 2020-08-25 VITALS
RESPIRATION RATE: 17 BRPM | HEIGHT: 68 IN | TEMPERATURE: 97.5 F | DIASTOLIC BLOOD PRESSURE: 76 MMHG | BODY MASS INDEX: 19.71 KG/M2 | WEIGHT: 130.07 LBS | SYSTOLIC BLOOD PRESSURE: 147 MMHG | HEART RATE: 87 BPM | OXYGEN SATURATION: 97 %

## 2020-08-25 PROCEDURE — 96372 THER/PROPH/DIAG INJ SC/IM: CPT

## 2020-08-25 PROCEDURE — 96376 TX/PRO/DX INJ SAME DRUG ADON: CPT

## 2020-08-25 PROCEDURE — G0378 HOSPITAL OBSERVATION PER HR: HCPCS

## 2020-08-25 PROCEDURE — 700111 HCHG RX REV CODE 636 W/ 250 OVERRIDE (IP): Performed by: PHYSICIAN ASSISTANT

## 2020-08-25 PROCEDURE — A9270 NON-COVERED ITEM OR SERVICE: HCPCS | Performed by: PHYSICIAN ASSISTANT

## 2020-08-25 PROCEDURE — 700102 HCHG RX REV CODE 250 W/ 637 OVERRIDE(OP): Performed by: PHYSICIAN ASSISTANT

## 2020-08-25 RX ADMIN — HYDROCODONE BITARTRATE AND ACETAMINOPHEN 20 ML: 7.5; 325 SOLUTION ORAL at 09:50

## 2020-08-25 RX ADMIN — FAMOTIDINE 20 MG: 10 INJECTION INTRAVENOUS at 05:02

## 2020-08-25 RX ADMIN — CLOBETASOL PROPIONATE: 0.5 CREAM TOPICAL at 05:03

## 2020-08-25 RX ADMIN — ESCITALOPRAM OXALATE 10 MG: 10 TABLET ORAL at 05:03

## 2020-08-25 RX ADMIN — METOPROLOL SUCCINATE 50 MG: 50 TABLET, EXTENDED RELEASE ORAL at 08:36

## 2020-08-25 RX ADMIN — HYDROCODONE BITARTRATE AND ACETAMINOPHEN 20 ML: 7.5; 325 SOLUTION ORAL at 05:13

## 2020-08-25 RX ADMIN — GABAPENTIN 300 MG: 300 CAPSULE ORAL at 08:36

## 2020-08-25 RX ADMIN — ENOXAPARIN SODIUM 40 MG: 40 INJECTION SUBCUTANEOUS at 09:50

## 2020-08-25 ASSESSMENT — ENCOUNTER SYMPTOMS
SHORTNESS OF BREATH: 0
VOMITING: 0
NAUSEA: 0
ABDOMINAL PAIN: 1
FEVER: 0
CHILLS: 0

## 2020-08-25 NOTE — DISCHARGE INSTRUCTIONS
Discharge Instructions    Discharged to home by car with relative. Discharged via wheelchair, hospital escort: Yes.  Special equipment needed: Not Applicable    Be sure to schedule a follow-up appointment with your primary care doctor or any specialists as instructed.     Discharge Plan:   Diet Plan: Discussed  Activity Level: Discussed  Confirmed Follow up Appointment: Patient to Call and Schedule Appointment  Confirmed Symptoms Management: Discussed    I understand that a diet low in cholesterol, fat, and sodium is recommended for good health. Unless I have been given specific instructions below for another diet, I accept this instruction as my diet prescription.   Other diet: clear    Special Instructions:    Ok to Shower over Tegaderms ; remove Tegaderms on 08/28/20.  No baths or soaks x 14 days.  No driving x 4-5 days.  No lifting > 15 lbs until x 4 weeks.  D/C home when alert, comfortable, ambulatory, and tolerating PO well  Pt Counseled re: I.S., diet, activity, home med's, and wound care  Begin PPI at home (all home med's larger in size than eraser head should be crushed or changed to liquid form x 2-3 weeks, while on liquid diet)  Hold MVI/supplements until after postop check.  F/U with Dr. Ganser ~ 1-2 weeks.    · Is patient discharged on Warfarin / Coumadin?   No       Laparoscopic Nissen Fundoplication, Care After  This sheet gives you information about how to care for yourself after your procedure. Your health care provider may also give you more specific instructions. If you have problems or questions, contact your health care provider.  What can I expect after the procedure?  After the procedure, it is common to have:  · Trouble swallowing (dysphagia).  · Discomfort when you swallow.  · Abdominal soreness.  · Bloating.  Follow these instructions at home:  Medicines  · Take over-the-counter and prescription medicines only as told by your health care provider.  · Ask your health care provider or  pharmacist if you can crush any pill that you are taking. Take only liquid medicines as directed.  · Do not drive or use heavy machinery while taking prescription pain medicine.  Incision care    · Follow instructions from your health care provider about how to take care of your incisions. Make sure you:  ? Wash your hands with soap and water before you change your bandage (dressing). If soap and water are not available, use hand .  ? Change your dressing as told by your health care provider.  ? Leave stitches (sutures), skin glue, or adhesive strips in place. These skin closures may need to stay in place for 2 weeks or longer. If adhesive strip edges start to loosen and curl up, you may trim the loose edges. Do not remove adhesive strips completely unless your health care provider tells you to do that.  · Check your incision area every day for signs of infection. Check for:  ? Redness, swelling, or pain.  ? Fluid or blood.  ? Warmth.  ? Pus or a bad smell.  Eating and drinking    · Follow instructions from your health care provider about eating or drinking restrictions. Follow these instructions carefully.  ? You may need to follow a liquid-only diet for 2 weeks, followed by a diet of soft foods for 2 weeks.  ? You should eat slow, take small bites, and chew the food carefully.  ? You should eat or drink in an upright position.  ? You should return to your usual diet gradually.  · Drink enough fluid to keep your urine pale yellow.  Activity    · Rest as told by your health care provider.  · Avoid sitting for a long time without moving. Get up to take short walks every 1-2 hours. This is important to improve blood flow and breathing.  · Do not lift anything that is heavier than 10 lb (4.5 kg), or the limit that you are told, until your health care provider says that it is safe.  · Avoid activities that take a lot of effort.  · Ask your health care provider what activities are safe for you. Ask when you  can:  ? Return to sexual activity.  ? Drive.  ? Go back to work.  · Return to your normal activities as told by your health care provider.  Bathing  · Do not take baths, swim, or use a hot tub until your health care provider approves. Ask your health care provider if you may take showers. You may only be allowed to take sponge baths.  General instructions  · Do not use any products that contain nicotine or tobacco, such as cigarettes and e-cigarettes. These can delay healing after surgery. If you need help quitting, ask your health care provider.  · Keep all follow-up visits as told by your health care provider. This is important.  Contact a health care provider if you have:  · A fever.  · Pain that does not go away with medicine.  · Frequent nausea or vomiting.  · Painful bloating.  · Constipation.  · Trouble swallowing.  · A cough that does not go away.  · An incision that opens up.  · An incision area that feels warm to touch.  · Redness, swelling, or pain in any incision areas.  · Fluid, blood, or pus coming from any incision.  Get help right away if:  · You have severe pain or severe bloating.  · You are unable to swallow.  · You have vomiting that does not stop.  · You have blood in your vomit.  · You have trouble breathing.  Summary  · After the procedure, it is common to have trouble swallowing or have discomfort when you swallow.  · Follow instructions from your health care provider about eating or drinking restrictions. You may need to follow a liquid-only diet for 2 weeks, followed by a diet of soft foods for 2 weeks.  · Return to your normal activities as told by your health care provider.  · Keep all follow-up visits as told by your health care provider. This is important.  This information is not intended to replace advice given to you by your health care provider. Make sure you discuss any questions you have with your health care provider.  Document Released: 08/10/2005 Document Revised: 09/06/2019  Document Reviewed: 01/16/2019  [employer] Patient Education © 2020 [employer]      Depression / Suicide Risk    As you are discharged from this [facility] facility, it is important to learn how to keep safe from harming yourself.    Recognize the warning signs:  · Abrupt changes in personality, positive or negative- including increase in energy   · Giving away possessions  · Change in eating patterns- significant weight changes-  positive or negative  · Change in sleeping patterns- unable to sleep or sleeping all the time   · Unwillingness or inability to communicate  · Depression  · Unusual sadness, discouragement and loneliness  · Talk of wanting to die  · Neglect of personal appearance   · Rebelliousness- reckless behavior  · Withdrawal from people/activities they love  · Confusion- inability to concentrate     If you or a loved one observes any of these behaviors or has concerns about self-harm, here's what you can do:  · Talk about it- your feelings and reasons for harming yourself  · Remove any means that you might use to hurt yourself (examples: pills, rope, extension cords, firearm)  · Get professional help from the community (Mental Health, Substance Abuse, psychological counseling)  · Do not be alone:Call your Safe Contact- someone whom you trust who will be there for you.  · Call your local CRISIS HOTLINE 784-3062 or 509-348-9693  · Call your local Children's Mobile Crisis Response Team [place] (365) 017-3795 or www.Barcoding  · Call the toll free National Suicide Prevention Hotlines   · National Suicide Prevention Lifeline 980-194-BNMV (2805)  · National Hope Line Network 800-SUICIDE (377-9498)

## 2020-08-25 NOTE — PROGRESS NOTES
Surgical Progress Note    Author: TABITHA Mcgee Date & Time created: 2020   8:22 AM     Interval Events:  S/p  Large recurrent paraesophageal hiatal hernia with   history of esophageal diverticulectomy and leak followed by stent, residual   esophageal stricture -POD#1, doing well; michelle clears without dysphagia or vomiting; pain controlled; wants to go home     Review of Systems   Constitutional: Negative for chills and fever.   Respiratory: Negative for shortness of breath.    Gastrointestinal: Positive for abdominal pain. Negative for nausea and vomiting.   Skin: Negative for itching and rash.     Hemodynamics:  Temp (24hrs), Av.6 °C (97.9 °F), Min:35.7 °C (96.3 °F), Max:37.4 °C (99.4 °F)  Temperature: 36.8 °C (98.3 °F)  Pulse  Av.6  Min: 61  Max: 92   Blood Pressure : 153/76     Respiratory:    Respiration: 20, Pulse Oximetry: 92 %        RUL Breath Sounds: Clear, RML Breath Sounds: Clear, RLL Breath Sounds: Diminished, HARRISON Breath Sounds: Clear, LLL Breath Sounds: Diminished  Neuro:  GCS       Fluids:    Intake/Output Summary (Last 24 hours) at 2020 0822  Last data filed at 2020 0000  Gross per 24 hour   Intake 4460 ml   Output 125 ml   Net 4335 ml        Current Diet Order   Procedures   • Diet Order Clear Liquid (no carbonation)     Physical Exam  Vitals signs and nursing note reviewed.   Constitutional:       General: She is not in acute distress.  Cardiovascular:      Rate and Rhythm: Normal rate.   Pulmonary:      Effort: Pulmonary effort is normal. No respiratory distress.   Abdominal:      Palpations: Abdomen is soft.      Comments: Teg's c/d/i   Skin:     General: Skin is warm and dry.   Neurological:      Mental Status: She is alert and oriented to person, place, and time.   Psychiatric:         Mood and Affect: Mood normal.       Labs:  No results found for this or any previous visit (from the past 24 hour(s)).  Medical Decision Making, by Problem:  There are no active  hospital problems to display for this patient.    Plan:  Clears without carbonation today. Please ensure patient able to tolerate several ounces (small sips at a time) of water/clears without dysphagia or vomiting/regurgitation before sending home.  Advance diet as tolerated to full liquids tomorrow. Follow diet progression handout given at preop appt.  Up ad jordin.  Ok to Shower over Tegaderms ; remove Tegaderms on 08/28/20.  No baths or soaks x 14 days.  No driving x 4-5 days.  No lifting > 15 lbs until x 4 weeks.  D/C home when alert, comfortable, ambulatory, and tolerating PO well  Pt Counseled re: I.S., diet, activity, home med's, and wound care  Begin PPI at home (all home med's larger in size than eraser head should be crushed or changed to liquid form x 2-3 weeks, while on liquid diet)  Hold MVI/supplements until after postop check.  F/U with Dr. Ganser ~ 1-2 weeks.    Quality Measures:  Quality-Core Measures   Reviewed items::  Medications reviewed  Jenkins catheter::  No Jenkins  DVT prophylaxis pharmacological::  Enoxaparin (Lovenox)  DVT prophylaxis - mechanical:  SCDs  Ulcer Prophylaxis::  Yes      Discussed patient condition with RN, Patient and Dr. Ganser

## 2020-08-25 NOTE — PROGRESS NOTES
Dual RN skin check completed at bedside. Findings are as follows:    -lap sites x5 on abd with gauze/tegaderm, C/D/I  -scarring to upper back from shingles

## 2020-08-25 NOTE — CARE PLAN
Problem: Bowel/Gastric:  Goal: Normal bowel function is maintained or improved  Note: Pt educated on the clear liquid diet restrictions. Pt tolerating well.     Problem: Respiratory:  Goal: Respiratory status will improve  Outcome: PROGRESSING AS EXPECTED  Note: Pt encouraged to use IS and to cough and deep breathe.

## 2020-08-25 NOTE — PROGRESS NOTES
report received. Assessment completed.  Pt is A&O x4. Pt on room air.   Medicating for pain PRN per MAR Pain 6/10. Pt c/o headache and difficulty sleeping. Gave pt mask and ear plugs.  Denies nausea.   - numbness, - tingling.  Skin has 5 lap sites with gauze and tegaderm, CDI   LDA @0G in the Left hand   Last BM PTA . +flatus,   +void.  Clear liquid diet. Tolerates well.   Pt up standby.  Call light and belongings within reach. All needs met at this time. Fall Precautions and hourly rounding in place.

## 2020-08-25 NOTE — DISCHARGE PLANNING
Care Transition Team Discharge Planning    Anticipated Discharge Information  Discharge Disposition: Discharge to home/self care (01)              Discharge Plan:  Home     This RN CM is following the case and will assist Pt with discharge as needed.

## 2020-08-25 NOTE — PROGRESS NOTES
Patient discharged to home. All lines removed. Discharge instructions and prescription reviewed and understanding verbalized. Patient states they will fill prescriptions. Patient states they will return to emergency if discussed symptoms arise. Patient left walking and Ann-Marie Nurse Appreciate. Medications from drawer removed and sent back to pharmacy or disposed of per protocol. Chart given to unit clerk.

## 2020-08-25 NOTE — PROGRESS NOTES
Bedside report received.  Assessment complete.  A&O x 4. Patient calls appropriately.  Patient ambulates with stand by assist.   Patient has 4/10 pain. Pain managed with prescribed medications.  Denies N&V. Tolerating diet.  Surgical lap sites to abdomen.  + void, + flatus  Patient denies SOB.  SCD's in place.  Review plan with of care with patient. Call light and personal belongings with in reach. Hourly rounding in place. All needs met at this time.

## 2020-09-10 RX ORDER — LISINOPRIL AND HYDROCHLOROTHIAZIDE 12.5; 1 MG/1; MG/1
TABLET ORAL
Qty: 90 TAB | Refills: 0 | Status: ON HOLD
Start: 2020-09-10 | End: 2020-09-13

## 2020-09-12 ENCOUNTER — HOSPITAL ENCOUNTER (INPATIENT)
Facility: MEDICAL CENTER | Age: 85
LOS: 3 days | DRG: 871 | End: 2020-09-16
Attending: EMERGENCY MEDICINE | Admitting: HOSPITALIST
Payer: MEDICARE

## 2020-09-12 ENCOUNTER — APPOINTMENT (OUTPATIENT)
Dept: RADIOLOGY | Facility: MEDICAL CENTER | Age: 85
DRG: 871 | End: 2020-09-12
Attending: EMERGENCY MEDICINE
Payer: MEDICARE

## 2020-09-12 DIAGNOSIS — R09.02 HYPOXIA: ICD-10-CM

## 2020-09-12 DIAGNOSIS — A41.9 SEPSIS, DUE TO UNSPECIFIED ORGANISM, UNSPECIFIED WHETHER ACUTE ORGAN DYSFUNCTION PRESENT (HCC): ICD-10-CM

## 2020-09-12 DIAGNOSIS — R40.4 ALTERED LEVEL OF CONSCIOUSNESS: ICD-10-CM

## 2020-09-12 DIAGNOSIS — I95.9 HYPOTENSION, UNSPECIFIED HYPOTENSION TYPE: ICD-10-CM

## 2020-09-12 DIAGNOSIS — J18.9 COMMUNITY ACQUIRED PNEUMONIA OF RIGHT LOWER LOBE OF LUNG: ICD-10-CM

## 2020-09-12 LAB
ALBUMIN SERPL BCP-MCNC: 3.4 G/DL (ref 3.2–4.9)
ALBUMIN/GLOB SERPL: 1 G/DL
ALP SERPL-CCNC: 92 U/L (ref 30–99)
ALT SERPL-CCNC: 13 U/L (ref 2–50)
ANION GAP SERPL CALC-SCNC: 16 MMOL/L (ref 7–16)
APPEARANCE UR: CLEAR
APTT PPP: 41.3 SEC (ref 24.7–36)
AST SERPL-CCNC: 16 U/L (ref 12–45)
BACTERIA #/AREA URNS HPF: ABNORMAL /HPF
BASOPHILS # BLD AUTO: 0.5 % (ref 0–1.8)
BASOPHILS # BLD: 0.09 K/UL (ref 0–0.12)
BILIRUB SERPL-MCNC: 0.2 MG/DL (ref 0.1–1.5)
BILIRUB UR QL STRIP.AUTO: NEGATIVE
BUN SERPL-MCNC: 31 MG/DL (ref 8–22)
CALCIUM SERPL-MCNC: 9.2 MG/DL (ref 8.4–10.2)
CHLORIDE SERPL-SCNC: 96 MMOL/L (ref 96–112)
CO2 SERPL-SCNC: 21 MMOL/L (ref 20–33)
COLOR UR: YELLOW
CREAT SERPL-MCNC: 1.31 MG/DL (ref 0.5–1.4)
D DIMER PPP IA.FEU-MCNC: 3.71 UG/ML (FEU) (ref 0–0.5)
EKG IMPRESSION: NORMAL
EOSINOPHIL # BLD AUTO: 0.57 K/UL (ref 0–0.51)
EOSINOPHIL NFR BLD: 3.4 % (ref 0–6.9)
EPI CELLS #/AREA URNS HPF: ABNORMAL /HPF
ERYTHROCYTE [DISTWIDTH] IN BLOOD BY AUTOMATED COUNT: 41.3 FL (ref 35.9–50)
GLOBULIN SER CALC-MCNC: 3.5 G/DL (ref 1.9–3.5)
GLUCOSE SERPL-MCNC: 140 MG/DL (ref 65–99)
GLUCOSE UR STRIP.AUTO-MCNC: NEGATIVE MG/DL
HCT VFR BLD AUTO: 30.4 % (ref 37–47)
HGB BLD-MCNC: 9.6 G/DL (ref 12–16)
HYALINE CASTS #/AREA URNS LPF: >10 /LPF
IMM GRANULOCYTES # BLD AUTO: 0.13 K/UL (ref 0–0.11)
IMM GRANULOCYTES NFR BLD AUTO: 0.8 % (ref 0–0.9)
INR PPP: 1.17 (ref 0.87–1.13)
KETONES UR STRIP.AUTO-MCNC: NEGATIVE MG/DL
LACTATE BLD-SCNC: 1.6 MMOL/L (ref 0.5–2)
LEUKOCYTE ESTERASE UR QL STRIP.AUTO: ABNORMAL
LYMPHOCYTES # BLD AUTO: 2.6 K/UL (ref 1–4.8)
LYMPHOCYTES NFR BLD: 15.5 % (ref 22–41)
MAGNESIUM SERPL-MCNC: 1.8 MG/DL (ref 1.5–2.5)
MCH RBC QN AUTO: 28.1 PG (ref 27–33)
MCHC RBC AUTO-ENTMCNC: 31.6 G/DL (ref 33.6–35)
MCV RBC AUTO: 88.9 FL (ref 81.4–97.8)
MICRO URNS: ABNORMAL
MONOCYTES # BLD AUTO: 1.02 K/UL (ref 0–0.85)
MONOCYTES NFR BLD AUTO: 6.1 % (ref 0–13.4)
MUCOUS THREADS #/AREA URNS HPF: ABNORMAL /HPF
NEUTROPHILS # BLD AUTO: 12.41 K/UL (ref 2–7.15)
NEUTROPHILS NFR BLD: 73.7 % (ref 44–72)
NITRITE UR QL STRIP.AUTO: NEGATIVE
NRBC # BLD AUTO: 0 K/UL
NRBC BLD-RTO: 0 /100 WBC
NT-PROBNP SERPL IA-MCNC: 2880 PG/ML (ref 0–125)
PH UR STRIP.AUTO: 5.5 [PH] (ref 5–8)
PLATELET # BLD AUTO: 287 K/UL (ref 164–446)
PMV BLD AUTO: 10.2 FL (ref 9–12.9)
POTASSIUM SERPL-SCNC: 3.5 MMOL/L (ref 3.6–5.5)
PROT SERPL-MCNC: 6.9 G/DL (ref 6–8.2)
PROT UR QL STRIP: NEGATIVE MG/DL
PROTHROMBIN TIME: 14.6 SEC (ref 12–14.6)
RBC # BLD AUTO: 3.42 M/UL (ref 4.2–5.4)
RBC # URNS HPF: ABNORMAL /HPF
RBC UR QL AUTO: ABNORMAL
SODIUM SERPL-SCNC: 133 MMOL/L (ref 135–145)
SP GR UR STRIP.AUTO: 1.02
TROPONIN T SERPL-MCNC: 14 NG/L (ref 6–19)
WBC # BLD AUTO: 16.8 K/UL (ref 4.8–10.8)
WBC #/AREA URNS HPF: ABNORMAL /HPF

## 2020-09-12 PROCEDURE — 700117 HCHG RX CONTRAST REV CODE 255: Performed by: EMERGENCY MEDICINE

## 2020-09-12 PROCEDURE — 94760 N-INVAS EAR/PLS OXIMETRY 1: CPT

## 2020-09-12 PROCEDURE — 83735 ASSAY OF MAGNESIUM: CPT

## 2020-09-12 PROCEDURE — 700105 HCHG RX REV CODE 258: Performed by: EMERGENCY MEDICINE

## 2020-09-12 PROCEDURE — 85610 PROTHROMBIN TIME: CPT

## 2020-09-12 PROCEDURE — 87077 CULTURE AEROBIC IDENTIFY: CPT

## 2020-09-12 PROCEDURE — 85025 COMPLETE CBC W/AUTO DIFF WBC: CPT

## 2020-09-12 PROCEDURE — 87086 URINE CULTURE/COLONY COUNT: CPT

## 2020-09-12 PROCEDURE — 85379 FIBRIN DEGRADATION QUANT: CPT

## 2020-09-12 PROCEDURE — 96375 TX/PRO/DX INJ NEW DRUG ADDON: CPT

## 2020-09-12 PROCEDURE — 80053 COMPREHEN METABOLIC PANEL: CPT

## 2020-09-12 PROCEDURE — 700111 HCHG RX REV CODE 636 W/ 250 OVERRIDE (IP): Performed by: EMERGENCY MEDICINE

## 2020-09-12 PROCEDURE — 85730 THROMBOPLASTIN TIME PARTIAL: CPT

## 2020-09-12 PROCEDURE — 700101 HCHG RX REV CODE 250

## 2020-09-12 PROCEDURE — 99285 EMERGENCY DEPT VISIT HI MDM: CPT

## 2020-09-12 PROCEDURE — 700111 HCHG RX REV CODE 636 W/ 250 OVERRIDE (IP)

## 2020-09-12 PROCEDURE — C9803 HOPD COVID-19 SPEC COLLECT: HCPCS | Performed by: EMERGENCY MEDICINE

## 2020-09-12 PROCEDURE — 71045 X-RAY EXAM CHEST 1 VIEW: CPT

## 2020-09-12 PROCEDURE — 71275 CT ANGIOGRAPHY CHEST: CPT

## 2020-09-12 PROCEDURE — 700105 HCHG RX REV CODE 258

## 2020-09-12 PROCEDURE — 700102 HCHG RX REV CODE 250 W/ 637 OVERRIDE(OP): Performed by: EMERGENCY MEDICINE

## 2020-09-12 PROCEDURE — 93005 ELECTROCARDIOGRAM TRACING: CPT | Performed by: EMERGENCY MEDICINE

## 2020-09-12 PROCEDURE — 83605 ASSAY OF LACTIC ACID: CPT

## 2020-09-12 PROCEDURE — 81001 URINALYSIS AUTO W/SCOPE: CPT

## 2020-09-12 PROCEDURE — 84484 ASSAY OF TROPONIN QUANT: CPT

## 2020-09-12 PROCEDURE — A9270 NON-COVERED ITEM OR SERVICE: HCPCS | Performed by: EMERGENCY MEDICINE

## 2020-09-12 PROCEDURE — 83880 ASSAY OF NATRIURETIC PEPTIDE: CPT

## 2020-09-12 PROCEDURE — 96365 THER/PROPH/DIAG IV INF INIT: CPT

## 2020-09-12 PROCEDURE — 87040 BLOOD CULTURE FOR BACTERIA: CPT

## 2020-09-12 PROCEDURE — 87186 SC STD MICRODIL/AGAR DIL: CPT

## 2020-09-12 RX ORDER — ACETAMINOPHEN 325 MG/1
650 TABLET ORAL ONCE
Status: COMPLETED | OUTPATIENT
Start: 2020-09-12 | End: 2020-09-12

## 2020-09-12 RX ORDER — SODIUM CHLORIDE, SODIUM LACTATE, POTASSIUM CHLORIDE, AND CALCIUM CHLORIDE .6; .31; .03; .02 G/100ML; G/100ML; G/100ML; G/100ML
30 INJECTION, SOLUTION INTRAVENOUS
Status: COMPLETED | OUTPATIENT
Start: 2020-09-12 | End: 2020-09-13

## 2020-09-12 RX ORDER — ONDANSETRON 2 MG/ML
4 INJECTION INTRAMUSCULAR; INTRAVENOUS ONCE
Status: COMPLETED | OUTPATIENT
Start: 2020-09-12 | End: 2020-09-12

## 2020-09-12 RX ADMIN — ACETAMINOPHEN 650 MG: 325 TABLET, FILM COATED ORAL at 22:29

## 2020-09-12 RX ADMIN — PIPERACILLIN AND TAZOBACTAM 4.5 G: 4; .5 INJECTION, POWDER, LYOPHILIZED, FOR SOLUTION INTRAVENOUS; PARENTERAL at 22:29

## 2020-09-12 RX ADMIN — SODIUM CHLORIDE, POTASSIUM CHLORIDE, SODIUM LACTATE AND CALCIUM CHLORIDE 1713 ML: 600; 310; 30; 20 INJECTION, SOLUTION INTRAVENOUS at 22:29

## 2020-09-12 RX ADMIN — VANCOMYCIN HYDROCHLORIDE 1500 MG: 500 INJECTION, POWDER, LYOPHILIZED, FOR SOLUTION INTRAVENOUS at 23:45

## 2020-09-12 RX ADMIN — ONDANSETRON HYDROCHLORIDE 4 MG: 2 SOLUTION INTRAMUSCULAR; INTRAVENOUS at 22:29

## 2020-09-12 RX ADMIN — IOHEXOL 59 ML: 350 INJECTION, SOLUTION INTRAVENOUS at 23:39

## 2020-09-12 ASSESSMENT — FIBROSIS 4 INDEX: FIB4 SCORE: 0.71

## 2020-09-13 PROBLEM — A41.9 SEPSIS (HCC): Status: ACTIVE | Noted: 2020-09-13

## 2020-09-13 PROBLEM — R79.89 ELEVATED BRAIN NATRIURETIC PEPTIDE (BNP) LEVEL: Status: ACTIVE | Noted: 2020-09-13

## 2020-09-13 PROBLEM — N39.0 ACUTE UTI: Status: ACTIVE | Noted: 2020-09-13

## 2020-09-13 PROBLEM — J18.9 RIGHT LOWER LOBE PNEUMONIA: Status: ACTIVE | Noted: 2020-09-13

## 2020-09-13 LAB
COVID ORDER STATUS COVID19: NORMAL
LACTATE BLD-SCNC: 1.1 MMOL/L (ref 0.5–2)
SARS-COV-2 RNA RESP QL NAA+PROBE: NOTDETECTED
SPECIMEN SOURCE: NORMAL

## 2020-09-13 PROCEDURE — A9270 NON-COVERED ITEM OR SERVICE: HCPCS | Performed by: NURSE PRACTITIONER

## 2020-09-13 PROCEDURE — 700102 HCHG RX REV CODE 250 W/ 637 OVERRIDE(OP): Performed by: HOSPITALIST

## 2020-09-13 PROCEDURE — 700111 HCHG RX REV CODE 636 W/ 250 OVERRIDE (IP): Performed by: HOSPITALIST

## 2020-09-13 PROCEDURE — A9270 NON-COVERED ITEM OR SERVICE: HCPCS | Performed by: HOSPITALIST

## 2020-09-13 PROCEDURE — 99223 1ST HOSP IP/OBS HIGH 75: CPT | Mod: AI | Performed by: HOSPITALIST

## 2020-09-13 PROCEDURE — 700102 HCHG RX REV CODE 250 W/ 637 OVERRIDE(OP): Performed by: NURSE PRACTITIONER

## 2020-09-13 PROCEDURE — 94669 MECHANICAL CHEST WALL OSCILL: CPT

## 2020-09-13 PROCEDURE — 700105 HCHG RX REV CODE 258: Performed by: HOSPITALIST

## 2020-09-13 PROCEDURE — 83605 ASSAY OF LACTIC ACID: CPT

## 2020-09-13 PROCEDURE — 94760 N-INVAS EAR/PLS OXIMETRY 1: CPT

## 2020-09-13 PROCEDURE — 770020 HCHG ROOM/CARE - TELE (206)

## 2020-09-13 PROCEDURE — 700101 HCHG RX REV CODE 250: Performed by: HOSPITALIST

## 2020-09-13 RX ORDER — BISACODYL 10 MG
10 SUPPOSITORY, RECTAL RECTAL
Status: DISCONTINUED | OUTPATIENT
Start: 2020-09-13 | End: 2020-09-16 | Stop reason: HOSPADM

## 2020-09-13 RX ORDER — GABAPENTIN 300 MG/1
300 CAPSULE ORAL
Status: DISCONTINUED | OUTPATIENT
Start: 2020-09-13 | End: 2020-09-16 | Stop reason: HOSPADM

## 2020-09-13 RX ORDER — HYDROCODONE BITARTRATE AND ACETAMINOPHEN 10; 325 MG/1; MG/1
1 TABLET ORAL 2 TIMES DAILY PRN
Status: DISCONTINUED | OUTPATIENT
Start: 2020-09-13 | End: 2020-09-14

## 2020-09-13 RX ORDER — SODIUM BICARBONATE 325 MG/1
325 TABLET ORAL 3 TIMES DAILY
Status: ON HOLD | COMMUNITY
End: 2020-09-13

## 2020-09-13 RX ORDER — LISINOPRIL AND HYDROCHLOROTHIAZIDE 12.5; 1 MG/1; MG/1
0.5 TABLET ORAL DAILY
COMMUNITY
End: 2020-12-22

## 2020-09-13 RX ORDER — ONDANSETRON 4 MG/1
4 TABLET, ORALLY DISINTEGRATING ORAL EVERY 4 HOURS PRN
Status: DISCONTINUED | OUTPATIENT
Start: 2020-09-13 | End: 2020-09-16 | Stop reason: HOSPADM

## 2020-09-13 RX ORDER — GUAIFENESIN/DEXTROMETHORPHAN 100-10MG/5
10 SYRUP ORAL EVERY 6 HOURS PRN
Status: DISCONTINUED | OUTPATIENT
Start: 2020-09-13 | End: 2020-09-16 | Stop reason: HOSPADM

## 2020-09-13 RX ORDER — AZITHROMYCIN 250 MG/1
500 TABLET, FILM COATED ORAL DAILY
Status: COMPLETED | OUTPATIENT
Start: 2020-09-14 | End: 2020-09-15

## 2020-09-13 RX ORDER — SODIUM BICARBONATE 650 MG/1
650 TABLET ORAL 3 TIMES DAILY
Status: ON HOLD | COMMUNITY
End: 2022-01-27

## 2020-09-13 RX ORDER — POLYETHYLENE GLYCOL 3350 17 G/17G
1 POWDER, FOR SOLUTION ORAL
Status: DISCONTINUED | OUTPATIENT
Start: 2020-09-13 | End: 2020-09-16 | Stop reason: HOSPADM

## 2020-09-13 RX ORDER — ROSUVASTATIN CALCIUM 20 MG/1
20 TABLET, COATED ORAL EVERY EVENING
COMMUNITY
End: 2021-03-23

## 2020-09-13 RX ORDER — ROSUVASTATIN CALCIUM 5 MG/1
5 TABLET, COATED ORAL EVERY EVENING
Status: ON HOLD | COMMUNITY
End: 2020-09-13

## 2020-09-13 RX ORDER — AMOXICILLIN 250 MG
2 CAPSULE ORAL 2 TIMES DAILY
Status: DISCONTINUED | OUTPATIENT
Start: 2020-09-13 | End: 2020-09-16 | Stop reason: HOSPADM

## 2020-09-13 RX ORDER — ESCITALOPRAM OXALATE 10 MG/1
10 TABLET ORAL DAILY
Status: DISCONTINUED | OUTPATIENT
Start: 2020-09-13 | End: 2020-09-16 | Stop reason: HOSPADM

## 2020-09-13 RX ORDER — ONDANSETRON 2 MG/ML
4 INJECTION INTRAMUSCULAR; INTRAVENOUS EVERY 4 HOURS PRN
Status: DISCONTINUED | OUTPATIENT
Start: 2020-09-13 | End: 2020-09-16 | Stop reason: HOSPADM

## 2020-09-13 RX ORDER — GABAPENTIN 300 MG/1
300 CAPSULE ORAL
COMMUNITY
End: 2020-10-29 | Stop reason: SDUPTHER

## 2020-09-13 RX ORDER — ACETAMINOPHEN 325 MG/1
650 TABLET ORAL EVERY 6 HOURS PRN
Status: DISCONTINUED | OUTPATIENT
Start: 2020-09-13 | End: 2020-09-16 | Stop reason: HOSPADM

## 2020-09-13 RX ORDER — ESCITALOPRAM OXALATE 10 MG/1
10 TABLET ORAL DAILY
COMMUNITY
End: 2020-10-13

## 2020-09-13 RX ORDER — POTASSIUM CHLORIDE 20 MEQ/1
20 TABLET, EXTENDED RELEASE ORAL ONCE
Status: COMPLETED | OUTPATIENT
Start: 2020-09-13 | End: 2020-09-13

## 2020-09-13 RX ORDER — TIZANIDINE 4 MG/1
4 TABLET ORAL 2 TIMES DAILY PRN
COMMUNITY
End: 2020-10-07

## 2020-09-13 RX ORDER — SODIUM CHLORIDE, SODIUM LACTATE, POTASSIUM CHLORIDE, AND CALCIUM CHLORIDE .6; .31; .03; .02 G/100ML; G/100ML; G/100ML; G/100ML
500 INJECTION, SOLUTION INTRAVENOUS
Status: DISCONTINUED | OUTPATIENT
Start: 2020-09-13 | End: 2020-09-16

## 2020-09-13 RX ORDER — HYDROCODONE BITARTRATE AND ACETAMINOPHEN 10; 325 MG/1; MG/1
1 TABLET ORAL 2 TIMES DAILY
Status: DISCONTINUED | OUTPATIENT
Start: 2020-09-13 | End: 2020-09-13

## 2020-09-13 RX ORDER — HYDROCODONE BITARTRATE AND ACETAMINOPHEN 10; 325 MG/1; MG/1
1 TABLET ORAL 2 TIMES DAILY PRN
COMMUNITY
End: 2022-10-18

## 2020-09-13 RX ORDER — HYDROCODONE BITARTRATE AND ACETAMINOPHEN 10; 325 MG/1; MG/1
1 TABLET ORAL EVERY 6 HOURS PRN
Status: DISCONTINUED | OUTPATIENT
Start: 2020-09-13 | End: 2020-09-13

## 2020-09-13 RX ADMIN — POTASSIUM CHLORIDE 20 MEQ: 1500 TABLET, EXTENDED RELEASE ORAL at 08:26

## 2020-09-13 RX ADMIN — ENOXAPARIN SODIUM 30 MG: 30 INJECTION, SOLUTION INTRAVENOUS; SUBCUTANEOUS at 06:08

## 2020-09-13 RX ADMIN — ACETAMINOPHEN 650 MG: 325 TABLET, FILM COATED ORAL at 12:06

## 2020-09-13 RX ADMIN — ACETAMINOPHEN 650 MG: 325 TABLET, FILM COATED ORAL at 19:31

## 2020-09-13 RX ADMIN — POLYETHYLENE GLYCOL 3350 1 PACKET: 17 POWDER, FOR SOLUTION ORAL at 06:12

## 2020-09-13 RX ADMIN — ACETAMINOPHEN 650 MG: 325 TABLET, FILM COATED ORAL at 06:12

## 2020-09-13 RX ADMIN — CEFTRIAXONE SODIUM 1 G: 1 INJECTION, POWDER, FOR SOLUTION INTRAMUSCULAR; INTRAVENOUS at 02:17

## 2020-09-13 RX ADMIN — MAGNESIUM HYDROXIDE 30 ML: 400 SUSPENSION ORAL at 15:14

## 2020-09-13 RX ADMIN — HYDROCODONE BITARTRATE AND ACETAMINOPHEN 1 TABLET: 10; 325 TABLET ORAL at 18:00

## 2020-09-13 RX ADMIN — HYDROCODONE BITARTRATE AND ACETAMINOPHEN 1 TABLET: 10; 325 TABLET ORAL at 12:03

## 2020-09-13 RX ADMIN — ESCITALOPRAM OXALATE 10 MG: 10 TABLET ORAL at 06:08

## 2020-09-13 RX ADMIN — GABAPENTIN 300 MG: 300 CAPSULE ORAL at 20:04

## 2020-09-13 RX ADMIN — SENNOSIDES-DOCUSATE SODIUM TAB 8.6-50 MG 2 TABLET: 8.6-5 TAB at 06:08

## 2020-09-13 RX ADMIN — SENNOSIDES-DOCUSATE SODIUM TAB 8.6-50 MG 2 TABLET: 8.6-5 TAB at 18:00

## 2020-09-13 RX ADMIN — AZITHROMYCIN DIHYDRATE 500 MG: 500 INJECTION, POWDER, LYOPHILIZED, FOR SOLUTION INTRAVENOUS at 06:22

## 2020-09-13 SDOH — ECONOMIC STABILITY: FOOD INSECURITY: WITHIN THE PAST 12 MONTHS, THE FOOD YOU BOUGHT JUST DIDN'T LAST AND YOU DIDN'T HAVE MONEY TO GET MORE.: NEVER TRUE

## 2020-09-13 SDOH — ECONOMIC STABILITY: TRANSPORTATION INSECURITY
IN THE PAST 12 MONTHS, HAS THE LACK OF TRANSPORTATION KEPT YOU FROM MEDICAL APPOINTMENTS OR FROM GETTING MEDICATIONS?: NO

## 2020-09-13 SDOH — ECONOMIC STABILITY: FOOD INSECURITY: WITHIN THE PAST 12 MONTHS, YOU WORRIED THAT YOUR FOOD WOULD RUN OUT BEFORE YOU GOT MONEY TO BUY MORE.: NEVER TRUE

## 2020-09-13 SDOH — ECONOMIC STABILITY: TRANSPORTATION INSECURITY
IN THE PAST 12 MONTHS, HAS LACK OF TRANSPORTATION KEPT YOU FROM MEETINGS, WORK, OR FROM GETTING THINGS NEEDED FOR DAILY LIVING?: NO

## 2020-09-13 ASSESSMENT — LIFESTYLE VARIABLES
CONSUMPTION TOTAL: NEGATIVE
ALCOHOL_USE: NO
HAVE YOU EVER FELT YOU SHOULD CUT DOWN ON YOUR DRINKING: NO
EVER HAD A DRINK FIRST THING IN THE MORNING TO STEADY YOUR NERVES TO GET RID OF A HANGOVER: NO
AVERAGE NUMBER OF DAYS PER WEEK YOU HAVE A DRINK CONTAINING ALCOHOL: 0
TOTAL SCORE: 0
TOTAL SCORE: 0
HOW MANY TIMES IN THE PAST YEAR HAVE YOU HAD 5 OR MORE DRINKS IN A DAY: 0
EVER FELT BAD OR GUILTY ABOUT YOUR DRINKING: NO
HAVE PEOPLE ANNOYED YOU BY CRITICIZING YOUR DRINKING: NO
ON A TYPICAL DAY WHEN YOU DRINK ALCOHOL HOW MANY DRINKS DO YOU HAVE: 0
TOTAL SCORE: 0

## 2020-09-13 ASSESSMENT — COGNITIVE AND FUNCTIONAL STATUS - GENERAL
DRESSING REGULAR UPPER BODY CLOTHING: A LITTLE
MOVING FROM LYING ON BACK TO SITTING ON SIDE OF FLAT BED: A LITTLE
CLIMB 3 TO 5 STEPS WITH RAILING: A LITTLE
MOBILITY SCORE: 18
TOILETING: A LITTLE
STANDING UP FROM CHAIR USING ARMS: A LITTLE
DAILY ACTIVITIY SCORE: 20
HELP NEEDED FOR BATHING: A LITTLE
DRESSING REGULAR LOWER BODY CLOTHING: A LITTLE
WALKING IN HOSPITAL ROOM: A LITTLE
TURNING FROM BACK TO SIDE WHILE IN FLAT BAD: A LITTLE
SUGGESTED CMS G CODE MODIFIER MOBILITY: CK
SUGGESTED CMS G CODE MODIFIER DAILY ACTIVITY: CJ
MOVING TO AND FROM BED TO CHAIR: A LITTLE

## 2020-09-13 ASSESSMENT — PATIENT HEALTH QUESTIONNAIRE - PHQ9
2. FEELING DOWN, DEPRESSED, IRRITABLE, OR HOPELESS: NOT AT ALL
SUM OF ALL RESPONSES TO PHQ9 QUESTIONS 1 AND 2: 0
1. LITTLE INTEREST OR PLEASURE IN DOING THINGS: NOT AT ALL

## 2020-09-13 ASSESSMENT — ENCOUNTER SYMPTOMS
PALPITATIONS: 0
WEAKNESS: 0
NAUSEA: 0
NECK PAIN: 0
CHILLS: 1
VOMITING: 0
DEPRESSION: 0
SHORTNESS OF BREATH: 1
DOUBLE VISION: 0
FEVER: 1
BRUISES/BLEEDS EASILY: 0
SORE THROAT: 0
COUGH: 1
MYALGIAS: 0
DIZZINESS: 0
BLURRED VISION: 0
INSOMNIA: 0
HEADACHES: 0

## 2020-09-13 ASSESSMENT — COPD QUESTIONNAIRES
DO YOU EVER COUGH UP ANY MUCUS OR PHLEGM?: NO/ONLY WITH OCCASIONAL COLDS OR INFECTIONS
HAVE YOU SMOKED AT LEAST 100 CIGARETTES IN YOUR ENTIRE LIFE: NO/DON'T KNOW
DURING THE PAST 4 WEEKS HOW MUCH DID YOU FEEL SHORT OF BREATH: NONE/LITTLE OF THE TIME
COPD SCREENING SCORE: 2

## 2020-09-13 ASSESSMENT — PAIN DESCRIPTION - PAIN TYPE
TYPE: CHRONIC PAIN

## 2020-09-13 NOTE — PROGRESS NOTES
Patient admitted after midnight by my colleague.  Patient seen and evaluated.  Patient is an 85-year-old female with known past medical history of anemia, atrophic vaginitis, depressive disorder, hyperlipidemia, hypertension, hypertriglyceridemia, narcotic dependence, osteopenia, vertigo, and parathyroid hormone excess.  Patient presented to the emergency room with complaints of altered mental status and fever.  Patient had recent hospitalization for fundoplication and stricture repair on August 24 of 2020, and now presents to the emergency room with generalized weakness, chills and shortness of breath.  On arrival in the emergency room patient was noted to have elevated temperature to 100.7 and was hypotensive with a blood pressure 76/36.  Patient received IV fluid resuscitation and blood pressure improved.  Patient was also noted to be hypoxic with O2 saturations at 85%.  Patient was placed on nasal cannula at 2 L of oxygen.  WBCs were noted to be elevated at 16.8.  And urinalysis showed positive for bacteria and trace leukocyte Estrace.  CT of the chest was completed which shows right lower lobe pneumonia.  COVID test was negative.  Patient was admitted to medical telemetry floor for continued monitoring and sepsis work-up.  Sepsis protocol was initiated.  Blood cultures were obtained and remain negative to date preliminary.  Patient was initiated on IV Zosyn and vancomycin in the ED and transition to Rocephin and azithromycin on medical floor on admit.  BNP was noted to be slightly elevated at 2888 and patient is receiving fluid resuscitation considering hypotension on arrival and sepsis diagnosis.   On exam today patient states she feels significantly better than on arrival.  However still feels weak and slightly short of breath.  Patient continues to require nasal cannula oxygen.  Patient is noted to continue to have fevers and requiring Tylenol.  We will continue to monitor patient and proceed with IV  antibiotic therapy.

## 2020-09-13 NOTE — PROGRESS NOTES
Telemetry Shift Summary    Rhythm SR  HR Range 60s  Ectopy none  Measurements 0.16/0.10/0.42        Normal Values  Rhythm SR  HR Range    Measurements 0.12-0.20 / 0.06-0.10  / 0.30-0.52

## 2020-09-13 NOTE — PROGRESS NOTES
2 RN skin assessment done  Old scabs to the back; no redness seen to buttock  Skin remains intact otherwise

## 2020-09-13 NOTE — PROGRESS NOTES
Med rec updated and complete  Allergies reviewed  Pt was not sure the strength of her medication.  Called pts  (Rk) @ [phone] to verify all dose of medications.  Pt and pts  reports no antibiotics in the last 2 weeks maribell she had to take at home.

## 2020-09-13 NOTE — ED PROVIDER NOTES
ED Provider Note    CHIEF COMPLAINT  Chief Complaint   Patient presents with   • Altered Mental Status     Increasing since Thurs   • Fever     Onset last night No documented        HPI  Patient is a 85-year-old female with a complex surgical history involving previous diverticulectomy's, esophageal strictures and dilations with multiple abdominal surgeries and repairs, previously seen by Dr. English and Leonor Do who now presents the emergency room for profound weakness, pallor and hypotension.    Per , the patient was seen and had a surgery on the 24th of this month for a esophageal hernia repair and upon discharge was doing well.  Over the last 2 to 3 days she has had shortness of breath, fevers, chills, body aches and weakness.    PPE Note: I personally donned full PPE for all patient encounters during this visit, including being clean-shaven with an N95 respirator mask, gloves, and goggles.     Chart review shows most recent preoperative diagnosis including: Large recurrent paraesophageal hiatal hernia with history of esophageal diverticulectomy and leak followed by stent, residual esophageal stricture.  Performed by Dr. Ganser     DNAR/DNI    REVIEW OF SYSTEMS  See HPI for further details. All other systems are negative.     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    SOCIAL HISTORY  Social History     Tobacco Use   • Smoking status: Never Smoker   • Smokeless tobacco: Former User   •  "Tobacco comment: Nicotine    Substance and Sexual Activity   • Alcohol use: No     Alcohol/week: 0.0 oz   • Drug use: No   • Sexual activity: Never     Partners: Male       SURGICAL HISTORY   has a past surgical history that includes breast biopsy (Left, 2015); lumbar decompression (1969, 1979); hysterectomy, total abdominal (1977); appendectomy (1945); parathyroid exploration (6/16/2010); paraesophageal hernia robotic (N/A, 10/12/2015); gastroscopy (10/23/2015); thoracoscopy (Left, 10/30/2015); gastroscopy-endo (11/1/2015); gastroscopy-endo (N/A, 12/10/2015); gastroscopy-endo (N/A, 2/26/2016); ankle orif (Right, 9/20/2016); and lap,esophagogast fundoplasty (N/A, 8/24/2020).    CURRENT MEDICATIONS  Home Medications    **Home medications have not yet been reviewed for this encounter**         ALLERGIES  Allergies   Allergen Reactions   • Asa [Aspirin] Unspecified     Headache       PHYSICAL EXAM  VITAL SIGNS: /57   Pulse 79   Temp 37 °C (98.6 °F) (Oral)   Resp (!) 21   Ht 1.727 m (5' 8\")   Wt 57.1 kg (125 lb 14.1 oz)   LMP 03/13/1970   SpO2 100%   BMI 19.14 kg/m²   Pulse ox interpretation: I interpret this pulse ox as normal.  Genl: Chronically ill appearing female, sitting in gurney comfortably, speaking clearly, appears in moderate distress   Head: NC/AT   ENT: Mucous membranes dry, posterior pharynx clear, uvula midline, nares patent bilaterally   Eyes: Normal sclera, pupils equal round reactive to light  Neck: Supple, FROM, no LAD appreciated   Pulmonary: Lungs are slightly diminished in lower lung fields bilaterally  Chest: No TTP  CV:  RRR, + systolic murmur appreciated, pulses 2+ in both upper and lower extremities,  Abdomen: soft, prior abdominal incisions noted. NT/ND; no rebound/guarding, no masses palpated, no HSM  : no CVA or suprapubic tenderness   Musculoskeletal: Pain free ROM of the neck. Moving upper and lower extremities and spontaneous in coordinated fashion  Neuro: A&Ox4 (person, " place, time, situation), speech fluent, gait steady, no focal deficits appreciated, No cerebellar signs. Sensation is grossly intact in the distal upper and lower extremities.  5/5 strength in  and dorsiflexion/plantar flexion of the ankles  Psych: Patient has an appropriate affect and behavior  Skin: No rash or lesions.  No pallor or jaundice.  No cyanosis.  Warm and dry.     DIAGNOSTIC STUDIES / PROCEDURES    EKG  EKG at 2308 shows: rate of 61, rhythm sinus, axis normal, intervals normal, QRS narrow, ST/T waves without ischemia or infarction    LABS  Labs Reviewed   URINALYSIS - Abnormal; Notable for the following components:       Result Value    Leukocyte Esterase Trace (*)     Occult Blood Small (*)     All other components within normal limits    Narrative:     Indication for culture:->Septic Shock: Persistent  hypotension,  Lactic acid > 4, vasopressors/inotropes started   APTT - Abnormal; Notable for the following components:    APTT 41.3 (*)     All other components within normal limits    Narrative:     Indicate which anticoagulants the patient is on:->UNKNOWN   PROTHROMBIN TIME - Abnormal; Notable for the following components:    INR 1.17 (*)     All other components within normal limits    Narrative:     Indicate which anticoagulants the patient is on:->UNKNOWN   D-DIMER - Abnormal; Notable for the following components:    D-Dimer Screen 3.71 (*)     All other components within normal limits    Narrative:     Indicate which anticoagulants the patient is on:->UNKNOWN   COMP METABOLIC PANEL - Abnormal; Notable for the following components:    Sodium 133 (*)     Potassium 3.5 (*)     Glucose 140 (*)     Bun 31 (*)     All other components within normal limits    Narrative:     Indicate which anticoagulants the patient is on:->UNKNOWN   CBC WITH DIFFERENTIAL - Abnormal; Notable for the following components:    WBC 16.8 (*)     RBC 3.42 (*)     Hemoglobin 9.6 (*)     Hematocrit 30.4 (*)     MCHC 31.6 (*)  "    Neutrophils-Polys 73.70 (*)     Lymphocytes 15.50 (*)     Neutrophils (Absolute) 12.41 (*)     Monos (Absolute) 1.02 (*)     Eos (Absolute) 0.57 (*)     Immature Granulocytes (abs) 0.13 (*)     All other components within normal limits    Narrative:     Indicate which anticoagulants the patient is on:->UNKNOWN   PROBRAIN NATRIURETIC PEPTIDE, NT - Abnormal; Notable for the following components:    NT-proBNP 2880 (*)     All other components within normal limits    Narrative:     Indicate which anticoagulants the patient is on:->UNKNOWN   ESTIMATED GFR - Abnormal; Notable for the following components:    GFR If  47 (*)     GFR If Non  39 (*)     All other components within normal limits    Narrative:     Indicate which anticoagulants the patient is on:->UNKNOWN   URINE MICROSCOPIC (W/UA) - Abnormal; Notable for the following components:    WBC 20-50 (*)     RBC 5-10 (*)     Bacteria Many (*)     Hyaline Cast >10 (*)     All other components within normal limits    Narrative:     Indication for culture:->Septic Shock: Persistent  hypotension,  Lactic acid > 4, vasopressors/inotropes started   URINE CULTURE(NEW)    Narrative:     Indication for culture:->Septic Shock: Persistent  hypotension,  Lactic acid > 4, vasopressors/inotropes started   BLOOD CULTURE    Narrative:     Per Hospital Policy: Only change Specimen Src: to \"Line\" if  specified by physician order.   BLOOD CULTURE    Narrative:     Per Hospital Policy: Only change Specimen Src: to \"Line\" if  specified by physician order.   MAGNESIUM    Narrative:     Indicate which anticoagulants the patient is on:->UNKNOWN   COVID/SARS COV-2    Narrative:     Collected By:[id number] CORNELIO LEVINE  Is patient being admitted?->Yes  Does this patient meet criteria for Rush/Cepheid per [facility]  Inpatient Workflow? (See workflow link below)->Yes  Expected turn around time?->Rush (Cepheid 2-4 hours)  Is this the patients First SARS CoV-2 " test?->Unknown  Is this patient employed in healthcare?->No  Is the patient symptomatic as defined by the CDC?->Yes  Date of symptom onset?->9/10/20  Is the patient hospitalized?->No  Is the patient a resident in a congregate care setting?->No  Is the patient pregnant?->No   TROPONIN    Narrative:     Indicate which anticoagulants the patient is on:->UNKNOWN   LACTIC ACID    Narrative:     Indicate which anticoagulants the patient is on:->UNKNOWN   SARS-COV-2, PCR (IN-HOUSE)    Narrative:     Collected By:[id number] CORNELIO LEVINE  Is patient being admitted?->Yes  Does this patient meet criteria for Rush/Cepheid per [facility]  Inpatient Workflow? (See workflow link below)->Yes  Expected turn around time?->Rush (Cepheid 2-4 hours)  Is this the patients First SARS CoV-2 test?->Unknown  Is this patient employed in healthcare?->No  Is the patient symptomatic as defined by the CDC?->Yes  Date of symptom onset?->9/10/20  Is the patient hospitalized?->No  Is the patient a resident in a congregate care setting?->No  Is the patient pregnant?->No   LACTIC ACID   LACTIC ACID   LACTIC ACID   PROTHROMBIN TIME     RADIOLOGY  CT-CTA CHEST PULMONARY ARTERY W/ RECONS   Final Result      Right lower lobe consolidation compatible with pneumonia.      Stable bilateral pulmonary nodules.      Scarring/atelectasis in the left upper lobe, lingula and right lower lobe.      Enlarged mediastinal and right hilar lymph nodes are new compared to prior. These may be reactive. Short interval follow-up CT chest is recommended.      Postsurgical changes of the distal esophagus and at the gastroesophageal junction. There appears to be esophageal wall thickening which can be seen in esophagitis. A small residual/recurrent hernia is not excluded.      Atherosclerotic plaque.            Low Risk: CT at 3-6 months, then consider CT at 18-24 months      High Risk: CT at 3-6 months, then at 18-24 months      Comments: Use most suspicious nodule as guide to  management. Follow-up intervals may vary according to size and risk.      Low Risk - Minimal or absent history of smoking and of other known risk factors.      High Risk - History of smoking or of other known risk factors.      Note: These recommendations do not apply to lung cancer screening, patients with immunosuppression, or patients with known primary cancer.      Fleischner Society 2017 Guidelines for Management of Incidentally Detected Pulmonary Nodules in Adults      DX-CHEST-PORTABLE (1 VIEW)   Final Result      Patchy right basilar opacities may represent pneumonitis.      Linear lingular opacities likely represent atelectasis or scarring.      Blunting of the left costophrenic angle is again noted which may represent pleural thickening or trace pleural fluid.      Atherosclerotic plaque.           COURSE & MEDICAL DECISION MAKING  Pertinent Labs & Imaging studies reviewed. (See chart for details)    DDX:  Atypical viral infection, r/o pneumonia, pyelonephritis, influenza, viral syndrome, soft tissue infection, intra-abdominal infection, lactic acidosis, dehydration, kidney injury    MDM    Initial evaluation at 2203:  Seen and evaluated emergently for hypotension, altered mental status.  She has profound previous surgical history, appeared chronically ill and was hypotensive.  The patient was emergently evaluated and was not having any focal tenderness but was feeling profoundly weak with some shortness of breath and was noted to be hypoxic.  Quickly corrected on oxygen, following placement of 2 large-bore IVs and initiation of sepsis protocol with empiric antibiosis and fluid resuscitation, repeat assessment showed good peripheral perfusion, improvements in her mental status, and overall was doing substantially better.  She was treated with antipyretics, source control was noted to be likely with the right lower lung.  She has changes consistent with prior surgical procedures on the left side.  Dimer is  acutely elevated and COVID test is currently pending.  CT of the chest secondary to the positive dimer did not identify any acute pulmonary embolus at this time.  She has no acute urinary complaints though contamination is likely present and will be sent for culture.  Frequent bedside reevaluation show continued overall improvement, reassuring lactate despite leukocytosis, anemia.  She will be admitted for sepsis, continued antibiosis and frequent reassessments.  Discussed with the hospitalist will admit the patient.    CRITICAL CARE:  I saw and evaluated this patient. I personally provided 40 minutes of critical care time to the patient excluding billable procedures and directly and personally provided the following treatment and critical care management:  Critical Care Interventions  Multiple bedside assessments, coordination of care with family and other historical sources, Fluid resuscitation and Airway assessment and management    HYDRATION: Based on the patient's presentation of Sepsis the patient was given IV fluids. IV Hydration was used because oral hydration was not adequate alone. Upon recheck following hydration, the patient was stable.    FINAL IMPRESSION  Visit Diagnoses     ICD-10-CM   1. Altered level of consciousness  R40.4   2. Sepsis, due to unspecified organism, unspecified whether acute organ dysfunction present (HCC)  A41.9   3. Hypotension, unspecified hypotension type  I95.9   4. Hypoxia  R09.02     Electronically signed by: Harrison Adan M.D., 9/12/2020 10:04 PM

## 2020-09-13 NOTE — ASSESSMENT & PLAN NOTE
This is Sepsis Present on admission  SIRS criteria identified on my evaluation include: Leukocytosis, with WBC greater than 12,000 and tachypnea  Source is right lower lobe pneumonia and E. coli UTI  Sepsis protocol initiated  Fluid resuscitation ordered per protocol  Completed course of IV Rocephin for UTI.  2 more days p.o. Augmentin for CAP  While organ dysfunction may be noted elsewhere in this problem list or in the chart, degree of organ dysfunction does not meet CMS criteria for severe sepsis  COVID test negative

## 2020-09-13 NOTE — PROGRESS NOTES
Report given to Thu RN. POC discussed. Pt resting comfortably in bed. Safety precautions in place.

## 2020-09-13 NOTE — ASSESSMENT & PLAN NOTE
-Weaning FiO2 to keep sats greater than 92%  RT protocol- nebulizer treatments as needed   O2 as needed wean as tolerated   -IS   Continue Antibiotic therapy

## 2020-09-13 NOTE — PROGRESS NOTES
Received patient from NOC RN. Assessment complete. A&Ox4. Denies pain. POC discussed. Call light within reach. Bed in low, locked position. All needs attended to at this time.    Temp 100.5F  -Pt was medicated with Tylenol 0612. Pt c/o of slight chill.     Pt also requesting home dose Norco .

## 2020-09-13 NOTE — ASSESSMENT & PLAN NOTE
-Most recently, status post esophageal repair with fundoplication done on 8/24/2020 by Dr. Ganser.   -Prior history of esophageal stent placement in 2015 complicated by perforation and sepsis and ongoing esophageal issues.  See prior discharge summaries for more details.

## 2020-09-13 NOTE — CARE PLAN
Problem: Safety  Goal: Will remain free from injury  Outcome: PROGRESSING AS EXPECTED  Note: Pt is injury-free at this moment   Fall precautions in place including: bed locked & at lowest position, call light & personal belongings within reach, x2 upper bed rails up   Bed alarm in use   Encouraged pt to call for any assistance, pt verbalized understanding      Problem: Infection  Goal: Will remain free from infection  Outcome: PROGRESSING AS EXPECTED  Note: Pt is afebrile (98.6*F); recent WBCs if 16.8  Pt on scheduled Azithromycin 500mg q24 hours and Rocephin 1g q24 hours for UTI and pna   Lactic is 1.6  Will continue to monitor

## 2020-09-13 NOTE — ED TRIAGE NOTES
"Pt semiconscious and slumped over in w/c Straight back to Rm 4 ERP immediately to bedside  reports pt just had \" stomach and esophagus surg \" on 8/24  "

## 2020-09-13 NOTE — PROGRESS NOTES
Received report from SAGRARIO Osei   Assumed pt care   Pt is A&Ox4, resting comfortably in bed.   Pt on 1L of oxygen via nasal cannula  No signs of SOB/respiratory distress.   Labs noted, VSS  Assessment completed; denied any pain upon assessment   Fall precautions in place.   Bed at lowest position. Bed alarm in use  Call light and personal belongings within reach.   Continue to monitor

## 2020-09-13 NOTE — H&P
Hospital Medicine History & Physical Note    Date of Service  9/13/2020    Primary Care Physician  Denzel Patel M.D.    Consultants  None    Code Status  Full Code    Chief Complaint  Chief Complaint   Patient presents with   • Altered Mental Status     Increasing since Thurs   • Fever     Onset last night No documented        History of Presenting Illness  85 y.o. female, with history of hypertension and esophageal issues a status post fundoplication for stricture repair on August 24, 2020, who presented to the emergency department on 9/12/2020 with complaints of shortness of breath, generalized weakness and chills.    ER COURSE:  -Upon arrival to the ER, febrile with temperature 100.7, hypotensive with blood pressure 76/36.  She was also hypoxic with O2 saturations of 85%  -Patient received IV fluid resuscitation and her blood pressure now is 112/64.  -Laboratory showing significantly elevated WBC at 16.8, potassium 3.5.  Also positive UTI  -CT of the chest is showing right lower lobe pneumonia  -Rapid COVID test was negative    Review of Systems  Review of Systems   Constitutional: Positive for chills, fever and malaise/fatigue.   HENT: Negative for congestion and sore throat.    Eyes: Negative for blurred vision and double vision.   Respiratory: Positive for cough and shortness of breath.    Cardiovascular: Negative for chest pain and palpitations.   Gastrointestinal: Negative for nausea and vomiting.   Genitourinary: Negative for dysuria and urgency.   Musculoskeletal: Negative for myalgias and neck pain.   Skin: Negative for itching and rash.   Neurological: Negative for dizziness, weakness and headaches.   Endo/Heme/Allergies: Does not bruise/bleed easily.   Psychiatric/Behavioral: Negative for depression. The patient does not have insomnia.        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    Surgical History   has a past surgical history that includes breast biopsy (Left, 2015); lumbar decompression (1969, 1979); hysterectomy, total abdominal (1977); appendectomy (1945); parathyroid exploration (6/16/2010); paraesophageal hernia robotic (N/A, 10/12/2015); gastroscopy (10/23/2015); thoracoscopy (Left, 10/30/2015); gastroscopy-endo (11/1/2015); gastroscopy-endo (N/A, 12/10/2015); gastroscopy-endo (N/A, 2/26/2016); ankle orif (Right, 9/20/2016); and pr lap,esophagogast fundoplasty (N/A, 8/24/2020).     Family History  family history includes Hypertension in her mother and another family member; Lung Disease in her brother, father, and another family member.     Social History   reports that she has never smoked. She has quit using smokeless tobacco. She reports that she does not drink alcohol or use drugs.    Allergies  Allergies   Allergen Reactions   • Asa [Aspirin] Unspecified     Headache       Medications  Prior to Admission Medications   Prescriptions Last Dose Informant Patient Reported? Taking?   escitalopram (LEXAPRO) 10 MG Tab   No No   Sig: TAKE ONE TABLET BY MOUTH DAILY   gabapentin (NEURONTIN) 300 MG Cap   No No   Sig: Take 1 Cap by mouth every day.   Patient taking differently: Take 300 mg by mouth every bedtime.   lisinopril-hydrochlorothiazide (PRINZIDE) 10-12.5 MG per tablet   No No   Sig: TAKE ONE TABLET BY MOUTH DAILY (EVERY 24 HOURS) *STOP LISINOPRIL*   metoprolol SR (TOPROL XL) 50 MG TABLET SR 24 HR  Patient Yes No   Sig: Take 50 mg by mouth every bedtime.      Facility-Administered  Medications: None       Physical Exam  Temp:  [37 °C (98.6 °F)-38.2 °C (100.7 °F)] 37 °C (98.6 °F)  Pulse:  [64-85] 79  Resp:  [16-36] 21  BP: ()/(36-64) 119/57  SpO2:  [85 %-100 %] 100 %    Physical Exam  Constitutional:       Appearance: Normal appearance.   HENT:      Head: Normocephalic and atraumatic.      Mouth/Throat:      Mouth: Mucous membranes are moist.      Pharynx: Oropharynx is clear.   Eyes:      Extraocular Movements: Extraocular movements intact.      Pupils: Pupils are equal, round, and reactive to light.   Neck:      Musculoskeletal: Normal range of motion and neck supple.   Cardiovascular:      Rate and Rhythm: Normal rate and regular rhythm.      Heart sounds: Normal heart sounds.   Pulmonary:      Effort: Pulmonary effort is normal.      Breath sounds: Normal breath sounds.   Abdominal:      General: Abdomen is flat. Bowel sounds are normal.      Palpations: Abdomen is soft.   Skin:     General: Skin is warm and dry.   Neurological:      General: No focal deficit present.      Mental Status: She is alert and oriented to person, place, and time.   Psychiatric:         Mood and Affect: Mood normal.         Behavior: Behavior normal.         Laboratory:  Recent Labs     09/12/20 2207   WBC 16.8*   RBC 3.42*   HEMOGLOBIN 9.6*   HEMATOCRIT 30.4*   MCV 88.9   MCH 28.1   MCHC 31.6*   RDW 41.3   PLATELETCT 287   MPV 10.2     Recent Labs     09/12/20 2207   SODIUM 133*   POTASSIUM 3.5*   CHLORIDE 96   CO2 21   GLUCOSE 140*   BUN 31*   CREATININE 1.31   CALCIUM 9.2     Recent Labs     09/12/20 2207   ALTSGPT 13   ASTSGOT 16   ALKPHOSPHAT 92   TBILIRUBIN 0.2   GLUCOSE 140*     Recent Labs     09/12/20 2207   APTT 41.3*   INR 1.17*     Recent Labs     09/12/20 2207   NTPROBNP 2880*         Recent Labs     09/12/20 2207   TROPONINT 14       Imaging:  CT-CTA CHEST PULMONARY ARTERY W/ RECONS   Final Result      Right lower lobe consolidation compatible with pneumonia.      Stable bilateral  pulmonary nodules.      Scarring/atelectasis in the left upper lobe, lingula and right lower lobe.      Enlarged mediastinal and right hilar lymph nodes are new compared to prior. These may be reactive. Short interval follow-up CT chest is recommended.      Postsurgical changes of the distal esophagus and at the gastroesophageal junction. There appears to be esophageal wall thickening which can be seen in esophagitis. A small residual/recurrent hernia is not excluded.      Atherosclerotic plaque.            Low Risk: CT at 3-6 months, then consider CT at 18-24 months      High Risk: CT at 3-6 months, then at 18-24 months      Comments: Use most suspicious nodule as guide to management. Follow-up intervals may vary according to size and risk.      Low Risk - Minimal or absent history of smoking and of other known risk factors.      High Risk - History of smoking or of other known risk factors.      Note: These recommendations do not apply to lung cancer screening, patients with immunosuppression, or patients with known primary cancer.      Fleischner Society 2017 Guidelines for Management of Incidentally Detected Pulmonary Nodules in Adults      DX-CHEST-PORTABLE (1 VIEW)   Final Result      Patchy right basilar opacities may represent pneumonitis.      Linear lingular opacities likely represent atelectasis or scarring.      Blunting of the left costophrenic angle is again noted which may represent pleural thickening or trace pleural fluid.      Atherosclerotic plaque.           EKG: Normal sinus rhythm at 61 bpm.  Normal axis and no acute ST elevation.  This is my interpretation.    Assessment/Plan:  I anticipate this patient will require at least two midnights for appropriate medical management, necessitating inpatient admission.    * Sepsis (HCC)  Assessment & Plan  This is Sepsis Present on admission  SIRS criteria identified on my evaluation include: Leukocytosis, with WBC greater than 12,000 and  tachypnea  Source is right lower lobe pneumonia  Sepsis protocol initiated  Fluid resuscitation ordered per protocol  IV antibiotics as appropriate for source of sepsis: She received Zosyn and vancomycin in the ED and I am continue with Rocephin and azithromycin  She was initially hypotensive with blood pressure of 76/36, received 30 mg/kg IV fluid resuscitation with good response, currently at 119/57  While organ dysfunction may be noted elsewhere in this problem list or in the chart, degree of organ dysfunction does not meet CMS criteria for severe sepsis  COVID test done and sent.          Acute UTI  Assessment & Plan  -Plan as above    Right lower lobe pneumonia  Assessment & Plan  -Plan as above    Elevated brain natriuretic peptide (BNP) level  Assessment & Plan  -Initial BNP 2888.  Patient is receiving fluid resuscitation considering initial hypotension under the context of sepsis.  -Monitor for fluid overload.    Neuropathy- (present on admission)  Assessment & Plan  -Continue gabapentin    Paraesophageal hiatal hernia s/p robotic repair 10/12- (present on admission)  Assessment & Plan  -Most recently, status post esophageal repair with fundoplication done on 8/24/2020 by Dr. Ganser.  Prior history of esophageal stent placement in 2015 complicated by perforation and sepsis and ongoing esophageal issues.  See prior discharge summaries for more details.  At this time, she is asymptomatic regarding gastric issues    HTN (hypertension)- (present on admission)  Assessment & Plan  -We will will hold off metoprolol lisinopril hydrochlorothiazide at this time as she presented with hypotension in the context of sepsis.  Closely monitor blood pressure and consider restarting her home medications if becomes hypertensive      DVT Ppx: Lovenox

## 2020-09-14 LAB
ALBUMIN SERPL BCP-MCNC: 2.7 G/DL (ref 3.2–4.9)
ALBUMIN/GLOB SERPL: 0.8 G/DL
ALP SERPL-CCNC: 106 U/L (ref 30–99)
ALT SERPL-CCNC: 14 U/L (ref 2–50)
ANION GAP SERPL CALC-SCNC: 10 MMOL/L (ref 7–16)
AST SERPL-CCNC: 17 U/L (ref 12–45)
BASOPHILS # BLD AUTO: 0.5 % (ref 0–1.8)
BASOPHILS # BLD: 0.06 K/UL (ref 0–0.12)
BILIRUB SERPL-MCNC: <0.2 MG/DL (ref 0.1–1.5)
BUN SERPL-MCNC: 20 MG/DL (ref 8–22)
CALCIUM SERPL-MCNC: 8.7 MG/DL (ref 8.4–10.2)
CHLORIDE SERPL-SCNC: 100 MMOL/L (ref 96–112)
CO2 SERPL-SCNC: 25 MMOL/L (ref 20–33)
CREAT SERPL-MCNC: 0.94 MG/DL (ref 0.5–1.4)
EOSINOPHIL # BLD AUTO: 0.74 K/UL (ref 0–0.51)
EOSINOPHIL NFR BLD: 6.1 % (ref 0–6.9)
ERYTHROCYTE [DISTWIDTH] IN BLOOD BY AUTOMATED COUNT: 40.9 FL (ref 35.9–50)
GLOBULIN SER CALC-MCNC: 3.4 G/DL (ref 1.9–3.5)
GLUCOSE SERPL-MCNC: 102 MG/DL (ref 65–99)
HCT VFR BLD AUTO: 28 % (ref 37–47)
HGB BLD-MCNC: 9 G/DL (ref 12–16)
IMM GRANULOCYTES # BLD AUTO: 0.05 K/UL (ref 0–0.11)
IMM GRANULOCYTES NFR BLD AUTO: 0.4 % (ref 0–0.9)
LYMPHOCYTES # BLD AUTO: 1.8 K/UL (ref 1–4.8)
LYMPHOCYTES NFR BLD: 14.9 % (ref 22–41)
MCH RBC QN AUTO: 28.1 PG (ref 27–33)
MCHC RBC AUTO-ENTMCNC: 32.1 G/DL (ref 33.6–35)
MCV RBC AUTO: 87.5 FL (ref 81.4–97.8)
MONOCYTES # BLD AUTO: 0.79 K/UL (ref 0–0.85)
MONOCYTES NFR BLD AUTO: 6.6 % (ref 0–13.4)
NEUTROPHILS # BLD AUTO: 8.62 K/UL (ref 2–7.15)
NEUTROPHILS NFR BLD: 71.5 % (ref 44–72)
NRBC # BLD AUTO: 0 K/UL
NRBC BLD-RTO: 0 /100 WBC
PLATELET # BLD AUTO: 252 K/UL (ref 164–446)
PMV BLD AUTO: 10.2 FL (ref 9–12.9)
POTASSIUM SERPL-SCNC: 4 MMOL/L (ref 3.6–5.5)
PROT SERPL-MCNC: 6.1 G/DL (ref 6–8.2)
RBC # BLD AUTO: 3.2 M/UL (ref 4.2–5.4)
SODIUM SERPL-SCNC: 135 MMOL/L (ref 135–145)
WBC # BLD AUTO: 12.1 K/UL (ref 4.8–10.8)

## 2020-09-14 PROCEDURE — 700105 HCHG RX REV CODE 258: Performed by: HOSPITALIST

## 2020-09-14 PROCEDURE — 85025 COMPLETE CBC W/AUTO DIFF WBC: CPT

## 2020-09-14 PROCEDURE — A9270 NON-COVERED ITEM OR SERVICE: HCPCS | Performed by: NURSE PRACTITIONER

## 2020-09-14 PROCEDURE — 770020 HCHG ROOM/CARE - TELE (206)

## 2020-09-14 PROCEDURE — 94669 MECHANICAL CHEST WALL OSCILL: CPT

## 2020-09-14 PROCEDURE — 700102 HCHG RX REV CODE 250 W/ 637 OVERRIDE(OP): Performed by: HOSPITALIST

## 2020-09-14 PROCEDURE — A9270 NON-COVERED ITEM OR SERVICE: HCPCS | Performed by: INTERNAL MEDICINE

## 2020-09-14 PROCEDURE — 99232 SBSQ HOSP IP/OBS MODERATE 35: CPT | Performed by: INTERNAL MEDICINE

## 2020-09-14 PROCEDURE — 700102 HCHG RX REV CODE 250 W/ 637 OVERRIDE(OP): Performed by: INTERNAL MEDICINE

## 2020-09-14 PROCEDURE — 700102 HCHG RX REV CODE 250 W/ 637 OVERRIDE(OP): Performed by: NURSE PRACTITIONER

## 2020-09-14 PROCEDURE — 700111 HCHG RX REV CODE 636 W/ 250 OVERRIDE (IP): Performed by: HOSPITALIST

## 2020-09-14 PROCEDURE — A9270 NON-COVERED ITEM OR SERVICE: HCPCS | Performed by: HOSPITALIST

## 2020-09-14 PROCEDURE — 80053 COMPREHEN METABOLIC PANEL: CPT

## 2020-09-14 PROCEDURE — 94760 N-INVAS EAR/PLS OXIMETRY 1: CPT

## 2020-09-14 RX ORDER — GUAIFENESIN 600 MG/1
600 TABLET, EXTENDED RELEASE ORAL EVERY 12 HOURS
Status: DISCONTINUED | OUTPATIENT
Start: 2020-09-14 | End: 2020-09-16 | Stop reason: HOSPADM

## 2020-09-14 RX ORDER — TRAZODONE HYDROCHLORIDE 50 MG/1
50 TABLET ORAL
Status: DISCONTINUED | OUTPATIENT
Start: 2020-09-14 | End: 2020-09-16 | Stop reason: HOSPADM

## 2020-09-14 RX ORDER — HYDROCODONE BITARTRATE AND ACETAMINOPHEN 10; 325 MG/1; MG/1
1 TABLET ORAL EVERY 4 HOURS PRN
Status: DISCONTINUED | OUTPATIENT
Start: 2020-09-14 | End: 2020-09-16 | Stop reason: HOSPADM

## 2020-09-14 RX ADMIN — GABAPENTIN 300 MG: 300 CAPSULE ORAL at 20:15

## 2020-09-14 RX ADMIN — SENNOSIDES-DOCUSATE SODIUM TAB 8.6-50 MG 2 TABLET: 8.6-5 TAB at 17:02

## 2020-09-14 RX ADMIN — CEFTRIAXONE SODIUM 1 G: 1 INJECTION, POWDER, FOR SOLUTION INTRAMUSCULAR; INTRAVENOUS at 02:00

## 2020-09-14 RX ADMIN — GUAIFENESIN 600 MG: 600 TABLET, EXTENDED RELEASE ORAL at 21:49

## 2020-09-14 RX ADMIN — GUAIFENESIN 600 MG: 600 TABLET, EXTENDED RELEASE ORAL at 10:46

## 2020-09-14 RX ADMIN — BISACODYL 10 MG: 10 SUPPOSITORY RECTAL at 10:46

## 2020-09-14 RX ADMIN — ESCITALOPRAM OXALATE 10 MG: 10 TABLET ORAL at 05:13

## 2020-09-14 RX ADMIN — HYDROCODONE BITARTRATE AND ACETAMINOPHEN 1 TABLET: 10; 325 TABLET ORAL at 05:13

## 2020-09-14 RX ADMIN — AZITHROMYCIN 500 MG: 250 TABLET, FILM COATED ORAL at 05:13

## 2020-09-14 RX ADMIN — TRAZODONE HYDROCHLORIDE 50 MG: 50 TABLET ORAL at 21:49

## 2020-09-14 RX ADMIN — SENNOSIDES-DOCUSATE SODIUM TAB 8.6-50 MG 2 TABLET: 8.6-5 TAB at 05:13

## 2020-09-14 RX ADMIN — ENOXAPARIN SODIUM 30 MG: 30 INJECTION, SOLUTION INTRAVENOUS; SUBCUTANEOUS at 05:13

## 2020-09-14 RX ADMIN — HYDROCODONE BITARTRATE AND ACETAMINOPHEN 1 TABLET: 10; 325 TABLET ORAL at 10:52

## 2020-09-14 RX ADMIN — ACETAMINOPHEN 650 MG: 325 TABLET, FILM COATED ORAL at 17:02

## 2020-09-14 ASSESSMENT — ENCOUNTER SYMPTOMS
HEADACHES: 0
CONSTIPATION: 1
MYALGIAS: 1
ABDOMINAL PAIN: 0
SORE THROAT: 0
DIZZINESS: 0
NERVOUS/ANXIOUS: 1
FEVER: 1
CHILLS: 1
SHORTNESS OF BREATH: 1
NAUSEA: 0
EYE DISCHARGE: 0
DEPRESSION: 0
COUGH: 1
VOMITING: 0
EYE PAIN: 0

## 2020-09-14 ASSESSMENT — PAIN DESCRIPTION - PAIN TYPE
TYPE: CHRONIC PAIN

## 2020-09-14 NOTE — FLOWSHEET NOTE
09/14/20 0652   Events/Summary/Plan   Events/Summary/Plan PEP and IS, 750 ml.   Vital Signs   Pulse 92   Respiration 18   Pulse Oximetry 92 %   $ Pulse Oximetry (Spot Check) Yes   Respiratory Assessment   Level of Consciousness Alert   Breath Sounds   RUL Breath Sounds Clear   RML Breath Sounds Clear   RLL Breath Sounds Diminished;Clear   HARRISON Breath Sounds Clear   LLL Breath Sounds Diminished;Clear   Secretions   Cough Moist;Non Productive;Strong   How Sputum Obtained Spontaneous   Oxygen   O2 (LPM) 2   O2 Delivery Device Silicone Nasal Cannula

## 2020-09-14 NOTE — CARE PLAN
Problem: Communication  Goal: The ability to communicate needs accurately and effectively will improve  Outcome: PROGRESSING AS EXPECTED  Note: Pt demonstrates appropriate use of call light when in need of assistance     Problem: Venous Thromboembolism (VTW)/Deep Vein Thrombosis (DVT) Prevention:  Goal: Patient will participate in Venous Thrombosis (VTE)/Deep Vein Thrombosis (DVT)Prevention Measures  Outcome: PROGRESSING AS EXPECTED  Flowsheets  Taken 9/13/2020 2313  SCDs, Sequential Compression Device: On  Taken 9/13/2020 2000  Mechanical Prophylaxis: SCDs, Sequential Compression Device  Note: SCDs on, pt ambulatory with SBA to commode. Educated pt about importance of ambulating and ROM.     Problem: Bowel/Gastric:  Goal: Normal bowel function is maintained or improved  Outcome: PROGRESSING SLOWER THAN EXPECTED  Note: Pt c/o constipation. Bowel protocol initiated.

## 2020-09-14 NOTE — CARE PLAN
Problem: Pain Management  Goal: Pain level will decrease to patient's comfort goal  Outcome: PROGRESSING AS EXPECTED  Note: Pt reporting 7/10 chronic pain, medicated with norco per MAR. Will continue to assess     Problem: Bowel/Gastric:  Goal: Normal bowel function is maintained or improved  Outcome: PROGRESSING SLOWER THAN EXPECTED  Note: Pt has not had bowel movement for 4 days now. PRN suppository given per MAR.      Problem: Respiratory:  Goal: Respiratory status will improve  Outcome: PROGRESSING SLOWER THAN EXPECTED  Note: Updated pt on plan of care including use of IS, pt is not motivated and requires coaching for use of IS will continue to encourage.

## 2020-09-14 NOTE — PROGRESS NOTES
Telemetry Shift Summary    Rhythm: SR/ST  HR: 80-100s  Ectopy: Braxton Montiel MT, R-PVC    Measurements for strip printed 9/14/2020 at 0240  HR 94  0.16 / 0.08 / 0.32    Normal Values  Rhythm: SR  HR:   Measurements: 0.12-0.20 / 0.06-0.10 / 0.30-0.52

## 2020-09-14 NOTE — FLOWSHEET NOTE
09/14/20 1454   Events/Summary/Plan   Events/Summary/Plan IS 1000 ml, and PEP   Vital Signs   Pulse 82   Respiration 18   Respiratory Assessment   Level of Consciousness Alert   Breath Sounds   RUL Breath Sounds Clear   RML Breath Sounds Clear;Diminished   RLL Breath Sounds Clear;Diminished   HARRISON Breath Sounds Clear   LLL Breath Sounds Clear;Diminished   Oxygen   O2 (LPM) 2   O2 Delivery Device Silicone Nasal Cannula

## 2020-09-14 NOTE — PROGRESS NOTES
[facility] Medicine Daily Progress Note    Date of Service  9/14/2020    Chief Complaint  85 y.o. female admitted 9/12/2020 with altered mentation and fever    Hospital Course    Patient is an 85-year-old female with known past medical history of anemia, atrophic vaginitis, depressive disorder, hyperlipidemia, hypertension, hypertriglyceridemia, narcotic dependence, osteopenia, vertigo, and parathyroid hormone excess.  Patient presented to the emergency room with complaints of altered mental status and fever.  Patient had recent hospitalization for fundoplication and stricture repair on August 24 of 2020, and now presents to the emergency room with generalized weakness, chills and shortness of breath.  On arrival in the emergency room patient was noted to have elevated temperature to 100.7 and was hypotensive with a blood pressure 76/36.  Patient received IV fluid resuscitation and blood pressure improved.  Patient was also noted to be hypoxic with O2 saturations at 85%.  Patient was placed on nasal cannula at 2 L of oxygen.  WBCs were noted to be elevated at 16.8.  And urinalysis showed positive for bacteria and trace leukocyte Estrace.  CT of the chest was completed which shows right lower lobe pneumonia.  COVID test was negative.  Patient was admitted to medical telemetry floor for continued monitoring and sepsis work-up.  Sepsis protocol was initiated.  Blood cultures were obtained and remain negative to date preliminary.  Patient was initiated on IV Zosyn and vancomycin in the ED and transition to Rocephin and azithromycin on medical floor on admit.  BNP was noted to be slightly elevated at 2888 and patient is receiving fluid resuscitation considering hypotension on arrival and sepsis diagnosis.       Interval Problem Update  9/14- Patient resting in bed, complaining of constipation and need for change in her Norco administration. Patient states she does not feel well but wants to go home. Patient continues to  require 2 L of O2 and is reluctant to follow respiratory instructions regarding IS and deep breathing. Will continue with antibiotics and RT protocol. No fevers for last 24 hours documented.     Consultants/Specialty  None    Code Status  Full Code    Disposition  TBD- patient unmotivated to participate fully     Review of Systems  Review of Systems   Constitutional: Positive for chills, fever and malaise/fatigue.   HENT: Negative for congestion and sore throat.    Eyes: Negative for pain and discharge.   Respiratory: Positive for cough and shortness of breath.    Cardiovascular: Negative for chest pain and leg swelling.   Gastrointestinal: Positive for constipation. Negative for abdominal pain, nausea and vomiting.   Genitourinary: Negative for dysuria and urgency.   Musculoskeletal: Positive for myalgias.   Skin: Negative for rash.   Neurological: Negative for dizziness and headaches.   Psychiatric/Behavioral: Negative for depression. The patient is nervous/anxious.         Physical Exam  Temp:  [36.8 °C (98.2 °F)-38.6 °C (101.4 °F)] 37 °C (98.6 °F)  Pulse:  [80-98] 82  Resp:  [16-20] 16  BP: (111-139)/(53-69) 111/63  SpO2:  [87 %-99 %] 92 %    Physical Exam  Vitals signs and nursing note reviewed.   Constitutional:       General: She is awake.      Appearance: She is normal weight.      Interventions: Nasal cannula in place.   HENT:      Head: Normocephalic and atraumatic.      Mouth/Throat:      Lips: Pink.      Mouth: Mucous membranes are moist.   Eyes:      General: Lids are normal.   Neck:      Musculoskeletal: Neck supple.   Cardiovascular:      Rate and Rhythm: Normal rate.      Heart sounds: Normal heart sounds. No friction rub.   Pulmonary:      Effort: No respiratory distress.      Breath sounds: Examination of the right-lower field reveals decreased breath sounds. Examination of the left-lower field reveals decreased breath sounds. Decreased breath sounds present.   Abdominal:      General: Bowel sounds  are normal.      Palpations: Abdomen is soft.      Tenderness: There is no abdominal tenderness.   Musculoskeletal:      Comments: MIKO   Skin:     General: Skin is warm and dry.   Neurological:      Mental Status: She is alert and oriented to person, place, and time.   Psychiatric:         Attention and Perception: Attention normal.         Mood and Affect: Mood is anxious.         Speech: Speech normal.         Behavior: Behavior is cooperative.         Fluids  No intake or output data in the 24 hours ending 09/14/20 1132    Laboratory  Recent Labs     09/12/20 2207 09/14/20  0436   WBC 16.8* 12.1*   RBC 3.42* 3.20*   HEMOGLOBIN 9.6* 9.0*   HEMATOCRIT 30.4* 28.0*   MCV 88.9 87.5   MCH 28.1 28.1   MCHC 31.6* 32.1*   RDW 41.3 40.9   PLATELETCT 287 252   MPV 10.2 10.2     Recent Labs     09/12/20 2207 09/14/20  0436   SODIUM 133* 135   POTASSIUM 3.5* 4.0   CHLORIDE 96 100   CO2 21 25   GLUCOSE 140* 102*   BUN 31* 20   CREATININE 1.31 0.94   CALCIUM 9.2 8.7     Recent Labs     09/12/20 2207   APTT 41.3*   INR 1.17*               Imaging  CT-CTA CHEST PULMONARY ARTERY W/ RECONS   Final Result      Right lower lobe consolidation compatible with pneumonia.      Stable bilateral pulmonary nodules.      Scarring/atelectasis in the left upper lobe, lingula and right lower lobe.      Enlarged mediastinal and right hilar lymph nodes are new compared to prior. These may be reactive. Short interval follow-up CT chest is recommended.      Postsurgical changes of the distal esophagus and at the gastroesophageal junction. There appears to be esophageal wall thickening which can be seen in esophagitis. A small residual/recurrent hernia is not excluded.      Atherosclerotic plaque.            Low Risk: CT at 3-6 months, then consider CT at 18-24 months      High Risk: CT at 3-6 months, then at 18-24 months      Comments: Use most suspicious nodule as guide to management. Follow-up intervals may vary according to size and risk.       Low Risk - Minimal or absent history of smoking and of other known risk factors.      High Risk - History of smoking or of other known risk factors.      Note: These recommendations do not apply to lung cancer screening, patients with immunosuppression, or patients with known primary cancer.      Fleischner Society 2017 Guidelines for Management of Incidentally Detected Pulmonary Nodules in Adults      DX-CHEST-PORTABLE (1 VIEW)   Final Result      Patchy right basilar opacities may represent pneumonitis.      Linear lingular opacities likely represent atelectasis or scarring.      Blunting of the left costophrenic angle is again noted which may represent pleural thickening or trace pleural fluid.      Atherosclerotic plaque.              Assessment/Plan  * Sepsis (HCC)  Assessment & Plan  This is Sepsis Present on admission  SIRS criteria identified on my evaluation include: Leukocytosis, with WBC greater than 12,000 and tachypnea  Source is right lower lobe pneumonia  Sepsis protocol initiated  Fluid resuscitation ordered per protocol  IV antibiotics as appropriate for source of sepsis: She received Zosyn and vancomycin in the ED continue with Rocephin and azithromycin on admission   While organ dysfunction may be noted elsewhere in this problem list or in the chart, degree of organ dysfunction does not meet CMS criteria for severe sepsis  COVID test negative           Acute UTI  Assessment & Plan  -Continue with antibiotic therapy   -Monitor I&O  -Encourage cleanliness    Right lower lobe pneumonia  Assessment & Plan  RT protocol- nebulizer treatments as needed   O2 as needed wean as tolerated   -IS   Continue Antibiotic therapy       Elevated brain natriuretic peptide (BNP) level  Assessment & Plan  -Initial BNP 2888.  Patient is receiving fluid resuscitation considering initial hypotension under the context of sepsis.  -Monitor for fluid overload.    Neuropathy- (present on admission)  Assessment &  Plan  -Continue gabapentin    Paraesophageal hiatal hernia s/p robotic repair 10/12- (present on admission)  Assessment & Plan  -Most recently, status post esophageal repair with fundoplication done on 8/24/2020 by Dr. Ganser.  Prior history of esophageal stent placement in 2015 complicated by perforation and sepsis and ongoing esophageal issues.  See prior discharge summaries for more details.  At this time, she is asymptomatic regarding gastric issues    HTN (hypertension)- (present on admission)  Assessment & Plan  -Currentlyl holding her metoprolol,  Lisinopril, and  hydrochlorothiazide at this time as she presented with hypotension in the context of sepsis.  Closely monitor blood pressure and consider restarting her home medications if becomes hypertensive       VTE prophylaxis: Lovenox

## 2020-09-14 NOTE — FLOWSHEET NOTE
09/14/20 1054   Events/Summary/Plan   Events/Summary/Plan -1000 ml,  and PEP   Vital Signs   Pulse 82   Respiration 16   Respiratory Assessment   Level of Consciousness Alert   Breath Sounds   RUL Breath Sounds Clear   RML Breath Sounds Clear;Diminished   RLL Breath Sounds Crackles;Diminished   HARRISON Breath Sounds Clear   LLL Breath Sounds Clear;Diminished   Oxygen   O2 (LPM) 2   O2 Delivery Device Silicone Nasal Cannula

## 2020-09-14 NOTE — PROGRESS NOTES
1900 Received report from SAGRARIO Rivas. Pt is awake, resting comfortably in bed. POC discussed, pt declines any needs at this time. Call light in reach, safety precautions in place.    2000 Assessment complete. Pt AOx4 on 2L NC. Per RT, pt refused to do breathing exercises. Pt educated that without practicing breathing exercises, further complications may occur or she may require O2 24/7 at home. Pt demonstrates weak effort when using IS. Pt is encouraged to practice IS 5x/hr every hour while awake tomorrow. Pt verbalized understanding but reinforcement is needed.    0200 Rounded on pt. Pt asleep, respirations even and unlabored. No acute signs of distress noted.    0515 AM medications administered. Pt requests a bedbath, pt informed that when staff is available that they will assist her in getting cleaned up. Bed low and locked, will continue to monitor.

## 2020-09-15 PROBLEM — J96.01 ACUTE HYPOXEMIC RESPIRATORY FAILURE (HCC): Status: ACTIVE | Noted: 2020-09-15

## 2020-09-15 PROBLEM — B96.20 E. COLI UTI: Status: ACTIVE | Noted: 2020-09-13

## 2020-09-15 LAB
ANION GAP SERPL CALC-SCNC: 13 MMOL/L (ref 7–16)
BACTERIA UR CULT: ABNORMAL
BACTERIA UR CULT: ABNORMAL
BUN SERPL-MCNC: 15 MG/DL (ref 8–22)
CALCIUM SERPL-MCNC: 8.7 MG/DL (ref 8.4–10.2)
CHLORIDE SERPL-SCNC: 101 MMOL/L (ref 96–112)
CO2 SERPL-SCNC: 25 MMOL/L (ref 20–33)
CREAT SERPL-MCNC: 0.75 MG/DL (ref 0.5–1.4)
ERYTHROCYTE [DISTWIDTH] IN BLOOD BY AUTOMATED COUNT: 41.9 FL (ref 35.9–50)
GLUCOSE SERPL-MCNC: 99 MG/DL (ref 65–99)
HCT VFR BLD AUTO: 28.1 % (ref 37–47)
HGB BLD-MCNC: 8.8 G/DL (ref 12–16)
MCH RBC QN AUTO: 27.6 PG (ref 27–33)
MCHC RBC AUTO-ENTMCNC: 31.3 G/DL (ref 33.6–35)
MCV RBC AUTO: 88.1 FL (ref 81.4–97.8)
PLATELET # BLD AUTO: 248 K/UL (ref 164–446)
PMV BLD AUTO: 10.1 FL (ref 9–12.9)
POTASSIUM SERPL-SCNC: 4.2 MMOL/L (ref 3.6–5.5)
RBC # BLD AUTO: 3.19 M/UL (ref 4.2–5.4)
SIGNIFICANT IND 70042: ABNORMAL
SITE SITE: ABNORMAL
SODIUM SERPL-SCNC: 139 MMOL/L (ref 135–145)
SOURCE SOURCE: ABNORMAL
WBC # BLD AUTO: 13.3 K/UL (ref 4.8–10.8)

## 2020-09-15 PROCEDURE — 99233 SBSQ HOSP IP/OBS HIGH 50: CPT | Performed by: HOSPITALIST

## 2020-09-15 PROCEDURE — 770020 HCHG ROOM/CARE - TELE (206)

## 2020-09-15 PROCEDURE — 700111 HCHG RX REV CODE 636 W/ 250 OVERRIDE (IP): Performed by: HOSPITALIST

## 2020-09-15 PROCEDURE — 700102 HCHG RX REV CODE 250 W/ 637 OVERRIDE(OP): Performed by: HOSPITALIST

## 2020-09-15 PROCEDURE — 700105 HCHG RX REV CODE 258: Performed by: HOSPITALIST

## 2020-09-15 PROCEDURE — 700102 HCHG RX REV CODE 250 W/ 637 OVERRIDE(OP): Performed by: NURSE PRACTITIONER

## 2020-09-15 PROCEDURE — A9270 NON-COVERED ITEM OR SERVICE: HCPCS | Performed by: HOSPITALIST

## 2020-09-15 PROCEDURE — A9270 NON-COVERED ITEM OR SERVICE: HCPCS | Performed by: INTERNAL MEDICINE

## 2020-09-15 PROCEDURE — A9270 NON-COVERED ITEM OR SERVICE: HCPCS | Performed by: NURSE PRACTITIONER

## 2020-09-15 PROCEDURE — 80048 BASIC METABOLIC PNL TOTAL CA: CPT

## 2020-09-15 PROCEDURE — 85027 COMPLETE CBC AUTOMATED: CPT

## 2020-09-15 PROCEDURE — 700102 HCHG RX REV CODE 250 W/ 637 OVERRIDE(OP): Performed by: INTERNAL MEDICINE

## 2020-09-15 PROCEDURE — 94760 N-INVAS EAR/PLS OXIMETRY 1: CPT

## 2020-09-15 RX ORDER — AMOXICILLIN AND CLAVULANATE POTASSIUM 875; 125 MG/1; MG/1
1 TABLET, FILM COATED ORAL EVERY 12 HOURS
Status: DISCONTINUED | OUTPATIENT
Start: 2020-09-15 | End: 2020-09-16

## 2020-09-15 RX ADMIN — CEFTRIAXONE SODIUM 1 G: 1 INJECTION, POWDER, FOR SOLUTION INTRAMUSCULAR; INTRAVENOUS at 02:02

## 2020-09-15 RX ADMIN — AMOXICILLIN AND CLAVULANATE POTASSIUM 1 TABLET: 875; 125 TABLET, FILM COATED ORAL at 17:29

## 2020-09-15 RX ADMIN — ESCITALOPRAM OXALATE 10 MG: 10 TABLET ORAL at 05:40

## 2020-09-15 RX ADMIN — GUAIFENESIN 600 MG: 600 TABLET, EXTENDED RELEASE ORAL at 17:29

## 2020-09-15 RX ADMIN — ENOXAPARIN SODIUM 30 MG: 30 INJECTION, SOLUTION INTRAVENOUS; SUBCUTANEOUS at 05:40

## 2020-09-15 RX ADMIN — HYDROCODONE BITARTRATE AND ACETAMINOPHEN 1 TABLET: 10; 325 TABLET ORAL at 09:04

## 2020-09-15 RX ADMIN — AZITHROMYCIN 500 MG: 250 TABLET, FILM COATED ORAL at 05:41

## 2020-09-15 RX ADMIN — SENNOSIDES-DOCUSATE SODIUM TAB 8.6-50 MG 2 TABLET: 8.6-5 TAB at 17:29

## 2020-09-15 RX ADMIN — HYDROCODONE BITARTRATE AND ACETAMINOPHEN 1 TABLET: 10; 325 TABLET ORAL at 14:20

## 2020-09-15 RX ADMIN — GUAIFENESIN 600 MG: 600 TABLET, EXTENDED RELEASE ORAL at 05:41

## 2020-09-15 RX ADMIN — TRAZODONE HYDROCHLORIDE 50 MG: 50 TABLET ORAL at 20:56

## 2020-09-15 RX ADMIN — ACETAMINOPHEN 650 MG: 325 TABLET, FILM COATED ORAL at 20:56

## 2020-09-15 RX ADMIN — SENNOSIDES-DOCUSATE SODIUM TAB 8.6-50 MG 2 TABLET: 8.6-5 TAB at 05:41

## 2020-09-15 RX ADMIN — GABAPENTIN 300 MG: 300 CAPSULE ORAL at 20:56

## 2020-09-15 ASSESSMENT — PAIN DESCRIPTION - PAIN TYPE
TYPE: CHRONIC PAIN

## 2020-09-15 ASSESSMENT — ENCOUNTER SYMPTOMS
DIZZINESS: 0
FEVER: 0
VOMITING: 0
EYE PAIN: 0
DEPRESSION: 0
ABDOMINAL PAIN: 0
CHILLS: 0
WEAKNESS: 1
EYE DISCHARGE: 0
NERVOUS/ANXIOUS: 0
SORE THROAT: 0
HEADACHES: 0
COUGH: 1
CONSTIPATION: 1
MYALGIAS: 1
SHORTNESS OF BREATH: 0
NAUSEA: 0

## 2020-09-15 ASSESSMENT — FIBROSIS 4 INDEX: FIB4 SCORE: 1.5

## 2020-09-15 NOTE — PROGRESS NOTES
Telemetry Shift Summary     Rhythm SR  HR Range 80s-90s  Ectopy rPVCs  Measurements 0.16/0.08/0.32           Normal Values  Rhythm SR  HR Range    Measurements 0.12-0.20 / 0.06-0.10  / 0.30-0.52

## 2020-09-15 NOTE — ASSESSMENT & PLAN NOTE
- reviewed  -continue home dosing of narcotics  -she will not DC home with RX for additional narcotics  -hold narcotics for RR <12 or altered mental status

## 2020-09-15 NOTE — CARE PLAN
Problem: Knowledge Deficit  Goal: Knowledge of disease process/condition, treatment plan, diagnostic tests, and medications will improve  Outcome: PROGRESSING AS EXPECTED  Note: Pt verbalizes understanding of education on the medications that were given. Has no questions at this time.      Problem: Respiratory:  Goal: Respiratory status will improve  Outcome: PROGRESSING AS EXPECTED  Note: Pt denies SOB. Pt encouraged to use IS, can get up 1000. Pt verbalizes understanding of education.

## 2020-09-15 NOTE — ASSESSMENT & PLAN NOTE
-Due to community-acquired pneumonia  -Weaning O2 to keep sats greater than 92%  -RT protocol  -Encourage incentive spirometry and ambulation/mobility

## 2020-09-15 NOTE — PROGRESS NOTES
Report received from Racheal ZABALA. Plan of care discussed. Pt resting comfortably in bed. Unlabored breathing, safety precautions in place.

## 2020-09-15 NOTE — PROGRESS NOTES
"Hospital Medicine Daily Progress Note    Date of Service  9/15/2020    Chief Complaint  Weakness and fevers    Hospital Course   Ms. 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  On arrival to the emergency department temperature was 100.7 and patient was hypotensive with BP 76/36.  She received IV fluid resuscitation with improvement in her blood pressures.  She was also noted to be hypoxic with O2 sats 85% on room air.  WBC 16.8.  UA positive for bacteria and trace leuk esterase.  CT chest showed RLL pneumonia and enlarged mediastinal and right hilar lymph nodes new compared to prior CT scan.  Will need follow-up CT chest as outpatient.  COVID testing negative. She was treated for community-acquired pneumonia and UTI with the sepsis protocol with IV ABX Zosyn and vancomycin.  Blood cultures negative to date.  Urine cultures ultimately grew pansensitive E. Coli.  Completed 3-day course IV Rocephin.  Transitioned to p.o. ABX on 9/15 x 2 more days.      Interval Problem Update  -Moods are labile.  Rude with staff at times.  Ambulates with encouragement.  -Continues to require supplemental O2 to keep sats greater than 92%.  Ambulation studies show patient qualifies for home O2.  Discussed with patient who states \"you can get it for me but I will not use it\".  Likely will be declined due to non-qualifying diagnosis  -Continue to encourage incentive spirometry and mobility/ambulation.      Consultants/Specialty  None    Code Status  Full Code    Disposition  DC later today if home O2 approved.    Review of Systems  Review of Systems   Constitutional: Positive for malaise/fatigue. Negative for chills and fever.   HENT: " Negative for congestion and sore throat.    Eyes: Negative for pain and discharge.   Respiratory: Positive for cough (improving). Negative for shortness of breath.    Cardiovascular: Negative for chest pain and leg swelling.   Gastrointestinal: Positive for constipation (chronic). Negative for abdominal pain, nausea and vomiting.   Genitourinary: Negative for dysuria and urgency.   Musculoskeletal: Positive for myalgias.   Skin: Negative for rash.   Neurological: Positive for weakness. Negative for dizziness and headaches.   Psychiatric/Behavioral: Negative for depression. The patient is not nervous/anxious.    All other systems reviewed and are negative.       Physical Exam  Temp:  [36.7 °C (98.1 °F)-37.2 °C (99 °F)] 36.7 °C (98.1 °F)  Pulse:  [] 97  Resp:  [16-18] 18  BP: (128-143)/(61-78) 141/68  SpO2:  [89 %-96 %] 90 %    Physical Exam  Vitals signs and nursing note reviewed.   Constitutional:       General: She is awake.      Appearance: She is normal weight. She is not ill-appearing or toxic-appearing.      Interventions: Nasal cannula in place.   HENT:      Head: Normocephalic and atraumatic.      Right Ear: Hearing normal.      Left Ear: Hearing normal.      Nose: Nose normal.      Mouth/Throat:      Lips: Pink.      Mouth: Mucous membranes are moist.   Eyes:      General: Lids are normal.   Neck:      Musculoskeletal: Neck supple.   Cardiovascular:      Rate and Rhythm: Normal rate.      Heart sounds: Normal heart sounds. No friction rub.   Pulmonary:      Effort: Pulmonary effort is normal. No respiratory distress.      Breath sounds: Examination of the right-lower field reveals rhonchi. Rhonchi present. No decreased breath sounds.   Abdominal:      General: Bowel sounds are normal.      Palpations: Abdomen is soft.      Tenderness: There is no abdominal tenderness.   Musculoskeletal: Normal range of motion.      Comments: Ambulatory    Skin:     General: Skin is warm and dry.      Capillary Refill:  Capillary refill takes 2 to 3 seconds.   Neurological:      Mental Status: She is alert and oriented to person, place, and time.      Sensory: Sensation is intact.      Motor: Motor function is intact.   Psychiatric:         Attention and Perception: Attention normal.         Mood and Affect: Mood is not anxious. Affect is labile.         Speech: Speech normal.         Behavior: Behavior is cooperative.         Cognition and Memory: Cognition normal.         Judgment: Judgment normal.      Comments: Jesus/rude at times         Fluids  No intake or output data in the 24 hours ending 09/15/20 1350    Laboratory  Recent Labs     09/12/20 2207 09/14/20 0436 09/15/20  0505   WBC 16.8* 12.1* 13.3*   RBC 3.42* 3.20* 3.19*   HEMOGLOBIN 9.6* 9.0* 8.8*   HEMATOCRIT 30.4* 28.0* 28.1*   MCV 88.9 87.5 88.1   MCH 28.1 28.1 27.6   MCHC 31.6* 32.1* 31.3*   RDW 41.3 40.9 41.9   PLATELETCT 287 252 248   MPV 10.2 10.2 10.1     Recent Labs     09/12/20 2207 09/14/20 0436 09/15/20  0505   SODIUM 133* 135 139   POTASSIUM 3.5* 4.0 4.2   CHLORIDE 96 100 101   CO2 21 25 25   GLUCOSE 140* 102* 99   BUN 31* 20 15   CREATININE 1.31 0.94 0.75   CALCIUM 9.2 8.7 8.7     Recent Labs     09/12/20 2207   APTT 41.3*   INR 1.17*               Imaging  CT-CTA CHEST PULMONARY ARTERY W/ RECONS   Final Result      Right lower lobe consolidation compatible with pneumonia.      Stable bilateral pulmonary nodules.      Scarring/atelectasis in the left upper lobe, lingula and right lower lobe.      Enlarged mediastinal and right hilar lymph nodes are new compared to prior. These may be reactive. Short interval follow-up CT chest is recommended.      Postsurgical changes of the distal esophagus and at the gastroesophageal junction. There appears to be esophageal wall thickening which can be seen in esophagitis. A small residual/recurrent hernia is not excluded.      Atherosclerotic plaque.            Low Risk: CT at 3-6 months, then consider CT at 18-24  months      High Risk: CT at 3-6 months, then at 18-24 months      Comments: Use most suspicious nodule as guide to management. Follow-up intervals may vary according to size and risk.      Low Risk - Minimal or absent history of smoking and of other known risk factors.      High Risk - History of smoking or of other known risk factors.      Note: These recommendations do not apply to lung cancer screening, patients with immunosuppression, or patients with known primary cancer.      Fleischner Society 2017 Guidelines for Management of Incidentally Detected Pulmonary Nodules in Adults      DX-CHEST-PORTABLE (1 VIEW)   Final Result      Patchy right basilar opacities may represent pneumonitis.      Linear lingular opacities likely represent atelectasis or scarring.      Blunting of the left costophrenic angle is again noted which may represent pleural thickening or trace pleural fluid.      Atherosclerotic plaque.              Assessment/Plan  * Sepsis (HCC)- (present on admission)  Assessment & Plan  This is Sepsis Present on admission  SIRS criteria identified on my evaluation include: Leukocytosis, with WBC greater than 12,000 and tachypnea  Source is right lower lobe pneumonia and E. coli UTI  Sepsis protocol initiated  Fluid resuscitation ordered per protocol  Completed course of IV Rocephin for UTI.  2 more days p.o. Augmentin for CAP  While organ dysfunction may be noted elsewhere in this problem list or in the chart, degree of organ dysfunction does not meet CMS criteria for severe sepsis  COVID test negative           E. coli UTI  Assessment & Plan  -Completed 3-day course of IV Rocephin  -Currently denies symptoms      Right lower lobe pneumonia  Assessment & Plan  -Weaning FiO2 to keep sats greater than 92%  RT protocol- nebulizer treatments as needed   O2 as needed wean as tolerated   -IS   Continue Antibiotic therapy       Elevated brain natriuretic peptide (BNP) level- (present on admission)  Assessment  & Plan  -Likely due to aggressive fluid resuscitation at presentation  -No signs of acute heart failure      Acute hypoxemic respiratory failure (HCC)  Assessment & Plan  -Due to community-acquired pneumonia  -Weaning O2 to keep sats greater than 92%  -RT protocol  -Encourage incentive spirometry and ambulation/mobility    Narcotic dependence (HCC)- (present on admission)  Assessment & Plan  - reviewed  -continue home dosing of narcotics  -she will not DC home with RX for additional narcotics  -hold narcotics for RR <12 or altered mental status    Neuropathy- (present on admission)  Assessment & Plan  -Continue gabapentin    Depression with anxiety- (present on admission)  Assessment & Plan  -Chronic and stable at her baseline  -Continue home Lexapro    Paraesophageal hiatal hernia s/p robotic repair 10/12- (present on admission)  Assessment & Plan  -Most recently, status post esophageal repair with fundoplication done on 8/24/2020 by Dr. Ganser.   -Prior history of esophageal stent placement in 2015 complicated by perforation and sepsis and ongoing esophageal issues.  See prior discharge summaries for more details.      HTN (hypertension)- (present on admission)  Assessment & Plan  -Continue to hold her home antihypertensives.  Consider resuming at discharge         VTE prophylaxis: Enoxaparin    I have performed a physical exam and reviewed and updated ROS and Assessment/Plan today (09/15/20). In review of the previous note there are no changes except as documented above    Please note that this dictation was created using voice recognition software. I have made every reasonable attempt to correct obvious errors, but there may be errors of grammar and possibly content that I did not discover before finalizing the note.    ALBERTINA Damon.

## 2020-09-15 NOTE — FACE TO FACE
"Face to Face Note  -  Durable Medical Equipment    CONCEPCIÓN Damon - NPI: [id number]  I certify that this patient is under my care and that they had a durable medical equipment(DME)face to face encounter by myself that meets the physician DME face-to-face encounter requirements with this patient on:    Date of encounter:   Patient:                    MRN:                       YOB: 2018  Ellen Srivastava  [id number]  1935     The encounter with the patient was in whole, or in part, for the following medical condition, which is the primary reason for durable medical equipment:  Other - Community Acquired Pneumonia    I certify that, based on my findings, the following durable medical equipment is medically necessary:  Oxygen.    HOME O2 Saturation Measurements:(Values must be present for Home Oxygen orders)  Room air sat at rest: 91  Room air sat with amb: 87  With liters of O2: 1, O2 sat at rest with O2: 90  With Liters of O2: 2, O2 sat with amb with O2 : 90  Is the patient mobile?: Yes    My Clinical findings support the need for the above equipment due to:  Hypoxia    Supporting Symptoms: The patient requires supplemental oxygen, as the following interventions have been tried with limited or no improvement: \"Ambulation with oximetry and \"Incentive spirometry    "

## 2020-09-15 NOTE — PROGRESS NOTES
Telemetry Strip     Strip printed: 1459  Measurements from am strip were as follows:  Rhythm: SR  HR: 79-93  Measurements: 0.12/ 0.10/ 0.36  Ectopy: r PVC             Normal Values  Rhythm SR  HR Range    Measurements 0.12-0.20 / 0.06-0.10  / 0.30-0.52

## 2020-09-15 NOTE — CARE PLAN
Problem: Respiratory:  Goal: Respiratory status will improve  Outcome: PROGRESSING SLOWER THAN EXPECTED  Note: Pt is maintaining oxygen saturation above 90% on 1 L educated pt on importance of using IS, pt reluctant to use and refusing ambulation at this time. Education provided     Problem: Psychosocial Needs:  Goal: Level of anxiety will decrease  Outcome: PROGRESSING SLOWER THAN EXPECTED  Note: Pt noncompliant and refusing Is /ambulation, will provide active listening and encourage pt to participate in plan of care

## 2020-09-15 NOTE — DISCHARGE PLANNING
"LSW spoke with pt at bedside regarding 02. LSW asked pt about her still requiring 1 L02 and pt replied \"I dont need oxygen. I only have one lung. Doesn't that make a difference?\" LSW stated that she was unaware pt only had one lung and asked if pt would be willing to pay for 02 as it was ordered for her. Pt stated she would not pay for oxygen.   "

## 2020-09-15 NOTE — FLOWSHEET NOTE
09/15/20 1030   Events/Summary/Plan   Events/Summary/Plan IS 1250 ml.   Vital Signs   Pulse 88   Respiration 18   Pulse Oximetry 89 %  (on .5 lpm,  increased to 1 lpm.)   $ Pulse Oximetry (Spot Check) Yes   Respiratory Assessment   Level of Consciousness Alert   Chest Exam   Work Of Breathing / Effort Shallow   Breath Sounds   RUL Breath Sounds Fine Crackles   RML Breath Sounds Fine Crackles   RLL Breath Sounds Fine Crackles;Diminished   HARRISON Breath Sounds Clear   LLL Breath Sounds Clear;Diminished   Oxygen   O2 (LPM) 1   O2 Delivery Device Silicone Nasal Cannula

## 2020-09-16 ENCOUNTER — APPOINTMENT (OUTPATIENT)
Dept: RADIOLOGY | Facility: MEDICAL CENTER | Age: 85
DRG: 871 | End: 2020-09-16
Attending: NURSE PRACTITIONER
Payer: MEDICARE

## 2020-09-16 VITALS
SYSTOLIC BLOOD PRESSURE: 144 MMHG | OXYGEN SATURATION: 92 % | HEIGHT: 68 IN | DIASTOLIC BLOOD PRESSURE: 82 MMHG | WEIGHT: 137.79 LBS | RESPIRATION RATE: 18 BRPM | BODY MASS INDEX: 20.88 KG/M2 | TEMPERATURE: 98.8 F | HEART RATE: 98 BPM

## 2020-09-16 PROBLEM — A41.9 SEPSIS (HCC): Status: RESOLVED | Noted: 2020-09-13 | Resolved: 2020-09-16

## 2020-09-16 PROBLEM — J96.01 ACUTE HYPOXEMIC RESPIRATORY FAILURE (HCC): Status: RESOLVED | Noted: 2020-09-15 | Resolved: 2020-09-16

## 2020-09-16 LAB
BASOPHILS # BLD AUTO: 0.3 % (ref 0–1.8)
BASOPHILS # BLD: 0.04 K/UL (ref 0–0.12)
EOSINOPHIL # BLD AUTO: 0.97 K/UL (ref 0–0.51)
EOSINOPHIL NFR BLD: 8.4 % (ref 0–6.9)
ERYTHROCYTE [DISTWIDTH] IN BLOOD BY AUTOMATED COUNT: 41.7 FL (ref 35.9–50)
HCT VFR BLD AUTO: 31.5 % (ref 37–47)
HGB BLD-MCNC: 10 G/DL (ref 12–16)
IMM GRANULOCYTES # BLD AUTO: 0.04 K/UL (ref 0–0.11)
IMM GRANULOCYTES NFR BLD AUTO: 0.3 % (ref 0–0.9)
LYMPHOCYTES # BLD AUTO: 1.91 K/UL (ref 1–4.8)
LYMPHOCYTES NFR BLD: 16.6 % (ref 22–41)
MCH RBC QN AUTO: 27.7 PG (ref 27–33)
MCHC RBC AUTO-ENTMCNC: 31.7 G/DL (ref 33.6–35)
MCV RBC AUTO: 87.3 FL (ref 81.4–97.8)
MONOCYTES # BLD AUTO: 0.8 K/UL (ref 0–0.85)
MONOCYTES NFR BLD AUTO: 6.9 % (ref 0–13.4)
NEUTROPHILS # BLD AUTO: 7.77 K/UL (ref 2–7.15)
NEUTROPHILS NFR BLD: 67.5 % (ref 44–72)
NRBC # BLD AUTO: 0 K/UL
NRBC BLD-RTO: 0 /100 WBC
PLATELET # BLD AUTO: 304 K/UL (ref 164–446)
PMV BLD AUTO: 9.8 FL (ref 9–12.9)
PROCALCITONIN SERPL-MCNC: 0.18 NG/ML
RBC # BLD AUTO: 3.61 M/UL (ref 4.2–5.4)
WBC # BLD AUTO: 11.5 K/UL (ref 4.8–10.8)

## 2020-09-16 PROCEDURE — 700102 HCHG RX REV CODE 250 W/ 637 OVERRIDE(OP): Performed by: INTERNAL MEDICINE

## 2020-09-16 PROCEDURE — 99239 HOSP IP/OBS DSCHRG MGMT >30: CPT | Performed by: HOSPITALIST

## 2020-09-16 PROCEDURE — A9270 NON-COVERED ITEM OR SERVICE: HCPCS | Performed by: INTERNAL MEDICINE

## 2020-09-16 PROCEDURE — A9270 NON-COVERED ITEM OR SERVICE: HCPCS | Performed by: HOSPITALIST

## 2020-09-16 PROCEDURE — 85025 COMPLETE CBC W/AUTO DIFF WBC: CPT

## 2020-09-16 PROCEDURE — 700111 HCHG RX REV CODE 636 W/ 250 OVERRIDE (IP): Performed by: HOSPITALIST

## 2020-09-16 PROCEDURE — 700102 HCHG RX REV CODE 250 W/ 637 OVERRIDE(OP): Performed by: NURSE PRACTITIONER

## 2020-09-16 PROCEDURE — 700102 HCHG RX REV CODE 250 W/ 637 OVERRIDE(OP): Performed by: HOSPITALIST

## 2020-09-16 PROCEDURE — 71046 X-RAY EXAM CHEST 2 VIEWS: CPT

## 2020-09-16 PROCEDURE — A9270 NON-COVERED ITEM OR SERVICE: HCPCS | Performed by: NURSE PRACTITIONER

## 2020-09-16 PROCEDURE — 84145 PROCALCITONIN (PCT): CPT

## 2020-09-16 RX ORDER — LISINOPRIL 5 MG/1
10 TABLET ORAL
Status: DISCONTINUED | OUTPATIENT
Start: 2020-09-16 | End: 2020-09-16 | Stop reason: HOSPADM

## 2020-09-16 RX ORDER — HYDROCHLOROTHIAZIDE 12.5 MG/1
12.5 TABLET ORAL
Status: DISCONTINUED | OUTPATIENT
Start: 2020-09-16 | End: 2020-09-16 | Stop reason: HOSPADM

## 2020-09-16 RX ORDER — AMOXICILLIN AND CLAVULANATE POTASSIUM 875; 125 MG/1; MG/1
1 TABLET, FILM COATED ORAL ONCE
Status: COMPLETED | OUTPATIENT
Start: 2020-09-16 | End: 2020-09-16

## 2020-09-16 RX ADMIN — HYDROCHLOROTHIAZIDE 12.5 MG: 12.5 TABLET ORAL at 09:20

## 2020-09-16 RX ADMIN — AMOXICILLIN AND CLAVULANATE POTASSIUM 1 TABLET: 875; 125 TABLET, FILM COATED ORAL at 10:51

## 2020-09-16 RX ADMIN — SENNOSIDES-DOCUSATE SODIUM TAB 8.6-50 MG 2 TABLET: 8.6-5 TAB at 05:10

## 2020-09-16 RX ADMIN — ENOXAPARIN SODIUM 40 MG: 40 INJECTION SUBCUTANEOUS at 05:11

## 2020-09-16 RX ADMIN — GUAIFENESIN 600 MG: 600 TABLET, EXTENDED RELEASE ORAL at 05:10

## 2020-09-16 RX ADMIN — AMOXICILLIN AND CLAVULANATE POTASSIUM 1 TABLET: 875; 125 TABLET, FILM COATED ORAL at 05:10

## 2020-09-16 RX ADMIN — HYDROCODONE BITARTRATE AND ACETAMINOPHEN 1 TABLET: 10; 325 TABLET ORAL at 09:16

## 2020-09-16 RX ADMIN — ESCITALOPRAM OXALATE 10 MG: 10 TABLET ORAL at 05:11

## 2020-09-16 RX ADMIN — LISINOPRIL 10 MG: 5 TABLET ORAL at 09:20

## 2020-09-16 ASSESSMENT — PAIN DESCRIPTION - PAIN TYPE
TYPE: CHRONIC PAIN
TYPE: CHRONIC PAIN

## 2020-09-16 NOTE — DISCHARGE INSTRUCTIONS
Discharge Instructions    Discharged to home by car with relative. Discharged via wheelchair, hospital escort: Yes.  Special equipment needed: Not Applicable    Be sure to schedule a follow-up appointment with your primary care doctor or any specialists as instructed.     Discharge Plan:   Diet Plan: Discussed  Activity Level: Discussed  Confirmed Follow up Appointment: Patient to Call and Schedule Appointment  Confirmed Symptoms Management: Discussed  Medication Reconciliation Updated: Yes    I understand that a diet low in cholesterol, fat, and sodium is recommended for good health. Unless I have been given specific instructions below for another diet, I accept this instruction as my diet prescription.   Other diet: See Below     Special Instructions: Use Incentive spirometer every hour.       · Is patient discharged on Warfarin / Coumadin?   No     Depression / Suicide Risk    As you are discharged from this [facility] facility, it is important to learn how to keep safe from harming yourself.    Recognize the warning signs:  · Abrupt changes in personality, positive or negative- including increase in energy   · Giving away possessions  · Change in eating patterns- significant weight changes-  positive or negative  · Change in sleeping patterns- unable to sleep or sleeping all the time   · Unwillingness or inability to communicate  · Depression  · Unusual sadness, discouragement and loneliness  · Talk of wanting to die  · Neglect of personal appearance   · Rebelliousness- reckless behavior  · Withdrawal from people/activities they love  · Confusion- inability to concentrate     If you or a loved one observes any of these behaviors or has concerns about self-harm, here's what you can do:  · Talk about it- your feelings and reasons for harming yourself  · Remove any means that you might use to hurt yourself (examples: pills, rope, extension cords, firearm)  · Get professional help from the community (Mental  Health, Substance Abuse, psychological counseling)  · Do not be alone:Call your Safe Contact- someone whom you trust who will be there for you.  · Call your local CRISIS HOTLINE 986-1022 or 245-363-6792  · Call your local Children's Mobile Crisis Response Team [place] (352) 570-9569 or www.[facility]  · Call the toll free National Suicide Prevention Hotlines   · National Suicide Prevention Lifeline 718-637-NJIG (7264)  · [place] [facility] Sentinel Line Network 800-SUICIDE (493-8050)      Discharge Instructions per CONCEPCIÓN Damon    -FU with PCP in 3-4 days    DIET: As Tolerated    ACTIVITY: As Tolerated    DIAGNOSIS: Community-acquired pneumonia and urinary tract infection    Return to ER if you have chest pain, shortness of breath, increased weakness, painful urination, inability to urinate, fevers, chills, uncontrolled nausea/vomiting/diarrhea.

## 2020-09-16 NOTE — PROGRESS NOTES
Report received from Racheal ZABALA. POC discussed. Pt laying in bed. Denies any needs at this time. Safety precautions in place.

## 2020-09-16 NOTE — PROGRESS NOTES
"Pt upset because she did not get to go home today. This RN reinforced education provided by day RN. It is not safe for her to go home without oxygen. Pt states \"I do not care anymore, I want to go home\" Pt said it was all the fault of MD and used some very rude language. This RN told her it was not appropriate for her to speak that way and she stated \" I do not care, I have freedom of speech, I will say whatever I want about whoever I want\"   "

## 2020-09-16 NOTE — DISCHARGE SUMMARY
Discharge Summary    CHIEF COMPLAINT ON ADMISSION  Chief Complaint   Patient presents with   • Altered Mental Status     Increasing since Thurs   • Fever     Onset last night No documented        Reason for Admission  Dehydration      Admission Date  9/12/2020    CODE STATUS  Full Code    HPI & HOSPITAL COURSE  Ms. 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      On arrival to the emergency department temperature was 100.7 and patient was hypotensive with BP 76/36.  She received IV fluid resuscitation with improvement in her blood pressures.  She was also noted to be hypoxic with O2 sats 85% on room air.  WBC 16.8.  UA positive for bacteria and trace leuk esterase.  CT chest showed RLL pneumonia and enlarged mediastinal and right hilar lymph nodes new compared to prior CT scan.  Will need follow-up CT chest as outpatient.  COVID testing negative.     She was treated for community-acquired pneumonia and UTI with the sepsis protocol with IV ABX Zosyn and vancomycin.  Blood cultures negative to date.  Urine cultures ultimately grew pansensitive E. Coli. Completed 3-day course IV Rocephin. Transitioned to p.o. ABX and completed ABX course prior to discharge.     HOME O2 Saturation Measurements:(Values must be present for Home Oxygen orders) done 9/16  Room air sat at rest: 92  Room air sat with amb: 90  With liters of O2: 1, O2 sat at rest with O2: 93  With Liters of O2: 1, O2 sat with amb with O2 : 92  Is the patient mobile?: Yes    Repeat chest x-ray this morning showed bibasilar linear atelectasis with minimal left pleural effusion. We encouraged increased mobility and incentive spirometry and were able to wean her  to room air this morning.    She was seen and examined prior to discharge.  She is ambulatory independently on room air with no shortness of breath with O2 sats greater than/equal to 90%.  She reports feeling at her baseline level of functioning and is very eager for discharge home today.    Therefore, she is discharged in good and stable condition to home with close outpatient follow-up.    The patient met 2-midnight criteria for an inpatient stay at the time of discharge.    Discharge Date  09/16/20    FOLLOW UP ITEMS POST DISCHARGE  Discharge Instructions per CONCEPCIÓN Damon    -FU with PCP in 3-4 days    DIET: As Tolerated    ACTIVITY: As Tolerated    DIAGNOSIS: Community-acquired pneumonia and urinary tract infection    Return to ER if you have chest pain, shortness of breath, increased weakness, painful urination, inability to urinate, fevers, chills, uncontrolled nausea/vomiting/diarrhea.      DISCHARGE DIAGNOSES  Principal Problem (Resolved):    Sepsis (HCC) POA: Yes  Active Problems:    Right lower lobe pneumonia POA: Unknown    E. coli UTI POA: No    Elevated brain natriuretic peptide (BNP) level POA: Yes    Narcotic dependence (HCC) POA: Yes      Overview: Chronic back pain          HTN (hypertension) POA: Yes    Paraesophageal hiatal hernia s/p robotic repair 10/12 POA: Yes      Overview: 10/12/14 -  [place]           Depression with anxiety POA: Yes      Overview: Severe at Baseline       Continue current antidepressant/sedation regimen      Psych consulted 11/19, continues to follow      Does have capacity. Does not want to be intubated. Does not want       palliation at this time.    Neuropathy POA: Yes  Resolved Problems:    Acute hypoxemic respiratory failure (HCC) POA: No      FOLLOW UP  Denzel Patel M.D.  [address] [place] [address] [address] [place] [place] [id number] [phone]      Please call to schedule your hospital follow up with your primary care physician. Thank you       MEDICATIONS ON DISCHARGE      Medication List      CONTINUE taking these medications      Instructions   escitalopram 10 MG Tabs  Commonly known as: LEXAPRO   Take 10 mg by mouth every day.  Dose: 10 mg     gabapentin 300 MG Caps  Commonly known as: NEURONTIN   Take 300 mg by mouth every bedtime.  Dose: 300 mg     HYDROcodone/acetaminophen  MG Tabs  Commonly known as: NORCO   Take 1 Tab by mouth 2 Times a Day.  Dose: 1 Tab     lisinopril-hydrochlorothiazide 10-12.5 MG per tablet  Commonly known as: PRINZIDE   Take 0.5 Tabs by mouth every day.  Dose: 0.5 Tab     metoprolol SR 50 MG Tb24  Commonly known as: TOPROL XL   Take 50 mg by mouth every day.  Dose: 50 mg     rosuvastatin 20 MG Tabs  Commonly known as: CRESTOR   Take 20 mg by mouth every evening.  Dose: 20 mg     sodium bicarbonate 650 MG Tabs  Commonly known as: SODIUM BICARBONATE   Take 650 mg by mouth 3 times a day.  Dose: 650 mg     tizanidine 4 MG Tabs  Commonly known as: ZANAFLEX   Take 4 mg by mouth 2 times a day as needed. Indications: Muscle Spasticity  Dose: 4 mg            Allergies  Allergies   Allergen Reactions   • Asa [Aspirin] Unspecified     Headache       DIET  Orders Placed This Encounter   Procedures   • Diet Order Cardiac     Standing Status:   Standing     Number of Occurrences:   1     Order Specific Question:   Diet:     Answer:   Cardiac [6]     Order Specific Question:   Texture Modifier     Answer:   Level 5 - Minced & Moist (Dysphagia 2)     Comments:   Likes soup     Order Specific Question:   Liquid level     Answer:   Level 0 - Thin       ACTIVITY  As tolerated.  Weight bearing as tolerated    CONSULTATIONS  None    PROCEDURES  None    LABORATORY  Lab Results   Component Value Date    SODIUM 139 09/15/2020    POTASSIUM 4.2 09/15/2020    CHLORIDE 101 09/15/2020    CO2 25 09/15/2020    GLUCOSE 99 09/15/2020    BUN 15 09/15/2020    CREATININE 0.75 09/15/2020    CREATININE 0.75 09/17/2014        Lab Results   Component Value Date    WBC 11.5 (H) 09/16/2020     HEMOGLOBIN 10.0 (L) 09/16/2020    HEMATOCRIT 31.5 (L) 09/16/2020    PLATELETCT 304 09/16/2020        Total time of the discharge process exceeds 45 minutes.    I have performed a physical exam and reviewed and updated ROS and Assessment/Plan today (09/16/20). In review of the previous note there are no changes except as documented above    Please note that this dictation was created using voice recognition software. I have made every reasonable attempt to correct obvious errors, but there may be errors of grammar and possibly content that I did not discover before finalizing the note.    ALBERTNIA Damon.

## 2020-09-16 NOTE — PROGRESS NOTES
Telemetry Shift Summary     Rhythm SR/ST  HR Range 80s-104  Ectopy rPVCs  Measurements 0.16/0.08/0.34           Normal Values  Rhythm SR  HR Range    Measurements 0.12-0.20 / 0.06-0.10  / 0.30-0.52

## 2020-09-16 NOTE — PROGRESS NOTES
"Pt refusing midnight vitals. Pt states \"I do not want to be bothered, there is no reason for them, I want to sleep\"  "

## 2020-09-16 NOTE — CARE PLAN
Problem: Safety  Goal: Will remain free from falls  Outcome: PROGRESSING AS EXPECTED  Intervention: Implement fall precautions  Flowsheets (Taken 9/15/2020 1926)  Environmental Precautions:   Treaded Slipper Socks on Patient   Personal Belongings, Wastebasket, Call Bell etc. in Easy Reach   Transferred to Stronger Side   Report Given to Other Health Care Providers Regarding Fall Risk   Bed in Low Position   Communication Sign for Patients & Families   Mobility Assessed & Appropriate Sign Placed

## 2020-09-16 NOTE — CARE PLAN
Problem: Communication  Goal: The ability to communicate needs accurately and effectively will improve  Outcome: MET     Problem: Safety  Goal: Will remain free from injury  Outcome: MET  Goal: Will remain free from falls  Outcome: MET     Problem: Infection  Goal: Will remain free from infection  Outcome: MET     Problem: Venous Thromboembolism (VTW)/Deep Vein Thrombosis (DVT) Prevention:  Goal: Patient will participate in Venous Thrombosis (VTE)/Deep Vein Thrombosis (DVT)Prevention Measures  Outcome: MET     Problem: Bowel/Gastric:  Goal: Normal bowel function is maintained or improved  Outcome: MET  Goal: Will not experience complications related to bowel motility  Outcome: MET     Problem: Knowledge Deficit  Goal: Knowledge of disease process/condition, treatment plan, diagnostic tests, and medications will improve  Outcome: MET  Goal: Knowledge of the prescribed therapeutic regimen will improve  Outcome: MET     Problem: Discharge Barriers/Planning  Goal: Patient's continuum of care needs will be met  Outcome: MET     Problem: Fluid Volume:  Goal: Will maintain balanced intake and output  Outcome: MET     Problem: Pain Management  Goal: Pain level will decrease to patient's comfort goal  Outcome: MET   Pt states that chronic pain is at her baseline   Problem: Respiratory:  Goal: Respiratory status will improve  Outcome: MET   Pt able to ambulate on RA 91% without SOB   Problem: Psychosocial Needs:  Goal: Level of anxiety will decrease  Outcome: MET     Problem: Skin Integrity  Goal: Risk for impaired skin integrity will decrease  Outcome: MET

## 2020-09-16 NOTE — PROGRESS NOTES
Telemetry Strip     Strip printed: 1441  Measurements from am strip were as follows:  Rhythm: SR  HR: 88-96  Measurements: 0.16/ 0.08/ 0.34  Ectopy: r PVC             Normal Values  Rhythm SR  HR Range    Measurements 0.12-0.20 / 0.06-0.10  / 0.30-0.52

## 2020-09-16 NOTE — PROGRESS NOTES
Monitor Summary     Rhythm: NSR  Measurements: .14/.08/.34  ECTOPIES: None    HR 90's        Normal Values  Rhythm SR  HR Range    Measurements 0.12-0.20 / 0.06-0.10  / 0.30-0.52

## 2020-09-16 NOTE — PROGRESS NOTES
"Pt had oxygen off. This RN took O2 pt satting at 88%. Educated pt on the importance of keeping the oxygen on. Pt put back on 1 L NC pt satting 93%. Pt encouraged to use IS. Pt states \"there is no point, I will not be using that shit\". This RN provided active listening and education on importance. Pt was not receptive and irritable.   "

## 2020-09-16 NOTE — PROGRESS NOTES
Pt being d/c with all belongings. Per NP pt given last does of ABX prior to leaving. Pt taken down by wheelchair. Discharge packet reviewed with patient. Questions encouraged and answered. Advised pt that she will need to call primary today to make apt within 3-4 days. Pt understands importance. Care released. Pt states that her pain is at baseline around 5/10.

## 2020-09-18 ENCOUNTER — OFFICE VISIT (OUTPATIENT)
Dept: MEDICAL GROUP | Age: 85
End: 2020-09-18
Payer: MEDICARE

## 2020-09-18 VITALS
BODY MASS INDEX: 20.05 KG/M2 | DIASTOLIC BLOOD PRESSURE: 62 MMHG | TEMPERATURE: 97.3 F | SYSTOLIC BLOOD PRESSURE: 112 MMHG | HEIGHT: 68 IN | WEIGHT: 132.28 LBS | OXYGEN SATURATION: 92 % | HEART RATE: 98 BPM | RESPIRATION RATE: 16 BRPM

## 2020-09-18 DIAGNOSIS — Z09 EXAMINATION FOR, FOLLOW-UP: ICD-10-CM

## 2020-09-18 DIAGNOSIS — J90 PLEURAL EFFUSION: ICD-10-CM

## 2020-09-18 PROCEDURE — 99214 OFFICE O/P EST MOD 30 MIN: CPT | Performed by: FAMILY MEDICINE

## 2020-09-18 ASSESSMENT — FIBROSIS 4 INDEX: FIB4 SCORE: 1.24

## 2020-09-18 NOTE — PROGRESS NOTES
This medical record contains text that has been entered with the assistance of computer voice recognition and dictation software.  Therefore, it may contain unintended errors in text, spelling, punctuation, or grammar      Chief Complaint   Patient presents with   • Follow-Up         Ellen Srivastava is a 85 y.o. female here evaluation and management of: Hospital follow-up      HPI:     1. Examination for, follow-up  2. Pleural effusion    The patient is an 85-year-old female who recently was admitted to the hospital for sepsis.  She presented on 12 September with malaise and fevers.  She did have a significant recent fundoplication and stricture repair on 8/24/2020 prior to that.  On arrival the patient's temperature was her 100.7 and a blood pressure of 76/36, she was given IV fluid resuscitation to improve her blood pressures.  Oxygen saturation were 85% and WBC count of 16.8 thousand.  CT of the chest revealed a right lower lobe pneumonia.  The patient was given IV Zosyn and vancomycin blood cultures were negative urine cultures grew pansensitive E. coli.  As result she completed 3-day course of IV Rocephin and transition to oral antibiotics with instructions to follow-up with PCP upon discharge.    She states that overall she does not feel as strong as she used to however she feels better than the day she was admitted, denies any fevers no chills no shortness of breath no malaise.  Current medicines (including changes today)  Current Outpatient Medications   Medication Sig Dispense Refill   • HYDROcodone/acetaminophen (NORCO)  MG Tab Take 1 Tab by mouth 2 Times a Day.     • escitalopram (LEXAPRO) 10 MG Tab Take 10 mg by mouth every day.     • gabapentin (NEURONTIN) 300 MG Cap Take 300 mg by mouth every bedtime.     • lisinopril-hydrochlorothiazide (PRINZIDE) 10-12.5 MG per tablet Take 0.5 Tabs by mouth every day.     • tizanidine (ZANAFLEX) 4 MG Tab Take 4 mg by mouth 2 times a day as needed.  Indications: Muscle Spasticity     • rosuvastatin (CRESTOR) 20 MG Tab Take 20 mg by mouth every evening.     • sodium bicarbonate (SODIUM BICARBONATE) 650 MG Tab Take 650 mg by mouth 3 times a day.     • metoprolol SR (TOPROL XL) 50 MG TABLET SR 24 HR Take 50 mg by mouth every day.       No current facility-administered medications for this visit.      She  has a past medical history of Anemia, Atrophic vaginitis (2010), Cerumen Impaction Recurrent (2010), Depressive disorder, not elsewhere classified (2010), HDL lipoprotein deficiency (2010), High cholesterol (2020), HTN (hypertension) (2010), Hypertriglyceridemia (2010), Narcotic dependence (HCC) (2012), Osteopenia (2010), Pain (2020), Parathyroid hormone excess (HCC) (2010), Serum calcium elevated (2010), Tinnitus (2010), and Vertigo, intermittent (2012).  She  has a past surgical history that includes breast biopsy (Left, ); lumbar decompression (, ); hysterectomy, total abdominal (); appendectomy (); parathyroid exploration (2010); paraesophageal hernia robotic (N/A, 10/12/2015); gastroscopy (10/23/2015); thoracoscopy (Left, 10/30/2015); gastroscopy-endo (2015); gastroscopy-endo (N/A, 12/10/2015); gastroscopy-endo (N/A, 2016); ankle orif (Right, 2016); and pr lap,esophagogast fundoplasty (N/A, 2020).  Social History     Tobacco Use   • Smoking status: Never Smoker   • Smokeless tobacco: Former User   • Tobacco comment: Nicotine    Substance Use Topics   • Alcohol use: No     Alcohol/week: 0.0 oz   • Drug use: No     Social History     Social History Narrative    Lives in [facility] and in [place].     Family History   Problem Relation Age of Onset   • Hypertension Mother    • Lung Disease Father    • Hypertension Other    • Lung Disease Other    • Lung Disease Brother      Family Status   Relation Name Status   • Mo   at age 86        Old Age  "  • Fa   at age 83        Lung Disease   • OTHER  (Not Specified)   • OTHER  (Not Specified)   • Bro           ROS    The pertinent  ROS findings can be seen in the HPI above.     All other systems reviewed and are negative     Objective:     /62 (BP Location: Left arm, Patient Position: Sitting, BP Cuff Size: Adult)   Pulse 98   Temp 36.3 °C (97.3 °F)   Resp 16   Ht 1.727 m (5' 8\")   Wt 60 kg (132 lb 4.4 oz)   SpO2 92%  Body mass index is 20.11 kg/m².      Physical Exam:    Constitutional: Alert, no distress.  Skin: No rashes  Eye: Equal, round and reactive, conjunctiva clear, lids normal.  ENMT: Lips without lesions, good dentition, oropharynx clear.  Neck: Trachea midline, no masses, no thyromegaly. No cervical or supraclavicular lymphadenopathy.  Respiratory: Unlabored respiratory effort, lungs clear to auscultation, no wheezes, no ronchi.  Cardiovascular: Normal S1, S2, no murmur, no edema  Abdomen: Soft, non-tender, no masses, no hepatosplenomegaly.        Assessment and Plan:   The following treatment plan was discussed      1. Examination for, follow-up  2. Pleural effusion    I encouraged the patient to continuously daily do incentive spirometry  Maintain adequate hydration and healthy diet  Repeat chest x-ray in the end of October to show resolution of the pleural effusion    - DX-CHEST-2 VIEWS; Future        Instructed to Follow up in clinic or ER for worsening symptoms, difficulty breathing, lack of expected recovery, or should new symptoms or problems arise.    Followup: Return in about 3 months (around 2020) for Reevaluation.       Once again this medical record contains text that has been entered with the assistance of computer voice recognition and dictation software.  Therefore, it may contain unintended errors in text, spelling, punctuation, or grammar         "

## 2020-10-07 RX ORDER — TIZANIDINE 4 MG/1
TABLET ORAL
Qty: 100 TAB | Refills: 0 | Status: SHIPPED | OUTPATIENT
Start: 2020-10-07 | End: 2020-12-11

## 2020-10-13 RX ORDER — ESCITALOPRAM OXALATE 10 MG/1
TABLET ORAL
Qty: 90 TAB | Refills: 0 | Status: SHIPPED | OUTPATIENT
Start: 2020-10-13 | End: 2021-01-15

## 2020-10-29 ENCOUNTER — OFFICE VISIT (OUTPATIENT)
Dept: MEDICAL GROUP | Age: 85
End: 2020-10-29
Payer: MEDICARE

## 2020-10-29 VITALS
BODY MASS INDEX: 20.21 KG/M2 | OXYGEN SATURATION: 94 % | HEART RATE: 65 BPM | TEMPERATURE: 99.7 F | WEIGHT: 136.47 LBS | DIASTOLIC BLOOD PRESSURE: 72 MMHG | SYSTOLIC BLOOD PRESSURE: 130 MMHG | HEIGHT: 69 IN

## 2020-10-29 DIAGNOSIS — I10 ESSENTIAL HYPERTENSION: ICD-10-CM

## 2020-10-29 DIAGNOSIS — G62.9 NEUROPATHY: ICD-10-CM

## 2020-10-29 DIAGNOSIS — Z23 NEED FOR VACCINATION: ICD-10-CM

## 2020-10-29 DIAGNOSIS — Z09 EXAMINATION FOR, FOLLOW-UP: ICD-10-CM

## 2020-10-29 DIAGNOSIS — J90 PLEURAL EFFUSION: ICD-10-CM

## 2020-10-29 PROCEDURE — 90746 HEPB VACCINE 3 DOSE ADULT IM: CPT | Performed by: FAMILY MEDICINE

## 2020-10-29 PROCEDURE — 99214 OFFICE O/P EST MOD 30 MIN: CPT | Mod: 25 | Performed by: FAMILY MEDICINE

## 2020-10-29 PROCEDURE — G0010 ADMIN HEPATITIS B VACCINE: HCPCS | Performed by: FAMILY MEDICINE

## 2020-10-29 RX ORDER — GABAPENTIN 300 MG/1
300 CAPSULE ORAL
Qty: 90 CAP | Refills: 3 | Status: SHIPPED | OUTPATIENT
Start: 2020-10-29 | End: 2020-12-11 | Stop reason: SDUPTHER

## 2020-10-29 ASSESSMENT — FIBROSIS 4 INDEX: FIB4 SCORE: 1.24

## 2020-10-29 ASSESSMENT — PATIENT HEALTH QUESTIONNAIRE - PHQ9: CLINICAL INTERPRETATION OF PHQ2 SCORE: 0

## 2020-10-29 NOTE — PROGRESS NOTES
This medical record contains text that has been entered with the assistance of computer voice recognition and dictation software.  Therefore, it may contain unintended errors in text, spelling, punctuation, or grammar      Chief Complaint   Patient presents with   • Follow-Up     pneumonia         Ellen Srivastava is a 85 y.o. female here evaluation and management of: Follow-up for pneumonia, medication      HPI:     1. Examination for, follow-up  2. Pleural effusion  The patient was seen and admitted to the hospital for sepsis on September 12, 2020.  The patient was hypotensive at 76/36 and a temperature of 100.7 she was given IV fluid resuscitation which normalized her blood pressure.  CT scan of the chest revealed a right lower lobe pneumonia with pleural effusion.  She was placed on appropriate antibiotics and her condition improved.  She returns today to repeat a chest x-ray to show resolution of the infiltrate.  Overall she states her energy is back to normal she denies any fevers, no chills no night sweats no cough    3. Essential hypertension  Prinzide 10/12.5 mg p.o. daily  Metoprolol 50 mg extended release p.o. daily    Home BP readings--- not monitoring    Today, the patient has been tollerating the BP meds without any issues. The patient denies any shortness of breath, cough, presyncopal syncopal episodes.        4. Neuropathy  Patient states that the gabapentin does help her at night with the neuropathy she needs a refill.  She has not had any abnormal side effects.  She also receives pain medication from her pain management provider.    5. Need for vaccination  For hepatitis B vaccine    Current medicines (including changes today)  Current Outpatient Medications   Medication Sig Dispense Refill   • gabapentin (NEURONTIN) 300 MG Cap Take 1 Cap by mouth every bedtime. 90 Cap 3   • escitalopram (LEXAPRO) 10 MG Tab TAKE ONE TABLET BY MOUTH DAILY 90 Tab 0   • tizanidine (ZANAFLEX) 4 MG Tab TAKE ONE  TABLET BY MOUTH TWICE A DAY AS NEEDED FOR SPASMS 100 Tab 0   • HYDROcodone/acetaminophen (NORCO)  MG Tab Take 1 Tab by mouth 2 Times a Day.     • lisinopril-hydrochlorothiazide (PRINZIDE) 10-12.5 MG per tablet Take 0.5 Tabs by mouth every day.     • rosuvastatin (CRESTOR) 20 MG Tab Take 20 mg by mouth every evening.     • sodium bicarbonate (SODIUM BICARBONATE) 650 MG Tab Take 650 mg by mouth 3 times a day.     • metoprolol SR (TOPROL XL) 50 MG TABLET SR 24 HR Take 50 mg by mouth every day.       No current facility-administered medications for this visit.      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  She  has a past surgical history that includes breast biopsy (Left, 2015); lumbar decompression (1969, 1979); hysterectomy, total abdominal (1977); appendectomy (1945); parathyroid exploration (6/16/2010); paraesophageal hernia robotic (N/A, 10/12/2015); gastroscopy (10/23/2015); thoracoscopy (Left, 10/30/2015); gastroscopy-endo (11/1/2015); gastroscopy-endo (N/A, 12/10/2015); gastroscopy-endo (N/A, 2/26/2016); ankle orif (Right, 9/20/2016); and pr lap,esophagogast fundoplasty (N/A, 8/24/2020).  Social History     Tobacco Use   • Smoking status: Never Smoker   • Smokeless tobacco: Former User   • Tobacco comment: Nicotine    Substance Use Topics   • Alcohol use: No     Alcohol/week: 0.0 oz   • Drug use: No     Social History     Social History Narrative    Lives in [place] and in [place].     Family History   Problem Relation  "Age of Onset   • Hypertension Mother    • Lung Disease Father    • Hypertension Other    • Lung Disease Other    • Lung Disease Brother      Family Status   Relation Name Status   • Mo   at age 86        Old Age   • Fa   at age 83        Lung Disease   • OTHER  (Not Specified)   • OTHER  (Not Specified)   • Bro           ROS    The pertinent  ROS findings can be seen in the HPI above.     All other systems reviewed and are negative     Objective:     /72 (BP Location: Left arm, Patient Position: Sitting, BP Cuff Size: Adult)   Pulse 65   Temp 37.6 °C (99.7 °F) (Temporal)   Ht 1.74 m (5' 8.5\")   Wt 61.9 kg (136 lb 7.4 oz)   SpO2 94%  Body mass index is 20.45 kg/m².      Physical Exam:    Constitutional: Alert, no distress.  Skin: no rashes  Eye: Equal, round and reactive, conjunctiva clear, lids normal.  ENMT: Lips without lesions, good dentition, oropharynx clear.  Neck: Trachea midline, no masses, no thyromegaly. No cervical or supraclavicular lymphadenopathy.  Respiratory: Unlabored respiratory effort, lungs clear to auscultation, no wheezes, no ronchi.  Cardiovascular: Normal S1, S2, no murmur, no edema  Abdomen: Soft, non-tender, no masses, no hepatosplenomegaly.        Assessment and Plan:   The following treatment plan was discussed      1. Examination for, follow-up  2. Pleural effusion  Repeat chest x-ray to confirm resolution of infiltrate    - DX-CHEST-2 VIEWS; Future    3. Essential hypertension  Patient has been stable with current management  We will make no changes for now      4. Neuropathy  Patient has been stable with current management  We will make no changes for now    - gabapentin (NEURONTIN) 300 MG Cap; Take 1 Cap by mouth every bedtime.  Dispense: 90 Cap; Refill: 3    5. Need for vaccination  Vaccine was administered today without adverse event.    - Hepatitis B Vaccine Adult 20+        Instructed to Follow up in clinic or ER for worsening symptoms, " difficulty breathing, lack of expected recovery, or should new symptoms or problems arise.    Followup: Return in about 6 months (around 4/29/2021) for Reevaluation, labs.       Once again this medical record contains text that has been entered with the assistance of computer voice recognition and dictation software.  Therefore, it may contain unintended errors in text, spelling, punctuation, or grammar

## 2020-11-10 ENCOUNTER — OFFICE VISIT (OUTPATIENT)
Dept: MEDICAL GROUP | Age: 85
End: 2020-11-10
Payer: MEDICARE

## 2020-11-10 VITALS
DIASTOLIC BLOOD PRESSURE: 72 MMHG | TEMPERATURE: 97.6 F | HEIGHT: 69 IN | OXYGEN SATURATION: 96 % | HEART RATE: 63 BPM | SYSTOLIC BLOOD PRESSURE: 108 MMHG | WEIGHT: 138 LBS | BODY MASS INDEX: 20.44 KG/M2

## 2020-11-10 DIAGNOSIS — L29.9 PRURITUS: ICD-10-CM

## 2020-11-10 PROCEDURE — 99214 OFFICE O/P EST MOD 30 MIN: CPT | Performed by: FAMILY MEDICINE

## 2020-11-10 RX ORDER — TRIAMCINOLONE ACETONIDE 1 MG/G
CREAM TOPICAL
Qty: 50 G | Refills: 0 | Status: ON HOLD | OUTPATIENT
Start: 2020-11-10 | End: 2022-01-27

## 2020-11-10 ASSESSMENT — FIBROSIS 4 INDEX: FIB4 SCORE: 1.24

## 2020-11-10 NOTE — PROGRESS NOTES
This medical record contains text that has been entered with the assistance of computer voice recognition and dictation software.  Therefore, it may contain unintended errors in text, spelling, punctuation, or grammar      Chief Complaint   Patient presents with   • Skin Lesion         Ellen Srivastava is a 85 y.o. female here evaluation and management of: Check her skin where she is itching      HPI:     1. Pruritus  NEW PROBLEM    The patient is an 85-year-old female who presents to clinic with a chief complaint of wanting to have her back and arms checked for any suspicious lesions.  She states she does scratch her back at night and she gets abrasions but she cannot see her back and wanted to be checked.  She denies any new suspicious lesions.  She does have a history of sun damage skin.  She denies any fevers, no chills no night sweats, shortness of breath is gone from her previous pleural effusion.  She states she did not want to repeat the x-ray because she wants to limit her exposure.    Current medicines (including changes today)  Current Outpatient Medications   Medication Sig Dispense Refill   • triamcinolone acetonide (KENALOG) 0.1 % Cream AAA BID UP TO  14 DAYS THEN STOP 50 g 0   • gabapentin (NEURONTIN) 300 MG Cap Take 1 Cap by mouth every bedtime. 90 Cap 3   • escitalopram (LEXAPRO) 10 MG Tab TAKE ONE TABLET BY MOUTH DAILY 90 Tab 0   • tizanidine (ZANAFLEX) 4 MG Tab TAKE ONE TABLET BY MOUTH TWICE A DAY AS NEEDED FOR SPASMS 100 Tab 0   • HYDROcodone/acetaminophen (NORCO)  MG Tab Take 1 Tab by mouth 2 Times a Day.     • lisinopril-hydrochlorothiazide (PRINZIDE) 10-12.5 MG per tablet Take 0.5 Tabs by mouth every day.     • rosuvastatin (CRESTOR) 20 MG Tab Take 20 mg by mouth every evening.     • metoprolol SR (TOPROL XL) 50 MG TABLET SR 24 HR Take 50 mg by mouth every day.     • sodium bicarbonate (SODIUM BICARBONATE) 650 MG Tab Take 650 mg by mouth 3 times a day.       No current  facility-administered medications for this visit.      She  has a past medical history of Anemia, Atrophic vaginitis (2010), Cerumen Impaction Recurrent (2010), Depressive disorder, not elsewhere classified (2010), HDL lipoprotein deficiency (2010), High cholesterol (2020), HTN (hypertension) (2010), Hypertriglyceridemia (2010), Narcotic dependence (HCC) (2012), Osteopenia (2010), Pain (2020), Parathyroid hormone excess (HCC) (2010), Serum calcium elevated (2010), Tinnitus (2010), and Vertigo, intermittent (2012).  She  has a past surgical history that includes breast biopsy (Left, ); lumbar decompression (, ); hysterectomy, total abdominal (); appendectomy (); parathyroid exploration (2010); paraesophageal hernia robotic (N/A, 10/12/2015); gastroscopy (10/23/2015); thoracoscopy (Left, 10/30/2015); gastroscopy-endo (2015); gastroscopy-endo (N/A, 12/10/2015); gastroscopy-endo (N/A, 2016); ankle orif (Right, 2016); and pr lap,esophagogast fundoplasty (N/A, 2020).  Social History     Tobacco Use   • Smoking status: Never Smoker   • Smokeless tobacco: Former User   • Tobacco comment: Nicotine    Substance Use Topics   • Alcohol use: No     Alcohol/week: 0.0 oz   • Drug use: No     Social History     Social History Narrative    Lives in [facility] and in [place].     Family History   Problem Relation Age of Onset   • Hypertension Mother    • Lung Disease Father    • Hypertension Other    • Lung Disease Other    • Lung Disease Brother      Family Status   Relation Name Status   • Mo   at age 86        Old Age   • Fa   at age 83        Lung Disease   • OTHER  (Not Specified)   • OTHER  (Not Specified)   • Bro           ROS    The pertinent  ROS findings can be seen in the HPI above.     All other systems reviewed and are negative     Objective:     /72 (BP Location: Right arm,  "Patient Position: Sitting, BP Cuff Size: Adult)   Pulse 63   Temp 36.4 °C (97.6 °F) (Temporal)   Ht 1.74 m (5' 8.5\")   Wt 62.6 kg (138 lb)   SpO2 96%  Body mass index is 20.68 kg/m².      Physical Exam:    Constitutional: Alert, no distress.  Skin: Excoriations noted on back no hyperpigmented suspicious lesions  Eye: Equal, round and reactive, conjunctiva clear, lids normal.  ENMT: Lips without lesions, good dentition, oropharynx clear.  Neck: Trachea midline, no masses, no thyromegaly. No cervical or supraclavicular lymphadenopathy.  Respiratory: Unlabored respiratory effort, lungs clear to auscultation, no wheezes, no ronchi.  Cardiovascular: Normal S1, S2, no murmur, no edema  Abdomen: Soft, non-tender, no masses, no hepatosplenomegaly.        Assessment and Plan:   The following treatment plan was discussed      1. Pruritus    Discussed moisture trapping    - triamcinolone acetonide (KENALOG) 0.1 % Cream; AAA BID UP TO  14 DAYS THEN STOP  Dispense: 50 g; Refill: 0          Instructed to Follow up in clinic or ER for worsening symptoms, difficulty breathing, lack of expected recovery, or should new symptoms or problems arise.    Followup: Return in about 6 months (around 5/10/2021) for Reevaluation, labs.       Once again this medical record contains text that has been entered with the assistance of computer voice recognition and dictation software.  Therefore, it may contain unintended errors in text, spelling, punctuation, or grammar         "

## 2020-12-11 DIAGNOSIS — G62.9 NEUROPATHY: ICD-10-CM

## 2020-12-11 RX ORDER — TIZANIDINE 4 MG/1
TABLET ORAL
Qty: 100 TAB | Refills: 0 | Status: SHIPPED | OUTPATIENT
Start: 2020-12-11 | End: 2021-03-29

## 2020-12-11 RX ORDER — GABAPENTIN 300 MG/1
300 CAPSULE ORAL 3 TIMES DAILY
Qty: 270 CAP | Refills: 2 | Status: SHIPPED | OUTPATIENT
Start: 2020-12-11 | End: 2021-10-19 | Stop reason: SDUPTHER

## 2020-12-11 NOTE — TELEPHONE ENCOUNTER
Received request via: Patient    Was the patient seen in the last year in this department? Yes    Does the patient have an active prescription (recently filled or refills available) for medication(s) requested? Yes, Patient called stating that she needed a new script to reflect that she needs to take the mediation three times a day.

## 2020-12-14 ENCOUNTER — HOSPITAL ENCOUNTER (OUTPATIENT)
Dept: RADIOLOGY | Facility: MEDICAL CENTER | Age: 85
End: 2020-12-14
Attending: FAMILY MEDICINE
Payer: MEDICARE

## 2020-12-14 DIAGNOSIS — Z12.31 VISIT FOR SCREENING MAMMOGRAM: ICD-10-CM

## 2020-12-14 PROCEDURE — 77067 SCR MAMMO BI INCL CAD: CPT

## 2020-12-22 RX ORDER — LISINOPRIL AND HYDROCHLOROTHIAZIDE 12.5; 1 MG/1; MG/1
TABLET ORAL
Qty: 90 TAB | Refills: 0 | Status: SHIPPED | OUTPATIENT
Start: 2020-12-22 | End: 2021-03-23

## 2021-01-05 RX ORDER — METOPROLOL SUCCINATE 50 MG/1
TABLET, EXTENDED RELEASE ORAL
Qty: 90 TAB | Refills: 1 | Status: SHIPPED | OUTPATIENT
Start: 2021-01-05 | End: 2021-07-13

## 2021-01-11 DIAGNOSIS — Z23 NEED FOR VACCINATION: ICD-10-CM

## 2021-01-13 DIAGNOSIS — J30.9 ALLERGIC RHINITIS, UNSPECIFIED SEASONALITY, UNSPECIFIED TRIGGER: ICD-10-CM

## 2021-01-14 RX ORDER — HYDROXYZINE HYDROCHLORIDE 25 MG/1
TABLET, FILM COATED ORAL
Qty: 90 TAB | Refills: 1 | Status: ON HOLD | OUTPATIENT
Start: 2021-01-14 | End: 2022-01-27

## 2021-01-15 ENCOUNTER — NURSE TRIAGE (OUTPATIENT)
Dept: HEALTH INFORMATION MANAGEMENT | Facility: OTHER | Age: 86
End: 2021-01-15

## 2021-01-15 RX ORDER — ESCITALOPRAM OXALATE 10 MG/1
TABLET ORAL
Qty: 90 TAB | Refills: 0 | Status: SHIPPED | OUTPATIENT
Start: 2021-01-15 | End: 2021-04-12

## 2021-01-17 PROCEDURE — 0011A MODERNA SARS-COV-2 VACCINE: CPT

## 2021-01-17 PROCEDURE — 91301 MODERNA SARS-COV-2 VACCINE: CPT

## 2021-01-18 ENCOUNTER — IMMUNIZATION (OUTPATIENT)
Dept: FAMILY PLANNING/WOMEN'S HEALTH CLINIC | Facility: IMMUNIZATION CENTER | Age: 86
End: 2021-01-18
Payer: MEDICARE

## 2021-01-18 DIAGNOSIS — Z23 ENCOUNTER FOR VACCINATION: Primary | ICD-10-CM

## 2021-01-29 ENCOUNTER — NURSE TRIAGE (OUTPATIENT)
Dept: HEALTH INFORMATION MANAGEMENT | Facility: OTHER | Age: 86
End: 2021-01-29

## 2021-02-14 ENCOUNTER — IMMUNIZATION (OUTPATIENT)
Dept: FAMILY PLANNING/WOMEN'S HEALTH CLINIC | Facility: IMMUNIZATION CENTER | Age: 86
End: 2021-02-14
Attending: INTERNAL MEDICINE
Payer: MEDICARE

## 2021-02-14 DIAGNOSIS — Z23 ENCOUNTER FOR VACCINATION: Primary | ICD-10-CM

## 2021-02-14 PROCEDURE — 91301 MODERNA SARS-COV-2 VACCINE: CPT | Performed by: INTERNAL MEDICINE

## 2021-02-14 PROCEDURE — 0012A MODERNA SARS-COV-2 VACCINE: CPT | Performed by: INTERNAL MEDICINE

## 2021-03-23 RX ORDER — LISINOPRIL AND HYDROCHLOROTHIAZIDE 12.5; 1 MG/1; MG/1
TABLET ORAL
Qty: 90 TABLET | Refills: 0 | Status: SHIPPED | OUTPATIENT
Start: 2021-03-23 | End: 2021-06-22

## 2021-03-23 RX ORDER — ROSUVASTATIN CALCIUM 20 MG/1
TABLET, COATED ORAL
Qty: 90 TABLET | Refills: 3 | Status: SHIPPED | OUTPATIENT
Start: 2021-03-23 | End: 2022-05-10 | Stop reason: SDUPTHER

## 2021-03-30 RX ORDER — TIZANIDINE 4 MG/1
TABLET ORAL
Qty: 100 TABLET | Refills: 0 | Status: SHIPPED | OUTPATIENT
Start: 2021-03-30 | End: 2021-06-21

## 2021-04-12 DIAGNOSIS — G62.9 NEUROPATHY: ICD-10-CM

## 2021-04-12 DIAGNOSIS — F41.8 DEPRESSION WITH ANXIETY: ICD-10-CM

## 2021-04-13 RX ORDER — ESCITALOPRAM OXALATE 10 MG/1
TABLET ORAL
Qty: 90 TABLET | Refills: 3 | Status: SHIPPED
Start: 2021-04-13 | End: 2021-05-26

## 2021-05-26 ENCOUNTER — OFFICE VISIT (OUTPATIENT)
Dept: MEDICAL GROUP | Age: 86
End: 2021-05-26
Payer: MEDICARE

## 2021-05-26 VITALS
TEMPERATURE: 97.6 F | BODY MASS INDEX: 20.73 KG/M2 | DIASTOLIC BLOOD PRESSURE: 62 MMHG | HEIGHT: 69 IN | WEIGHT: 140 LBS | RESPIRATION RATE: 14 BRPM | OXYGEN SATURATION: 93 % | SYSTOLIC BLOOD PRESSURE: 120 MMHG | HEART RATE: 73 BPM

## 2021-05-26 DIAGNOSIS — F41.8 DEPRESSION WITH ANXIETY: ICD-10-CM

## 2021-05-26 PROBLEM — F32.9 MAJOR DEPRESSIVE DISORDER: Status: ACTIVE | Noted: 2021-05-26

## 2021-05-26 PROCEDURE — 99214 OFFICE O/P EST MOD 30 MIN: CPT | Performed by: FAMILY MEDICINE

## 2021-05-26 RX ORDER — ESCITALOPRAM OXALATE 20 MG/1
20 TABLET ORAL DAILY
Qty: 90 TABLET | Refills: 0 | Status: SHIPPED | OUTPATIENT
Start: 2021-05-26 | End: 2021-09-30 | Stop reason: SDUPTHER

## 2021-05-26 ASSESSMENT — FIBROSIS 4 INDEX: FIB4 SCORE: 1.26

## 2021-05-26 NOTE — PROGRESS NOTES
This medical record contains text that has been entered with the assistance of computer voice recognition and dictation software.  Therefore, it may contain unintended errors in text, spelling, punctuation, or grammar      Chief Complaint   Patient presents with   • Leg Pain     Lt leg pain         Ellen Srivastava is a 86 y.o. female here evaluation and management of: Depression      HPI:     1. Depression with anxiety  UNCONTROLLED CONDITION    The patient is a very pleasant 86-year-old female who presents to clinic with a chief complaint of worsening depression.  She has been taking Lexapro 10 mg p.o. daily but states sometimes she will just start crying.  She states her  says that she becomes very crazy with emotional liability for no reason.  She denies any suicidal homicidal ideations.  She has good support from her  who is a retired [employer].  She denies any visual or auditory hallucinations, no olfactory hallucination or tactile hallucinations.  She states she has been compliant with this Lexapro as written.    Current medicines (including changes today)  Current Outpatient Medications   Medication Sig Dispense Refill   • escitalopram (LEXAPRO) 20 MG tablet Take 1 tablet by mouth every day. 90 tablet 0   • tizanidine (ZANAFLEX) 4 MG Tab TAKE ONE TABLET BY MOUTH TWICE A DAY AS NEEDED FOR SPASMS 100 tablet 0   • rosuvastatin (CRESTOR) 20 MG Tab TAKE ONE TABLET BY MOUTH EVERY EVENING (CRESTOR) 90 tablet 3   • lisinopril-hydrochlorothiazide (PRINZIDE) 10-12.5 MG per tablet TAKE ONE TABLET BY MOUTH DAILY (EVERY 24 HOURS) (STOP LISINOPRIL) 90 tablet 0   • hydrOXYzine HCl (ATARAX) 25 MG Tab TAKE ONE-HALF TO ONE TABLET BY MOUTH THREE TIMES A DAY AS NEEDED FOR ANXIETY (MAX 3 TABLETS PER 24 HOURS) 90 Tab 1   • metoprolol SR (TOPROL XL) 50 MG TABLET SR 24 HR TAKE ONE TABLET BY MOUTH DAILY *TOPROL XL* 90 Tab 1   • gabapentin (NEURONTIN) 300 MG Cap Take 1 Cap by mouth 3 times a day. 270  Cap 2   • triamcinolone acetonide (KENALOG) 0.1 % Cream AAA BID UP TO  14 DAYS THEN STOP 50 g 0   • HYDROcodone/acetaminophen (NORCO)  MG Tab Take 1 Tab by mouth 2 Times a Day.     • sodium bicarbonate (SODIUM BICARBONATE) 650 MG Tab Take 650 mg by mouth 3 times a day.       No current facility-administered medications for this visit.     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  She  has a past surgical history that includes breast biopsy (Left, 2015); lumbar decompression (1969, 1979); hysterectomy, total abdominal (1977); appendectomy (1945); parathyroid exploration (6/16/2010); paraesophageal hernia robotic (N/A, 10/12/2015); gastroscopy (10/23/2015); thoracoscopy (Left, 10/30/2015); gastroscopy-endo (11/1/2015); gastroscopy-endo (N/A, 12/10/2015); gastroscopy-endo (N/A, 2/26/2016); ankle orif (Right, 9/20/2016); and pr lap,esophagogast fundoplasty (N/A, 8/24/2020).  Social History     Tobacco Use   • Smoking status: Never Smoker   • Smokeless tobacco: Former User   • Tobacco comment: Nicotine    Vaping Use   • Vaping Use: Never used   Substance Use Topics   • Alcohol use: No     Alcohol/week: 0.0 oz   • Drug use: No     Social History     Social History Narrative    Lives in [facility] and in [place].     Family History   Problem Relation Age of Onset   • Hypertension Mother    • Lung Disease Father    • Hypertension Other    • Lung Disease Other    • Lung Disease Brother      Family Status   Relation Name Status   • Mo   " at age 86        Old Age   • Fa   at age 83        Lung Disease   • OTHER  (Not Specified)   • OTHER  (Not Specified)   • Bro           ROS    The pertinent  ROS findings can be seen in the HPI above.     All other systems reviewed and are negative     Objective:     /62 (BP Location: Right arm, Patient Position: Sitting, BP Cuff Size: Adult)   Pulse 73   Temp 36.4 °C (97.6 °F) (Temporal)   Resp 14   Ht 1.74 m (5' 8.5\")   Wt 63.5 kg (140 lb)   SpO2 93%  Body mass index is 20.98 kg/m².      Physical Exam:    Constitutional: Alert, no distress.  Skin: No suspicious lesions   Eye: Equal, round and reactive, conjunctiva clear, lids normal.  ENMT: Lips without lesions, good dentition, oropharynx clear.  Neck: Trachea midline, no masses, no thyromegaly. No cervical or supraclavicular lymphadenopathy.  Respiratory: Unlabored respiratory effort, lungs clear to auscultation, no wheezes, no ronchi.  Cardiovascular: Normal S1, S2, no murmur, no edema  Abdomen: Soft, non-tender, no masses, no hepatosplenomegaly.        Assessment and Plan:   The following treatment plan was discussed      1. Depression with anxiety  Increase Lexapro to 20 mg p.o. daily  We reviewed all side effects  If there is no improvement we will change to fluoxetine    - escitalopram (LEXAPRO) 20 MG tablet; Take 1 tablet by mouth every day.  Dispense: 90 tablet; Refill: 0            Instructed to Follow up in clinic or ER for worsening symptoms, difficulty breathing, lack of expected recovery, or should new symptoms or problems arise.    Followup: Return in about 4 weeks (around 2021) for Reevaluation, Discussing tollerance and efficacy of medication.             "

## 2021-06-22 RX ORDER — TIZANIDINE 4 MG/1
TABLET ORAL
Qty: 200 TABLET | Refills: 3 | Status: ON HOLD
Start: 2021-06-22 | End: 2022-01-27

## 2021-06-22 RX ORDER — LISINOPRIL AND HYDROCHLOROTHIAZIDE 12.5; 1 MG/1; MG/1
TABLET ORAL
Qty: 90 TABLET | Refills: 3 | Status: SHIPPED | OUTPATIENT
Start: 2021-06-22 | End: 2022-10-18 | Stop reason: SDUPTHER

## 2021-06-23 ENCOUNTER — APPOINTMENT (OUTPATIENT)
Dept: MEDICAL GROUP | Age: 86
End: 2021-06-23
Payer: MEDICARE

## 2021-06-24 ENCOUNTER — NURSE TRIAGE (OUTPATIENT)
Dept: HEALTH INFORMATION MANAGEMENT | Facility: OTHER | Age: 86
End: 2021-06-24
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
## 2021-06-30 ENCOUNTER — OFFICE VISIT (OUTPATIENT)
Dept: MEDICAL GROUP | Age: 86
End: 2021-06-30
Payer: MEDICARE

## 2021-06-30 VITALS
OXYGEN SATURATION: 92 % | BODY MASS INDEX: 22.48 KG/M2 | WEIGHT: 151.8 LBS | HEART RATE: 66 BPM | TEMPERATURE: 99.1 F | RESPIRATION RATE: 16 BRPM | SYSTOLIC BLOOD PRESSURE: 130 MMHG | HEIGHT: 69 IN | DIASTOLIC BLOOD PRESSURE: 78 MMHG

## 2021-06-30 DIAGNOSIS — M54.6 ACUTE BILATERAL THORACIC BACK PAIN: ICD-10-CM
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
## 2021-06-30 DIAGNOSIS — M25.551 HIP PAIN, ACUTE, RIGHT: ICD-10-CM

## 2021-06-30 PROCEDURE — 99213 OFFICE O/P EST LOW 20 MIN: CPT | Performed by: FAMILY MEDICINE

## 2021-06-30 RX ORDER — LOPERAMIDE HCL 1 MG/7.5ML
1 SUSPENSION ORAL 4 TIMES DAILY PRN
Qty: 300 ML | Refills: 0 | Status: ON HOLD
Start: 2021-06-30 | End: 2022-01-27

## 2021-06-30 ASSESSMENT — PATIENT HEALTH QUESTIONNAIRE - PHQ9
5. POOR APPETITE OR OVEREATING: NOT AT ALL
4. FEELING TIRED OR HAVING LITTLE ENERGY: NOT AT ALL
7. TROUBLE CONCENTRATING ON THINGS, SUCH AS READING THE NEWSPAPER OR WATCHING TELEVISION: NOT AT ALL
2. FEELING DOWN, DEPRESSED, IRRITABLE, OR HOPELESS: NOT AT ALL
8. MOVING OR SPEAKING SO SLOWLY THAT OTHER PEOPLE COULD HAVE NOTICED. OR THE OPPOSITE, BEING SO FIGETY OR RESTLESS THAT YOU HAVE BEEN MOVING AROUND A LOT MORE THAN USUAL: NOT AT ALL
SUM OF ALL RESPONSES TO PHQ9 QUESTIONS 1 AND 2: 0
1. LITTLE INTEREST OR PLEASURE IN DOING THINGS: NOT AT ALL
6. FEELING BAD ABOUT YOURSELF - OR THAT YOU ARE A FAILURE OR HAVE LET YOURSELF OR YOUR FAMILY DOWN: NOT AL ALL
SUM OF ALL RESPONSES TO PHQ QUESTIONS 1-9: 0
3. TROUBLE FALLING OR STAYING ASLEEP OR SLEEPING TOO MUCH: NOT AT ALL
9. THOUGHTS THAT YOU WOULD BE BETTER OFF DEAD, OR OF HURTING YOURSELF: NOT AT ALL

## 2021-06-30 ASSESSMENT — FIBROSIS 4 INDEX: FIB4 SCORE: 1.26
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
## 2021-07-07 ENCOUNTER — HOSPITAL ENCOUNTER (OUTPATIENT)
Dept: RADIOLOGY | Facility: MEDICAL CENTER | Age: 86
End: 2021-07-07
Attending: FAMILY MEDICINE
Payer: OTHER MISCELLANEOUS

## 2021-07-07 DIAGNOSIS — M54.6 ACUTE BILATERAL THORACIC BACK PAIN: ICD-10-CM

## 2021-07-07 DIAGNOSIS — M54.41 ACUTE BILATERAL LOW BACK PAIN WITH RIGHT-SIDED SCIATICA: ICD-10-CM

## 2021-07-07 DIAGNOSIS — M54.50 LUMBAR SPINE PAIN: ICD-10-CM

## 2021-07-07 DIAGNOSIS — M25.551 RIGHT HIP PAIN: ICD-10-CM

## 2021-07-07 PROCEDURE — 72100 X-RAY EXAM L-S SPINE 2/3 VWS: CPT

## 2021-07-07 PROCEDURE — 73521 X-RAY EXAM HIPS BI 2 VIEWS: CPT

## 2021-07-13 RX ORDER — METOPROLOL SUCCINATE 50 MG/1
TABLET, EXTENDED RELEASE ORAL
Qty: 90 TABLET | Refills: 0 | Status: SHIPPED | OUTPATIENT
Start: 2021-07-13 | End: 2021-10-15 | Stop reason: SDUPTHER

## 2021-09-23 ENCOUNTER — OFFICE VISIT (OUTPATIENT)
Dept: MEDICAL GROUP | Age: 86
End: 2021-09-23
Payer: MEDICARE

## 2021-09-23 VITALS
SYSTOLIC BLOOD PRESSURE: 100 MMHG | TEMPERATURE: 97 F | HEART RATE: 68 BPM | DIASTOLIC BLOOD PRESSURE: 60 MMHG | OXYGEN SATURATION: 93 % | BODY MASS INDEX: 21.95 KG/M2 | HEIGHT: 69 IN | RESPIRATION RATE: 16 BRPM | WEIGHT: 148.2 LBS

## 2021-09-23 DIAGNOSIS — R13.11 ORAL PHASE DYSPHAGIA: ICD-10-CM

## 2021-09-23 DIAGNOSIS — K44.9 PARAESOPHAGEAL HIATAL HERNIA: ICD-10-CM

## 2021-09-23 DIAGNOSIS — Z23 NEED FOR VACCINATION: ICD-10-CM

## 2021-09-23 PROCEDURE — G0008 ADMIN INFLUENZA VIRUS VAC: HCPCS | Performed by: FAMILY MEDICINE

## 2021-09-23 PROCEDURE — 99214 OFFICE O/P EST MOD 30 MIN: CPT | Mod: 25 | Performed by: FAMILY MEDICINE

## 2021-09-23 PROCEDURE — 90662 IIV NO PRSV INCREASED AG IM: CPT | Performed by: FAMILY MEDICINE

## 2021-09-23 RX ORDER — OMEPRAZOLE 20 MG/1
20 CAPSULE, DELAYED RELEASE ORAL DAILY
Qty: 30 CAPSULE | Refills: 2 | Status: SHIPPED | OUTPATIENT
Start: 2021-09-23 | End: 2022-05-10 | Stop reason: SDUPTHER

## 2021-09-23 ASSESSMENT — FIBROSIS 4 INDEX: FIB4 SCORE: 1.26

## 2021-09-23 ASSESSMENT — PATIENT HEALTH QUESTIONNAIRE - PHQ9: CLINICAL INTERPRETATION OF PHQ2 SCORE: 0

## 2021-09-23 NOTE — PROGRESS NOTES
This medical record contains text that has been entered with the assistance of computer voice recognition and dictation software.  Therefore, it may contain unintended errors in text, spelling, punctuation, or grammar      Chief Complaint   Patient presents with   • Difficulty Swallowing         Ellen Srivastava is a 86 y.o. female here evaluation and management of: Difficulty swallowing      HPI:     1. Oral phase dysphagia  2. 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  She states that the difficulty swallowing is mainly for solid food she can drink liquids in fact she has been doing that.  She has a significant past medical history of paraesophageal hiatal hernia status post repair in 10/2012.  She states she had the same symptoms at that presentation.  Sometimes she has food regurgitated that is undigested and sometimes the food gets stuck right in the middle of the chest.  She has not been taking any PPIs or antacids.  The Patient has not lost significant weight only 2.7lbs since June.      Vitals 6/30/2021 9/23/2021   WEIGHT 151.8 148.2       3. Need for vaccination    Due for flu vaccine    Current medicines (including changes today)  Current Outpatient Medications   Medication Sig Dispense Refill   • omeprazole (PRILOSEC) 20 MG delayed-release capsule Take 1 Capsule by mouth every day. 30 Capsule 2   • metoprolol SR (TOPROL XL) 50 MG TABLET SR 24 HR TAKE ONE TABLET BY MOUTH DAILY 90 tablet 0   • loperamide (IMODIUM) 1 MG/7.5ML Suspension Take 7.5 mL by mouth 4 times a day as needed for Diarrhea. 300 mL 0   • tizanidine (ZANAFLEX) 4 MG Tab TAKE ONE TABLET BY MOUTH TWICE A DAY AS NEEDED FOR SPASMS 200 tablet 3   • lisinopril-hydrochlorothiazide (PRINZIDE) 10-12.5 MG per tablet TAKE ONE TABLET BY MOUTH DAILY (EVERY 24 HOURS) (STOP LISINOPRIL) 90 tablet 3   • escitalopram  (LEXAPRO) 20 MG tablet Take 1 tablet by mouth every day. 90 tablet 0   • rosuvastatin (CRESTOR) 20 MG Tab TAKE ONE TABLET BY MOUTH EVERY EVENING (CRESTOR) 90 tablet 3   • hydrOXYzine HCl (ATARAX) 25 MG Tab TAKE ONE-HALF TO ONE TABLET BY MOUTH THREE TIMES A DAY AS NEEDED FOR ANXIETY (MAX 3 TABLETS PER 24 HOURS) 90 Tab 1   • gabapentin (NEURONTIN) 300 MG Cap Take 1 Cap by mouth 3 times a day. 270 Cap 2   • triamcinolone acetonide (KENALOG) 0.1 % Cream AAA BID UP TO  14 DAYS THEN STOP 50 g 0   • HYDROcodone/acetaminophen (NORCO)  MG Tab Take 1 Tab by mouth 2 Times a Day.     • sodium bicarbonate (SODIUM BICARBONATE) 650 MG Tab Take 650 mg by mouth 3 times a day.       No current facility-administered medications for this visit.     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  She  has a past surgical history that includes breast biopsy (Left, 2015); lumbar decompression (1969, 1979); hysterectomy, total abdominal (1977); appendectomy (1945); parathyroid exploration (6/16/2010); paraesophageal hernia robotic (N/A, 10/12/2015); gastroscopy (10/23/2015); thoracoscopy (Left, 10/30/2015); gastroscopy-endo (11/1/2015); gastroscopy-endo (N/A, 12/10/2015); gastroscopy-endo (N/A, 2/26/2016); ankle orif (Right, 9/20/2016); and pr lap,esophagogast fundoplasty (N/A, 8/24/2020).  Social History     Tobacco Use   • Smoking status: Never Smoker   • Smokeless tobacco: Former User   • Tobacco comment: Nicotine    Vaping Use   • Vaping Use: Never  "used   Substance Use Topics   • Alcohol use: No     Alcohol/week: 0.0 oz   • Drug use: No     Social History     Social History Narrative    Lives in [facility] and in [place].     Family History   Problem Relation Age of Onset   • Hypertension Mother    • Lung Disease Father    • Hypertension Other    • Lung Disease Other    • Lung Disease Brother      Family Status   Relation Name Status   • Mo   at age 86        Old Age   • Fa   at age 83        Lung Disease   • OTHER  (Not Specified)   • OTHER  (Not Specified)   • Bro           ROS    The pertinent  ROS findings can be seen in the HPI above.     All other systems reviewed and are negative     Objective:     /60 (BP Location: Right arm, Patient Position: Sitting, BP Cuff Size: Adult)   Pulse 68   Temp 36.1 °C (97 °F) (Temporal)   Resp 16   Ht 1.74 m (5' 8.5\")   Wt 67.2 kg (148 lb 3.2 oz)   SpO2 93%  Body mass index is 22.21 kg/m².      Physical Exam:    Constitutional: Alert, no distress.  Skin: No suspicious lesions  Eye: Equal, round and reactive, conjunctiva clear, lids normal.  ENMT: Lips without lesions, good dentition, oropharynx clear.  Neck: Trachea midline, no masses, no thyromegaly. No cervical or supraclavicular lymphadenopathy.  Respiratory: Unlabored respiratory effort, lungs clear to auscultation, no wheezes, no ronchi.  Cardiovascular: Normal S1, S2, no murmur, no edema  Abdomen: Soft, non-tender, no masses, no hepatosplenomegaly.        Assessment and Plan:   The following treatment plan was discussed      1. Oral phase dysphagia  2. 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      - omeprazole (PRILOSEC) 20 MG delayed-release capsule; Take 1 Capsule by mouth every day.  Dispense: 30 Capsule; Refill: 2    - REFERRAL TO GASTROENTEROLOGY    3. Need for vaccination    - INFLUENZA VACCINE, HIGH DOSE (65+ " ONLY)        Instructed to Follow up in clinic or ER for worsening symptoms, difficulty breathing, lack of expected recovery, or should new symptoms or problems arise.    Followup: Return in about 3 months (around 12/23/2021) for Reevaluation, labs.

## 2021-09-30 DIAGNOSIS — F41.8 DEPRESSION WITH ANXIETY: ICD-10-CM

## 2021-09-30 RX ORDER — ESCITALOPRAM OXALATE 20 MG/1
20 TABLET ORAL DAILY
Qty: 90 TABLET | Refills: 0 | Status: SHIPPED | OUTPATIENT
Start: 2021-09-30 | End: 2021-12-23 | Stop reason: SDUPTHER

## 2021-10-08 ENCOUNTER — HOSPITAL ENCOUNTER (OUTPATIENT)
Dept: RADIOLOGY | Facility: MEDICAL CENTER | Age: 86
End: 2021-10-08
Attending: NURSE PRACTITIONER
Payer: MEDICARE

## 2021-10-08 DIAGNOSIS — Z48.815 ENCNTR FOR SURGICAL AFTCR FOLLOWING SURGERY ON THE DGSTV SYS: ICD-10-CM

## 2021-10-08 DIAGNOSIS — R13.19 ESOPHAGEAL DYSPHAGIA: ICD-10-CM

## 2021-10-08 DIAGNOSIS — K22.5 DIVERTICULUM OF ESOPHAGUS, ACQUIRED: ICD-10-CM

## 2021-10-08 DIAGNOSIS — K44.9 DIAPHRAGMATIC HERNIA WITHOUT OBSTRUCTION AND WITHOUT GANGRENE: ICD-10-CM

## 2021-10-08 PROCEDURE — 74220 X-RAY XM ESOPHAGUS 1CNTRST: CPT

## 2021-10-15 NOTE — TELEPHONE ENCOUNTER
Received request via: Pharmacy    Was the patient seen in the last year in this department? Yes    Does the patient have an active prescription (recently filled or refills available) for medication(s) requested? No     Vascular insufficiency of extremity Atrial fibrillation

## 2021-10-18 RX ORDER — METOPROLOL SUCCINATE 50 MG/1
TABLET, EXTENDED RELEASE ORAL
Qty: 90 TABLET | Refills: 1 | Status: SHIPPED | OUTPATIENT
Start: 2021-10-18 | End: 2022-04-22 | Stop reason: SDUPTHER

## 2021-10-19 DIAGNOSIS — G62.9 NEUROPATHY: ICD-10-CM

## 2021-10-20 RX ORDER — GABAPENTIN 300 MG/1
300 CAPSULE ORAL 3 TIMES DAILY
Qty: 270 CAPSULE | Refills: 1 | Status: SHIPPED
Start: 2021-10-20 | End: 2022-05-29

## 2021-11-17 ENCOUNTER — OFFICE VISIT (OUTPATIENT)
Dept: MEDICAL GROUP | Age: 86
End: 2021-11-17
Payer: MEDICARE

## 2021-11-17 VITALS
OXYGEN SATURATION: 93 % | TEMPERATURE: 97.7 F | SYSTOLIC BLOOD PRESSURE: 122 MMHG | DIASTOLIC BLOOD PRESSURE: 78 MMHG | HEIGHT: 69 IN | RESPIRATION RATE: 16 BRPM | HEART RATE: 71 BPM | BODY MASS INDEX: 21.83 KG/M2 | WEIGHT: 147.4 LBS

## 2021-11-17 DIAGNOSIS — R13.13 PHARYNGEAL DYSPHAGIA: ICD-10-CM

## 2021-11-17 PROCEDURE — 99214 OFFICE O/P EST MOD 30 MIN: CPT | Performed by: FAMILY MEDICINE

## 2021-11-17 ASSESSMENT — FIBROSIS 4 INDEX: FIB4 SCORE: 1.26

## 2021-11-17 NOTE — PROGRESS NOTES
This medical record contains text that has been entered with the assistance of computer voice recognition and dictation software.  Therefore, it may contain unintended errors in text, spelling, punctuation, or grammar      Chief Complaint   Patient presents with   • Difficulty Swallowing         Ellen Srivastava is a 86 y.o. female here evaluation and management of: difficulty swallowing      HPI:     1. Pharyngeal dysphagia  NEW UNDIAGNOSED PROBLEM    Patient is a very pleasant 86-year-old female who presents to clinic with a chief complaint of difficulty swallowing.  She states it started after her hiatal hernia repair.  She has a hard time swallowing whole foods she must eat liquid or soft foods in the meantime.  She is able to tolerate p.o. liquids.  She denies any fevers chills night sweats no chest pain or shortness of breath no headaches today.    Current medicines (including changes today)  Current Outpatient Medications   Medication Sig Dispense Refill   • gabapentin (NEURONTIN) 300 MG Cap Take 1 Capsule by mouth 3 times a day. 270 Capsule 1   • metoprolol SR (TOPROL XL) 50 MG TABLET SR 24 HR TAKE ONE TABLET BY MOUTH DAILY 90 Tablet 1   • escitalopram (LEXAPRO) 20 MG tablet Take 1 Tablet by mouth every day. 90 Tablet 0   • omeprazole (PRILOSEC) 20 MG delayed-release capsule Take 1 Capsule by mouth every day. 30 Capsule 2   • loperamide (IMODIUM) 1 MG/7.5ML Suspension Take 7.5 mL by mouth 4 times a day as needed for Diarrhea. 300 mL 0   • tizanidine (ZANAFLEX) 4 MG Tab TAKE ONE TABLET BY MOUTH TWICE A DAY AS NEEDED FOR SPASMS 200 tablet 3   • lisinopril-hydrochlorothiazide (PRINZIDE) 10-12.5 MG per tablet TAKE ONE TABLET BY MOUTH DAILY (EVERY 24 HOURS) (STOP LISINOPRIL) 90 tablet 3   • rosuvastatin (CRESTOR) 20 MG Tab TAKE ONE TABLET BY MOUTH EVERY EVENING (CRESTOR) 90 tablet 3   • hydrOXYzine HCl (ATARAX) 25 MG Tab TAKE ONE-HALF TO ONE TABLET BY MOUTH THREE TIMES A DAY AS NEEDED FOR ANXIETY (MAX 3  TABLETS PER 24 HOURS) 90 Tab 1   • triamcinolone acetonide (KENALOG) 0.1 % Cream AAA BID UP TO  14 DAYS THEN STOP 50 g 0   • HYDROcodone/acetaminophen (NORCO)  MG Tab Take 1 Tab by mouth 2 Times a Day.     • sodium bicarbonate (SODIUM BICARBONATE) 650 MG Tab Take 650 mg by mouth 3 times a day.       No current facility-administered medications for this visit.     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  She  has a past surgical history that includes breast biopsy (Left, 2015); lumbar decompression (1969, 1979); hysterectomy, total abdominal (1977); appendectomy (1945); parathyroid exploration (6/16/2010); paraesophageal hernia robotic (N/A, 10/12/2015); gastroscopy (10/23/2015); thoracoscopy (Left, 10/30/2015); gastroscopy-endo (11/1/2015); gastroscopy-endo (N/A, 12/10/2015); gastroscopy-endo (N/A, 2/26/2016); ankle orif (Right, 9/20/2016); and pr lap,esophagogast fundoplasty (N/A, 8/24/2020).  Social History     Tobacco Use   • Smoking status: Never Smoker   • Smokeless tobacco: Former User   • Tobacco comment: Nicotine    Vaping Use   • Vaping Use: Never used   Substance Use Topics   • Alcohol use: No     Alcohol/week: 0.0 oz   • Drug use: No     Social History     Social History Narrative    Lives in [facility] and in [place].     Family History   Problem Relation Age of Onset   • Hypertension Mother    • Lung Disease Father    • Hypertension Other    • Lung Disease Other    • Lung Disease Brother      Family Status  "  Relation Name Status   • Mo   at age 86        Old Age   • Fa   at age 83        Lung Disease   • OTHER  (Not Specified)   • OTHER  (Not Specified)   • Bro           ROS    The pertinent  ROS findings can be seen in the HPI above.     All other systems reviewed and are negative     Objective:     /78 (BP Location: Right arm, Patient Position: Sitting, BP Cuff Size: Adult)   Pulse 71   Temp 36.5 °C (97.7 °F) (Temporal)   Resp 16   Ht 1.74 m (5' 8.5\")   Wt 66.9 kg (147 lb 6.4 oz)   SpO2 93%  Body mass index is 22.09 kg/m².      Physical Exam:    Constitutional: Alert, no distress.  Skin: No suspicious lesions  Eye: Equal, round and reactive, conjunctiva clear, lids normal.  ENMT: Lips without lesions, good dentition, oropharynx clear.  Neck: Trachea midline, no masses, no thyromegaly. No cervical or supraclavicular lymphadenopathy.  Respiratory: Unlabored respiratory effort, lungs clear to auscultation, no wheezes, no ronchi.  Cardiovascular: Normal S1, S2, no murmur, no edema  Abdomen: Soft, non-tender, no masses, no hepatosplenomegaly.        Assessment and Plan:   The following treatment plan was discussed      1. Pharyngeal dysphagia    Continue PPI daily  Refer to surgery   - Referral to General Surgery            Instructed to Follow up in clinic or ER for worsening symptoms, difficulty breathing, lack of expected recovery, or should new symptoms or problems arise.    Followup: Return in about 6 months (around 2022) for Reevaluation.             "

## 2021-12-23 DIAGNOSIS — F41.8 DEPRESSION WITH ANXIETY: ICD-10-CM

## 2021-12-23 RX ORDER — ESCITALOPRAM OXALATE 20 MG/1
20 TABLET ORAL DAILY
Qty: 90 TABLET | Refills: 0 | Status: SHIPPED | OUTPATIENT
Start: 2021-12-23 | End: 2022-04-06 | Stop reason: SDUPTHER

## 2022-01-25 NOTE — ASSESSMENT & PLAN NOTE
Diarrhea at this point has improved.  Occult blood is negative  Stool WBCs and cultures are pending.  Gastroenterology was to see the patient on Tuesday for an EGD/colonoscopy.  May have to consult in-house if the patient is not discharged by then.     ED call back, pt states, \"I'm doing much better.\" No further questions or concerns expressed when asked.

## 2022-01-26 ENCOUNTER — APPOINTMENT (OUTPATIENT)
Dept: RADIOLOGY | Facility: MEDICAL CENTER | Age: 87
DRG: 493 | End: 2022-01-26
Attending: EMERGENCY MEDICINE
Payer: MEDICARE

## 2022-01-26 ENCOUNTER — HOSPITAL ENCOUNTER (INPATIENT)
Facility: MEDICAL CENTER | Age: 87
LOS: 3 days | DRG: 493 | End: 2022-01-31
Attending: EMERGENCY MEDICINE | Admitting: INTERNAL MEDICINE
Payer: MEDICARE

## 2022-01-26 DIAGNOSIS — S92.414A NONDISPLACED FRACTURE OF PROXIMAL PHALANX OF RIGHT GREAT TOE, INITIAL ENCOUNTER FOR CLOSED FRACTURE: ICD-10-CM

## 2022-01-26 DIAGNOSIS — S92.414A CLOSED NONDISPLACED FRACTURE OF PROXIMAL PHALANX OF RIGHT GREAT TOE, INITIAL ENCOUNTER: ICD-10-CM

## 2022-01-26 DIAGNOSIS — S82.852A TRIMALLEOLAR FRACTURE OF ANKLE, CLOSED, LEFT, INITIAL ENCOUNTER: ICD-10-CM

## 2022-01-26 DIAGNOSIS — S82.892A CLOSED LEFT ANKLE FRACTURE, INITIAL ENCOUNTER: ICD-10-CM

## 2022-01-26 DIAGNOSIS — R00.1 SYMPTOMATIC BRADYCARDIA: ICD-10-CM

## 2022-01-26 DIAGNOSIS — S82.842A CLOSED BIMALLEOLAR FRACTURE OF LEFT ANKLE, INITIAL ENCOUNTER: ICD-10-CM

## 2022-01-26 DIAGNOSIS — S82.55XA NONDISPLACED FRACTURE OF MEDIAL MALLEOLUS OF LEFT TIBIA, INITIAL ENCOUNTER FOR CLOSED FRACTURE: ICD-10-CM

## 2022-01-26 PROBLEM — R26.2 UNABLE TO WALK: Status: ACTIVE | Noted: 2022-01-26

## 2022-01-26 LAB
ALBUMIN SERPL BCP-MCNC: 3.4 G/DL (ref 3.2–4.9)
ALBUMIN/GLOB SERPL: 1.5 G/DL
ALP SERPL-CCNC: 64 U/L (ref 30–99)
ALT SERPL-CCNC: 12 U/L (ref 2–50)
ANION GAP SERPL CALC-SCNC: 9 MMOL/L (ref 7–16)
AST SERPL-CCNC: 21 U/L (ref 12–45)
BASOPHILS # BLD AUTO: 0.6 % (ref 0–1.8)
BASOPHILS # BLD: 0.06 K/UL (ref 0–0.12)
BILIRUB SERPL-MCNC: 0.2 MG/DL (ref 0.1–1.5)
BUN SERPL-MCNC: 20 MG/DL (ref 8–22)
CALCIUM SERPL-MCNC: 8.9 MG/DL (ref 8.4–10.2)
CHLORIDE SERPL-SCNC: 100 MMOL/L (ref 96–112)
CO2 SERPL-SCNC: 29 MMOL/L (ref 20–33)
CREAT SERPL-MCNC: 1.21 MG/DL (ref 0.5–1.4)
EKG IMPRESSION: NORMAL
EOSINOPHIL # BLD AUTO: 0.12 K/UL (ref 0–0.51)
EOSINOPHIL NFR BLD: 1.3 % (ref 0–6.9)
ERYTHROCYTE [DISTWIDTH] IN BLOOD BY AUTOMATED COUNT: 47.7 FL (ref 35.9–50)
GLOBULIN SER CALC-MCNC: 2.3 G/DL (ref 1.9–3.5)
GLUCOSE SERPL-MCNC: 113 MG/DL (ref 65–99)
HCT VFR BLD AUTO: 39.6 % (ref 37–47)
HGB BLD-MCNC: 12.6 G/DL (ref 12–16)
IMM GRANULOCYTES # BLD AUTO: 0.03 K/UL (ref 0–0.11)
IMM GRANULOCYTES NFR BLD AUTO: 0.3 % (ref 0–0.9)
LACTATE BLD-SCNC: 1.5 MMOL/L (ref 0.5–2)
LYMPHOCYTES # BLD AUTO: 4.32 K/UL (ref 1–4.8)
LYMPHOCYTES NFR BLD: 45.6 % (ref 22–41)
MCH RBC QN AUTO: 30.7 PG (ref 27–33)
MCHC RBC AUTO-ENTMCNC: 31.8 G/DL (ref 33.6–35)
MCV RBC AUTO: 96.6 FL (ref 81.4–97.8)
MONOCYTES # BLD AUTO: 0.84 K/UL (ref 0–0.85)
MONOCYTES NFR BLD AUTO: 8.9 % (ref 0–13.4)
NEUTROPHILS # BLD AUTO: 4.1 K/UL (ref 2–7.15)
NEUTROPHILS NFR BLD: 43.3 % (ref 44–72)
NRBC # BLD AUTO: 0 K/UL
NRBC BLD-RTO: 0 /100 WBC
PLATELET # BLD AUTO: 157 K/UL (ref 164–446)
PMV BLD AUTO: 9.9 FL (ref 9–12.9)
POTASSIUM SERPL-SCNC: 4.4 MMOL/L (ref 3.6–5.5)
PROT SERPL-MCNC: 5.7 G/DL (ref 6–8.2)
RBC # BLD AUTO: 4.1 M/UL (ref 4.2–5.4)
SODIUM SERPL-SCNC: 138 MMOL/L (ref 135–145)
TROPONIN T SERPL-MCNC: 7 NG/L (ref 6–19)
WBC # BLD AUTO: 9.5 K/UL (ref 4.8–10.8)

## 2022-01-26 PROCEDURE — 93005 ELECTROCARDIOGRAM TRACING: CPT | Performed by: EMERGENCY MEDICINE

## 2022-01-26 PROCEDURE — 96376 TX/PRO/DX INJ SAME DRUG ADON: CPT

## 2022-01-26 PROCEDURE — 71045 X-RAY EXAM CHEST 1 VIEW: CPT

## 2022-01-26 PROCEDURE — 700105 HCHG RX REV CODE 258

## 2022-01-26 PROCEDURE — 73630 X-RAY EXAM OF FOOT: CPT | Mod: RT

## 2022-01-26 PROCEDURE — 73600 X-RAY EXAM OF ANKLE: CPT | Mod: LT

## 2022-01-26 PROCEDURE — 83605 ASSAY OF LACTIC ACID: CPT

## 2022-01-26 PROCEDURE — 99285 EMERGENCY DEPT VISIT HI MDM: CPT

## 2022-01-26 PROCEDURE — 80053 COMPREHEN METABOLIC PANEL: CPT

## 2022-01-26 PROCEDURE — 96374 THER/PROPH/DIAG INJ IV PUSH: CPT

## 2022-01-26 PROCEDURE — 73560 X-RAY EXAM OF KNEE 1 OR 2: CPT | Mod: LT

## 2022-01-26 PROCEDURE — 700111 HCHG RX REV CODE 636 W/ 250 OVERRIDE (IP): Performed by: EMERGENCY MEDICINE

## 2022-01-26 PROCEDURE — 96375 TX/PRO/DX INJ NEW DRUG ADDON: CPT

## 2022-01-26 PROCEDURE — 73590 X-RAY EXAM OF LOWER LEG: CPT | Mod: LT

## 2022-01-26 PROCEDURE — G0378 HOSPITAL OBSERVATION PER HR: HCPCS

## 2022-01-26 PROCEDURE — 84484 ASSAY OF TROPONIN QUANT: CPT

## 2022-01-26 PROCEDURE — 99220 PR INITIAL OBSERVATION CARE,LEVL III: CPT | Performed by: HOSPITALIST

## 2022-01-26 PROCEDURE — 85025 COMPLETE CBC W/AUTO DIFF WBC: CPT

## 2022-01-26 RX ORDER — MORPHINE SULFATE 4 MG/ML
2 INJECTION INTRAVENOUS ONCE
Status: COMPLETED | OUTPATIENT
Start: 2022-01-26 | End: 2022-01-26

## 2022-01-26 RX ORDER — ESCITALOPRAM OXALATE 10 MG/1
20 TABLET ORAL DAILY
Status: DISCONTINUED | OUTPATIENT
Start: 2022-01-27 | End: 2022-01-31 | Stop reason: HOSPADM

## 2022-01-26 RX ORDER — AMOXICILLIN 250 MG
2 CAPSULE ORAL 2 TIMES DAILY
Status: DISCONTINUED | OUTPATIENT
Start: 2022-01-26 | End: 2022-01-29 | Stop reason: ALTCHOICE

## 2022-01-26 RX ORDER — ONDANSETRON 4 MG/1
4 TABLET, ORALLY DISINTEGRATING ORAL EVERY 4 HOURS PRN
Status: DISCONTINUED | OUTPATIENT
Start: 2022-01-26 | End: 2022-01-31 | Stop reason: HOSPADM

## 2022-01-26 RX ORDER — POLYETHYLENE GLYCOL 3350 17 G/17G
1 POWDER, FOR SOLUTION ORAL
Status: DISCONTINUED | OUTPATIENT
Start: 2022-01-26 | End: 2022-01-29 | Stop reason: ALTCHOICE

## 2022-01-26 RX ORDER — METOPROLOL SUCCINATE 25 MG/1
50 TABLET, EXTENDED RELEASE ORAL
Status: DISCONTINUED | OUTPATIENT
Start: 2022-01-27 | End: 2022-01-26

## 2022-01-26 RX ORDER — HYDROCODONE BITARTRATE AND ACETAMINOPHEN 10; 325 MG/1; MG/1
1 TABLET ORAL 2 TIMES DAILY
Status: DISCONTINUED | OUTPATIENT
Start: 2022-01-27 | End: 2022-01-31 | Stop reason: HOSPADM

## 2022-01-26 RX ORDER — SODIUM CHLORIDE 9 MG/ML
1000 INJECTION, SOLUTION INTRAVENOUS ONCE
Status: COMPLETED | OUTPATIENT
Start: 2022-01-26 | End: 2022-01-26

## 2022-01-26 RX ORDER — OMEPRAZOLE 20 MG/1
20 CAPSULE, DELAYED RELEASE ORAL DAILY
Status: DISCONTINUED | OUTPATIENT
Start: 2022-01-27 | End: 2022-01-31 | Stop reason: HOSPADM

## 2022-01-26 RX ORDER — GABAPENTIN 300 MG/1
300 CAPSULE ORAL 3 TIMES DAILY
Status: DISCONTINUED | OUTPATIENT
Start: 2022-01-27 | End: 2022-01-31 | Stop reason: HOSPADM

## 2022-01-26 RX ORDER — ONDANSETRON 2 MG/ML
4 INJECTION INTRAMUSCULAR; INTRAVENOUS EVERY 4 HOURS PRN
Status: DISCONTINUED | OUTPATIENT
Start: 2022-01-26 | End: 2022-01-29 | Stop reason: ALTCHOICE

## 2022-01-26 RX ORDER — ROSUVASTATIN CALCIUM 10 MG/1
20 TABLET, COATED ORAL EVERY EVENING
Status: DISCONTINUED | OUTPATIENT
Start: 2022-01-27 | End: 2022-01-31 | Stop reason: HOSPADM

## 2022-01-26 RX ORDER — ACETAMINOPHEN 325 MG/1
650 TABLET ORAL EVERY 6 HOURS PRN
Status: DISCONTINUED | OUTPATIENT
Start: 2022-01-26 | End: 2022-01-31 | Stop reason: HOSPADM

## 2022-01-26 RX ORDER — MORPHINE SULFATE 4 MG/ML
4 INJECTION INTRAVENOUS ONCE
Status: COMPLETED | OUTPATIENT
Start: 2022-01-26 | End: 2022-01-26

## 2022-01-26 RX ORDER — ONDANSETRON 2 MG/ML
4 INJECTION INTRAMUSCULAR; INTRAVENOUS ONCE
Status: COMPLETED | OUTPATIENT
Start: 2022-01-26 | End: 2022-01-26

## 2022-01-26 RX ORDER — BISACODYL 10 MG
10 SUPPOSITORY, RECTAL RECTAL
Status: DISCONTINUED | OUTPATIENT
Start: 2022-01-26 | End: 2022-01-29 | Stop reason: ALTCHOICE

## 2022-01-26 RX ORDER — LISINOPRIL AND HYDROCHLOROTHIAZIDE 12.5; 1 MG/1; MG/1
1 TABLET ORAL DAILY
Status: DISCONTINUED | OUTPATIENT
Start: 2022-01-27 | End: 2022-01-26

## 2022-01-26 RX ADMIN — MORPHINE SULFATE 4 MG: 4 INJECTION INTRAVENOUS at 20:54

## 2022-01-26 RX ADMIN — ONDANSETRON 4 MG: 2 INJECTION INTRAMUSCULAR; INTRAVENOUS at 20:53

## 2022-01-26 RX ADMIN — SODIUM CHLORIDE 1000 ML: 9 INJECTION, SOLUTION INTRAVENOUS at 22:41

## 2022-01-26 RX ADMIN — MORPHINE SULFATE 2 MG: 4 INJECTION INTRAVENOUS at 22:41

## 2022-01-26 ASSESSMENT — ENCOUNTER SYMPTOMS
FALLS: 1
DOUBLE VISION: 0
DIZZINESS: 0
MYALGIAS: 0
SORE THROAT: 0
SHORTNESS OF BREATH: 0
VOMITING: 0
COUGH: 0
BLURRED VISION: 0
INSOMNIA: 0
NAUSEA: 0
FEVER: 0
BRUISES/BLEEDS EASILY: 0
HEADACHES: 0
NECK PAIN: 0
PALPITATIONS: 0
WEAKNESS: 0
DEPRESSION: 0

## 2022-01-26 ASSESSMENT — FIBROSIS 4 INDEX: FIB4 SCORE: 1.26

## 2022-01-27 PROBLEM — E87.20 METABOLIC ACIDOSIS: Status: RESOLVED | Noted: 2020-07-04 | Resolved: 2022-01-27

## 2022-01-27 PROBLEM — Z00.00 MEDICARE ANNUAL WELLNESS VISIT, INITIAL: Status: RESOLVED | Noted: 2018-04-10 | Resolved: 2022-01-27

## 2022-01-27 PROBLEM — J18.9 RIGHT LOWER LOBE PNEUMONIA: Status: RESOLVED | Noted: 2020-09-13 | Resolved: 2022-01-27

## 2022-01-27 PROBLEM — E87.5 HYPERKALEMIA: Status: ACTIVE | Noted: 2022-01-27

## 2022-01-27 LAB
ANION GAP SERPL CALC-SCNC: 6 MMOL/L (ref 7–16)
BUN SERPL-MCNC: 19 MG/DL (ref 8–22)
CALCIUM SERPL-MCNC: 8.7 MG/DL (ref 8.4–10.2)
CHLORIDE SERPL-SCNC: 105 MMOL/L (ref 96–112)
CO2 SERPL-SCNC: 28 MMOL/L (ref 20–33)
CREAT SERPL-MCNC: 1.15 MG/DL (ref 0.5–1.4)
ERYTHROCYTE [DISTWIDTH] IN BLOOD BY AUTOMATED COUNT: 47.2 FL (ref 35.9–50)
GLUCOSE SERPL-MCNC: 98 MG/DL (ref 65–99)
HCT VFR BLD AUTO: 41.7 % (ref 37–47)
HGB BLD-MCNC: 13.2 G/DL (ref 12–16)
LACTATE BLD-SCNC: 0.9 MMOL/L (ref 0.5–2)
MCH RBC QN AUTO: 30.3 PG (ref 27–33)
MCHC RBC AUTO-ENTMCNC: 31.7 G/DL (ref 33.6–35)
MCV RBC AUTO: 95.9 FL (ref 81.4–97.8)
PLATELET # BLD AUTO: 162 K/UL (ref 164–446)
PMV BLD AUTO: 9.6 FL (ref 9–12.9)
POTASSIUM SERPL-SCNC: 5.7 MMOL/L (ref 3.6–5.5)
RBC # BLD AUTO: 4.35 M/UL (ref 4.2–5.4)
SODIUM SERPL-SCNC: 139 MMOL/L (ref 135–145)
WBC # BLD AUTO: 15 K/UL (ref 4.8–10.8)

## 2022-01-27 PROCEDURE — 96376 TX/PRO/DX INJ SAME DRUG ADON: CPT

## 2022-01-27 PROCEDURE — 80048 BASIC METABOLIC PNL TOTAL CA: CPT

## 2022-01-27 PROCEDURE — G0378 HOSPITAL OBSERVATION PER HR: HCPCS

## 2022-01-27 PROCEDURE — 92610 EVALUATE SWALLOWING FUNCTION: CPT

## 2022-01-27 PROCEDURE — 96375 TX/PRO/DX INJ NEW DRUG ADDON: CPT

## 2022-01-27 PROCEDURE — 700102 HCHG RX REV CODE 250 W/ 637 OVERRIDE(OP): Performed by: INTERNAL MEDICINE

## 2022-01-27 PROCEDURE — 700111 HCHG RX REV CODE 636 W/ 250 OVERRIDE (IP): Performed by: HOSPITALIST

## 2022-01-27 PROCEDURE — 700102 HCHG RX REV CODE 250 W/ 637 OVERRIDE(OP): Performed by: HOSPITALIST

## 2022-01-27 PROCEDURE — 99226 PR SUBSEQUENT OBSERVATION CARE,LEVEL III: CPT | Performed by: INTERNAL MEDICINE

## 2022-01-27 PROCEDURE — 83605 ASSAY OF LACTIC ACID: CPT

## 2022-01-27 PROCEDURE — 700111 HCHG RX REV CODE 636 W/ 250 OVERRIDE (IP): Performed by: INTERNAL MEDICINE

## 2022-01-27 PROCEDURE — A9270 NON-COVERED ITEM OR SERVICE: HCPCS | Performed by: INTERNAL MEDICINE

## 2022-01-27 PROCEDURE — A9270 NON-COVERED ITEM OR SERVICE: HCPCS | Performed by: HOSPITALIST

## 2022-01-27 PROCEDURE — 700105 HCHG RX REV CODE 258: Performed by: INTERNAL MEDICINE

## 2022-01-27 PROCEDURE — 85027 COMPLETE CBC AUTOMATED: CPT

## 2022-01-27 RX ORDER — OXYCODONE HYDROCHLORIDE 5 MG/1
5 TABLET ORAL
Status: DISCONTINUED | OUTPATIENT
Start: 2022-01-27 | End: 2022-01-29 | Stop reason: ALTCHOICE

## 2022-01-27 RX ORDER — OXYCODONE HYDROCHLORIDE 5 MG/1
2.5 TABLET ORAL
Status: DISCONTINUED | OUTPATIENT
Start: 2022-01-27 | End: 2022-01-31 | Stop reason: HOSPADM

## 2022-01-27 RX ORDER — SODIUM CHLORIDE 9 MG/ML
INJECTION, SOLUTION INTRAVENOUS CONTINUOUS
Status: DISCONTINUED | OUTPATIENT
Start: 2022-01-27 | End: 2022-01-31 | Stop reason: HOSPADM

## 2022-01-27 RX ORDER — HYDROMORPHONE HYDROCHLORIDE 1 MG/ML
0.25 INJECTION, SOLUTION INTRAMUSCULAR; INTRAVENOUS; SUBCUTANEOUS
Status: DISCONTINUED | OUTPATIENT
Start: 2022-01-27 | End: 2022-01-31 | Stop reason: HOSPADM

## 2022-01-27 RX ORDER — CHOLECALCIFEROL (VITAMIN D3) 125 MCG
5 CAPSULE ORAL NIGHTLY
Status: DISCONTINUED | OUTPATIENT
Start: 2022-01-27 | End: 2022-01-31 | Stop reason: HOSPADM

## 2022-01-27 RX ADMIN — Medication 5 MG: at 20:15

## 2022-01-27 RX ADMIN — GABAPENTIN 300 MG: 300 CAPSULE ORAL at 19:20

## 2022-01-27 RX ADMIN — OXYCODONE HYDROCHLORIDE 5 MG: 5 TABLET ORAL at 20:15

## 2022-01-27 RX ADMIN — HYDROMORPHONE HYDROCHLORIDE 0.25 MG: 1 INJECTION, SOLUTION INTRAMUSCULAR; INTRAVENOUS; SUBCUTANEOUS at 21:50

## 2022-01-27 RX ADMIN — SODIUM CHLORIDE: 9 INJECTION, SOLUTION INTRAVENOUS at 16:38

## 2022-01-27 RX ADMIN — HYDROMORPHONE HYDROCHLORIDE 0.25 MG: 1 INJECTION, SOLUTION INTRAMUSCULAR; INTRAVENOUS; SUBCUTANEOUS at 08:41

## 2022-01-27 RX ADMIN — GABAPENTIN 300 MG: 300 CAPSULE ORAL at 13:43

## 2022-01-27 RX ADMIN — HYDROCODONE BITARTRATE AND ACETAMINOPHEN 1 TABLET: 10; 325 TABLET ORAL at 05:26

## 2022-01-27 RX ADMIN — ONDANSETRON 4 MG: 2 INJECTION INTRAMUSCULAR; INTRAVENOUS at 08:42

## 2022-01-27 RX ADMIN — OXYCODONE HYDROCHLORIDE 5 MG: 5 TABLET ORAL at 10:25

## 2022-01-27 RX ADMIN — HYDROMORPHONE HYDROCHLORIDE 0.25 MG: 1 INJECTION, SOLUTION INTRAMUSCULAR; INTRAVENOUS; SUBCUTANEOUS at 16:46

## 2022-01-27 RX ADMIN — SENNOSIDES AND DOCUSATE SODIUM 2 TABLET: 50; 8.6 TABLET ORAL at 19:20

## 2022-01-27 RX ADMIN — OXYCODONE HYDROCHLORIDE 5 MG: 5 TABLET ORAL at 13:43

## 2022-01-27 RX ADMIN — ROSUVASTATIN 20 MG: 10 TABLET, FILM COATED ORAL at 20:16

## 2022-01-27 RX ADMIN — HYDROCODONE BITARTRATE AND ACETAMINOPHEN 1 TABLET: 10; 325 TABLET ORAL at 19:20

## 2022-01-27 RX ADMIN — GABAPENTIN 300 MG: 300 CAPSULE ORAL at 05:27

## 2022-01-27 ASSESSMENT — ENCOUNTER SYMPTOMS
COUGH: 0
BLURRED VISION: 0
DIARRHEA: 0
HEADACHES: 0
CHILLS: 0
SPEECH CHANGE: 0
DIZZINESS: 0
NERVOUS/ANXIOUS: 0
CLAUDICATION: 0
INSOMNIA: 0
PHOTOPHOBIA: 0
SORE THROAT: 0
ABDOMINAL PAIN: 0
WEAKNESS: 0
SENSORY CHANGE: 0
VOMITING: 0
SHORTNESS OF BREATH: 0
CONSTIPATION: 0
DEPRESSION: 0
HEARTBURN: 0
MYALGIAS: 0
FALLS: 1
FEVER: 0

## 2022-01-27 ASSESSMENT — COGNITIVE AND FUNCTIONAL STATUS - GENERAL
MOVING FROM LYING ON BACK TO SITTING ON SIDE OF FLAT BED: A LITTLE
SUGGESTED CMS G CODE MODIFIER MOBILITY: CK
MOVING TO AND FROM BED TO CHAIR: A LITTLE
CLIMB 3 TO 5 STEPS WITH RAILING: A LOT
TOILETING: A LITTLE
MOBILITY SCORE: 16
WALKING IN HOSPITAL ROOM: A LOT
STANDING UP FROM CHAIR USING ARMS: A LITTLE
TURNING FROM BACK TO SIDE WHILE IN FLAT BAD: A LITTLE
HELP NEEDED FOR BATHING: A LITTLE
DAILY ACTIVITIY SCORE: 21
SUGGESTED CMS G CODE MODIFIER DAILY ACTIVITY: CJ
DRESSING REGULAR LOWER BODY CLOTHING: A LITTLE

## 2022-01-27 ASSESSMENT — PAIN DESCRIPTION - PAIN TYPE
TYPE: ACUTE PAIN
TYPE: ACUTE PAIN

## 2022-01-27 ASSESSMENT — LIFESTYLE VARIABLES
TOTAL SCORE: 0
TOTAL SCORE: 0
HOW MANY TIMES IN THE PAST YEAR HAVE YOU HAD 5 OR MORE DRINKS IN A DAY: 0
HAVE YOU EVER FELT YOU SHOULD CUT DOWN ON YOUR DRINKING: NO
EVER FELT BAD OR GUILTY ABOUT YOUR DRINKING: NO
HAVE PEOPLE ANNOYED YOU BY CRITICIZING YOUR DRINKING: NO
CONSUMPTION TOTAL: NEGATIVE
AVERAGE NUMBER OF DAYS PER WEEK YOU HAVE A DRINK CONTAINING ALCOHOL: 0
EVER HAD A DRINK FIRST THING IN THE MORNING TO STEADY YOUR NERVES TO GET RID OF A HANGOVER: NO
ON A TYPICAL DAY WHEN YOU DRINK ALCOHOL HOW MANY DRINKS DO YOU HAVE: 0
ALCOHOL_USE: NO
TOTAL SCORE: 0

## 2022-01-27 ASSESSMENT — FIBROSIS 4 INDEX: FIB4 SCORE: 3.143351465587962496

## 2022-01-27 NOTE — THERAPY
Speech Language Pathology   Clinical Swallow Evaluation     Patient Name: Ellen Srivastava  AGE:  86 y.o., SEX:  female  Medical Record #: [id number]  Today's Date: 1/27/2022          Assessment    Patient is 86 y.o. female, with h/o HTN, who presented to the ED on  1/26/2022 after sustaining ground-level fall at home.   Patient participated in clinical swallow evaluation this date.     Oral Mechanism Exam:    Patient with symmetrical facial features. Tongue protrusion to midline. ROM and strength of oral musculature found to be WFL.     Oral/Pharyngeal phase:    The patient consumed PO trials of soft and bite sized textures and thin liquids. Patient with adequate oral acceptance and containment. Adequate bite and mastication of presented bolus. Presumed timely AP transit and timely swallow trigger. Patient with no overt s/sx of aspiration/penetration appreciated.     Clinical Impressions:    No overt s/sx of aspiration appreciated this date. Patient reports requiring a soft diet for esophageal dysphagia which she is following GI outpatient for. She has had 2 surgeries for hiatal hernia per patient report.     Recommendations:    Initiation of diet soft and bite sized with thin liquids per patient preference/GI recommendations. Advance as tolerated.    Plan    Recommend Speech Therapy for Evaluation only for the following treatments:  Dysphagia Training.    Discharge Recommendations: (P) Anticipate that the patient will have no further speech therapy needs after discharge from the hospital    Objective       01/27/22 1156   Oral Motor Eval    Is Patient Able to Complete Oral Motor Eval Yes, Within Normal Limits   Laryngeal Function   Voice Quality Within Functional Limits   Volutional Cough Within Functional Limits   Excursion Upon Swallow Complete   Oral Food Presentation   Single Swallow Thin (0) Within Functional Limits   Serial Swallow Thin (0) Within Functional Limits   Soft & Bite-Sized (6) - (Dysphagia  "III) Within Functional Limits   Self Feeding Independent   Tracheostomy   Tracheostomy  No   Dysphagia Strategies / Recommendations   Strategies / Interventions Recommended (Yes / No) Yes   Compensatory Strategies None   Diet / Liquid Recommendation Thin (0);Soft & Bite-Sized (6) - (Dysphagia III)   Medication Administration  Crush all Medications in Puree;Float Whole with Puree   Therapy Interventions No Swallowing Intervention Required   Dysphagia Rating   Nutritional Liquid Intake Rating Scale Non thickened beverages   Nutritional Food Intake Rating Scale Total oral diet with multiple consistencies without special preparation but with specific food limitations   Patient / Family Goals   Patient / Family Goal #1 \"I just prefer soft foods\"         "

## 2022-01-27 NOTE — ED PROVIDER NOTES
ED Provider Note    ED Provider Note    Primary care provider: Denzel Patel M.D.  Means of arrival: EMS  History obtained from: Patient    CHIEF COMPLAINT  Chief Complaint   Patient presents with   • Dizziness     pt got dizzy and fell three times today   • Ankle Pain     left ankle is deformed and swollen    • Foot Pain     Pt states that she fell and broke her foot in november and the foot is still painful      Seen at 8:40 PM.   HPI  Ellen Srivastava is a 86 y.o. female who presents to the Emergency Department for falls.  The patient states that 3 times a day when she was standing up she felt her legs give out and she fell to the ground.  On the most recent episode that occurred about an hour ago she awkwardly landed on her left leg and feels that she has broken the left ankle.  She reports having surgery there and there is a amarilis in place from prior injuries.  She denies any head trauma or loss of consciousness.  She does not feel off balance.  She denies any neck pain, numbness, chest pain, shortness of breath, vomiting, diarrhea, abdominal pain.    The patient states she had a similar event in November.  She does not give any other explanation or insight as to why her legs gave out on her.  She denies feeling lightheaded, she denies any vertigo.    REVIEW OF SYSTEMS  See HPI,   Remainder of ROS negative.     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    SURGICAL HISTORY   has a past surgical  "history that includes breast biopsy (Left, 2015); lumbar decompression (1969, 1979); hysterectomy, total abdominal (1977); appendectomy (1945); parathyroid exploration (6/16/2010); paraesophageal hernia robotic (N/A, 10/12/2015); gastroscopy (10/23/2015); thoracoscopy (Left, 10/30/2015); gastroscopy-endo (11/1/2015); gastroscopy-endo (N/A, 12/10/2015); gastroscopy-endo (N/A, 2/26/2016); orif, ankle (Right, 9/20/2016); and lap,esophagogast fundoplasty (N/A, 8/24/2020).    SOCIAL HISTORY  Social History     Tobacco Use   • Smoking status: Never Smoker   • Smokeless tobacco: Former User   • Tobacco comment: Nicotine    Vaping Use   • Vaping Use: Never used   Substance Use Topics   • Alcohol use: No     Alcohol/week: 0.0 oz   • Drug use: No      Social History     Substance and Sexual Activity   Drug Use No       FAMILY HISTORY  Family History   Problem Relation Age of Onset   • Hypertension Mother    • Lung Disease Father    • Hypertension Other    • Lung Disease Other    • Lung Disease Brother        CURRENT MEDICATIONS  Reviewed.  See Encounter Summary.     ALLERGIES  Allergies   Allergen Reactions   • Asa [Aspirin] Unspecified     Headache       PHYSICAL EXAM  VITAL SIGNS: /67   Pulse (!) 58   Temp 36.1 °C (96.9 °F) (Temporal)   Resp 18   Ht 1.727 m (5' 8\")   Wt 61.2 kg (135 lb)   LMP 03/13/1970   SpO2 91%   BMI 20.53 kg/m²   Constitutional: Awake, alert in no apparent distress.  HENT: Normocephalic, atraumatic, no midline neck tenderness, bilateral external ears normal. Nose normal.   Eyes: Conjunctiva normal, non-icteric, EOMI.    Thorax & Lungs: Easy unlabored respirations, Clear to ascultation bilaterally.  Cardiovascular: Regular rate, Regular rhythm, No murmurs, rubs or gallops. Bilateral pulses symmetrical.   Abdomen:  Soft, nontender, nondistended, normal active bowel sounds.   :    Skin: Visualized skin is  Dry, No erythema, No rash.   Musculoskeletal:   No cyanosis, clubbing or edema. No " leg asymmetry.  No midline tenderness throughout the spine or step-off.  The upper extremities are atraumatic.  The pelvis is stable.  The bilateral femurs are atraumatic.  The patient has no knee tenderness.  She has an obvious deformity to the left lower leg, overlying skin is normal.  There is some tenderness in the right midfoot, no instability noted.  Tenderness noted over the first phalanx with overlying ecchymosis.  Neurologic: Alert, Grossly non-focal.   Psychiatric: Normal affect, Normal mood  Lymphatic:  No cervical LAD    EKG   12 lead Interpreted by me  Rhythm: Sinus bradycardia  Rate: 49  Axis: normal  Ectopy: none  Conduction: normal  ST Segments: no acute change  T Waves: no acute change  Clinical Impression: Normal EKG without acute changes     RADIOLOGY  DX-FOOT-COMPLETE 3+ RIGHT   Final Result      1.  Intra-articular fracture of the lateral base of the first proximal phalanx could be chronic or acute. Suggest correlation for point tenderness   2.  Osteopenia   3.  Prior surgical fixation of the distal tibia and fibula      DX-TIBIA AND FIBULA LEFT   Final Result      1.  Osteopenia   2.  Fracture of the medial malleolus   3.  Probable fracture of the posterior malleolus   4.  Possible fracture of the medial malleolus   5.  Possible disruption of the ankle mortise   6.  Recommend dedicated radiographs of the LEFT ankle and knee      DX-CHEST-LIMITED (1 VIEW)   Final Result      1.  LEFT medial basilar atelectasis, less likely pneumonia   2.  Curvilinear scar in the lateral LEFT lower lobe      DX-KNEE 2- LEFT    (Results Pending)   DX-ANKLE 2- VIEWS LEFT    (Results Pending)         COURSE & MEDICAL DECISION MAKING  Pertinent Labs & Imaging studies reviewed. (See chart for details)    Differential diagnoses include but are not limited to: Near syncope, orthostasis, tib-fib fracture, foot fracture, Lisfranc injury    8:40 PM - Medical record reviewed, patient seen and examined at bedside.    10 PM  Case discussed with Dr. Cooley, they will consult in the patient tomorrow, unlikely that the patient will require surgery on this hospital stay.  Case discussed with Dr. Whitney who will evaluate the patient for admission.    Decision Making:  This is a pleasant 86 y.o. year old female who presents with left ankle pain, right foot pain.  The patient has had 3 falls today, she states that her legs just give out.  She had a soft blood pressure on initial evaluation which actually worsened with the administration of morphine, at one point she had a systolic blood pressure in the 70s.  She does not have a reflexive tachycardia, likely because she is on a beta-blocker.  EKG does not show acute ischemic changes, QT prolongation, or short PA.  I suspect she has some symptomatic bradycardia from her beta-blocker.  Recommend discontinuing this.  The troponin is not elevated, the patient has not had any chest pain or shortness of breath, do not suspect ACS.  She has no leukocytosis or leftward shift, do not suspect sepsis.    Unfortunately because of the falls, the patient now has a right great toe that is fractured and a left bimalleolar fracture.  She will be nonambulatory secondary to these fractures, therefore would not be safe to discharge her, nor would it be in the best interest to send home with patient that has frequent falls and new fractures.    Plan will be to admit the patient, consider telemetry, have orthopedics weigh in on the bimalleolar fracture.  She may need to be placed in a rehab facility until she is able to ambulate unassisted.    FINAL IMPRESSION  1. Closed bimalleolar fracture of left ankle, initial encounter    2. Closed nondisplaced fracture of proximal phalanx of right great toe, initial encounter    3. Symptomatic bradycardia

## 2022-01-27 NOTE — THERAPY
Missed Therapy     Patient Name: Ellen Srivastava  Age:  86 y.o., Sex:  female  Medical Record #: [id number]  Today's Date: 1/27/2022    Discussed missed therapy with RN       01/27/22 0813   Interdisciplinary Plan of Care Collaboration   Collaboration Comments OT orders rec'd- Pt pending ortho consult for LLE bimaleolar fx and R great toe fx.  Unsafe to evaluate at this time until appropriate WB status and POC determined by Ortho MD.  Will follow for OT evaluation as appropriate.

## 2022-01-27 NOTE — H&P
"Hospital Medicine History & Physical Note    Date of Service  1/26/2022    Primary Care Physician  Denzel Patel M.D.    Consultants  orthopedics    Specialist Names: ERP discussed with Dr. Cooley    Code Status  Full Code    Chief Complaint  Chief Complaint   Patient presents with   • Dizziness     pt got dizzy and fell three times today   • Ankle Pain     left ankle is deformed and swollen    • Foot Pain     Pt states that she fell and broke her foot in november and the foot is still painful        History of Presenting Illness  Ellen Srivastava is a 86 y.o. female, with h/o HTN, who presented to the ED on  1/26/2022 after sustaining ground-level fall at home.  Patient actually states that she fell 3 times during the day today, last time twisting her ankle and being unable to walk secondary to pain.  She reports feeling \"dizzy \"all day long, but did not had any chest pain, shortness of breath.  She does not have a fever, no nausea, vomiting.  She fell the first time on her living room, second time in the bathroom and third time on the hallway.  She reports having a previous fall in November when she thought she fractured her right foot.  She is not on blood thinners.  She denies head injury or loss of consciousness.  ER evaluation is demonstrating left medial malleolar fracture and possible underlying fracture of posterior malleolar area and disruption of the ankle mortise.  She also has intra-articular fracture of the lateral base of the first proximal phalanx, that is acute versus chronic the patient really cannot ambulate with those 2 lesions.  ERP contacted orthopedic surgery team and discussed this case with Dr. Cooley who requested dedicated ankle views and will evaluate patient in the morning.  -Additionally, patient had episode of hypotension here with blood pressure 95/62 and 82/58 after receiving 1 dose of morphine.  She had a liter normal saline bolus and blood pressure now is within normal " Due to COVID-19 ACTION PLAN, the patient's office visit was converted to a phone visit.    This patient verbally consents to a telephone visit.    Patient states they are Domingo Jaramillo and that they are speaking to me from their home.     It has been 6 months since the patient's last in-office visit.    Patient notes the following changes in medical history since last visit: No chief complaint on file.       ROS asthma cough     Patient offers the following concerns: nebulizer machine     Patient is doing home BP checks: no     The following testing was reviewed during this phone visit: none     Medication and allergy reconciliation completed over the phone.     The following problems were addressed during today's call:  Problem List Items Addressed This Visit     None      Visit Diagnoses     Mild intermittent asthma in adult without complication    -  Primary    Relevant Medications    albuterol (VENTOLIN) (2.5 MG/3ML) 0.083% nebulizer solution    Mild persistent asthmatic bronchitis without complication        Relevant Medications    albuterol 108 (90 Base) MCG/ACT inhaler          The following was ordered during today's call:   No orders of the defined types were placed in this encounter.       Time spent talking with patient during today's call: 20    Testing should be completed prior to next visit. New prescriptions / refills sent to the pharmacy.    Patient was advised to call if they experience any new or worsening symptoms.    Nebulizer machine  Ordered   Use albuterol as needed for asthma   And inhaler four times a day   Return in about 4 weeks (around 4/30/2020).   limits.    I discussed the plan of care with patient.    Review of Systems  Review of Systems   Constitutional: Negative for fever.   HENT: Negative for congestion and sore throat.    Eyes: Negative for blurred vision and double vision.   Respiratory: Negative for cough and shortness of breath.    Cardiovascular: Negative for chest pain and palpitations.   Gastrointestinal: Negative for nausea and vomiting.   Genitourinary: Negative for dysuria and urgency.   Musculoskeletal: Positive for falls and joint pain. Negative for myalgias and neck pain.        Unable to walk due to pain   Skin: Negative for itching and rash.   Neurological: Negative for dizziness, weakness and headaches.   Endo/Heme/Allergies: Does not bruise/bleed easily.   Psychiatric/Behavioral: Negative for depression. The patient does not have insomnia.        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    Surgical History   has a past surgical history that includes breast biopsy (Left, 2015); lumbar decompression (1969, 1979); hysterectomy, total abdominal (1977); appendectomy (1945); parathyroid exploration (6/16/2010); paraesophageal hernia robotic (N/A, 10/12/2015); gastroscopy (10/23/2015); thoracoscopy (Left, 10/30/2015); gastroscopy-endo (11/1/2015); gastroscopy-endo (N/A, 12/10/2015); gastroscopy-endo (N/A, 2/26/2016); orif, ankle (Right, 9/20/2016); and pr lap,esophagogast fundoplasty (N/A, 8/24/2020).     Family History  family history  includes Hypertension in her mother and another family member; Lung Disease in her brother, father, and another family member.   Family history reviewed with patient. There is no family history that is pertinent to the chief complaint.     Social History   reports that she has never smoked. She has quit using smokeless tobacco. She reports that she does not drink alcohol and does not use drugs.    Allergies  Allergies   Allergen Reactions   • Asa [Aspirin] Unspecified     Headache       Medications  Prior to Admission Medications   Prescriptions Last Dose Informant Patient Reported? Taking?   HYDROcodone/acetaminophen (NORCO)  MG Tab  Significant Other Yes No   Sig: Take 1 Tab by mouth 2 Times a Day.   escitalopram (LEXAPRO) 20 MG tablet   No No   Sig: Take 1 Tablet by mouth every day.   gabapentin (NEURONTIN) 300 MG Cap   No No   Sig: Take 1 Capsule by mouth 3 times a day.   hydrOXYzine HCl (ATARAX) 25 MG Tab   No No   Sig: TAKE ONE-HALF TO ONE TABLET BY MOUTH THREE TIMES A DAY AS NEEDED FOR ANXIETY (MAX 3 TABLETS PER 24 HOURS)   lisinopril-hydrochlorothiazide (PRINZIDE) 10-12.5 MG per tablet   No No   Sig: TAKE ONE TABLET BY MOUTH DAILY (EVERY 24 HOURS) (STOP LISINOPRIL)   loperamide (IMODIUM) 1 MG/7.5ML Suspension   No No   Sig: Take 7.5 mL by mouth 4 times a day as needed for Diarrhea.   metoprolol SR (TOPROL XL) 50 MG TABLET SR 24 HR   No No   Sig: TAKE ONE TABLET BY MOUTH DAILY   omeprazole (PRILOSEC) 20 MG delayed-release capsule   No No   Sig: Take 1 Capsule by mouth every day.   rosuvastatin (CRESTOR) 20 MG Tab   No No   Sig: TAKE ONE TABLET BY MOUTH EVERY EVENING (CRESTOR)   sodium bicarbonate (SODIUM BICARBONATE) 650 MG Tab  Significant Other Yes No   Sig: Take 650 mg by mouth 3 times a day.   tizanidine (ZANAFLEX) 4 MG Tab   No No   Sig: TAKE ONE TABLET BY MOUTH TWICE A DAY AS NEEDED FOR SPASMS   triamcinolone acetonide (KENALOG) 0.1 % Cream   No No   Sig: AAA BID UP TO  14 DAYS THEN STOP       Facility-Administered Medications: None       Physical Exam  Temp:  [36.1 °C (96.9 °F)] 36.1 °C (96.9 °F)  Pulse:  [56-57] 56  Resp:  [18] 18  BP: ()/(58-73) 109/66  SpO2:  [92 %-96 %] 92 %  Blood Pressure : 109/66   Temperature: 36.1 °C (96.9 °F)   Pulse: (!) 56   Respiration: 18   Pulse Oximetry: 92 %       Physical Exam  Constitutional:       Appearance: Normal appearance.   HENT:      Head: Normocephalic and atraumatic.      Mouth/Throat:      Mouth: Mucous membranes are moist.      Pharynx: Oropharynx is clear.   Eyes:      Extraocular Movements: Extraocular movements intact.      Pupils: Pupils are equal, round, and reactive to light.   Cardiovascular:      Rate and Rhythm: Normal rate and regular rhythm.      Heart sounds: Normal heart sounds.   Pulmonary:      Effort: Pulmonary effort is normal.      Breath sounds: Normal breath sounds.   Abdominal:      General: Abdomen is flat. Bowel sounds are normal.      Palpations: Abdomen is soft.   Musculoskeletal:      Cervical back: Normal range of motion and neck supple.   Skin:     General: Skin is warm and dry.   Neurological:      General: No focal deficit present.      Mental Status: She is alert and oriented to person, place, and time.   Psychiatric:         Mood and Affect: Mood normal.         Behavior: Behavior normal.         Laboratory:  Recent Labs     01/26/22 2039   WBC 9.5   RBC 4.10*   HEMOGLOBIN 12.6   HEMATOCRIT 39.6   MCV 96.6   MCH 30.7   MCHC 31.8*   RDW 47.7   PLATELETCT 157*   MPV 9.9     Recent Labs     01/26/22 2039   SODIUM 138   POTASSIUM 4.4   CHLORIDE 100   CO2 29   GLUCOSE 113*   BUN 20   CREATININE 1.21   CALCIUM 8.9     Recent Labs     01/26/22 2039   ALTSGPT 12   ASTSGOT 21   ALKPHOSPHAT 64   TBILIRUBIN 0.2   GLUCOSE 113*         No results for input(s): NTPROBNP in the last 72 hours.      Recent Labs     01/26/22 2039   TROPONINT 7       Imaging:  DX-KNEE 2- LEFT   Final Result      1.  No radiographic evidence of acute  traumatic injury.   2.  Osteopenia   3.  Osteoarthritis      DX-ANKLE 2- VIEWS LEFT   Final Result      1.  Medial malleolar fracture   2.  Posterior malleolar fracture   3.  Lateral malleolar fracture may also be present   4.  Disruption of the ankle mortise   5.  Osteopenia      DX-FOOT-COMPLETE 3+ RIGHT   Final Result      1.  Intra-articular fracture of the lateral base of the first proximal phalanx could be chronic or acute. Suggest correlation for point tenderness   2.  Osteopenia   3.  Prior surgical fixation of the distal tibia and fibula      DX-TIBIA AND FIBULA LEFT   Final Result      1.  Osteopenia   2.  Fracture of the medial malleolus   3.  Probable fracture of the posterior malleolus   4.  Possible fracture of the medial malleolus   5.  Possible disruption of the ankle mortise   6.  Recommend dedicated radiographs of the LEFT ankle and knee      DX-CHEST-LIMITED (1 VIEW)   Final Result      1.  LEFT medial basilar atelectasis, less likely pneumonia   2.  Curvilinear scar in the lateral LEFT lower lobe              Assessment/Plan:  I anticipate this patient is appropriate for observation status at this time.    * Unable to ambulate  Assessment & Plan  -Observation status to medical floor.  -Patient has on the right side intra-articular fracture of the lateral base of the first proximal phalanx that could be acute versus chronic and on the left side she has fracture of the medial malleolus, probable fracture of the posterior malleolus and does irruption of ankle mortise.  -Given 3 falls and bilateral lesions/fractures, she is unable to ambulate and therefore unable to care for herself.  -ERP discussed this case with orthopedic surgical team and they are requesting dedicated x-ray of the left ankle and knee.  -I will admit her for PT/OT evaluation, pain control and orthopedic consult in the morning.  I appreciate recommendations.    Nondisplaced fracture of proximal phalanx of right great toe, initial  encounter for closed fracture  Assessment & Plan  -Plan as above.    Nondisplaced fracture of medial malleolus of left tibia, initial encounter for closed fracture  Assessment & Plan  -Plan as above.    Narcotic dependence (HCC)- (present on admission)  Assessment & Plan  Patient has chronic back pain and takes Norco that I will continue.    HTN (hypertension)- (present on admission)  Assessment & Plan  -She takes metoprolol, lisinopril and hydrochlorothiazide.  It is possible that she was hypotensive and this was causing her to be dizzy.  -I am holding all her antihypertensive medication and will monitor her blood pressure.  -She had hypotension after receiving morphine which improved after IV fluids.  We will closely monitor this.    Neuropathy- (present on admission)  Assessment & Plan  -Continue Neurontin.    Depression with anxiety- (present on admission)  Assessment & Plan  -Follows with psychiatry.  Continue Lexapro.    GERD (gastroesophageal reflux disease)- (present on admission)  Assessment & Plan  -Asymptomatic at this time, continue omeprazole.      VTE prophylaxis: SCDs/TEDs

## 2022-01-27 NOTE — ASSESSMENT & PLAN NOTE
-She takes metoprolol, lisinopril and hydrochlorothiazide.   Hypotensive in ED, resume medications once BP allows.

## 2022-01-27 NOTE — ASSESSMENT & PLAN NOTE
-Observation status to medical floor.  -Patient has on the right side intra-articular fracture of the lateral base of the first proximal phalanx that could be acute versus chronic and on the left side she has fracture of the medial malleolus, probable fracture of the posterior malleolus and does irruption of ankle mortise.  -Trimalleolar fracture Left ankle - OR today  -Patient refuses SNF/rehab, Home health ordered, PT/OT recommending wheelchair for transportation with removable/elevating leg rest for LLE.

## 2022-01-27 NOTE — ED NOTES
Pharmacy Medication Reconciliation      ~Medication reconciliation updated and complete per patient home pharmacy  ~Allergies have been verified and updated   ~No oral ABX within the last 30 days  ~Patient home pharmacy:[facility]

## 2022-01-27 NOTE — HOSPITAL COURSE
"Ellen Srivastava is a 86 y.o. female, with h/o HTN, who presented to the ED on  1/26/2022 after sustaining ground-level fall at home.  Patient actually states that she fell 3 times during the day today, last time twisting her ankle and being unable to walk secondary to pain.  She reports feeling \"dizzy \"all day long, but did not had any chest pain, shortness of breath.  She denies head injury or loss of consciousness.  ER evaluation is demonstrating left medial malleolar fracture and possible underlying fracture of posterior malleolar area and disruption of the ankle mortise.  Dr. Celis consulted and plans to take to the OR on 1/28.    "

## 2022-01-27 NOTE — PROGRESS NOTES
"[facility] Medicine Daily Progress Note    Date of Service  1/27/2022    Chief Complaint  Ellen Srivastava is a 86 y.o. female admitted 1/26/2022 with severe left ankle pain.    Hospital Course  Ellen Srivastava is a 86 y.o. female, with h/o HTN, who presented to the ED on  1/26/2022 after sustaining ground-level fall at home.  Patient actually states that she fell 3 times during the day today, last time twisting her ankle and being unable to walk secondary to pain.  She reports feeling \"dizzy \"all day long, but did not had any chest pain, shortness of breath.  She denies head injury or loss of consciousness.  ER evaluation is demonstrating left medial malleolar fracture and possible underlying fracture of posterior malleolar area and disruption of the ankle mortise.  Dr. Celis consulted and plans to take to the OR on 1/28.        Interval Problem Update  1/27 Patient seen in the ED, she states she has significant pain in her right foot and left ankle. She will have splint placed around left ankle and OR planned for tomorrow.  Pain management until OR.  She will likely need SNF for rehab as she has bilateral LE fractures but right now she states she would only accept home health.    I have personally seen and examined the patient at bedside. I discussed the plan of care with patient, bedside RN, charge RN,  and pharmacy.    Consultants/Specialty  orthopedics    Code Status  Full Code    Disposition  Patient is not medically cleared for discharge.   Anticipate discharge to to skilled nursing facility.  I have placed the appropriate orders for post-discharge needs.    Review of Systems  Review of Systems   Constitutional: Negative for chills and fever.   HENT: Negative for congestion and sore throat.    Eyes: Negative for blurred vision and photophobia.   Respiratory: Negative for cough and shortness of breath.    Cardiovascular: Negative for chest pain, claudication and leg swelling. "   Gastrointestinal: Negative for abdominal pain, constipation, diarrhea, heartburn and vomiting.   Genitourinary: Negative for dysuria and hematuria.   Musculoskeletal: Positive for falls and joint pain (left ankle, right foot). Negative for myalgias.   Skin: Negative for itching and rash.   Neurological: Negative for dizziness, sensory change, speech change, weakness and headaches.   Psychiatric/Behavioral: Negative for depression. The patient is not nervous/anxious and does not have insomnia.         Physical Exam  Temp:  [36.1 °C (96.9 °F)-36.8 °C (98.2 °F)] 36.8 °C (98.2 °F)  Pulse:  [51-74] 70  Resp:  [18] 18  BP: ()/() 120/101  SpO2:  [89 %-100 %] 94 %    Physical Exam  Vitals and nursing note reviewed.   Constitutional:       General: She is not in acute distress.     Appearance: Normal appearance. She is not ill-appearing.   HENT:      Head: Normocephalic and atraumatic.      Nose: Nose normal.   Eyes:      General: No scleral icterus.  Cardiovascular:      Rate and Rhythm: Normal rate and regular rhythm.      Heart sounds: Normal heart sounds. No murmur heard.      Pulmonary:      Effort: Pulmonary effort is normal.      Breath sounds: Normal breath sounds.   Abdominal:      General: Bowel sounds are normal. There is no distension.      Palpations: Abdomen is soft.   Musculoskeletal:         General: No swelling or tenderness.      Cervical back: Neck supple.   Skin:     General: Skin is warm and dry.   Neurological:      General: No focal deficit present.      Mental Status: She is alert and oriented to person, place, and time.   Psychiatric:         Mood and Affect: Mood normal.         Fluids    Intake/Output Summary (Last 24 hours) at 1/27/2022 1357  Last data filed at 1/27/2022 0900  Gross per 24 hour   Intake 240 ml   Output --   Net 240 ml       Laboratory  Recent Labs     01/26/22 2039 01/27/22  0415   WBC 9.5 15.0*   RBC 4.10* 4.35   HEMOGLOBIN 12.6 13.2   HEMATOCRIT 39.6 41.7   MCV  96.6 95.9   MCH 30.7 30.3   MCHC 31.8* 31.7*   RDW 47.7 47.2   PLATELETCT 157* 162*   MPV 9.9 9.6     Recent Labs     01/26/22 2039 01/27/22  0415   SODIUM 138 139   POTASSIUM 4.4 5.7*   CHLORIDE 100 105   CO2 29 28   GLUCOSE 113* 98   BUN 20 19   CREATININE 1.21 1.15   CALCIUM 8.9 8.7                   Imaging  DX-KNEE 2- LEFT   Final Result      1.  No radiographic evidence of acute traumatic injury.   2.  Osteopenia   3.  Osteoarthritis      DX-ANKLE 2- VIEWS LEFT   Final Result      1.  Medial malleolar fracture   2.  Posterior malleolar fracture   3.  Lateral malleolar fracture may also be present   4.  Disruption of the ankle mortise   5.  Osteopenia      DX-FOOT-COMPLETE 3+ RIGHT   Final Result      1.  Intra-articular fracture of the lateral base of the first proximal phalanx could be chronic or acute. Suggest correlation for point tenderness   2.  Osteopenia   3.  Prior surgical fixation of the distal tibia and fibula      DX-TIBIA AND FIBULA LEFT   Final Result      1.  Osteopenia   2.  Fracture of the medial malleolus   3.  Probable fracture of the posterior malleolus   4.  Possible fracture of the medial malleolus   5.  Possible disruption of the ankle mortise   6.  Recommend dedicated radiographs of the LEFT ankle and knee      DX-CHEST-LIMITED (1 VIEW)   Final Result      1.  LEFT medial basilar atelectasis, less likely pneumonia   2.  Curvilinear scar in the lateral LEFT lower lobe           Assessment/Plan  * Unable to ambulate  Assessment & Plan  -Observation status to medical floor.  -Patient has on the right side intra-articular fracture of the lateral base of the first proximal phalanx that could be acute versus chronic and on the left side she has fracture of the medial malleolus, probable fracture of the posterior malleolus and does irruption of ankle mortise.  -Trimalleolar fracture Left ankle - OR tomorrow, will order rapid covid today.  -may need SNF for rehab.      Hyperkalemia  Assessment  & Plan  Gentle IV hydration      Nondisplaced fracture of proximal phalanx of right great toe, initial encounter for closed fracture  Assessment & Plan  Surgical shoe      Nondisplaced fracture of medial malleolus of left tibia, initial encounter for closed fracture  Assessment & Plan  Dr Cooley consulted   OR 1/28/2022      Neuropathy- (present on admission)  Assessment & Plan  -Continue Neurontin.    Depression with anxiety- (present on admission)  Assessment & Plan  -Follows with psychiatry.  Continue Lexapro.    GERD (gastroesophageal reflux disease)- (present on admission)  Assessment & Plan  -Asymptomatic at this time, continue omeprazole.    Narcotic dependence (HCC)- (present on admission)  Assessment & Plan  Patient has chronic back pain and takes Norco that I will continue.    HTN (hypertension)- (present on admission)  Assessment & Plan  -She takes metoprolol, lisinopril and hydrochlorothiazide.   Hypotensive in ED, resume medications once BP allows.           VTE prophylaxis: SCDs/TEDs    I have performed a physical exam and reviewed and updated ROS and Plan today (1/27/2022). In review of yesterday's note (1/26/2022), there are no changes except as documented above.

## 2022-01-28 ENCOUNTER — APPOINTMENT (OUTPATIENT)
Dept: RADIOLOGY | Facility: MEDICAL CENTER | Age: 87
DRG: 493 | End: 2022-01-28
Attending: ORTHOPAEDIC SURGERY
Payer: MEDICARE

## 2022-01-28 ENCOUNTER — ANESTHESIA (OUTPATIENT)
Dept: SURGERY | Facility: MEDICAL CENTER | Age: 87
DRG: 493 | End: 2022-01-28
Payer: MEDICARE

## 2022-01-28 ENCOUNTER — ANESTHESIA EVENT (OUTPATIENT)
Dept: SURGERY | Facility: MEDICAL CENTER | Age: 87
DRG: 493 | End: 2022-01-28
Payer: MEDICARE

## 2022-01-28 PROBLEM — S82.892A CLOSED LEFT ANKLE FRACTURE, INITIAL ENCOUNTER: Status: ACTIVE | Noted: 2022-01-28

## 2022-01-28 LAB
ANION GAP SERPL CALC-SCNC: 10 MMOL/L (ref 7–16)
BUN SERPL-MCNC: 16 MG/DL (ref 8–22)
CALCIUM SERPL-MCNC: 8.3 MG/DL (ref 8.4–10.2)
CHLORIDE SERPL-SCNC: 100 MMOL/L (ref 96–112)
CO2 SERPL-SCNC: 25 MMOL/L (ref 20–33)
CREAT SERPL-MCNC: 0.97 MG/DL (ref 0.5–1.4)
ERYTHROCYTE [DISTWIDTH] IN BLOOD BY AUTOMATED COUNT: 47.9 FL (ref 35.9–50)
GLUCOSE SERPL-MCNC: 116 MG/DL (ref 65–99)
HCT VFR BLD AUTO: 38.3 % (ref 37–47)
HGB BLD-MCNC: 12.3 G/DL (ref 12–16)
MCH RBC QN AUTO: 30.6 PG (ref 27–33)
MCHC RBC AUTO-ENTMCNC: 32.1 G/DL (ref 33.6–35)
MCV RBC AUTO: 95.3 FL (ref 81.4–97.8)
PLATELET # BLD AUTO: 162 K/UL (ref 164–446)
PMV BLD AUTO: 10.1 FL (ref 9–12.9)
POTASSIUM SERPL-SCNC: 4.2 MMOL/L (ref 3.6–5.5)
RBC # BLD AUTO: 4.02 M/UL (ref 4.2–5.4)
SARS-COV+SARS-COV-2 AG RESP QL IA.RAPID: NOTDETECTED
SODIUM SERPL-SCNC: 135 MMOL/L (ref 135–145)
SPECIMEN SOURCE: NORMAL
WBC # BLD AUTO: 12 K/UL (ref 4.8–10.8)

## 2022-01-28 PROCEDURE — 160009 HCHG ANES TIME/MIN: Performed by: ORTHOPAEDIC SURGERY

## 2022-01-28 PROCEDURE — A9270 NON-COVERED ITEM OR SERVICE: HCPCS | Performed by: ORTHOPAEDIC SURGERY

## 2022-01-28 PROCEDURE — A9270 NON-COVERED ITEM OR SERVICE: HCPCS | Performed by: HOSPITALIST

## 2022-01-28 PROCEDURE — 80048 BASIC METABOLIC PNL TOTAL CA: CPT

## 2022-01-28 PROCEDURE — 99232 SBSQ HOSP IP/OBS MODERATE 35: CPT | Performed by: INTERNAL MEDICINE

## 2022-01-28 PROCEDURE — 85027 COMPLETE CBC AUTOMATED: CPT

## 2022-01-28 PROCEDURE — 36415 COLL VENOUS BLD VENIPUNCTURE: CPT

## 2022-01-28 PROCEDURE — 501838 HCHG SUTURE GENERAL: Performed by: ORTHOPAEDIC SURGERY

## 2022-01-28 PROCEDURE — 700102 HCHG RX REV CODE 250 W/ 637 OVERRIDE(OP): Performed by: INTERNAL MEDICINE

## 2022-01-28 PROCEDURE — 700111 HCHG RX REV CODE 636 W/ 250 OVERRIDE (IP): Performed by: ANESTHESIOLOGY

## 2022-01-28 PROCEDURE — 73610 X-RAY EXAM OF ANKLE: CPT | Mod: LT

## 2022-01-28 PROCEDURE — 64447 NJX AA&/STRD FEMORAL NRV IMG: CPT | Performed by: ORTHOPAEDIC SURGERY

## 2022-01-28 PROCEDURE — 770006 HCHG ROOM/CARE - MED/SURG/GYN SEMI*

## 2022-01-28 PROCEDURE — 700102 HCHG RX REV CODE 250 W/ 637 OVERRIDE(OP): Performed by: HOSPITALIST

## 2022-01-28 PROCEDURE — 503036 HCHG GUIDE PIN,OIC: Performed by: ORTHOPAEDIC SURGERY

## 2022-01-28 PROCEDURE — 160002 HCHG RECOVERY MINUTES (STAT): Performed by: ORTHOPAEDIC SURGERY

## 2022-01-28 PROCEDURE — 64445 NJX AA&/STRD SCIATIC NRV IMG: CPT | Performed by: ORTHOPAEDIC SURGERY

## 2022-01-28 PROCEDURE — 94760 N-INVAS EAR/PLS OXIMETRY 1: CPT

## 2022-01-28 PROCEDURE — 160048 HCHG OR STATISTICAL LEVEL 1-5: Performed by: ORTHOPAEDIC SURGERY

## 2022-01-28 PROCEDURE — A9270 NON-COVERED ITEM OR SERVICE: HCPCS | Performed by: INTERNAL MEDICINE

## 2022-01-28 PROCEDURE — C1713 ANCHOR/SCREW BN/BN,TIS/BN: HCPCS | Performed by: ORTHOPAEDIC SURGERY

## 2022-01-28 PROCEDURE — 27822 TREATMENT OF ANKLE FRACTURE: CPT | Mod: LT | Performed by: ORTHOPAEDIC SURGERY

## 2022-01-28 PROCEDURE — 700111 HCHG RX REV CODE 636 W/ 250 OVERRIDE (IP): Performed by: ORTHOPAEDIC SURGERY

## 2022-01-28 PROCEDURE — 700105 HCHG RX REV CODE 258: Performed by: INTERNAL MEDICINE

## 2022-01-28 PROCEDURE — 160035 HCHG PACU - 1ST 60 MINS PHASE I: Performed by: ORTHOPAEDIC SURGERY

## 2022-01-28 PROCEDURE — 87426 SARSCOV CORONAVIRUS AG IA: CPT

## 2022-01-28 PROCEDURE — 160029 HCHG SURGERY MINUTES - 1ST 30 MINS LEVEL 4: Performed by: ORTHOPAEDIC SURGERY

## 2022-01-28 PROCEDURE — 0QSH04Z REPOSITION LEFT TIBIA WITH INTERNAL FIXATION DEVICE, OPEN APPROACH: ICD-10-PCS | Performed by: ORTHOPAEDIC SURGERY

## 2022-01-28 PROCEDURE — 160041 HCHG SURGERY MINUTES - EA ADDL 1 MIN LEVEL 4: Performed by: ORTHOPAEDIC SURGERY

## 2022-01-28 PROCEDURE — 700105 HCHG RX REV CODE 258: Performed by: ORTHOPAEDIC SURGERY

## 2022-01-28 PROCEDURE — 700102 HCHG RX REV CODE 250 W/ 637 OVERRIDE(OP): Performed by: ORTHOPAEDIC SURGERY

## 2022-01-28 PROCEDURE — 700101 HCHG RX REV CODE 250: Performed by: ANESTHESIOLOGY

## 2022-01-28 PROCEDURE — 500881 HCHG PACK, EXTREMITY: Performed by: ORTHOPAEDIC SURGERY

## 2022-01-28 DEVICE — SCREW CANN 4.0X40 SHORT OIC (3TX3+2TX2=13): Type: IMPLANTABLE DEVICE | Site: ANKLE | Status: FUNCTIONAL

## 2022-01-28 RX ORDER — BISACODYL 10 MG
10 SUPPOSITORY, RECTAL RECTAL
Status: DISCONTINUED | OUTPATIENT
Start: 2022-01-28 | End: 2022-01-30

## 2022-01-28 RX ORDER — METOPROLOL TARTRATE 1 MG/ML
1 INJECTION, SOLUTION INTRAVENOUS
Status: DISCONTINUED | OUTPATIENT
Start: 2022-01-28 | End: 2022-01-28 | Stop reason: HOSPADM

## 2022-01-28 RX ORDER — DIPHENHYDRAMINE HYDROCHLORIDE 50 MG/ML
25 INJECTION INTRAMUSCULAR; INTRAVENOUS EVERY 6 HOURS PRN
Status: DISCONTINUED | OUTPATIENT
Start: 2022-01-28 | End: 2022-01-31 | Stop reason: HOSPADM

## 2022-01-28 RX ORDER — KETOROLAC TROMETHAMINE 30 MG/ML
15 INJECTION, SOLUTION INTRAMUSCULAR; INTRAVENOUS EVERY 6 HOURS
Status: DISCONTINUED | OUTPATIENT
Start: 2022-01-28 | End: 2022-01-31 | Stop reason: HOSPADM

## 2022-01-28 RX ORDER — BUPIVACAINE HYDROCHLORIDE 5 MG/ML
INJECTION, SOLUTION EPIDURAL; INTRACAUDAL PRN
Status: DISCONTINUED | OUTPATIENT
Start: 2022-01-28 | End: 2022-01-28 | Stop reason: SURG

## 2022-01-28 RX ORDER — HYDROMORPHONE HYDROCHLORIDE 1 MG/ML
0.1 INJECTION, SOLUTION INTRAMUSCULAR; INTRAVENOUS; SUBCUTANEOUS
Status: DISCONTINUED | OUTPATIENT
Start: 2022-01-28 | End: 2022-01-28 | Stop reason: HOSPADM

## 2022-01-28 RX ORDER — DOCUSATE SODIUM 100 MG/1
100 CAPSULE, LIQUID FILLED ORAL 2 TIMES DAILY
Status: DISCONTINUED | OUTPATIENT
Start: 2022-01-28 | End: 2022-01-31 | Stop reason: HOSPADM

## 2022-01-28 RX ORDER — HYDROMORPHONE HYDROCHLORIDE 1 MG/ML
0.2 INJECTION, SOLUTION INTRAMUSCULAR; INTRAVENOUS; SUBCUTANEOUS
Status: DISCONTINUED | OUTPATIENT
Start: 2022-01-28 | End: 2022-01-28 | Stop reason: HOSPADM

## 2022-01-28 RX ORDER — SCOLOPAMINE TRANSDERMAL SYSTEM 1 MG/1
1 PATCH, EXTENDED RELEASE TRANSDERMAL
Status: DISCONTINUED | OUTPATIENT
Start: 2022-01-28 | End: 2022-01-31 | Stop reason: HOSPADM

## 2022-01-28 RX ORDER — DEXAMETHASONE SODIUM PHOSPHATE 4 MG/ML
INJECTION, SOLUTION INTRA-ARTICULAR; INTRALESIONAL; INTRAMUSCULAR; INTRAVENOUS; SOFT TISSUE PRN
Status: DISCONTINUED | OUTPATIENT
Start: 2022-01-28 | End: 2022-01-28 | Stop reason: SURG

## 2022-01-28 RX ORDER — HYDRALAZINE HYDROCHLORIDE 20 MG/ML
5 INJECTION INTRAMUSCULAR; INTRAVENOUS
Status: DISCONTINUED | OUTPATIENT
Start: 2022-01-28 | End: 2022-01-28 | Stop reason: HOSPADM

## 2022-01-28 RX ORDER — ACETAMINOPHEN 325 MG/1
650 TABLET ORAL EVERY 6 HOURS
Status: DISCONTINUED | OUTPATIENT
Start: 2022-01-28 | End: 2022-01-31 | Stop reason: HOSPADM

## 2022-01-28 RX ORDER — DIPHENHYDRAMINE HYDROCHLORIDE 50 MG/ML
12.5 INJECTION INTRAMUSCULAR; INTRAVENOUS
Status: DISCONTINUED | OUTPATIENT
Start: 2022-01-28 | End: 2022-01-28 | Stop reason: HOSPADM

## 2022-01-28 RX ORDER — DEXAMETHASONE SODIUM PHOSPHATE 4 MG/ML
4 INJECTION, SOLUTION INTRA-ARTICULAR; INTRALESIONAL; INTRAMUSCULAR; INTRAVENOUS; SOFT TISSUE
Status: DISCONTINUED | OUTPATIENT
Start: 2022-01-28 | End: 2022-01-31 | Stop reason: HOSPADM

## 2022-01-28 RX ORDER — SODIUM CHLORIDE, SODIUM LACTATE, POTASSIUM CHLORIDE, CALCIUM CHLORIDE 600; 310; 30; 20 MG/100ML; MG/100ML; MG/100ML; MG/100ML
INJECTION, SOLUTION INTRAVENOUS CONTINUOUS
Status: ACTIVE | OUTPATIENT
Start: 2022-01-28 | End: 2022-01-28

## 2022-01-28 RX ORDER — ONDANSETRON 2 MG/ML
4 INJECTION INTRAMUSCULAR; INTRAVENOUS EVERY 4 HOURS PRN
Status: DISCONTINUED | OUTPATIENT
Start: 2022-01-28 | End: 2022-01-31 | Stop reason: HOSPADM

## 2022-01-28 RX ORDER — HALOPERIDOL 5 MG/ML
1 INJECTION INTRAMUSCULAR EVERY 6 HOURS PRN
Status: DISCONTINUED | OUTPATIENT
Start: 2022-01-28 | End: 2022-01-31 | Stop reason: HOSPADM

## 2022-01-28 RX ORDER — CEFAZOLIN SODIUM 1 G/3ML
INJECTION, POWDER, FOR SOLUTION INTRAMUSCULAR; INTRAVENOUS PRN
Status: DISCONTINUED | OUTPATIENT
Start: 2022-01-28 | End: 2022-01-28 | Stop reason: SURG

## 2022-01-28 RX ORDER — OXYCODONE HCL 5 MG/5 ML
10 SOLUTION, ORAL ORAL
Status: DISCONTINUED | OUTPATIENT
Start: 2022-01-28 | End: 2022-01-28 | Stop reason: HOSPADM

## 2022-01-28 RX ORDER — OXYCODONE HYDROCHLORIDE 10 MG/1
10 TABLET ORAL
Status: DISCONTINUED | OUTPATIENT
Start: 2022-01-28 | End: 2022-01-31 | Stop reason: HOSPADM

## 2022-01-28 RX ORDER — IBUPROFEN 400 MG/1
800 TABLET ORAL 3 TIMES DAILY PRN
Status: DISCONTINUED | OUTPATIENT
Start: 2022-01-31 | End: 2022-01-31 | Stop reason: HOSPADM

## 2022-01-28 RX ORDER — HYDROMORPHONE HYDROCHLORIDE 1 MG/ML
0.5 INJECTION, SOLUTION INTRAMUSCULAR; INTRAVENOUS; SUBCUTANEOUS
Status: DISCONTINUED | OUTPATIENT
Start: 2022-01-28 | End: 2022-01-31 | Stop reason: HOSPADM

## 2022-01-28 RX ORDER — HYDROMORPHONE HYDROCHLORIDE 1 MG/ML
0.4 INJECTION, SOLUTION INTRAMUSCULAR; INTRAVENOUS; SUBCUTANEOUS
Status: DISCONTINUED | OUTPATIENT
Start: 2022-01-28 | End: 2022-01-28 | Stop reason: HOSPADM

## 2022-01-28 RX ORDER — ENEMA 19; 7 G/133ML; G/133ML
1 ENEMA RECTAL
Status: DISCONTINUED | OUTPATIENT
Start: 2022-01-28 | End: 2022-01-31 | Stop reason: HOSPADM

## 2022-01-28 RX ORDER — MIDAZOLAM HYDROCHLORIDE 1 MG/ML
INJECTION INTRAMUSCULAR; INTRAVENOUS PRN
Status: DISCONTINUED | OUTPATIENT
Start: 2022-01-28 | End: 2022-01-28 | Stop reason: SURG

## 2022-01-28 RX ORDER — AMOXICILLIN 250 MG
1 CAPSULE ORAL NIGHTLY
Status: DISCONTINUED | OUTPATIENT
Start: 2022-01-28 | End: 2022-01-31 | Stop reason: HOSPADM

## 2022-01-28 RX ORDER — HALOPERIDOL 5 MG/ML
1 INJECTION INTRAMUSCULAR
Status: DISCONTINUED | OUTPATIENT
Start: 2022-01-28 | End: 2022-01-28 | Stop reason: HOSPADM

## 2022-01-28 RX ORDER — OXYCODONE HYDROCHLORIDE 5 MG/1
5 TABLET ORAL
Status: DISCONTINUED | OUTPATIENT
Start: 2022-01-28 | End: 2022-01-31 | Stop reason: HOSPADM

## 2022-01-28 RX ORDER — OXYCODONE HCL 5 MG/5 ML
5 SOLUTION, ORAL ORAL
Status: DISCONTINUED | OUTPATIENT
Start: 2022-01-28 | End: 2022-01-28 | Stop reason: HOSPADM

## 2022-01-28 RX ORDER — CEFAZOLIN SODIUM IN 0.9 % NACL 2 G/100 ML
2 PLASTIC BAG, INJECTION (ML) INTRAVENOUS EVERY 8 HOURS
Status: COMPLETED | OUTPATIENT
Start: 2022-01-28 | End: 2022-01-29

## 2022-01-28 RX ORDER — SODIUM CHLORIDE, SODIUM LACTATE, POTASSIUM CHLORIDE, CALCIUM CHLORIDE 600; 310; 30; 20 MG/100ML; MG/100ML; MG/100ML; MG/100ML
INJECTION, SOLUTION INTRAVENOUS CONTINUOUS
Status: DISCONTINUED | OUTPATIENT
Start: 2022-01-28 | End: 2022-01-28 | Stop reason: HOSPADM

## 2022-01-28 RX ORDER — ONDANSETRON 2 MG/ML
INJECTION INTRAMUSCULAR; INTRAVENOUS PRN
Status: DISCONTINUED | OUTPATIENT
Start: 2022-01-28 | End: 2022-01-28 | Stop reason: SURG

## 2022-01-28 RX ORDER — LIDOCAINE HYDROCHLORIDE 20 MG/ML
INJECTION, SOLUTION EPIDURAL; INFILTRATION; INTRACAUDAL; PERINEURAL PRN
Status: DISCONTINUED | OUTPATIENT
Start: 2022-01-28 | End: 2022-01-28 | Stop reason: SURG

## 2022-01-28 RX ORDER — ONDANSETRON 2 MG/ML
4 INJECTION INTRAMUSCULAR; INTRAVENOUS
Status: DISCONTINUED | OUTPATIENT
Start: 2022-01-28 | End: 2022-01-28 | Stop reason: HOSPADM

## 2022-01-28 RX ORDER — POLYETHYLENE GLYCOL 3350 17 G/17G
1 POWDER, FOR SOLUTION ORAL 2 TIMES DAILY PRN
Status: DISCONTINUED | OUTPATIENT
Start: 2022-01-28 | End: 2022-01-31 | Stop reason: HOSPADM

## 2022-01-28 RX ORDER — ACETAMINOPHEN 325 MG/1
650 TABLET ORAL EVERY 6 HOURS PRN
Status: DISCONTINUED | OUTPATIENT
Start: 2022-02-02 | End: 2022-01-31 | Stop reason: HOSPADM

## 2022-01-28 RX ORDER — AMOXICILLIN 250 MG
1 CAPSULE ORAL
Status: DISCONTINUED | OUTPATIENT
Start: 2022-01-28 | End: 2022-01-31 | Stop reason: HOSPADM

## 2022-01-28 RX ADMIN — ONDANSETRON 4 MG: 2 INJECTION INTRAMUSCULAR; INTRAVENOUS at 08:54

## 2022-01-28 RX ADMIN — ACETAMINOPHEN 650 MG: 325 TABLET, FILM COATED ORAL at 11:54

## 2022-01-28 RX ADMIN — HYDROCODONE BITARTRATE AND ACETAMINOPHEN 1 TABLET: 10; 325 TABLET ORAL at 04:56

## 2022-01-28 RX ADMIN — KETOROLAC TROMETHAMINE 15 MG: 30 INJECTION, SOLUTION INTRAMUSCULAR at 17:07

## 2022-01-28 RX ADMIN — SENNOSIDES AND DOCUSATE SODIUM 1 TABLET: 50; 8.6 TABLET ORAL at 20:14

## 2022-01-28 RX ADMIN — Medication 5 MG: at 20:14

## 2022-01-28 RX ADMIN — HYDROCODONE BITARTRATE AND ACETAMINOPHEN 1 TABLET: 10; 325 TABLET ORAL at 17:05

## 2022-01-28 RX ADMIN — BUPIVACAINE HYDROCHLORIDE 14 MG: 5 INJECTION, SOLUTION EPIDURAL; INTRACAUDAL; PERINEURAL at 08:21

## 2022-01-28 RX ADMIN — GABAPENTIN 300 MG: 300 CAPSULE ORAL at 17:05

## 2022-01-28 RX ADMIN — OXYCODONE HYDROCHLORIDE 5 MG: 5 TABLET ORAL at 20:14

## 2022-01-28 RX ADMIN — POLYETHYLENE GLYCOL (3350) 1 PACKET: 17 POWDER, FOR SOLUTION ORAL at 17:07

## 2022-01-28 RX ADMIN — CEFAZOLIN 2 G: 330 INJECTION, POWDER, FOR SOLUTION INTRAMUSCULAR; INTRAVENOUS at 08:40

## 2022-01-28 RX ADMIN — SODIUM CHLORIDE: 9 INJECTION, SOLUTION INTRAVENOUS at 17:12

## 2022-01-28 RX ADMIN — LIDOCAINE HYDROCHLORIDE 60 MG: 20 INJECTION, SOLUTION EPIDURAL; INFILTRATION; INTRACAUDAL; PERINEURAL at 08:32

## 2022-01-28 RX ADMIN — MIDAZOLAM HYDROCHLORIDE 1.5 MG: 1 INJECTION, SOLUTION INTRAMUSCULAR; INTRAVENOUS at 08:19

## 2022-01-28 RX ADMIN — SENNOSIDES AND DOCUSATE SODIUM 2 TABLET: 50; 8.6 TABLET ORAL at 04:58

## 2022-01-28 RX ADMIN — ENOXAPARIN SODIUM 40 MG: 40 INJECTION SUBCUTANEOUS at 20:15

## 2022-01-28 RX ADMIN — ROSUVASTATIN 20 MG: 10 TABLET, FILM COATED ORAL at 17:06

## 2022-01-28 RX ADMIN — BUPIVACAINE HYDROCHLORIDE 16 MG: 5 INJECTION, SOLUTION EPIDURAL; INTRACAUDAL; PERINEURAL at 08:24

## 2022-01-28 RX ADMIN — GABAPENTIN 300 MG: 300 CAPSULE ORAL at 11:54

## 2022-01-28 RX ADMIN — WATER 2 G: 1000 INJECTION, SOLUTION INTRAVENOUS at 17:07

## 2022-01-28 RX ADMIN — DEXAMETHASONE SODIUM PHOSPHATE 4 MG: 4 INJECTION, SOLUTION INTRAMUSCULAR; INTRAVENOUS at 08:53

## 2022-01-28 RX ADMIN — KETOROLAC TROMETHAMINE 15 MG: 30 INJECTION, SOLUTION INTRAMUSCULAR at 11:53

## 2022-01-28 RX ADMIN — ACETAMINOPHEN 650 MG: 325 TABLET, FILM COATED ORAL at 17:06

## 2022-01-28 RX ADMIN — OMEPRAZOLE 20 MG: 20 CAPSULE, DELAYED RELEASE ORAL at 04:58

## 2022-01-28 RX ADMIN — PROPOFOL 85 MG: 10 INJECTION, EMULSION INTRAVENOUS at 08:32

## 2022-01-28 RX ADMIN — SENNOSIDES AND DOCUSATE SODIUM 2 TABLET: 50; 8.6 TABLET ORAL at 17:05

## 2022-01-28 RX ADMIN — MIDAZOLAM HYDROCHLORIDE 0.5 MG: 1 INJECTION, SOLUTION INTRAMUSCULAR; INTRAVENOUS at 08:32

## 2022-01-28 RX ADMIN — ESCITALOPRAM OXALATE 20 MG: 10 TABLET ORAL at 04:56

## 2022-01-28 RX ADMIN — DOCUSATE SODIUM 100 MG: 100 CAPSULE, LIQUID FILLED ORAL at 10:33

## 2022-01-28 RX ADMIN — SODIUM CHLORIDE, POTASSIUM CHLORIDE, SODIUM LACTATE AND CALCIUM CHLORIDE: 600; 310; 30; 20 INJECTION, SOLUTION INTRAVENOUS at 08:27

## 2022-01-28 RX ADMIN — GABAPENTIN 300 MG: 300 CAPSULE ORAL at 04:56

## 2022-01-28 ASSESSMENT — ENCOUNTER SYMPTOMS
NERVOUS/ANXIOUS: 0
WEAKNESS: 0
COUGH: 0
DIZZINESS: 0
DEPRESSION: 0
CONSTIPATION: 0
DIARRHEA: 0
PHOTOPHOBIA: 0
CLAUDICATION: 0
MYALGIAS: 0
SORE THROAT: 0
SENSORY CHANGE: 0
FALLS: 1
FEVER: 0
SHORTNESS OF BREATH: 0
HEARTBURN: 0
ABDOMINAL PAIN: 0
BLURRED VISION: 0
HEADACHES: 0
SPEECH CHANGE: 0
CHILLS: 0
VOMITING: 0
INSOMNIA: 0

## 2022-01-28 ASSESSMENT — COGNITIVE AND FUNCTIONAL STATUS - GENERAL
DAILY ACTIVITIY SCORE: 21
SUGGESTED CMS G CODE MODIFIER MOBILITY: CK
CLIMB 3 TO 5 STEPS WITH RAILING: A LOT
MOVING FROM LYING ON BACK TO SITTING ON SIDE OF FLAT BED: A LITTLE
TOILETING: A LITTLE
MOVING TO AND FROM BED TO CHAIR: A LITTLE
SUGGESTED CMS G CODE MODIFIER DAILY ACTIVITY: CJ
WALKING IN HOSPITAL ROOM: A LOT
HELP NEEDED FOR BATHING: A LITTLE
DRESSING REGULAR LOWER BODY CLOTHING: A LITTLE
TURNING FROM BACK TO SIDE WHILE IN FLAT BAD: A LITTLE
STANDING UP FROM CHAIR USING ARMS: A LOT
MOBILITY SCORE: 15

## 2022-01-28 ASSESSMENT — PAIN DESCRIPTION - PAIN TYPE
TYPE: SURGICAL PAIN
TYPE: ACUTE PAIN;SURGICAL PAIN

## 2022-01-28 NOTE — CARE PLAN
The patient is Stable - Low risk of patient condition declining or worsening    Shift Goals  Clinical Goals: Manage pain. surgery tomorrow  Patient Goals: manage pain  Family Goals: none present    Progress made toward(s) clinical / shift goals:  plan for surgery tomorrow to left ankle      Problem: Pain - Standard  Goal: Alleviation of pain or a reduction in pain to the patient’s comfort goal  Outcome: Progressing   Pain under control with prn meds    Problem: Knowledge Deficit - Standard  Goal: Patient and family/care givers will demonstrate understanding of plan of care, disease process/condition, diagnostic tests and medications  Outcome: Progressing   Patient verbalizes understaning of treatment plan

## 2022-01-28 NOTE — ANESTHESIA TIME REPORT
Anesthesia Start and Stop Event Times     Date Time Event    1/28/2022 0823 Ready for Procedure     0827 Anesthesia Start     0909 Anesthesia Stop        Responsible Staff  01/28/22    Name Role Begin End    Blaine King M.D. Anesth 0827 0909        Preop Diagnosis (Free Text):  Pre-op Diagnosis     fracture of the medial malleolus, probable fracture of the posterior malleolus         Preop Diagnosis (Codes):  Diagnosis Information     Diagnosis Code(s): Fracture of medial malleolus [S82.53XA]        Premium Reason  Non-Premium    Comments:

## 2022-01-28 NOTE — ANESTHESIA PROCEDURE NOTES
Airway    Date/Time: 1/28/2022 8:33 AM  Performed by: Blaine King M.D.  Authorized by: Blaine King M.D.     Location:  OR  Urgency:  Elective  Indications for Airway Management:  Anesthesia      Spontaneous Ventilation: absent    Sedation Level:  Deep  Preoxygenated: Yes    Final Airway Type:  Supraglottic airway  Final Supraglottic Airway:  Standard LMA    SGA Size:  3  Number of Attempts at Approach:  1

## 2022-01-28 NOTE — ANESTHESIA PROCEDURE NOTES
Peripheral Block    Date/Time: 1/28/2022 8:19 AM  Performed by: Blaine King M.D.  Authorized by: Blaine King M.D.     Patient Location:  Pre-op  Start Time:  1/28/2022 8:19 AM  End Time:  1/28/2022 8:22 AM  Reason for Block: at surgeon's request and post-op pain management ONLY    patient identified, IV checked, site marked, risks and benefits discussed, surgical consent, monitors and equipment checked, pre-op evaluation and timeout performed    Patient Position:  Supine  Prep: ChloraPrep    Monitoring:  Heart rate, continuous pulse ox and cardiac monitor  Block Region:  Lower Extremity  Lower Extremity - Block Type:  Selective FEMORAL nerve block at the Adductor Canal    Laterality:  Left  Procedures: ultrasound guided  Image captured, interpreted and electronically stored.  Local Infiltration:  Lidocaine  Strength:  1 %  Dose:  3 ml  Block Type:  Single-shot  Needle Length:  100mm  Needle Gauge:  21 G  Needle Localization:  Ultrasound guidance  Injection Assessment:  Negative aspiration for heme, no paresthesia on injection, incremental injection and local visualized surrounding nerve on ultrasound   US Guided Selective Femoral Nerve Block at Adductor Canal:   US probe placed at mid-thigh level on externally rotated leg and femur identified.  Probe directed medially until Sartorius Muscle (SM), Femoral Artery (FA) and Saphenous Nerve (SN) identified in Adductor Canal (AC).  Needle inserted anterolateral to probe in an in plane approach into a subsartorial perivascular perineural position.  After negative aspiration LA injected with ease and visualized spreading within the AC.

## 2022-01-28 NOTE — CARE PLAN
The patient is Stable - Low risk of patient condition declining or worsening    Shift Goals  Clinical Goals: (P) pain management  Patient Goals: (P) comfort,rest  Family Goals: none present    Progress made toward(s) clinical / shift goals:  Pt states pain is well managed after surgery this shift. Comfort measures provided, pt denies further needs. Progressing on other goals.   Problem: Pain - Standard  Goal: Alleviation of pain or a reduction in pain to the patient’s comfort goal  Outcome: Progressing     Problem: Knowledge Deficit - Standard  Goal: Patient and family/care givers will demonstrate understanding of plan of care, disease process/condition, diagnostic tests and medications  Outcome: Progressing     Problem: Fall Risk  Goal: Patient will remain free from falls  Outcome: Progressing     Problem: Skin Integrity  Goal: Skin integrity is maintained or improved  Outcome: Progressing       Patient is not progressing towards the following goals:

## 2022-01-28 NOTE — ANESTHESIA PROCEDURE NOTES
Peripheral Block    Date/Time: 1/28/2022 8:22 AM  Performed by: Blaine King M.D.  Authorized by: Blaine King M.D.     Patient Location:  Pre-op  Start Time:  1/28/2022 8:22 AM  End Time:  1/28/2022 8:25 AM  Reason for Block: at surgeon's request and post-op pain management ONLY    patient identified, IV checked, site marked, risks and benefits discussed, surgical consent, monitors and equipment checked, pre-op evaluation and timeout performed    Patient Position:  Supine  Prep: ChloraPrep    Monitoring:  Heart rate, continuous pulse ox and cardiac monitor  Block Region:  Lower Extremity  Lower Extremity - Block Type:  SCIATIC nerve block, lateral approach    Laterality:  Left  Procedures: ultrasound guided  Image captured, interpreted and electronically stored.  Local Infiltration:  Lidocaine  Strength:  1 %  Dose:  3 ml  Block Type:  Single-shot  Needle Length:  100mm  Needle Gauge:  21 G  Needle Localization:  Ultrasound guidance  Injection Assessment:  Negative aspiration for heme, no paresthesia on injection, incremental injection and local visualized surrounding nerve on ultrasound  Evidence of intravascular injection: No     US Guided Sciatic Nerve Block   US probe placed several cm proximal to popliteal crease on posterior thigh and scanned caudad and cephalad until Sciatic Nerve (SN) identified superficial/lateral to popliteal artery.  Needle inserted lateral to probe in an in plane approach under direct visualization to a perineural position.  After negative aspiration LA injected with ease and visualized surrounding the SN.

## 2022-01-28 NOTE — PROGRESS NOTES
Patient admitted from the ED this morning. A&Ox4, VS stable, on RA, nontele. C/o pain to BL feet, splint to left leg for ankle fracture. Right great toe also fractured, minimal bruising, no dressing in place. BLE elevated. Purewick in place, voiding well.

## 2022-01-28 NOTE — THERAPY
Missed Therapy     Patient Name: Ellen Srivastava  Age:  86 y.o., Sex:  female  Medical Record #: [id number]  Today's Date: 1/28/2022    Discussed missed therapy with RN       01/28/22 0739   Interdisciplinary Plan of Care Collaboration   IDT Collaboration with  Nursing   Collaboration Comments PT order received; Pt is currently awaiting surgical fixation for L ankle fracture. Will plan on evaluation once post-op.

## 2022-01-28 NOTE — ANESTHESIA POSTPROCEDURE EVALUATION
Patient: Ellen Srivastava    Procedure Summary     Date: 01/28/22 Room / Location:  OR  / SURGERY [facility]    Anesthesia Start: 0827 Anesthesia Stop: 0909    Procedure: ORIF, ANKLE-Bimalleolar (Left Ankle) Diagnosis:       Fracture of medial malleolus      (fracture of the medial malleolus)    Surgeons: Rayshawn Celis M.D. Responsible Provider: Blaine King M.D.    Anesthesia Type: general, peripheral nerve block ASA Status: 2          Final Anesthesia Type: general, peripheral nerve block  Last vitals  BP   Blood Pressure : 119/62    Temp   37.6 °C (99.7 °F)    Pulse   98   Resp   16    SpO2   98 %      Anesthesia Post Evaluation    Patient location during evaluation: PACU  Patient participation: complete - patient participated  Level of consciousness: awake and alert    Airway patency: patent  Anesthetic complications: no  Cardiovascular status: hemodynamically stable  Respiratory status: acceptable  Hydration status: euvolemic    PONV: none          No complications documented.     Nurse Pain Score: 1 (NPRS)

## 2022-01-28 NOTE — PROGRESS NOTES
"2140: This RN notified by CNA that pt had ripped out IV. Upon assessment pt reported frustration with \"noise\" from IV pump, \"I just want to sleep\". Pt educated on reason for fluids and IV. New IV placed, pt refusing continuous fluids. Fluids paused at this time.   "

## 2022-01-28 NOTE — DOCUMENTATION QUERY
[facility]                                                                       Query Response Note      PATIENT:               ELIA ZARATE  ACCT #:                  [id number]  MRN:                     [id number]  :                      1935  ADMIT DATE:       2022 8:21 PM  DISCH DATE:          RESPONDING  PROVIDER #:        [id number]           QUERY TEXT:      Based upon the above referenced clinical indicators and treatment, please provide a corresponding diagnosis if possible     NOTE:  If an appropriate response is not listed below, please respond with a new note.      The patient's Clinical Indicators include:  - Findings:  H&P, x rays and notes:  osteopenia with trimaleolar fracture     - Treatments:  x ray, orthopedic consult, surgical fixation of fracture     - Risk factors:  advanced age, repeat falls, osteopenia     Thank You,  Yodit Mendoza RN  Clinical Documentation   [email or web address]  Connect via Vo[employer] Messenger  Options provided:   -- Fracture related to osteopenia   -- Fracture not related to osteopenia   -- Other   -- Unable to determine      Query created by: Yodit Mendoza on 2022 12:03 PM    RESPONSE TEXT:    Fracture not related to osteopenia          Electronically signed by:  PAM MARROQUIN DO 2022 2:12 PM

## 2022-01-28 NOTE — DISCHARGE PLANNING
PC to .  said that pt does not want to go to SNF but she would like to go home with HH.  confirmed he can assist pt at home and this is not new for them as pt came home at least twice from hospital and she needed assistance with HH and they were able to navigate the situation well.      said they live in a two level apartment with stairs but they have been able to navigate this in the past without any problem.     Choice for HH taken with any company that accepts their insurance. He cannot remember who they had before.       Care Transition Team Assessment    Information Source  Orientation Level: Oriented X4  Information Given By: Spouse  Who is responsible for making decisions for patient? : Patient    Readmission Evaluation  Is this a readmission?: No    Elopement Risk  Legal Hold: No  Ambulatory or Self Mobile in Wheelchair: (P) No-Not an Elopement Risk  Elopement Risk: (P) Not at Risk for Elopement    Interdisciplinary Discharge Planning  Does Admitting Nurse Feel This Could be a Complex Discharge?: No  Primary Care Physician: Dr. Patel  Lives with - Patient's Self Care Capacity: Spouse  Patient or legal guardian wants to designate a caregiver: No  Support Systems: Spouse / Significant Other  Housing / Facility: 2 Story Apartment / Condo  Do You Take your Prescribed Medications Regularly: Yes  Able to Return to Previous ADL's: Future Time w/Therapy  Mobility Issues: Yes  Prior Services: Skilled Home Health Services  Patient Prefers to be Discharged to:: Home with HH  Assistance Needed: Yes  Durable Medical Equipment: Walker    Discharge Preparedness  What is your plan after discharge?: Home health care  What are your discharge supports?: Spouse  Prior Functional Level: Ambulatory,Independent with Activities of Daily Living,Independent with Medication Management  Difficulity with ADLs: Bathing,Toileting,Walking  Difficulity with IADLs: Cooking,Driving,Laundry,Shopping    Functional  Assesment  Prior Functional Level: Ambulatory,Independent with Activities of Daily Living,Independent with Medication Management    Finances  Financial Barriers to Discharge: No  Prescription Coverage: Yes    Vision / Hearing Impairment  Vision Impairment : Yes  Right Eye Vision: Wears Glasses  Left Eye Vision: Wears Glasses  Hearing Impairment : No    Domestic Abuse  Have you ever been the victim of abuse or violence?: No  Physical Abuse or Sexual Abuse: No  Verbal Abuse or Emotional Abuse: No  Possible Abuse/Neglect Reported to:: Not Applicable    Discharge Risks or Barriers  Discharge risks or barriers?: Post-acute placement / services    Anticipated Discharge Information  Discharge Disposition: D/T to home under A care in anticipation of covered skilled care (06)

## 2022-01-28 NOTE — OP REPORT
DATE OF OPERATION: 1/28/2022     PREOPERATIVE DIAGNOSIS:  1. Left trimalleolar ankle fracture    POSTOPERATIVE DIAGNOSIS: Same    PROCEDURE PERFORMED:  1. Open treatment of left trimalleolar ankle fracture with internal fixation without of posterior lip    SURGEON: Rayshawn Celis M.D.     ASSISTANT: Kermit Dyer PA-C    ANESTHESIA: General    ESTIMATED BLOOD LOSS: 0 mL    INDICATIONS: The patient is a 86 y.o. female with a left  ankle fracture resulting from a fall.  The patient denies antecedent pain, and was found to have a normal neurovascular exam and skin envelope.  Radiographs reviewed by myself demonstrated the ankle fracture.  Given these findings, surgical treatment of the ankle fracture was indicated.  I discussed the risks and benefits of the procedure, including the risks of infection, wound healing complication, neurovascular injury, compartment syndrome, pain, malunion, non-union, malrotation, and the medical risks of anesthesia including DVT, PE, MI, stroke, and death.  Benefits include early mobilization, improved chance of union, and reduction in the medical risks of ankle fractures.  Alternatives to surgery were also discussed, including non-operative management.  The patient signed the informed consent and the operative extremity was marked.        PROCEDURE:  The patient underwent anesthesia, and was positioned supine on a radiolucent table and all bony prominences were well padded.  Preoperative antibiotics were administered. Sequential compression devices were employed. The correct operative site was prepped and draped into a sterile field. A procedural pause was conducted to verify correct patient, correct extremity, presence of the surgeons initials on the operative extremity.     A well padded tourniquet was inflated to 250mmHg and attention was then turned to the medial malleolus. A J type incision was made over  with care taken to avoid all neurovascular structures. Soft tissue  stripping was avoided to preserve blood flow to bone and maximize healing potential. The medial malleolus was reduced under direct vision and flouroscopic guidance. It was then held with two 4.0 OIC partially threaded cannulated screws.The lateral tissues were swollen and the malleolus was well aligned so no additional fixation was placed. The posterior fragment reduced well and involved less than 30% of the joint so no additional fixation was required. All screws were checked and found to be of appropriate length and out of the joint.  Wounds were irrigated with normal saline, and closed in layers, with 1-0 Vicyrl for fascia, 2-0 Vicryl in the subcutaneous tissue, and nylon in the skin.  Sterile dressings were applied. A well padded short leg cast was applied.     The patient tolerated the procedure well. There were no apparent complications. All sponge, needle, and instrument counts were correct on two separate occasions. He was awakened, extubated, and transferred to the recovery room in satisfactory condition.      The use of Kermit Dyer as a surgical assistant was necessary for assistance with exposure, retraction, fracture reduction, instrumentation, and closure.     Post-Operative Plan:     1.  The patient should remain toe touch weightbearing on their operative extremity.  Gait aids (crutch or crutches, cane, walker) may be used as needed, and may be discontinued when no longer required.  2.  IV antibiotics - may be continued for 24 hours, but are not required.  3.  DVT prophylaxis - SCD's and Lovenox 40 mg SQ daily while inpatient.  The patient may transition to Aspirin 325 mg PO BID as an outpatient  4.  Discharge planning   ____________________________________   Rayshawn Celis M.D.   DD: 1/28/2022  9:08 AM

## 2022-01-28 NOTE — ANESTHESIA PREPROCEDURE EVALUATION
Case: [id number] Date/Time: 01/28/22 0815    Procedure: ORIF, ANKLE-Bimalleolar (Left Ankle)    Anesthesia type: General    Pre-op diagnosis: fracture of the medial malleolus, probable fracture of the posterior malleolus     Location:  OR 02 / SURGERY [facility]    Surgeons: Rayshawn Celis M.D.        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         (positive) Acute renal failure (ARF) (HCC)       Physical Exam    Airway   Mallampati: III  TM distance: >3 FB  Neck ROM: full       Cardiovascular - normal exam  Rhythm: regular  Rate: normal  (-) murmur     Dental - normal exam           Pulmonary - normal exam  Breath sounds clear to auscultation     Abdominal    Neurological - normal exam                 Anesthesia Plan    ASA 2       Plan - general and peripheral nerve block     Peripheral nerve block will be post-op pain control  Airway plan will be LMA          Induction: intravenous    Postoperative Plan: Postoperative administration of opioids is intended.    Pertinent diagnostic labs and testing reviewed    Informed Consent:    Anesthetic plan and risks discussed with patient.    Use of blood products discussed with: patient whom consented to blood products.

## 2022-01-28 NOTE — CARE PLAN
The patient is Watcher - Medium risk of patient condition declining or worsening    Shift Goals  Clinical Goals: pain control   Patient Goals: manage pain  Family Goals: none present    Progress made toward(s) clinical / shift goals:    Problem: Pain - Standard  Goal: Alleviation of pain or a reduction in pain to the patient’s comfort goal  Outcome: Progressing  Note: Pain managed per MAR       Problem: Knowledge Deficit - Standard  Goal: Patient and family/care givers will demonstrate understanding of plan of care, disease process/condition, diagnostic tests and medications  Outcome: Progressing  Note: Reviewed plan of care with pt, pt verbalized understanding, no questions at this time.      Problem: Fall Risk  Goal: Patient will remain free from falls  Outcome: Progressing       Patient is not progressing towards the following goals:

## 2022-01-28 NOTE — OR NURSING
0906: To PACU from OR via bed, sleeping, respirations spontaneous and non-labored via OPA. O2 at 6 lpm via mask at 6 lpm. Dressing left Ankle, two pins, 2 screws. Patient received nerve block per Anesthesia at bedside.    0920: No Change, OPA in place, patient still asleep.    0935: No Change, OPA in place, patient still asleep.    0941: OPA DC'd, No complaints.    0950: Patient denies pain and nausea at this time.  Report to SAGRARIO Fletcher. Meets criteria to transfer to floor. Transferred on O2 tank @ 583

## 2022-01-28 NOTE — PROGRESS NOTES
"[facility] Medicine Daily Progress Note    Date of Service  1/28/2022    Chief Complaint  Ellen Srivastava is a 86 y.o. female admitted 1/26/2022 with severe left ankle pain.    Hospital Course  Ellen Srivastava is a 86 y.o. female, with h/o HTN, who presented to the ED on  1/26/2022 after sustaining ground-level fall at home.  Patient actually states that she fell 3 times during the day today, last time twisting her ankle and being unable to walk secondary to pain.  She reports feeling \"dizzy \"all day long, but did not had any chest pain, shortness of breath.  She denies head injury or loss of consciousness.  ER evaluation is demonstrating left medial malleolar fracture and possible underlying fracture of posterior malleolar area and disruption of the ankle mortise.  Dr. Celis consulted and plans to take to the OR on 1/28.        Interval Problem Update  1/27 Patient seen in the ED, she states she has significant pain in her right foot and left ankle. She will have splint placed around left ankle and OR planned for tomorrow.  Pain management until OR.  She will likely need SNF for rehab as she has bilateral LE fractures but right now she states she would only accept home health.  1/28 Patient seen postoperatively and feeling well.  She states pain was severe yesterday but better once she was up to her room.  She went to OR with Dr Celis today and had ORIF trimalleolar fracture.  PT/OT to evaluate for disposition recommendations.  I've also requested a post op shoe for the right foot given the fracture of the lateral base of the first proximal phalanx for pain control.     I have personally seen and examined the patient at bedside. I discussed the plan of care with patient, bedside RN, charge RN,  and pharmacy.    Consultants/Specialty  orthopedics    Code Status  Full Code    Disposition  Patient is not medically cleared for discharge.   Anticipate discharge to to skilled nursing " facility.  I have placed the appropriate orders for post-discharge needs.    Review of Systems  Review of Systems   Constitutional: Negative for chills and fever.   HENT: Negative for congestion and sore throat.    Eyes: Negative for blurred vision and photophobia.   Respiratory: Negative for cough and shortness of breath.    Cardiovascular: Negative for chest pain, claudication and leg swelling.   Gastrointestinal: Negative for abdominal pain, constipation, diarrhea, heartburn and vomiting.   Genitourinary: Negative for dysuria and hematuria.   Musculoskeletal: Positive for falls. Negative for joint pain and myalgias.   Skin: Negative for itching and rash.   Neurological: Negative for dizziness, sensory change, speech change, weakness and headaches.   Psychiatric/Behavioral: Negative for depression. The patient is not nervous/anxious and does not have insomnia.         Physical Exam  Temp:  [36.5 °C (97.7 °F)-37.7 °C (99.9 °F)] 37.6 °C (99.7 °F)  Pulse:  [80-98] 98  Resp:  [16-18] 16  BP: (112-140)/(55-72) 119/62  SpO2:  [89 %-99 %] 98 %    Physical Exam  Vitals and nursing note reviewed.   Constitutional:       General: She is not in acute distress.     Appearance: Normal appearance. She is not ill-appearing.   HENT:      Head: Normocephalic and atraumatic.      Nose: Nose normal.   Eyes:      General: No scleral icterus.  Cardiovascular:      Rate and Rhythm: Normal rate and regular rhythm.      Heart sounds: Normal heart sounds. No murmur heard.      Pulmonary:      Effort: Pulmonary effort is normal.      Breath sounds: Normal breath sounds.   Abdominal:      General: Bowel sounds are normal. There is no distension.      Palpations: Abdomen is soft.   Musculoskeletal:         General: No swelling or tenderness.      Cervical back: Neck supple.      Comments: Left ankle in cast, good cap refill on toes     Skin:     General: Skin is warm and dry.   Neurological:      General: No focal deficit present.       Mental Status: She is alert and oriented to person, place, and time.   Psychiatric:         Mood and Affect: Mood normal.         Fluids    Intake/Output Summary (Last 24 hours) at 1/28/2022 1416  Last data filed at 1/28/2022 1000  Gross per 24 hour   Intake 970 ml   Output 5 ml   Net 965 ml       Laboratory  Recent Labs     01/26/22 2039 01/27/22  0415 01/28/22  0440   WBC 9.5 15.0* 12.0*   RBC 4.10* 4.35 4.02*   HEMOGLOBIN 12.6 13.2 12.3   HEMATOCRIT 39.6 41.7 38.3   MCV 96.6 95.9 95.3   MCH 30.7 30.3 30.6   MCHC 31.8* 31.7* 32.1*   RDW 47.7 47.2 47.9   PLATELETCT 157* 162* 162*   MPV 9.9 9.6 10.1     Recent Labs     01/26/22 2039 01/27/22 0415 01/28/22  0440   SODIUM 138 139 135   POTASSIUM 4.4 5.7* 4.2   CHLORIDE 100 105 100   CO2 29 28 25   GLUCOSE 113* 98 116*   BUN 20 19 16   CREATININE 1.21 1.15 0.97   CALCIUM 8.9 8.7 8.3*                   Imaging  DX-PORTABLE FLUOROSCOPY < 1 HOUR   Final Result      Portable fluoroscopy utilized for 3.1 seconds.         INTERPRETING LOCATION: [address] [place] [address], [address]      DX-ANKLE 3+ VIEWS LEFT   Final Result      Medial malleolus fracture fixation and mild tibiotalar osteoarthritis      DX-KNEE 2- LEFT   Final Result      1.  No radiographic evidence of acute traumatic injury.   2.  Osteopenia   3.  Osteoarthritis      DX-ANKLE 2- VIEWS LEFT   Final Result      1.  Medial malleolar fracture   2.  Posterior malleolar fracture   3.  Lateral malleolar fracture may also be present   4.  Disruption of the ankle mortise   5.  Osteopenia      DX-FOOT-COMPLETE 3+ RIGHT   Final Result      1.  Intra-articular fracture of the lateral base of the first proximal phalanx could be chronic or acute. Suggest correlation for point tenderness   2.  Osteopenia   3.  Prior surgical fixation of the distal tibia and fibula      DX-TIBIA AND FIBULA LEFT   Final Result      1.  Osteopenia   2.  Fracture of the medial malleolus   3.  Probable fracture of the posterior malleolus   4.   Possible fracture of the medial malleolus   5.  Possible disruption of the ankle mortise   6.  Recommend dedicated radiographs of the LEFT ankle and knee      DX-CHEST-LIMITED (1 VIEW)   Final Result      1.  LEFT medial basilar atelectasis, less likely pneumonia   2.  Curvilinear scar in the lateral LEFT lower lobe           Assessment/Plan  * Unable to ambulate  Assessment & Plan  -Observation status to medical floor.  -Patient has on the right side intra-articular fracture of the lateral base of the first proximal phalanx that could be acute versus chronic and on the left side she has fracture of the medial malleolus, probable fracture of the posterior malleolus and does irruption of ankle mortise.  -Trimalleolar fracture Left ankle - OR today  -may need SNF for rehab, pending PT/OT        Hyperkalemia  Assessment & Plan  Gentle IV hydration      Nondisplaced fracture of proximal phalanx of right great toe, initial encounter for closed fracture  Assessment & Plan  Surgical shoe      Nondisplaced fracture of medial malleolus of left tibia, initial encounter for closed fracture  Assessment & Plan  Dr Celis consulted   OR 1/28/2022      Neuropathy- (present on admission)  Assessment & Plan  -Continue Neurontin.    Depression with anxiety- (present on admission)  Assessment & Plan  -Follows with psychiatry.  Continue Lexapro.    GERD (gastroesophageal reflux disease)- (present on admission)  Assessment & Plan  -Asymptomatic at this time, continue omeprazole.    Narcotic dependence (HCC)- (present on admission)  Assessment & Plan  Patient has chronic back pain and takes Norco that I will continue.    HTN (hypertension)- (present on admission)  Assessment & Plan  -She takes metoprolol, lisinopril and hydrochlorothiazide.   Hypotensive in ED, resume medications once BP allows.         VTE prophylaxis: SCDs/TEDs    I have performed a physical exam and reviewed and updated ROS and Plan today (1/28/2022). In review of  yesterday's note (1/27/2022), there are no changes except as documented above.

## 2022-01-28 NOTE — THERAPY
Speech Therapy Contact Note    Patient Name: Ellen Srivastava  Age:  86 y.o., Sex:  female  Medical Record #: [id number]  Today's Date: 1/28/2022 01/28/22 0859   Interdisciplinary Plan of Care Collaboration   IDT Collaboration with  Nursing;Certified Nursing Assistant   Collaboration Comments RN re-ordered evaluation as patient was having difficulty with lunch tray this afternoon. Discussed patients esophageal dysphagia w/ RN and met with patient. Patient reports that her issues are esophageal in nature and that she is being followed by outpatient GI for trouble swallowing. At this time, speech therapy services are not indicated. Recommend patient continue to follow-up with GI doctor in the outpatient setting. Offered to downgrade patients diet back to soft and bite sized in the meantime but patient declined offer. Patient at LOW risk of aspiration/aspiration PNA.

## 2022-01-28 NOTE — PROGRESS NOTES
0700: Received report from NOC RN. Pt was awake and alert. PT was updated on surgery plan and picked up by pre-op. Report was given during night shift.     1059: Report received from PACU RN.  Pt returned to floor from PACU. Pt is awake, alert and has no current complaints of pain or nausea. Fiberglass cast is in place to left ankle, left pedal pulse unable to evaluate. Pt on toe-touch weight baring status. Updated on POC and post-op precautions. Safety precautions in place.

## 2022-01-29 LAB
ANION GAP SERPL CALC-SCNC: 7 MMOL/L (ref 7–16)
BUN SERPL-MCNC: 24 MG/DL (ref 8–22)
CALCIUM SERPL-MCNC: 8.3 MG/DL (ref 8.4–10.2)
CHLORIDE SERPL-SCNC: 105 MMOL/L (ref 96–112)
CO2 SERPL-SCNC: 25 MMOL/L (ref 20–33)
CREAT SERPL-MCNC: 1.3 MG/DL (ref 0.5–1.4)
ERYTHROCYTE [DISTWIDTH] IN BLOOD BY AUTOMATED COUNT: 47.6 FL (ref 35.9–50)
GLUCOSE SERPL-MCNC: 133 MG/DL (ref 65–99)
HCT VFR BLD AUTO: 35 % (ref 37–47)
HGB BLD-MCNC: 11.1 G/DL (ref 12–16)
MCH RBC QN AUTO: 30.4 PG (ref 27–33)
MCHC RBC AUTO-ENTMCNC: 31.7 G/DL (ref 33.6–35)
MCV RBC AUTO: 95.9 FL (ref 81.4–97.8)
PLATELET # BLD AUTO: 133 K/UL (ref 164–446)
PMV BLD AUTO: 10.4 FL (ref 9–12.9)
POTASSIUM SERPL-SCNC: 4.7 MMOL/L (ref 3.6–5.5)
RBC # BLD AUTO: 3.65 M/UL (ref 4.2–5.4)
SODIUM SERPL-SCNC: 137 MMOL/L (ref 135–145)
WBC # BLD AUTO: 12.7 K/UL (ref 4.8–10.8)

## 2022-01-29 PROCEDURE — 85027 COMPLETE CBC AUTOMATED: CPT

## 2022-01-29 PROCEDURE — 700105 HCHG RX REV CODE 258: Performed by: INTERNAL MEDICINE

## 2022-01-29 PROCEDURE — 700102 HCHG RX REV CODE 250 W/ 637 OVERRIDE(OP): Performed by: HOSPITALIST

## 2022-01-29 PROCEDURE — 80048 BASIC METABOLIC PNL TOTAL CA: CPT

## 2022-01-29 PROCEDURE — 700111 HCHG RX REV CODE 636 W/ 250 OVERRIDE (IP): Performed by: ORTHOPAEDIC SURGERY

## 2022-01-29 PROCEDURE — 97165 OT EVAL LOW COMPLEX 30 MIN: CPT

## 2022-01-29 PROCEDURE — 27822 TREATMENT OF ANKLE FRACTURE: CPT | Mod: ASROC,LT | Performed by: PHYSICIAN ASSISTANT

## 2022-01-29 PROCEDURE — 700102 HCHG RX REV CODE 250 W/ 637 OVERRIDE(OP): Performed by: INTERNAL MEDICINE

## 2022-01-29 PROCEDURE — 36415 COLL VENOUS BLD VENIPUNCTURE: CPT

## 2022-01-29 PROCEDURE — 700101 HCHG RX REV CODE 250: Performed by: ORTHOPAEDIC SURGERY

## 2022-01-29 PROCEDURE — A9270 NON-COVERED ITEM OR SERVICE: HCPCS | Performed by: ORTHOPAEDIC SURGERY

## 2022-01-29 PROCEDURE — 700102 HCHG RX REV CODE 250 W/ 637 OVERRIDE(OP): Performed by: ORTHOPAEDIC SURGERY

## 2022-01-29 PROCEDURE — 99024 POSTOP FOLLOW-UP VISIT: CPT | Performed by: ORTHOPAEDIC SURGERY

## 2022-01-29 PROCEDURE — 99232 SBSQ HOSP IP/OBS MODERATE 35: CPT | Performed by: INTERNAL MEDICINE

## 2022-01-29 PROCEDURE — A9270 NON-COVERED ITEM OR SERVICE: HCPCS | Performed by: INTERNAL MEDICINE

## 2022-01-29 PROCEDURE — A9270 NON-COVERED ITEM OR SERVICE: HCPCS | Performed by: HOSPITALIST

## 2022-01-29 PROCEDURE — 700105 HCHG RX REV CODE 258: Performed by: ORTHOPAEDIC SURGERY

## 2022-01-29 PROCEDURE — 97162 PT EVAL MOD COMPLEX 30 MIN: CPT

## 2022-01-29 PROCEDURE — 770006 HCHG ROOM/CARE - MED/SURG/GYN SEMI*

## 2022-01-29 RX ORDER — BISACODYL 10 MG
10 SUPPOSITORY, RECTAL RECTAL
Status: DISCONTINUED | OUTPATIENT
Start: 2022-01-29 | End: 2022-01-31 | Stop reason: HOSPADM

## 2022-01-29 RX ORDER — POLYETHYLENE GLYCOL 3350 17 G/17G
1 POWDER, FOR SOLUTION ORAL
Status: DISCONTINUED | OUTPATIENT
Start: 2022-01-29 | End: 2022-01-31 | Stop reason: HOSPADM

## 2022-01-29 RX ORDER — AMOXICILLIN 250 MG
2 CAPSULE ORAL 2 TIMES DAILY
Status: DISCONTINUED | OUTPATIENT
Start: 2022-01-29 | End: 2022-01-31 | Stop reason: HOSPADM

## 2022-01-29 RX ADMIN — KETOROLAC TROMETHAMINE 15 MG: 30 INJECTION, SOLUTION INTRAMUSCULAR at 00:18

## 2022-01-29 RX ADMIN — ACETAMINOPHEN 650 MG: 325 TABLET, FILM COATED ORAL at 17:33

## 2022-01-29 RX ADMIN — KETOROLAC TROMETHAMINE 15 MG: 30 INJECTION, SOLUTION INTRAMUSCULAR at 17:33

## 2022-01-29 RX ADMIN — OXYCODONE HYDROCHLORIDE 5 MG: 5 TABLET ORAL at 20:18

## 2022-01-29 RX ADMIN — WATER 2 G: 1000 INJECTION, SOLUTION INTRAVENOUS at 00:18

## 2022-01-29 RX ADMIN — HYDROCODONE BITARTRATE AND ACETAMINOPHEN 1 TABLET: 10; 325 TABLET ORAL at 17:33

## 2022-01-29 RX ADMIN — SODIUM CHLORIDE: 9 INJECTION, SOLUTION INTRAVENOUS at 20:27

## 2022-01-29 RX ADMIN — SENNOSIDES AND DOCUSATE SODIUM 2 TABLET: 50; 8.6 TABLET ORAL at 11:32

## 2022-01-29 RX ADMIN — ESCITALOPRAM OXALATE 20 MG: 10 TABLET ORAL at 05:38

## 2022-01-29 RX ADMIN — GABAPENTIN 300 MG: 300 CAPSULE ORAL at 11:32

## 2022-01-29 RX ADMIN — SODIUM CHLORIDE: 9 INJECTION, SOLUTION INTRAVENOUS at 08:00

## 2022-01-29 RX ADMIN — SENNOSIDES AND DOCUSATE SODIUM 2 TABLET: 50; 8.6 TABLET ORAL at 05:37

## 2022-01-29 RX ADMIN — ENOXAPARIN SODIUM 40 MG: 40 INJECTION SUBCUTANEOUS at 20:19

## 2022-01-29 RX ADMIN — GABAPENTIN 300 MG: 300 CAPSULE ORAL at 17:33

## 2022-01-29 RX ADMIN — DOCUSATE SODIUM 100 MG: 100 CAPSULE, LIQUID FILLED ORAL at 17:33

## 2022-01-29 RX ADMIN — ROSUVASTATIN 20 MG: 10 TABLET, FILM COATED ORAL at 17:33

## 2022-01-29 RX ADMIN — KETOROLAC TROMETHAMINE 15 MG: 30 INJECTION, SOLUTION INTRAMUSCULAR at 11:31

## 2022-01-29 RX ADMIN — OMEPRAZOLE 20 MG: 20 CAPSULE, DELAYED RELEASE ORAL at 05:38

## 2022-01-29 RX ADMIN — HYDROCODONE BITARTRATE AND ACETAMINOPHEN 1 TABLET: 10; 325 TABLET ORAL at 05:36

## 2022-01-29 RX ADMIN — GABAPENTIN 300 MG: 300 CAPSULE ORAL at 05:38

## 2022-01-29 RX ADMIN — KETOROLAC TROMETHAMINE 15 MG: 30 INJECTION, SOLUTION INTRAMUSCULAR at 05:36

## 2022-01-29 RX ADMIN — ACETAMINOPHEN 650 MG: 325 TABLET, FILM COATED ORAL at 11:31

## 2022-01-29 RX ADMIN — DOCUSATE SODIUM 100 MG: 100 CAPSULE, LIQUID FILLED ORAL at 05:38

## 2022-01-29 RX ADMIN — Medication 5 MG: at 20:17

## 2022-01-29 ASSESSMENT — PAIN DESCRIPTION - PAIN TYPE
TYPE: SURGICAL PAIN

## 2022-01-29 ASSESSMENT — ENCOUNTER SYMPTOMS
DEPRESSION: 0
FEVER: 0
CONSTIPATION: 0
VOMITING: 0
PHOTOPHOBIA: 0
DIZZINESS: 0
DIARRHEA: 0
INSOMNIA: 0
WEAKNESS: 0
NERVOUS/ANXIOUS: 0
HEARTBURN: 0
FALLS: 1
SENSORY CHANGE: 0
MYALGIAS: 0
SORE THROAT: 0
CLAUDICATION: 0
COUGH: 0
SPEECH CHANGE: 0
SHORTNESS OF BREATH: 0
HEADACHES: 0
ABDOMINAL PAIN: 0
BLURRED VISION: 0
CHILLS: 0

## 2022-01-29 ASSESSMENT — GAIT ASSESSMENTS
GAIT LEVEL OF ASSIST: MINIMAL ASSIST
DISTANCE (FEET): 40
ASSISTIVE DEVICE: FRONT WHEEL WALKER

## 2022-01-29 ASSESSMENT — COGNITIVE AND FUNCTIONAL STATUS - GENERAL
DAILY ACTIVITIY SCORE: 20
MOVING FROM LYING ON BACK TO SITTING ON SIDE OF FLAT BED: A LITTLE
WALKING IN HOSPITAL ROOM: A LITTLE
SUGGESTED CMS G CODE MODIFIER DAILY ACTIVITY: CJ
HELP NEEDED FOR BATHING: A LITTLE
TOILETING: A LITTLE
STANDING UP FROM CHAIR USING ARMS: A LITTLE
DRESSING REGULAR LOWER BODY CLOTHING: A LOT
CLIMB 3 TO 5 STEPS WITH RAILING: A LOT
MOBILITY SCORE: 19
SUGGESTED CMS G CODE MODIFIER MOBILITY: CK

## 2022-01-29 ASSESSMENT — PATIENT HEALTH QUESTIONNAIRE - PHQ9
1. LITTLE INTEREST OR PLEASURE IN DOING THINGS: NOT AT ALL
2. FEELING DOWN, DEPRESSED, IRRITABLE, OR HOPELESS: NOT AT ALL
SUM OF ALL RESPONSES TO PHQ9 QUESTIONS 1 AND 2: 0

## 2022-01-29 ASSESSMENT — ACTIVITIES OF DAILY LIVING (ADL): TOILETING: INDEPENDENT

## 2022-01-29 NOTE — PROGRESS NOTES
Received report from NOC RN. Pt is awake, alert, eating breakfast. Pt aware of POC today and all questions were answered at this time. Pt educated on fall precautions. Hourly rounding in place.

## 2022-01-29 NOTE — PROGRESS NOTES
Report received from Day RN, assumed care of pt.   POC and medications reviewed with pt. Pt verbalized understanding.   AOx4  C/o 5/10 pain at this time medicated per MAR   Denies  SOB, or dizziness at this time.   Safety measures in place.

## 2022-01-29 NOTE — CARE PLAN
The patient is Stable - Low risk of patient condition declining or worsening    Shift Goals  Clinical Goals: pain control   Patient Goals: comfort,rest  Family Goals: none present    Progress made toward(s) clinical / shift goals:    Problem: Pain - Standard  Goal: Alleviation of pain or a reduction in pain to the patient’s comfort goal  Outcome: Progressing     Problem: Knowledge Deficit - Standard  Goal: Patient and family/care givers will demonstrate understanding of plan of care, disease process/condition, diagnostic tests and medications  Outcome: Progressing     Problem: Fall Risk  Goal: Patient will remain free from falls  Outcome: Progressing       Patient is not progressing towards the following goals:

## 2022-01-29 NOTE — FACE TO FACE
Face to Face Supporting Documentation - Home Health    The encounter with this patient was in whole or in part the primary reason for home health admission.    Date of encounter:   Patient:                    MRN:                       YOB: 2020  Ellen Srivastava  [id number]  1935     Home health to see patient for:  Skilled Nursing care for assessment, interventions & education, Physical Therapy evaluation and treatment and Occupational therapy evaluation and treatment    Skilled need for:  Surgical Aftercare left trimalleolar ankle fracture    Skilled nursing interventions to include:  Comment: .    Homebound status evidenced by:  Needs the assistance of another person in order to leave the home. Leaving home requires a considerable and taxing effort. There is a normal inability to leave the home.    Community Physician to provide follow up care: Denzel Patel M.D.     Optional Interventions? No      I certify the face to face encounter for this home health care referral meets the CMS requirements and the encounter/clinical assessment with the patient was, in whole, or in part, for the medical condition(s) listed above, which is the primary reason for home health care. Based on my clinical findings: the service(s) are medically necessary, support the need for home health care, and the homebound criteria are met.  I certify that this patient has had a face to face encounter by myself.  Nury Da Silva D.O. - NPI: [id number]

## 2022-01-29 NOTE — PROGRESS NOTES
"[facility] Medicine Daily Progress Note    Date of Service  1/29/2022    Chief Complaint  Ellen Srivastava is a 86 y.o. female admitted 1/26/2022 with severe left ankle pain.    Hospital Course  Ellen Srivastava is a 86 y.o. female, with h/o HTN, who presented to the ED on  1/26/2022 after sustaining ground-level fall at home.  Patient actually states that she fell 3 times during the day today, last time twisting her ankle and being unable to walk secondary to pain.  She reports feeling \"dizzy \"all day long, but did not had any chest pain, shortness of breath.  She denies head injury or loss of consciousness.  ER evaluation is demonstrating left medial malleolar fracture and possible underlying fracture of posterior malleolar area and disruption of the ankle mortise.  Dr. Celis consulted and plans to take to the OR on 1/28.        Interval Problem Update  1/27 Patient seen in the ED, she states she has significant pain in her right foot and left ankle. She will have splint placed around left ankle and OR planned for tomorrow.  Pain management until OR.  She will likely need SNF for rehab as she has bilateral LE fractures but right now she states she would only accept home health.  1/28 Patient seen postoperatively and feeling well.  She states pain was severe yesterday but better once she was up to her room.  She went to OR with Dr Celis today and had ORIF trimalleolar fracture.  PT/OT to evaluate for disposition recommendations.  I've also requested a post op shoe for the right foot given the fracture of the lateral base of the first proximal phalanx for pain control.   1/29 Patient in good spirits when seen this am.  She states pain is controlled but she is worried about when therapy will have her walk.  Post surgical shoe at bedside for right foot.      I have personally seen and examined the patient at bedside. I discussed the plan of care with patient, bedside RN, charge RN,  " and pharmacy.    Consultants/Specialty  orthopedics    Code Status  Full Code    Disposition  Patient is not medically cleared for discharge.   Anticipate discharge to to skilled nursing facility.  I have placed the appropriate orders for post-discharge needs.    Review of Systems  Review of Systems   Constitutional: Negative for chills and fever.   HENT: Negative for congestion and sore throat.    Eyes: Negative for blurred vision and photophobia.   Respiratory: Negative for cough and shortness of breath.    Cardiovascular: Negative for chest pain, claudication and leg swelling.   Gastrointestinal: Negative for abdominal pain, constipation, diarrhea, heartburn and vomiting.   Genitourinary: Negative for dysuria and hematuria.   Musculoskeletal: Positive for falls and joint pain (right foot, left ankle). Negative for myalgias.   Skin: Negative for itching and rash.   Neurological: Negative for dizziness, sensory change, speech change, weakness and headaches.   Psychiatric/Behavioral: Negative for depression. The patient is not nervous/anxious and does not have insomnia.         Physical Exam  Temp:  [36.7 °C (98.1 °F)-37.2 °C (98.9 °F)] 37.2 °C (98.9 °F)  Pulse:  [74-90] 84  Resp:  [17-18] 17  BP: (104-129)/(55-65) 129/65  SpO2:  [94 %-98 %] 95 %    Physical Exam  Vitals and nursing note reviewed.   Constitutional:       General: She is not in acute distress.     Appearance: Normal appearance. She is not ill-appearing.   HENT:      Head: Normocephalic and atraumatic.      Nose: Nose normal.   Eyes:      General: No scleral icterus.  Cardiovascular:      Rate and Rhythm: Normal rate and regular rhythm.      Heart sounds: Normal heart sounds. No murmur heard.      Pulmonary:      Effort: Pulmonary effort is normal.      Breath sounds: Normal breath sounds.   Abdominal:      General: Bowel sounds are normal. There is no distension.      Palpations: Abdomen is soft.   Musculoskeletal:         General: No swelling or  tenderness.      Cervical back: Neck supple.      Comments: Left ankle in cast, good cap refill on toes     Skin:     General: Skin is warm and dry.   Neurological:      General: No focal deficit present.      Mental Status: She is alert and oriented to person, place, and time.   Psychiatric:         Mood and Affect: Mood normal.         Fluids    Intake/Output Summary (Last 24 hours) at 1/29/2022 1301  Last data filed at 1/29/2022 1000  Gross per 24 hour   Intake 480 ml   Output --   Net 480 ml       Laboratory  Recent Labs     01/27/22 0415 01/28/22  0440 01/29/22  0405   WBC 15.0* 12.0* 12.7*   RBC 4.35 4.02* 3.65*   HEMOGLOBIN 13.2 12.3 11.1*   HEMATOCRIT 41.7 38.3 35.0*   MCV 95.9 95.3 95.9   MCH 30.3 30.6 30.4   MCHC 31.7* 32.1* 31.7*   RDW 47.2 47.9 47.6   PLATELETCT 162* 162* 133*   MPV 9.6 10.1 10.4     Recent Labs     01/27/22 0415 01/28/22  0440 01/29/22  0405   SODIUM 139 135 137   POTASSIUM 5.7* 4.2 4.7   CHLORIDE 105 100 105   CO2 28 25 25   GLUCOSE 98 116* 133*   BUN 19 16 24*   CREATININE 1.15 0.97 1.30   CALCIUM 8.7 8.3* 8.3*                   Imaging  DX-PORTABLE FLUOROSCOPY < 1 HOUR   Final Result      Portable fluoroscopy utilized for 3.1 seconds.         INTERPRETING LOCATION: [address] [place] [address], [address]      DX-ANKLE 3+ VIEWS LEFT   Final Result      Medial malleolus fracture fixation and mild tibiotalar osteoarthritis      DX-KNEE 2- LEFT   Final Result      1.  No radiographic evidence of acute traumatic injury.   2.  Osteopenia   3.  Osteoarthritis      DX-ANKLE 2- VIEWS LEFT   Final Result      1.  Medial malleolar fracture   2.  Posterior malleolar fracture   3.  Lateral malleolar fracture may also be present   4.  Disruption of the ankle mortise   5.  Osteopenia      DX-FOOT-COMPLETE 3+ RIGHT   Final Result      1.  Intra-articular fracture of the lateral base of the first proximal phalanx could be chronic or acute. Suggest correlation for point tenderness   2.  Osteopenia   3.  Prior  surgical fixation of the distal tibia and fibula      DX-TIBIA AND FIBULA LEFT   Final Result      1.  Osteopenia   2.  Fracture of the medial malleolus   3.  Probable fracture of the posterior malleolus   4.  Possible fracture of the medial malleolus   5.  Possible disruption of the ankle mortise   6.  Recommend dedicated radiographs of the LEFT ankle and knee      DX-CHEST-LIMITED (1 VIEW)   Final Result      1.  LEFT medial basilar atelectasis, less likely pneumonia   2.  Curvilinear scar in the lateral LEFT lower lobe           Assessment/Plan  * Unable to ambulate  Assessment & Plan  -Observation status to medical floor.  -Patient has on the right side intra-articular fracture of the lateral base of the first proximal phalanx that could be acute versus chronic and on the left side she has fracture of the medial malleolus, probable fracture of the posterior malleolus and does irruption of ankle mortise.  -Trimalleolar fracture Left ankle - OR today  -Patient refuses SNF/rehab, Home health ordered, pending PT/OT        Hyperkalemia  Assessment & Plan  Gentle IV hydration      Nondisplaced fracture of proximal phalanx of right great toe, initial encounter for closed fracture  Assessment & Plan  Surgical shoe      Nondisplaced fracture of medial malleolus of left tibia, initial encounter for closed fracture  Assessment & Plan  Dr Celis consulted   OR 1/28/2022      Neuropathy- (present on admission)  Assessment & Plan  -Continue Neurontin.    Depression with anxiety- (present on admission)  Assessment & Plan  -Follows with psychiatry.  Continue Lexapro.    GERD (gastroesophageal reflux disease)- (present on admission)  Assessment & Plan  -Asymptomatic at this time, continue omeprazole.    Narcotic dependence (HCC)- (present on admission)  Assessment & Plan  Patient has chronic back pain and takes Norco that I will continue.    HTN (hypertension)- (present on admission)  Assessment & Plan  -She takes metoprolol,  lisinopril and hydrochlorothiazide.   Hypotensive in ED, resume medications once BP allows.         VTE prophylaxis: SCDs/TEDs    I have performed a physical exam and reviewed and updated ROS and Plan today (1/29/2022). In review of yesterday's note (1/28/2022), there are no changes except as documented above.

## 2022-01-29 NOTE — THERAPY
Physical Therapy   Initial Evaluation     Patient Name: Ellen Srivastava  Age:  86 y.o., Sex:  female  Medical Record #: [id number]  Today's Date: 1/29/2022     Precautions  Precautions: (P) Fall Risk;Toe Touch Weight Bearing Left Lower Extremity    Assessment  Patient is 86 y.o. female with a diagnosis of Fx L ankle following fall Pt has walking boot on R LE.She lives at home with  and is active.Acute PT needed to improve transfers ,ambulation and stairs     Plan    Recommend Physical Therapy daily until therapy goals are met for the following treatments:  Gait Training, Neuro Re-Education / Balance, Stair Training, Therapeutic Activities and Therapeutic Exercises      01/29/22 1100   Total Time Spent   Total Time Spent (Mins) 30   Charge Group   PT Evaluation PT Evaluation Mod   Initial Contact Note    Initial Contact Note Order Received and Verified, Physical Therapy Evaluation in Progress with Full Report to Follow.   Precautions   Precautions Fall Risk;Toe Touch Weight Bearing Left Lower Extremity   Pain 0 - 10 Group   Therapist Pain Assessment 3   Prior Living Situation   Prior Services Skilled Home Health Services   Housing / Facility 2 Story Apartment / Condo   Steps Into Home 10   Equipment Owned None   Lives with - Patient's Self Care Capacity Spouse   Prior Level of Functional Mobility   Bed Mobility Independent   Transfer Status Independent   Ambulation Independent   Distance Ambulation (Feet)   (community amb)   Assistive Devices Used None   Stairs Independent   Cognition    Cognition / Consciousness X   Level of Consciousness Alert   Passive ROM Lower Body   Passive ROM Lower Body X   Active ROM Lower Body    Active ROM Lower Body  X   Strength Lower Body   Lower Body Strength  X   Coordination Lower Body    Coordination Lower Body  WDL   Balance Assessment   Sitting Balance (Static) Fair +   Sitting Balance (Dynamic) Fair +   Standing Balance (Static) Fair   Standing Balance (Dynamic) Fair    Weight Shift Sitting Fair   Weight Shift Standing Fair   Gait Analysis   Gait Level Of Assist Minimal Assist   Assistive Device Front Wheel Walker   Distance (Feet) 40   # of Times Distance was Traveled 1   Weight Bearing Status TTWB L   Bed Mobility    Supine to Sit Modified Independent   Sit to Supine Modified Independent   Scooting Modified Independent   Functional Mobility   Sit to Stand Minimal Assist   Bed, Chair, Wheelchair Transfer Minimal Assist   Transfer Method Stand Step   How much difficulty does the patient currently have...   Turning over in bed (including adjusting bedclothes, sheets and blankets)? 4   Sitting down on and standing up from a chair with arms (e.g., wheelchair, bedside commode, etc.) 3   Moving from lying on back to sitting on the side of the bed? 4   How much help from another person does the patient currently need...   Moving to and from a bed to a chair (including a wheelchair)? 3   Need to walk in a hospital room? 3   Climbing 3-5 steps with a railing? 2   6 clicks Mobility Score 19   Activity Tolerance   Sitting Edge of Bed 10   Standing 10   Patient / Family Goals    Patient / Family Goal #1 Home   Short Term Goals    Short Term Goal # 1 S with transfers in 4 V   Short Term Goal # 2 S with ambulation x 50 feet in 4 V   Short Term Goal # 3 Min A x flight of stairs in 4 V   Problem List    Problems Impaired Transfers;Impaired Ambulation;Functional ROM Deficit;Functional Strength Deficit   Anticipated Discharge Equipment and Recommendations   DC Equipment Recommendations Front-Wheel Walker   Interdisciplinary Plan of Care Collaboration   IDT Collaboration with  Nursing   Session Information   Date / Session Number  1/29-1 1/7 2/4       DC Equipment Recommendations: (P) Front-Wheel Walker

## 2022-01-30 LAB
ANION GAP SERPL CALC-SCNC: 8 MMOL/L (ref 7–16)
BUN SERPL-MCNC: 30 MG/DL (ref 8–22)
CALCIUM SERPL-MCNC: 7.8 MG/DL (ref 8.4–10.2)
CHLORIDE SERPL-SCNC: 107 MMOL/L (ref 96–112)
CO2 SERPL-SCNC: 24 MMOL/L (ref 20–33)
CREAT SERPL-MCNC: 1.23 MG/DL (ref 0.5–1.4)
ERYTHROCYTE [DISTWIDTH] IN BLOOD BY AUTOMATED COUNT: 49.3 FL (ref 35.9–50)
GLUCOSE SERPL-MCNC: 102 MG/DL (ref 65–99)
HCT VFR BLD AUTO: 32.3 % (ref 37–47)
HGB BLD-MCNC: 10.2 G/DL (ref 12–16)
MCH RBC QN AUTO: 30.7 PG (ref 27–33)
MCHC RBC AUTO-ENTMCNC: 31.6 G/DL (ref 33.6–35)
MCV RBC AUTO: 97.3 FL (ref 81.4–97.8)
PLATELET # BLD AUTO: 143 K/UL (ref 164–446)
PMV BLD AUTO: 10.7 FL (ref 9–12.9)
POTASSIUM SERPL-SCNC: 4.5 MMOL/L (ref 3.6–5.5)
RBC # BLD AUTO: 3.32 M/UL (ref 4.2–5.4)
SODIUM SERPL-SCNC: 139 MMOL/L (ref 135–145)
WBC # BLD AUTO: 10.8 K/UL (ref 4.8–10.8)

## 2022-01-30 PROCEDURE — 700102 HCHG RX REV CODE 250 W/ 637 OVERRIDE(OP): Performed by: INTERNAL MEDICINE

## 2022-01-30 PROCEDURE — 770006 HCHG ROOM/CARE - MED/SURG/GYN SEMI*

## 2022-01-30 PROCEDURE — 700102 HCHG RX REV CODE 250 W/ 637 OVERRIDE(OP): Performed by: ORTHOPAEDIC SURGERY

## 2022-01-30 PROCEDURE — 97116 GAIT TRAINING THERAPY: CPT

## 2022-01-30 PROCEDURE — 80048 BASIC METABOLIC PNL TOTAL CA: CPT

## 2022-01-30 PROCEDURE — 36415 COLL VENOUS BLD VENIPUNCTURE: CPT

## 2022-01-30 PROCEDURE — 97530 THERAPEUTIC ACTIVITIES: CPT

## 2022-01-30 PROCEDURE — A9270 NON-COVERED ITEM OR SERVICE: HCPCS | Performed by: INTERNAL MEDICINE

## 2022-01-30 PROCEDURE — 99024 POSTOP FOLLOW-UP VISIT: CPT | Performed by: ORTHOPAEDIC SURGERY

## 2022-01-30 PROCEDURE — 99232 SBSQ HOSP IP/OBS MODERATE 35: CPT | Performed by: INTERNAL MEDICINE

## 2022-01-30 PROCEDURE — A9270 NON-COVERED ITEM OR SERVICE: HCPCS | Performed by: HOSPITALIST

## 2022-01-30 PROCEDURE — 700105 HCHG RX REV CODE 258: Performed by: INTERNAL MEDICINE

## 2022-01-30 PROCEDURE — 85027 COMPLETE CBC AUTOMATED: CPT

## 2022-01-30 PROCEDURE — 700111 HCHG RX REV CODE 636 W/ 250 OVERRIDE (IP): Performed by: ORTHOPAEDIC SURGERY

## 2022-01-30 PROCEDURE — A9270 NON-COVERED ITEM OR SERVICE: HCPCS | Performed by: ORTHOPAEDIC SURGERY

## 2022-01-30 PROCEDURE — 700102 HCHG RX REV CODE 250 W/ 637 OVERRIDE(OP): Performed by: HOSPITALIST

## 2022-01-30 PROCEDURE — 94760 N-INVAS EAR/PLS OXIMETRY 1: CPT

## 2022-01-30 RX ADMIN — KETOROLAC TROMETHAMINE 15 MG: 30 INJECTION, SOLUTION INTRAMUSCULAR at 12:38

## 2022-01-30 RX ADMIN — GABAPENTIN 300 MG: 300 CAPSULE ORAL at 12:38

## 2022-01-30 RX ADMIN — HYDROCODONE BITARTRATE AND ACETAMINOPHEN 1 TABLET: 10; 325 TABLET ORAL at 17:13

## 2022-01-30 RX ADMIN — KETOROLAC TROMETHAMINE 15 MG: 30 INJECTION, SOLUTION INTRAMUSCULAR at 05:36

## 2022-01-30 RX ADMIN — HYDROCODONE BITARTRATE AND ACETAMINOPHEN 1 TABLET: 10; 325 TABLET ORAL at 05:37

## 2022-01-30 RX ADMIN — OMEPRAZOLE 20 MG: 20 CAPSULE, DELAYED RELEASE ORAL at 05:37

## 2022-01-30 RX ADMIN — Medication 5 MG: at 19:43

## 2022-01-30 RX ADMIN — ACETAMINOPHEN 650 MG: 325 TABLET, FILM COATED ORAL at 17:13

## 2022-01-30 RX ADMIN — SODIUM CHLORIDE: 9 INJECTION, SOLUTION INTRAVENOUS at 13:29

## 2022-01-30 RX ADMIN — ROSUVASTATIN 20 MG: 10 TABLET, FILM COATED ORAL at 17:12

## 2022-01-30 RX ADMIN — ENOXAPARIN SODIUM 40 MG: 40 INJECTION SUBCUTANEOUS at 19:43

## 2022-01-30 RX ADMIN — ESCITALOPRAM OXALATE 20 MG: 10 TABLET ORAL at 05:37

## 2022-01-30 RX ADMIN — GABAPENTIN 300 MG: 300 CAPSULE ORAL at 17:16

## 2022-01-30 RX ADMIN — GABAPENTIN 300 MG: 300 CAPSULE ORAL at 05:37

## 2022-01-30 RX ADMIN — ACETAMINOPHEN 650 MG: 325 TABLET, FILM COATED ORAL at 12:40

## 2022-01-30 RX ADMIN — KETOROLAC TROMETHAMINE 15 MG: 30 INJECTION, SOLUTION INTRAMUSCULAR at 17:14

## 2022-01-30 ASSESSMENT — PAIN DESCRIPTION - PAIN TYPE
TYPE: CHRONIC PAIN

## 2022-01-30 ASSESSMENT — COGNITIVE AND FUNCTIONAL STATUS - GENERAL
TURNING FROM BACK TO SIDE WHILE IN FLAT BAD: A LITTLE
CLIMB 3 TO 5 STEPS WITH RAILING: TOTAL
MOVING FROM LYING ON BACK TO SITTING ON SIDE OF FLAT BED: A LITTLE
WALKING IN HOSPITAL ROOM: A LITTLE
MOBILITY SCORE: 16
SUGGESTED CMS G CODE MODIFIER MOBILITY: CK
MOVING TO AND FROM BED TO CHAIR: A LITTLE
STANDING UP FROM CHAIR USING ARMS: A LITTLE

## 2022-01-30 ASSESSMENT — ENCOUNTER SYMPTOMS
SHORTNESS OF BREATH: 0
DIZZINESS: 0
HEARTBURN: 0
SENSORY CHANGE: 0
CONSTIPATION: 0
INSOMNIA: 0
MYALGIAS: 0
FALLS: 1
CLAUDICATION: 0
WEAKNESS: 0
FEVER: 0
COUGH: 0
VOMITING: 0
DEPRESSION: 0
NERVOUS/ANXIOUS: 0
HEADACHES: 0
SPEECH CHANGE: 0
CHILLS: 0
SORE THROAT: 0
PHOTOPHOBIA: 0
BLURRED VISION: 0
ABDOMINAL PAIN: 0
DIARRHEA: 0

## 2022-01-30 ASSESSMENT — PATIENT HEALTH QUESTIONNAIRE - PHQ9
SUM OF ALL RESPONSES TO PHQ9 QUESTIONS 1 AND 2: 0
2. FEELING DOWN, DEPRESSED, IRRITABLE, OR HOPELESS: NOT AT ALL
1. LITTLE INTEREST OR PLEASURE IN DOING THINGS: NOT AT ALL

## 2022-01-30 ASSESSMENT — GAIT ASSESSMENTS
DEVIATION: STEP TO
GAIT LEVEL OF ASSIST: CONTACT GUARD ASSIST
DISTANCE (FEET): 35
ASSISTIVE DEVICE: FRONT WHEEL WALKER

## 2022-01-30 NOTE — DISCHARGE PLANNING
Received Choice form at [phone]  Agency/Facility Name: [facility]  Referral sent per Choice form @ [phone]

## 2022-01-30 NOTE — CARE PLAN
The patient is Stable - Low risk of patient condition declining or worsening    Shift Goals  Clinical Goals: pain control   Patient Goals: comfort,rest  Family Goals: none present    Progress made toward(s) clinical / shift goals:  Pain controlled with scheduled medication, elevation, rest, and repositioning.    Problem: Pain - Standard  Goal: Alleviation of pain or a reduction in pain to the patient’s comfort goal  Outcome: Progressing     Problem: Knowledge Deficit - Standard  Goal: Patient and family/care givers will demonstrate understanding of plan of care, disease process/condition, diagnostic tests and medications  Outcome: Progressing     Problem: Fall Risk  Goal: Patient will remain free from falls  Outcome: Progressing     Problem: Skin Integrity  Goal: Skin integrity is maintained or improved  Outcome: Progressing       Patient is not progressing towards the following goals:

## 2022-01-30 NOTE — THERAPY
Physical Therapy   Daily Treatment     Patient Name: Ellen Srivastava  Age:  86 y.o., Sex:  female  Medical Record #: [id number]  Today's Date: 1/30/2022     Precautions  Precautions: Fall Risk;Toe Touch Weight Bearing Left Lower Extremity;Weight Bearing As Tolerated Right Lower Extremity  Comments: boot for R LE    Assessment    Pt presenting with poor memory and insight although does recall that she is TTWB L LE and is able to maintain this throughout PT session. Pt does have impaired balance at baseline and feel she should have a w/c at home to allow her a place to sit and get around home if she becomes fatigued especially if grooming in the bathroom or in the kitchen.  Pt has a flight of stairs to get into apt and will be unsafe for her to attempt these at this time and maintain TTWB. Pt is refusing further therapy at SNF even though this would benefit her and is recommended.  Pt will need transport home up to 2nd floor apt and HHPT.     Plan    Continue current treatment plan.    DC Equipment Recommendations: Wheelchair,Front-Wheel Walker (wheelchair with removable leg rest and elevating L LE); raised toilet seat        01/30/22 1048   Cognition    Comments Pt with decreased insight and safety, poor memory   Balance   Sitting Balance (Static) Fair +   Sitting Balance (Dynamic) Fair   Standing Balance (Static) Fair   Standing Balance (Dynamic) Fair   Weight Shift Sitting Fair   Weight Shift Standing Fair   Skilled Intervention Postural Facilitation;Sequencing;Tactile Cuing;Verbal Cuing   Comments stdg with FWW   Gait Analysis   Gait Level Of Assist Contact Guard Assist   Assistive Device Front Wheel Walker   Distance (Feet) 35  (20 ftx2)   # of Times Distance was Traveled 1   Deviation Step To   Weight Bearing Status TTWB L LE   Skilled Intervention Postural Facilitation;Sequencing;Tactile Cuing;Verbal Cuing   Bed Mobility    Supine to Sit Supervised   Sit to Supine Supervised   Functional Mobility   Sit to  Stand Contact Guard Assist   Bed, Chair, Wheelchair Transfer Minimal Assist  (Dominic sit <> stand from toilet)   Transfer Method Stand Step  (with FWW)   Skilled Intervention Postural Facilitation;Sequencing;Tactile Cuing;Verbal Cuing   Activity Tolerance   Standing 5 min   Short Term Goals    Short Term Goal # 1 S with transfers in 4 V   Goal Outcome # 1 Progressing as expected   Short Term Goal # 2 S with ambulation x 50 feet in 4 V   Goal Outcome # 2 Progressing as expected   Short Term Goal # 3 Min A x flight of stairs in 4 V   Goal Outcome # 3 Goal not met

## 2022-01-30 NOTE — PROGRESS NOTES
"[facility] Medicine Daily Progress Note    Date of Service  1/30/2022    Chief Complaint  Ellen Srivastava is a 86 y.o. female admitted 1/26/2022 with severe left ankle pain.    Hospital Course  Ellen Srivastava is a 86 y.o. female, with h/o HTN, who presented to the ED on  1/26/2022 after sustaining ground-level fall at home.  Patient actually states that she fell 3 times during the day today, last time twisting her ankle and being unable to walk secondary to pain.  She reports feeling \"dizzy \"all day long, but did not had any chest pain, shortness of breath.  She denies head injury or loss of consciousness.  ER evaluation is demonstrating left medial malleolar fracture and possible underlying fracture of posterior malleolar area and disruption of the ankle mortise.  Dr. Celis consulted and plans to take to the OR on 1/28.        Interval Problem Update  1/27 Patient seen in the ED, she states she has significant pain in her right foot and left ankle. She will have splint placed around left ankle and OR planned for tomorrow.  Pain management until OR.  She will likely need SNF for rehab as she has bilateral LE fractures but right now she states she would only accept home health.  1/28 Patient seen postoperatively and feeling well.  She states pain was severe yesterday but better once she was up to her room.  She went to OR with Dr Celis today and had ORIF trimalleolar fracture.  PT/OT to evaluate for disposition recommendations.  I've also requested a post op shoe for the right foot given the fracture of the lateral base of the first proximal phalanx for pain control.   1/29 Patient in good spirits when seen this am.  She states pain is controlled but she is worried about when therapy will have her walk.  Post surgical shoe at bedside for right foot.    1/30 Patient without complaint but wondering how she can watch the \"game\" on her IPad.  She needs [facility] assistance to return home tomorrow as " she lives on a second floor apartment and cannot get up the stairs.  She is appropriate for dc home tomorrow once this is coordinated.     I have personally seen and examined the patient at bedside. I discussed the plan of care with patient, bedside RN, charge RN,  and pharmacy.    Consultants/Specialty  orthopedics    Code Status  Full Code    Disposition  Patient is not medically cleared for discharge.   Anticipate discharge to to skilled nursing facility.  I have placed the appropriate orders for post-discharge needs.    Review of Systems  Review of Systems   Constitutional: Negative for chills and fever.   HENT: Negative for congestion and sore throat.    Eyes: Negative for blurred vision and photophobia.   Respiratory: Negative for cough and shortness of breath.    Cardiovascular: Negative for chest pain, claudication and leg swelling.   Gastrointestinal: Negative for abdominal pain, constipation, diarrhea, heartburn and vomiting.   Genitourinary: Negative for dysuria and hematuria.   Musculoskeletal: Positive for falls and joint pain (right foot, left ankle). Negative for myalgias.   Skin: Negative for itching and rash.   Neurological: Negative for dizziness, sensory change, speech change, weakness and headaches.   Psychiatric/Behavioral: Negative for depression. The patient is not nervous/anxious and does not have insomnia.         Physical Exam  Temp:  [36.3 °C (97.4 °F)-36.9 °C (98.5 °F)] 36.7 °C (98 °F)  Pulse:  [83-90] 87  Resp:  [15-16] 16  BP: (106-150)/(52-78) 122/62  SpO2:  [90 %-93 %] 93 %    Physical Exam  Vitals and nursing note reviewed.   Constitutional:       General: She is not in acute distress.     Appearance: Normal appearance. She is not ill-appearing.   HENT:      Head: Normocephalic and atraumatic.      Nose: Nose normal.   Eyes:      General: No scleral icterus.  Cardiovascular:      Rate and Rhythm: Normal rate and regular rhythm.      Heart sounds: Normal heart sounds. No  murmur heard.      Pulmonary:      Effort: Pulmonary effort is normal.      Breath sounds: Normal breath sounds.   Abdominal:      General: Bowel sounds are normal. There is no distension.      Palpations: Abdomen is soft.   Musculoskeletal:         General: No swelling or tenderness.      Cervical back: Neck supple.      Comments: Left ankle in cast, good cap refill on toes     Skin:     General: Skin is warm and dry.   Neurological:      General: No focal deficit present.      Mental Status: She is alert and oriented to person, place, and time.   Psychiatric:         Mood and Affect: Mood normal.         Fluids    Intake/Output Summary (Last 24 hours) at 1/30/2022 1327  Last data filed at 1/30/2022 0900  Gross per 24 hour   Intake 480 ml   Output --   Net 480 ml       Laboratory  Recent Labs     01/28/22  0440 01/29/22  0405 01/30/22  0046   WBC 12.0* 12.7* 10.8   RBC 4.02* 3.65* 3.32*   HEMOGLOBIN 12.3 11.1* 10.2*   HEMATOCRIT 38.3 35.0* 32.3*   MCV 95.3 95.9 97.3   MCH 30.6 30.4 30.7   MCHC 32.1* 31.7* 31.6*   RDW 47.9 47.6 49.3   PLATELETCT 162* 133* 143*   MPV 10.1 10.4 10.7     Recent Labs     01/28/22  0440 01/29/22  0405 01/30/22  0046   SODIUM 135 137 139   POTASSIUM 4.2 4.7 4.5   CHLORIDE 100 105 107   CO2 25 25 24   GLUCOSE 116* 133* 102*   BUN 16 24* 30*   CREATININE 0.97 1.30 1.23   CALCIUM 8.3* 8.3* 7.8*                   Imaging  DX-PORTABLE FLUOROSCOPY < 1 HOUR   Final Result      Portable fluoroscopy utilized for 3.1 seconds.         INTERPRETING LOCATION: [address] [place] [address], [id number]      DX-ANKLE 3+ VIEWS LEFT   Final Result      Medial malleolus fracture fixation and mild tibiotalar osteoarthritis      DX-KNEE 2- LEFT   Final Result      1.  No radiographic evidence of acute traumatic injury.   2.  Osteopenia   3.  Osteoarthritis      DX-ANKLE 2- VIEWS LEFT   Final Result      1.  Medial malleolar fracture   2.  Posterior malleolar fracture   3.  Lateral malleolar fracture may also be present    4.  Disruption of the ankle mortise   5.  Osteopenia      DX-FOOT-COMPLETE 3+ RIGHT   Final Result      1.  Intra-articular fracture of the lateral base of the first proximal phalanx could be chronic or acute. Suggest correlation for point tenderness   2.  Osteopenia   3.  Prior surgical fixation of the distal tibia and fibula      DX-TIBIA AND FIBULA LEFT   Final Result      1.  Osteopenia   2.  Fracture of the medial malleolus   3.  Probable fracture of the posterior malleolus   4.  Possible fracture of the medial malleolus   5.  Possible disruption of the ankle mortise   6.  Recommend dedicated radiographs of the LEFT ankle and knee      DX-CHEST-LIMITED (1 VIEW)   Final Result      1.  LEFT medial basilar atelectasis, less likely pneumonia   2.  Curvilinear scar in the lateral LEFT lower lobe           Assessment/Plan  * Unable to ambulate  Assessment & Plan  -Observation status to medical floor.  -Patient has on the right side intra-articular fracture of the lateral base of the first proximal phalanx that could be acute versus chronic and on the left side she has fracture of the medial malleolus, probable fracture of the posterior malleolus and does irruption of ankle mortise.  -Trimalleolar fracture Left ankle - OR today  -Patient refuses SNF/rehab, Home health ordered, PT/OT recommending wheelchair for transportation with removable/elevating leg rest for LLE.          Hyperkalemia  Assessment & Plan  Gentle IV hydration      Nondisplaced fracture of proximal phalanx of right great toe, initial encounter for closed fracture  Assessment & Plan  Surgical shoe      Nondisplaced fracture of medial malleolus of left tibia, initial encounter for closed fracture  Assessment & Plan  Dr Celis consulted   OR 1/28/2022      Neuropathy- (present on admission)  Assessment & Plan  -Continue Neurontin.    Depression with anxiety- (present on admission)  Assessment & Plan  -Follows with psychiatry.  Continue  Lexapro.    GERD (gastroesophageal reflux disease)- (present on admission)  Assessment & Plan  -Asymptomatic at this time, continue omeprazole.    Narcotic dependence (HCC)- (present on admission)  Assessment & Plan  Patient has chronic back pain and takes Norco that I will continue.    HTN (hypertension)- (present on admission)  Assessment & Plan  -She takes metoprolol, lisinopril and hydrochlorothiazide.   Hypotensive in ED, resume medications once BP allows.         VTE prophylaxis: SCDs/TEDs    I have performed a physical exam and reviewed and updated ROS and Plan today (1/30/2022). In review of yesterday's note (1/29/2022), there are no changes except as documented above.

## 2022-01-30 NOTE — CARE PLAN
The patient is Stable - Low risk of patient condition declining or worsening    Shift Goals  Clinical Goals: pain control   Patient Goals: sleep   Family Goals: none present    Progress made toward(s) clinical / shift goals:    Problem: Pain - Standard  Goal: Alleviation of pain or a reduction in pain to the patient’s comfort goal  Outcome: Progressing  Note: Pain managed per MAR       Problem: Knowledge Deficit - Standard  Goal: Patient and family/care givers will demonstrate understanding of plan of care, disease process/condition, diagnostic tests and medications  Outcome: Progressing  Note: Reviewed plan of care with pt, pt verbalized understanding, no questions at this time.       Problem: Fall Risk  Goal: Patient will remain free from falls  Outcome: Progressing     Problem: Skin Integrity  Goal: Skin integrity is maintained or improved  Outcome: Progressing       Patient is not progressing towards the following goals:

## 2022-01-30 NOTE — DISCHARGE PLANNING
Anticipated Discharge Disposition:   Home     Action:   Avinash HIGH requesting transportation home for patient. RN GUS spoke with patient and confirmed address on facesheet is where patient will need to be transported. Patient stated 10 am is the preferable transportation time.     Patient requesting to speak to Avinash HIGH about discharge.  MD notified.     Faxed transportation request to [facility].          Barriers to Discharge:   Transportation     Plan:   HCM will continue to follow and assist with discharge needs.

## 2022-01-30 NOTE — PROGRESS NOTES
Received report from night RN, assumed care. POC discussed.   A&Ox4. Pain 6/10 chronic back pain, pt comfortable at this level.   Bed in lowest position with call light and belongings within reach.

## 2022-01-30 NOTE — THERAPY
Occupational Therapy   Initial Evaluation     Patient Name: Ellen Srivastava  Age:  86 y.o., Sex:  female  Medical Record #: [id number]  Today's Date: 1/29/2022     Precautions  Precautions: Fall Risk,Toe Touch Weight Bearing Left Lower Extremity  Comments: (P) boot to RLE for toe fracture    Assessment  Patient is 86 y.o. female admitted with GLF. S/p ORIF trimalleolar fracture, 1st R phalanx fracture. Walking boot on RLE. A&Ox3, mild confusion. Shows decreased activity tolerance, balance during ADL's, functional mobility. Uses FWW; able to maintain TTWB LLE. MaxA with LB dressing. Seated grooming with Sup. Transfers with Feliciano, FWW. Pt lives with spouse who is able to assist when needed. Pt is adament about returning home upon dc. OT will follow while in house.               Plan  Recommend Occupational Therapy 4 times per week until therapy goals are met for the following treatments:  Neuro Re-Education / Balance, Self Care/Activities of Daily Living and Therapeutic Activities.  DC Equipment Recommendations: Front-Wheel Walker  Discharge Recommendations: Recommend home health for continued occupational therapy services - declining post-acute inpt rehab     01/29/22 1059   Prior Living Situation   Prior Services Skilled Home Health Services   Housing / Facility 1 Story Apartment / Condo   Steps Into Home 10   Steps In Home 1   Elevator No   Bathroom Set up Walk In Shower;Shower Chair   Equipment Owned Tub / Shower Seat   Lives with - Patient's Self Care Capacity Spouse   Comments Spouse able to assist at home   Prior Level of ADL Function   Self Feeding Independent   Grooming / Hygiene Independent   Bathing Independent   Dressing Independent   Toileting Independent   Prior Level of IADL Function   Medication Management Independent   Laundry Independent   Kitchen Mobility Independent   Finances Unable To Determine At This Time   Home Management Independent   Shopping Requires Assist   Prior Level Of Mobility  Independent Without Device in Home   Driving / Transportation Relatives / Others Provide Transportation   Occupation (Pre-Hospital Vocational) Not Employed   Leisure Interests Unable To Determine At This Time   History of Falls   History of Falls Yes   Precautions   Precautions Toe Touch Weight Bearing Left Lower Extremity;Fall Risk   Comments boot to RLE for toe fracture   Vitals   O2 Delivery Device None - Room Air   Pain 0 - 10 Group   Location Ankle   Location Orientation Left   Therapist Pain Assessment Post Activity Pain Same as Prior to Activity;Nurse Notified   Cognition    Cognition / Consciousness X   Level of Consciousness Alert   Comments mild confusion noted   Passive ROM Upper Body   Passive ROM Upper Body WDL   Active ROM Upper Body   Active ROM Upper Body  WDL   Strength Upper Body   Upper Body Strength  WDL   Sensation Upper Body   Upper Extremity Sensation  WDL   Upper Body Muscle Tone   Upper Body Muscle Tone  WDL   Coordination Upper Body   Coordination WDL   Balance Assessment   Sitting Balance (Static) Fair +   Sitting Balance (Dynamic) Fair +   Standing Balance (Static) Fair   Standing Balance (Dynamic) Fair   Weight Shift Sitting Fair   Weight Shift Standing Fair   Comments fww   Bed Mobility    Supine to Sit Supervised   Sit to Supine Standby Assist   ADL Assessment   Grooming Standby Assist;Seated   Upper Body Dressing Supervision   How much help from another person does the patient currently need...   Putting on and taking off regular lower body clothing? 2   Bathing (including washing, rinsing, and drying)? 3   Toileting, which includes using a toilet, bedpan, or urinal? 3   Putting on and taking off regular upper body clothing? 4   Taking care of personal grooming such as brushing teeth? 4   Eating meals? 4   6 Clicks Daily Activity Score 20   Functional Mobility   Sit to Stand Minimal Assist   Bed, Chair, Wheelchair Transfer Minimal Assist   Activity Tolerance   Sitting Edge of Bed 15    Standing 10   Patient / Family Goals   Patient / Family Goal #1 home   Short Term Goals   Short Term Goal # 1 pt will perform ADL transfers with Sup within 5 days   Short Term Goal # 2 pt will perform LB dressing with Sup within 5 days   Short Term Goal # 3 pt will perform toileting in br with Sup within 5 days

## 2022-01-30 NOTE — DISCHARGE PLANNING
Received Choice form at [phone]  Agency/Facility Name: [facility]   Referral sent per Choice form @ [phone]

## 2022-01-30 NOTE — DISCHARGE PLANNING
Anticipated Discharge Disposition: Home w/ HH    Action: Met with pt at bedside to discuss HH choice and DME. Pt states no preference in HH company. Pt agreeable to FWW. Traction order requested from RN.     Choice form completed and faxed to [facility]    Barriers to Discharge: HH acceptance    Plan: F/U with HH referral

## 2022-01-30 NOTE — PROGRESS NOTES
Report received from Day RN, assumed care of pt.   POC and medications reviewed with pt. Pt verbalized understanding.   AOx4  C/o 4/10 pain at this time, declining intervention  at this time.  Denies  SOB, or dizziness at this time.   Safety measures in place.

## 2022-01-30 NOTE — PROGRESS NOTES
"Patient seen and examined  Complains of pain as expected  Very pleasant    /78   Pulse 87   Temp 36.4 °C (97.6 °F) (Temporal)   Resp 16   Ht 1.727 m (5' 8\")   Wt 73.1 kg (161 lb 2.5 oz)   SpO2 92%     Recent Labs     01/28/22  0440 01/29/22  0405 01/30/22  0046   WBC 12.0* 12.7* 10.8   RBC 4.02* 3.65* 3.32*   HEMOGLOBIN 12.3 11.1* 10.2*   HEMATOCRIT 38.3 35.0* 32.3*   MCV 95.3 95.9 97.3   MCH 30.6 30.4 30.7   MCHC 32.1* 31.7* 31.6*   RDW 47.9 47.6 49.3   PLATELETCT 162* 133* 143*   MPV 10.1 10.4 10.7       No acute distress  Dressing clean dry and intact  Neurovascularly intact    POD# 2 ORIF L ankle              Plan:  DVT Prophylaxis- TEDS/SCDs, ASA  Weight Bearing Status- TTWB RLE  PT/OT  Antibiotics: periop complete  Case Coordination  SHAN 2 weeks post-op on DC        "

## 2022-01-31 VITALS
TEMPERATURE: 98 F | BODY MASS INDEX: 24.42 KG/M2 | SYSTOLIC BLOOD PRESSURE: 139 MMHG | WEIGHT: 161.16 LBS | RESPIRATION RATE: 16 BRPM | DIASTOLIC BLOOD PRESSURE: 58 MMHG | OXYGEN SATURATION: 94 % | HEIGHT: 68 IN | HEART RATE: 74 BPM

## 2022-01-31 LAB
ANION GAP SERPL CALC-SCNC: 9 MMOL/L (ref 7–16)
BUN SERPL-MCNC: 22 MG/DL (ref 8–22)
CALCIUM SERPL-MCNC: 7.8 MG/DL (ref 8.4–10.2)
CHLORIDE SERPL-SCNC: 111 MMOL/L (ref 96–112)
CO2 SERPL-SCNC: 23 MMOL/L (ref 20–33)
CREAT SERPL-MCNC: 0.85 MG/DL (ref 0.5–1.4)
ERYTHROCYTE [DISTWIDTH] IN BLOOD BY AUTOMATED COUNT: 51 FL (ref 35.9–50)
GLUCOSE SERPL-MCNC: 70 MG/DL (ref 65–99)
HCT VFR BLD AUTO: 31.7 % (ref 37–47)
HGB BLD-MCNC: 10 G/DL (ref 12–16)
MCH RBC QN AUTO: 30.9 PG (ref 27–33)
MCHC RBC AUTO-ENTMCNC: 31.5 G/DL (ref 33.6–35)
MCV RBC AUTO: 97.8 FL (ref 81.4–97.8)
PLATELET # BLD AUTO: 164 K/UL (ref 164–446)
PMV BLD AUTO: 10.7 FL (ref 9–12.9)
POTASSIUM SERPL-SCNC: 4.5 MMOL/L (ref 3.6–5.5)
RBC # BLD AUTO: 3.24 M/UL (ref 4.2–5.4)
SODIUM SERPL-SCNC: 143 MMOL/L (ref 135–145)
WBC # BLD AUTO: 7.4 K/UL (ref 4.8–10.8)

## 2022-01-31 PROCEDURE — 700102 HCHG RX REV CODE 250 W/ 637 OVERRIDE(OP): Performed by: ORTHOPAEDIC SURGERY

## 2022-01-31 PROCEDURE — 36415 COLL VENOUS BLD VENIPUNCTURE: CPT

## 2022-01-31 PROCEDURE — 80048 BASIC METABOLIC PNL TOTAL CA: CPT

## 2022-01-31 PROCEDURE — A9270 NON-COVERED ITEM OR SERVICE: HCPCS | Performed by: HOSPITALIST

## 2022-01-31 PROCEDURE — 99239 HOSP IP/OBS DSCHRG MGMT >30: CPT | Performed by: INTERNAL MEDICINE

## 2022-01-31 PROCEDURE — 700111 HCHG RX REV CODE 636 W/ 250 OVERRIDE (IP): Performed by: ORTHOPAEDIC SURGERY

## 2022-01-31 PROCEDURE — A9270 NON-COVERED ITEM OR SERVICE: HCPCS | Performed by: ORTHOPAEDIC SURGERY

## 2022-01-31 PROCEDURE — 700102 HCHG RX REV CODE 250 W/ 637 OVERRIDE(OP): Performed by: HOSPITALIST

## 2022-01-31 PROCEDURE — 85027 COMPLETE CBC AUTOMATED: CPT

## 2022-01-31 PROCEDURE — 94760 N-INVAS EAR/PLS OXIMETRY 1: CPT

## 2022-01-31 PROCEDURE — 700105 HCHG RX REV CODE 258: Performed by: INTERNAL MEDICINE

## 2022-01-31 RX ADMIN — ESCITALOPRAM OXALATE 20 MG: 10 TABLET ORAL at 04:31

## 2022-01-31 RX ADMIN — OXYCODONE HYDROCHLORIDE 10 MG: 10 TABLET ORAL at 09:32

## 2022-01-31 RX ADMIN — SODIUM CHLORIDE: 9 INJECTION, SOLUTION INTRAVENOUS at 02:59

## 2022-01-31 RX ADMIN — GABAPENTIN 300 MG: 300 CAPSULE ORAL at 04:31

## 2022-01-31 RX ADMIN — KETOROLAC TROMETHAMINE 15 MG: 30 INJECTION, SOLUTION INTRAMUSCULAR at 04:26

## 2022-01-31 RX ADMIN — HYDROCODONE BITARTRATE AND ACETAMINOPHEN 1 TABLET: 10; 325 TABLET ORAL at 04:25

## 2022-01-31 RX ADMIN — OMEPRAZOLE 20 MG: 20 CAPSULE, DELAYED RELEASE ORAL at 04:31

## 2022-01-31 ASSESSMENT — PAIN DESCRIPTION - PAIN TYPE
TYPE: CHRONIC PAIN

## 2022-01-31 NOTE — FACE TO FACE
Face to Face Note  -  Durable Medical Equipment    Nury Da Silva D.O. - NPI: [id number]  I certify that this patient is under my care and that they had a durable medical equipment(DME)face to face encounter by myself that meets the physician DME face-to-face encounter requirements with this patient on:    Date of encounter:   Patient:                    MRN:                       YOB: 2020  Ellen Srivastava  [id number]  1935     The encounter with the patient was in whole, or in part, for the following medical condition, which is the primary reason for durable medical equipment:  Post-Op Surgery    I certify that, based on my findings, the following durable medical equipment is medically necessary:  Wheel Chair.    HOME O2 Saturation Measurements:(Values must be present for Home Oxygen orders)         ,     ,         My Clinical findings support the need for the above equipment due to:  Abnormal Gait    Supporting Symptoms: Left trimalleolar fracture and right foot fracture as she has bilateral foot/ankle fractures she cannot use a walker or cane at this time.    If patient feels more short of breath, they can go up to 6 liters per minute and contact healthcare provider.

## 2022-01-31 NOTE — PROGRESS NOTES
Received report from day shift nurse. Assumed pt care at 1915. Pt is A&Ox4, resting comfortably in bed. Pt on r.a.. No signs of SOB/respiratory distress. Labs noted, VSS. Pt c/o no pain at this moment. Fall precautions in place. Bed at lowest position. Call light and personal belongings within reach. Continue to monitor

## 2022-01-31 NOTE — DISCHARGE INSTRUCTIONS
Discharge Instructions    Discharged to home by medical transportation with relative. Discharged via wheelchair, hospital escort: Yes.  Special equipment needed: Walker    Be sure to schedule a follow-up appointment with your primary care doctor or any specialists as instructed.     Discharge Plan:   Diet Plan: Discussed  Activity Level: Discussed  Confirmed Follow up Appointment: Patient to Call and Schedule Appointment  Confirmed Symptoms Management: Discussed  Medication Reconciliation Updated: Yes  Influenza Vaccine Indication: Not indicated: Previously immunized this influenza season and > 8 years of age    I understand that a diet low in cholesterol, fat, and sodium is recommended for good health. Unless I have been given specific instructions below for another diet, I accept this instruction as my diet prescription.   Other diet: Home diet as tolerated    Special Instructions: Discharge instructions for the Orthopedic Patient    Follow up with Primary Care Physician within 2 weeks of discharge to home, regarding:  Review of medications and diagnostic testing.  Surveillance for medical complications.  Workup and treatment of osteoporosis, if appropriate.     -Is this a Hip/Knee/Shoulder Joint Replacement patient? No    -Is this patient being discharged with medication to prevent blood clots?  No    · Is patient discharged on Warfarin / Coumadin?   No     Depression / Suicide Risk    As you are discharged from this [facility] facility, it is important to learn how to keep safe from harming yourself.    Recognize the warning signs:  · Abrupt changes in personality, positive or negative- including increase in energy   · Giving away possessions  · Change in eating patterns- significant weight changes-  positive or negative  · Change in sleeping patterns- unable to sleep or sleeping all the time   · Unwillingness or inability to communicate  · Depression  · Unusual sadness, discouragement and loneliness  · Talk  of wanting to die  · Neglect of personal appearance   · Rebelliousness- reckless behavior  · Withdrawal from people/activities they love  · Confusion- inability to concentrate     If you or a loved one observes any of these behaviors or has concerns about self-harm, here's what you can do:  · Talk about it- your feelings and reasons for harming yourself  · Remove any means that you might use to hurt yourself (examples: pills, rope, extension cords, firearm)  · Get professional help from the community (Mental Health, Substance Abuse, psychological counseling)  · Do not be alone:Call your Safe Contact- someone whom you trust who will be there for you.  · Call your local CRISIS HOTLINE 957-3936 or 558-453-7101  · Call your local Children's Mobile Crisis Response Team [place] (097) 274-9077 or www.[employer]  · Call the toll free National Suicide Prevention Hotlines   · National Suicide Prevention Lifeline 258-897-IPHN (3826)  · National Hope Line Network 800-SUICIDE (290-7825)

## 2022-01-31 NOTE — DISCHARGE PLANNING
Agency/Facility Name: [facility]  Referral sent per DORINA Pichardo @ 0928    Agency/Facility Name: [facility]  Spoke To: Venkat  Outcome: Per venkat referral received and in review    3339  Agency/Facility Name: [facility]  Spoke To: hugo  Outcome: Per hugo they need everything resent seperatly since the FWW and Wheelchair is in the same referral.

## 2022-01-31 NOTE — CARE PLAN
The patient is Stable - Low risk of patient condition declining or worsening    Shift Goals  Clinical Goals: pain control, rest  Patient Goals: discharge  Family Goals: none present    Progress made toward(s) clinical / shift goals:      Problem: Pain - Standard  Goal: Alleviation of pain or a reduction in pain to the patient’s comfort goal  Outcome: Progressing  Note: Pt refused her scheduled Tylenol and Toradol. She said she wanted to have a good rest        Problem: Skin Integrity  Goal: Skin integrity is maintained or improved  Outcome: Progressing  Note: Pt skin intact, ambulated to the bathroom back to her bed. Will continue to monitor                Patient is not progressing towards the following goals: n/a

## 2022-01-31 NOTE — CARE PLAN
The patient is Stable - Low risk of patient condition declining or worsening    Shift Goals  Clinical Goals: Pain control  Patient Goals: Pain control  Family Goals: none present    Progress made toward(s) clinical / shift goals:  Pain controlled with scheduled and PRN medication.     Problem: Pain - Standard  Goal: Alleviation of pain or a reduction in pain to the patient’s comfort goal  Outcome: Progressing     Problem: Knowledge Deficit - Standard  Goal: Patient and family/care givers will demonstrate understanding of plan of care, disease process/condition, diagnostic tests and medications  Outcome: Progressing     Problem: Fall Risk  Goal: Patient will remain free from falls  Outcome: Progressing     Problem: Skin Integrity  Goal: Skin integrity is maintained or improved  Outcome: Progressing       Patient is not progressing towards the following goals:

## 2022-01-31 NOTE — DISCHARGE PLANNING
Anticipated Discharge Disposition: Home at 1015 via GMT    Action: Discussed pt in morning rounds. Per MD, she ordered wheelchair for pt. Pt has transport set up for home at 1015.    0910: LSW met with pt at bedside to discuss DME wheelchair choice. Pt gave verbal consent to send referral to company contracted with her insurance. LSW notified pt, wheelchair would be delivered to her home. Per DPA, will send referral to [facility].    0930: LSW requested MD add comment ruling out walker/cane to face to face.     Barriers to Discharge: None    Plan: LSW to follow and assist as needed.

## 2022-01-31 NOTE — DISCHARGE SUMMARY
"Discharge Summary    CHIEF COMPLAINT ON ADMISSION  Chief Complaint   Patient presents with   • Dizziness     pt got dizzy and fell three times today   • Ankle Pain     left ankle is deformed and swollen    • Foot Pain     Pt states that she fell and broke her foot in november and the foot is still painful        Reason for Admission  EMS     Admission Date  1/26/2022    CODE STATUS  Prior    HPI & HOSPITAL COURSE  Ellen Srivastava is a 86 y.o. female, with h/o HTN, who presented to the ED on  1/26/2022 after sustaining ground-level fall at home.  Patient actually states that she fell 3 times during the day today, last time twisting her ankle and being unable to walk secondary to pain.  She reports feeling \"dizzy \"all day long, but did not had any chest pain, shortness of breath.  She denies head injury or loss of consciousness.  ER evaluation is demonstrating left medial malleolar fracture and possible underlying fracture of posterior malleolar area and disruption of the ankle mortise.  Dr. Celis consulted and took to the OR on 1/28.  She had ORIF of the trimalleolar fracture and tolerated it well.  She was fixed with a walking post operative shoe for the right foot due to her proximal phalanx fracture and able to get around short distances with the FWW.  She needed a wheelchair based on the recommendations of physical therapy for getting around in places other than the home.  Home transportation and home health set up for the patient to return home.  She will follow with Dr Celis in 2 weeks for repeat imaging and weight bearing adjustments if appropriate.       Therefore, she is discharged in good and stable condition to home with organized home healthcare and close outpatient follow-up.    The patient met 2-midnight criteria for an inpatient stay at the time of discharge.    Discharge Date  1/31/2022    FOLLOW UP ITEMS POST DISCHARGE  PCP and orthopedic surgery.    DISCHARGE DIAGNOSES  Principal " Problem:    Unable to ambulate POA: Unknown  Active Problems:    Hyperlipidemia with target LDL less than 130 POA: Yes      Overview: ICD-10 transition    HTN (hypertension) POA: Yes    Narcotic dependence (HCC) POA: Yes      Overview: Chronic back pain          GERD (gastroesophageal reflux disease) POA: Yes    Depression with anxiety POA: Yes      Overview: Severe at Baseline       Continue current antidepressant/sedation regimen      Psych consulted 11/19, continues to follow      Does have capacity. Does not want to be intubated. Does not want       palliation at this time.    Neuropathy POA: Yes    Nondisplaced fracture of medial malleolus of left tibia, initial encounter for closed fracture POA: Unknown    Nondisplaced fracture of proximal phalanx of right great toe, initial encounter for closed fracture POA: Unknown    Hyperkalemia POA: Unknown    Closed left ankle fracture, initial encounter POA: Yes  Resolved Problems:    * No resolved hospital problems. *      FOLLOW UP  Future Appointments   Date Time Provider Department Center   2/25/2022  7:00 AM FRANCISCO JAVIER [facility]ARIEGOS [facility]     Denzel Patel M.D.  [address] [place] [address]  [phone]    In 2 weeks      Rayshawn Celis M.D.  [address] [place] [address]  [phone]    In 2 weeks        MEDICATIONS ON DISCHARGE     Medication List      CHANGE how you take these medications      Instructions   gabapentin 300 MG Caps  What changed: when to take this  Commonly known as: NEURONTIN   Take 1 Capsule by mouth 3 times a day.  Dose: 300 mg        CONTINUE taking these medications      Instructions   escitalopram 20 MG tablet  Commonly known as: LEXAPRO   Take 1 Tablet by mouth every day.  Dose: 20 mg     HYDROcodone/acetaminophen  MG Tabs  Commonly known as: NORCO   Take 1 Tablet by mouth 2 times a day as needed.  Dose: 1 Tablet     lisinopril-hydrochlorothiazide 10-12.5 MG per tablet  Commonly known as: PRINZIDE   TAKE ONE  TABLET BY MOUTH DAILY (EVERY 24 HOURS) (STOP LISINOPRIL)     metoprolol SR 50 MG Tb24  Commonly known as: TOPROL XL   TAKE ONE TABLET BY MOUTH DAILY     omeprazole 20 MG delayed-release capsule  Commonly known as: PRILOSEC   Take 1 Capsule by mouth every day.  Dose: 20 mg     rosuvastatin 20 MG Tabs  Commonly known as: CRESTOR   TAKE ONE TABLET BY MOUTH EVERY EVENING (CRESTOR)            Allergies  Allergies   Allergen Reactions   • Asa [Aspirin] Unspecified     Headache       DIET  No orders of the defined types were placed in this encounter.      ACTIVITY  As tolerated.  Toe touch LEFT leg    CONSULTATIONS  Pasha Alththomasn - ortho    PROCEDURES  1/28/22 - [place]- ORIF trimalleolar fracture.    LABORATORY  Lab Results   Component Value Date    SODIUM 143 01/31/2022    POTASSIUM 4.5 01/31/2022    CHLORIDE 111 01/31/2022    CO2 23 01/31/2022    GLUCOSE 70 01/31/2022    BUN 22 01/31/2022    CREATININE 0.85 01/31/2022    CREATININE 0.75 09/17/2014        Lab Results   Component Value Date    WBC 7.4 01/31/2022    HEMOGLOBIN 10.0 (L) 01/31/2022    HEMATOCRIT 31.7 (L) 01/31/2022    PLATELETCT 164 01/31/2022        Total time of the discharge process exceeds 35 minutes.

## 2022-01-31 NOTE — DISCHARGE PLANNING
DC Transport Scheduled    Received request at: 0800    Transport Company Scheduled:  [employer]      Scheduled Date: 01/31/22  Scheduled Time: 1015    [address] [address] [place]    Notified care team of scheduled transport via Vo[employer].     If there are any changes needed to the DC transportation scheduled, please contact [facility] at ext. [phone] between the hours of 9212-1342 Mon-Fri. If outside those hours, contact the ED Case Manager at ext. [phone].

## 2022-01-31 NOTE — CARE PLAN
The patient is Stable - Low risk of patient condition declining or worsening    Shift Goals  Clinical Goals: Pain control  Patient Goals: Ambulate to bathroom  Family Goals: none present    Progress made toward(s) clinical / shift goals:  Pain controlled with scheduled medication and distraction. Pt able to ambulate to bathroom.     Problem: Pain - Standard  Goal: Alleviation of pain or a reduction in pain to the patient’s comfort goal  Outcome: Progressing     Problem: Knowledge Deficit - Standard  Goal: Patient and family/care givers will demonstrate understanding of plan of care, disease process/condition, diagnostic tests and medications  Outcome: Progressing     Problem: Fall Risk  Goal: Patient will remain free from falls  Outcome: Progressing     Problem: Skin Integrity  Goal: Skin integrity is maintained or improved  Outcome: Progressing       Patient is not progressing towards the following goals:

## 2022-02-02 NOTE — FACE TO FACE
Face to Face Note  -  Durable Medical Equipment    Kd Noble M.D. - NPI: [id number]  I certify that this patient is under my care and that they had a durable medical equipment(DME)face to face encounter by myself that meets the physician DME face-to-face encounter requirements with this patient on:    Date of encounter:   Patient:                    MRN:                       YOB: 2020  Ellen Srivastava  [id number]  1935     The encounter with the patient was in whole, or in part, for the following medical condition, which is the primary reason for durable medical equipment:  Other - post-op surgery    I certify that, based on my findings, the following durable medical equipment is medically necessary:  Wheel Chair.    HOME O2 Saturation Measurements:(Values must be present for Home Oxygen orders)         ,     ,         My Clinical findings support the need for the above equipment due to:  Abnormal Gait    Supporting Symptoms: Left trimalleolar fracture and right foot fracture as she has bilateral foot/ankle fractures she cannot use a walker or cane at this time.     The patient requires there wheelchair in order to perform MRADLS within the home and is able and willing to self-propel oneself.      If patient feels more short of breath, they can go up to 6 liters per minute and contact healthcare provider.

## 2022-03-24 ENCOUNTER — HOSPITAL ENCOUNTER (OUTPATIENT)
Dept: RADIOLOGY | Facility: MEDICAL CENTER | Age: 87
End: 2022-03-24
Attending: FAMILY MEDICINE
Payer: MEDICARE

## 2022-03-24 DIAGNOSIS — Z12.31 VISIT FOR SCREENING MAMMOGRAM: ICD-10-CM

## 2022-03-24 PROCEDURE — 77063 BREAST TOMOSYNTHESIS BI: CPT

## 2022-03-29 ENCOUNTER — TELEPHONE (OUTPATIENT)
Dept: MEDICAL GROUP | Age: 87
End: 2022-03-29
Payer: MEDICARE

## 2022-03-29 DIAGNOSIS — R92.8 ABNORMAL MAMMOGRAM: ICD-10-CM

## 2022-04-06 DIAGNOSIS — F41.8 DEPRESSION WITH ANXIETY: ICD-10-CM

## 2022-04-06 RX ORDER — ESCITALOPRAM OXALATE 20 MG/1
20 TABLET ORAL DAILY
Qty: 90 TABLET | Refills: 0 | Status: SHIPPED | OUTPATIENT
Start: 2022-04-06 | End: 2022-06-28 | Stop reason: SDUPTHER

## 2022-04-11 ENCOUNTER — HOSPITAL ENCOUNTER (OUTPATIENT)
Dept: RADIOLOGY | Facility: MEDICAL CENTER | Age: 87
End: 2022-04-11
Attending: FAMILY MEDICINE
Payer: MEDICARE

## 2022-04-11 DIAGNOSIS — R92.8 ABNORMAL MAMMOGRAM: ICD-10-CM

## 2022-04-11 PROCEDURE — 76642 ULTRASOUND BREAST LIMITED: CPT | Mod: RT

## 2022-04-11 PROCEDURE — G0279 TOMOSYNTHESIS, MAMMO: HCPCS | Mod: RT

## 2022-04-21 ENCOUNTER — HOSPITAL ENCOUNTER (OUTPATIENT)
Dept: RADIOLOGY | Facility: MEDICAL CENTER | Age: 87
End: 2022-04-21
Attending: FAMILY MEDICINE
Payer: MEDICARE

## 2022-04-21 DIAGNOSIS — R92.8 ABNORMAL FINDINGS ON DIAGNOSTIC IMAGING OF BREAST: ICD-10-CM

## 2022-04-21 PROCEDURE — 88305 TISSUE EXAM BY PATHOLOGIST: CPT

## 2022-04-21 PROCEDURE — 19083 BX BREAST 1ST LESION US IMAG: CPT | Mod: RT

## 2022-04-22 ENCOUNTER — TELEPHONE (OUTPATIENT)
Dept: RADIOLOGY | Facility: MEDICAL CENTER | Age: 87
End: 2022-04-22
Payer: MEDICARE

## 2022-04-22 LAB — PATHOLOGY CONSULT NOTE: NORMAL

## 2022-04-22 RX ORDER — METOPROLOL SUCCINATE 50 MG/1
TABLET, EXTENDED RELEASE ORAL
Qty: 90 TABLET | Refills: 3 | Status: SHIPPED | OUTPATIENT
Start: 2022-04-22 | End: 2023-06-07 | Stop reason: SDUPTHER

## 2022-04-25 ENCOUNTER — TELEPHONE (OUTPATIENT)
Dept: MEDICAL GROUP | Age: 87
End: 2022-04-25
Payer: MEDICARE

## 2022-04-25 NOTE — TELEPHONE ENCOUNTER
Per [facility], patient had right breast biopsy completed on April 21.  Pathology report is still pending in epic.

## 2022-04-25 NOTE — TELEPHONE ENCOUNTER
----- Message from Denzel Patel M.D. sent at 4/20/2022 11:27 AM PDT -----  Please call patient to and ask her has she been set up for the biopsy of her breast because the ultrasound and mammogram were very suspicious.    Denzel Patel MD  [address] [address]

## 2022-05-10 DIAGNOSIS — R13.11 ORAL PHASE DYSPHAGIA: ICD-10-CM

## 2022-05-11 RX ORDER — OMEPRAZOLE 20 MG/1
20 CAPSULE, DELAYED RELEASE ORAL DAILY
Qty: 30 CAPSULE | Refills: 0 | Status: SHIPPED
Start: 2022-05-11 | End: 2023-04-07

## 2022-05-11 RX ORDER — ROSUVASTATIN CALCIUM 20 MG/1
TABLET, COATED ORAL
Qty: 90 TABLET | Refills: 0 | Status: SHIPPED | OUTPATIENT
Start: 2022-05-11 | End: 2022-09-06 | Stop reason: SDUPTHER

## 2022-05-29 ENCOUNTER — OFFICE VISIT (OUTPATIENT)
Dept: URGENT CARE | Facility: CLINIC | Age: 87
End: 2022-05-29
Payer: MEDICARE

## 2022-05-29 VITALS
HEART RATE: 63 BPM | HEIGHT: 68 IN | SYSTOLIC BLOOD PRESSURE: 122 MMHG | RESPIRATION RATE: 18 BRPM | BODY MASS INDEX: 20.58 KG/M2 | TEMPERATURE: 98 F | WEIGHT: 135.8 LBS | DIASTOLIC BLOOD PRESSURE: 62 MMHG | OXYGEN SATURATION: 93 %

## 2022-05-29 DIAGNOSIS — U07.1 COVID-19 VIRUS INFECTION: ICD-10-CM

## 2022-05-29 LAB
EXTERNAL QUALITY CONTROL: ABNORMAL
SARS-COV+SARS-COV-2 AG RESP QL IA.RAPID: POSITIVE

## 2022-05-29 PROCEDURE — 87426 SARSCOV CORONAVIRUS AG IA: CPT | Performed by: PHYSICIAN ASSISTANT

## 2022-05-29 PROCEDURE — 99213 OFFICE O/P EST LOW 20 MIN: CPT | Mod: CS | Performed by: PHYSICIAN ASSISTANT

## 2022-05-29 RX ORDER — PREDNISONE 10 MG/1
20 TABLET ORAL DAILY
Qty: 6 TABLET | Refills: 0 | Status: SHIPPED | OUTPATIENT
Start: 2022-05-29 | End: 2022-06-01

## 2022-05-29 ASSESSMENT — ENCOUNTER SYMPTOMS
SINUS PAIN: 0
CHILLS: 0
SORE THROAT: 1
EYE DISCHARGE: 0
EYE REDNESS: 0
CONSTIPATION: 0
EYE PAIN: 0
COUGH: 0
ABDOMINAL PAIN: 0
NAUSEA: 0
WHEEZING: 0
FEVER: 0
DIZZINESS: 0
HEADACHES: 1
SHORTNESS OF BREATH: 1
DIARRHEA: 0
VOMITING: 0
DIAPHORESIS: 0

## 2022-05-29 ASSESSMENT — FIBROSIS 4 INDEX: FIB4 SCORE: 3.14

## 2022-05-29 NOTE — PROGRESS NOTES
"  Subjective:     Ellen Srivastava  is a 87 y.o. female who presents for Other (Tested positive for COVID today, x3days sick, cough, sore throat, hard to swallow no taste )       She presents today with morning shortness of breath, headache, watering eyes, sore throat, general body malaise that been ongoing for the last 3 days.  She did test positive for COVID today and her  was diagnosed with COVID 3-4 days ago.  She denies chest pain, abdominal pain, nausea/vomiting, urinary symptoms.  She is requesting a POC COVID test today.       Review of Systems   Constitutional: Positive for malaise/fatigue. Negative for chills, diaphoresis and fever.   HENT: Positive for ear discharge (Watery) and sore throat. Negative for congestion, ear pain and sinus pain.    Eyes: Negative for pain, discharge and redness.   Respiratory: Positive for shortness of breath. Negative for cough and wheezing.    Cardiovascular: Negative for chest pain.   Gastrointestinal: Negative for abdominal pain, constipation, diarrhea, nausea and vomiting.   Genitourinary: Negative for dysuria, frequency and urgency.   Neurological: Positive for headaches. Negative for dizziness.      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   Constitutional:       General: She is not in acute distress.     Appearance: Normal appearance. She is not ill-appearing, toxic-appearing or diaphoretic.   HENT:      Head: Normocephalic.      Right Ear: Tympanic membrane, ear canal and external ear normal. There is no impacted cerumen.      Left Ear: Tympanic membrane, ear canal and external ear normal. There is no impacted cerumen.      Nose: No congestion or rhinorrhea.      Mouth/Throat:      Mouth: Mucous membranes are moist.      Pharynx: No oropharyngeal exudate or posterior oropharyngeal erythema.   Eyes:      General:         Right eye: No discharge.         Left eye: No discharge.      Conjunctiva/sclera: Conjunctivae normal.   Cardiovascular:      Rate and Rhythm: Normal rate and regular rhythm.   Pulmonary:      Effort: Pulmonary effort is normal. No respiratory distress.      Breath sounds: Normal breath sounds. No stridor. No wheezing or rhonchi.   Musculoskeletal:      Cervical back: Neck supple.   Lymphadenopathy:      Cervical: No cervical adenopathy.   Neurological:      General: No focal deficit present.      Mental Status: She is alert and oriented to person, place, and time.   Psychiatric:         Mood and Affect: Mood normal.         Behavior: Behavior normal.         Thought Content: Thought content normal.         Judgment: Judgment normal.             Diagnostic testing:    POC COVID- positive    Assessment/Plan:     Encounter Diagnoses   Name Primary?   • COVID-19 virus infection       Plan for care for today's complaint includes Paxlovid and prednisone.  I did discuss with the patient that she needs to withhold from taking her statin medication while taking the Paxlovid due to medication interactions.  She should continue to monitor symptoms and if they are not improved in the next 5 to 7 days then she should  return to the urgent care or with her PCP for reevaluation.  If her symptoms get significantly worse, she experiences worsening shortness of breath, chest pain, difficulty breathing then she should follow-up with the emergency department at that time..  Prescription for Paxlovid and prednisone provided.    See AVS Instructions below for written guidance provided to patient on after-visit management and care in addition to our verbal discussion during the visit.    Please note that this dictation was created using voice recognition software. I have attempted to correct all errors, but there may be sound-alike, spelling, grammar and possibly content errors that I did not discover before finalizing the note.    Tony Brooks BARNARD

## 2022-06-28 DIAGNOSIS — F41.8 DEPRESSION WITH ANXIETY: ICD-10-CM

## 2022-06-29 RX ORDER — ESCITALOPRAM OXALATE 20 MG/1
20 TABLET ORAL DAILY
Qty: 90 TABLET | Refills: 0 | Status: SHIPPED | OUTPATIENT
Start: 2022-06-29 | End: 2022-10-10 | Stop reason: SDUPTHER

## 2022-09-06 RX ORDER — ROSUVASTATIN CALCIUM 20 MG/1
TABLET, COATED ORAL
Qty: 90 TABLET | Refills: 1 | Status: SHIPPED
Start: 2022-09-06 | End: 2023-08-17

## 2022-10-10 DIAGNOSIS — F41.8 DEPRESSION WITH ANXIETY: ICD-10-CM

## 2022-10-11 RX ORDER — ESCITALOPRAM OXALATE 20 MG/1
20 TABLET ORAL DAILY
Qty: 90 TABLET | Refills: 0 | Status: SHIPPED
Start: 2022-10-11 | End: 2022-10-18

## 2022-10-17 RX ORDER — LISINOPRIL AND HYDROCHLOROTHIAZIDE 12.5; 1 MG/1; MG/1
TABLET ORAL
Qty: 90 TABLET | Refills: 0 | Status: CANCELLED | OUTPATIENT
Start: 2022-10-17

## 2022-10-18 ENCOUNTER — OFFICE VISIT (OUTPATIENT)
Dept: MEDICAL GROUP | Age: 87
End: 2022-10-18
Payer: MEDICARE

## 2022-10-18 VITALS
WEIGHT: 138.9 LBS | HEIGHT: 68 IN | OXYGEN SATURATION: 64 % | RESPIRATION RATE: 16 BRPM | HEART RATE: 96 BPM | SYSTOLIC BLOOD PRESSURE: 120 MMHG | BODY MASS INDEX: 21.05 KG/M2 | DIASTOLIC BLOOD PRESSURE: 70 MMHG

## 2022-10-18 DIAGNOSIS — I10 ESSENTIAL HYPERTENSION: ICD-10-CM

## 2022-10-18 DIAGNOSIS — R13.11 ORAL PHASE DYSPHAGIA: ICD-10-CM

## 2022-10-18 DIAGNOSIS — F51.01 PRIMARY INSOMNIA: ICD-10-CM

## 2022-10-18 DIAGNOSIS — Z23 NEED FOR VACCINATION: ICD-10-CM

## 2022-10-18 PROCEDURE — 90662 IIV NO PRSV INCREASED AG IM: CPT | Performed by: FAMILY MEDICINE

## 2022-10-18 PROCEDURE — G0008 ADMIN INFLUENZA VIRUS VAC: HCPCS | Performed by: FAMILY MEDICINE

## 2022-10-18 PROCEDURE — 99214 OFFICE O/P EST MOD 30 MIN: CPT | Mod: 25 | Performed by: FAMILY MEDICINE

## 2022-10-18 RX ORDER — LISINOPRIL AND HYDROCHLOROTHIAZIDE 12.5; 1 MG/1; MG/1
TABLET ORAL
Qty: 90 TABLET | Refills: 3 | Status: SHIPPED | OUTPATIENT
Start: 2022-10-18 | End: 2023-08-04 | Stop reason: SDUPTHER

## 2022-10-18 RX ORDER — HYDROXYZINE HYDROCHLORIDE 25 MG/1
25 TABLET, FILM COATED ORAL 3 TIMES DAILY PRN
COMMUNITY
End: 2023-04-07

## 2022-10-18 RX ORDER — GABAPENTIN 300 MG/1
300 CAPSULE ORAL 3 TIMES DAILY
COMMUNITY
End: 2022-10-20 | Stop reason: SDUPTHER

## 2022-10-18 RX ORDER — HYDROXYZINE HYDROCHLORIDE 25 MG/1
25 TABLET, FILM COATED ORAL
Qty: 90 TABLET | Refills: 0 | Status: SHIPPED | OUTPATIENT
Start: 2022-10-18 | End: 2023-01-04

## 2022-10-18 ASSESSMENT — FIBROSIS 4 INDEX: FIB4 SCORE: 3.14

## 2022-10-18 NOTE — PROGRESS NOTES
This medical record contains text that has been entered with the assistance of computer voice recognition and dictation software.  Therefore, it may contain unintended errors in text, spelling, punctuation, or grammar      Chief Complaint   Patient presents with    Aphasia     Not a lot of choking, difficulty swallowing, loss of appetite, scars from surgery, swallowing medications get stuck, would like to see about liquid versions of medication.         Ellen Srivastava is a 87 y.o. female here evaluation and management of: Dysphagia not aphasia      HPI:           1. Oral phase dysphagia  UNCONTROLLED CONDITION    The patient has a history of oral dysphagia recently it has returned.  She has been having difficulty swallowing solid big chunks of food as well as her pills.  She can swallow liquids without a problem.        2. Primary insomnia  NEW UNDIAGNOSED PROBLEM    Patient states that he has been having a hard time sleeping, she denies any depressed mood no suicidal homicidal ideations.  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  She had surveillance CT scans to proceed with that.  Her main issue is difficulty swallowing bread meat larger bites of food.    3. Essential hypertension  Prinzide 10/12.5 mg p.o. daily  Metoprolol sustained-release 50 mg p.o. daily      Overall the patient has been compliant with his medical regimen, denies any adverse side effects such as cough, no syncope, no presyncope, no excessive fatigue.  The patient denies any chest pain today no headaches.      4. Need for vaccination    Due for flu vaccine  Current medicines (including changes today)  Current Outpatient Medications   Medication Sig Dispense Refill    hydrOXYzine HCl (ATARAX) 25 MG Tab Take 25 mg by mouth 3 times a day as needed for Itching. 1/2 tabs PRN      gabapentin (NEURONTIN) 300 MG Cap Take 300 mg by mouth 3 times a day.      lisinopril-hydrochlorothiazide (PRINZIDE)  10-12.5 MG per tablet TAKE ONE TABLET BY MOUTH DAILY (EVERY 24 HOURS) (STOP LISINOPRIL) 90 Tablet 3    hydrOXYzine HCl (ATARAX) 25 MG Tab Take 1 Tablet by mouth at bedtime. 90 Tablet 0    rosuvastatin (CRESTOR) 20 MG Tab TAKE ONE TABLET BY MOUTH EVERY EVENING (CRESTOR) 90 Tablet 1    omeprazole (PRILOSEC) 20 MG delayed-release capsule Take 1 Capsule by mouth every day. 30 Capsule 0    metoprolol SR (TOPROL XL) 50 MG TABLET SR 24 HR TAKE ONE TABLET BY MOUTH DAILY 90 Tablet 3     No current facility-administered medications for this visit.     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  She  has a past surgical history that includes breast biopsy (Left, 2015); lumbar decompression (1969, 1979); hysterectomy, total abdominal (1977); appendectomy (1945); parathyroid exploration (6/16/2010); paraesophageal hernia robotic (N/A, 10/12/2015); gastroscopy (10/23/2015); thoracoscopy (Left, 10/30/2015); gastroscopy-endo (11/1/2015); gastroscopy-endo (N/A, 12/10/2015); gastroscopy-endo (N/A, 2/26/2016); orif, ankle (Right, 9/20/2016); pr lap,esophagogast fundoplasty (N/A, 8/24/2020); and orif, ankle (Left, 1/28/2022).  Social History     Tobacco Use    Smoking status: Never    Smokeless tobacco: Former    Tobacco comments:     Nicotine    Vaping Use    Vaping Use: Never used   Substance Use Topics    Alcohol use: No     Alcohol/week: 0.0 oz    Drug use: No     Social History     Social History Narrative    Lives in [facility] and in [place].  "    Family History   Problem Relation Age of Onset    Hypertension Mother     Lung Disease Father     Hypertension Other     Lung Disease Other     Lung Disease Brother      Family Status   Relation Name Status    Mo   at age 86        Old Age    Fa   at age 83        Lung Disease    OTHER  (Not Specified)    OTHER  (Not Specified)    Bro           ROS    The pertinent  ROS findings can be seen in the HPI above.     All other systems reviewed and are negative     Objective:     /70 (BP Location: Right arm, Patient Position: Sitting, BP Cuff Size: Adult long)   Pulse 96   Resp 16   Ht 1.727 m (5' 8\")   Wt 63 kg (138 lb 14.4 oz)   SpO2 (!) 64%  Body mass index is 21.12 kg/m².      Physical Exam:    Constitutional: Alert, no distress.  Skin: No suspicious lesions  Eye: Equal, round and reactive, conjunctiva clear, lids normal.  ENMT: Lips without lesions, good dentition, oropharynx clear.  Neck: Trachea midline, no masses, no thyromegaly. No cervical or supraclavicular lymphadenopathy.  Respiratory: Unlabored respiratory effort, lungs clear to auscultation, no wheezes, no ronchi.  Cardiovascular: Normal S1, S2, no murmur, no edema  Abdomen: Soft, non-tender, no masses, no hepatosplenomegaly.        Assessment and Plan:   The following treatment plan was discussed      1. Oral phase dysphagia  We will obtain a barium swallow  Send back to GI    - JUAN JOSÉ ESCALERA (BARIUM SWALLOW); Future  - Referral to Gastroenterology    2. Primary insomnia  We will try Atarax  Since its not habit-forming  - hydrOXYzine HCl (ATARAX) 25 MG Tab; Take 1 Tablet by mouth at bedtime.  Dispense: 90 Tablet; Refill: 0    3. Essential hypertension      Overall the patient has been compliant with his medical regimen, denies any adverse side effects such as cough, no syncope, no presyncope, no excessive fatigue.  The patient denies any chest pain today no headaches.    - lisinopril-hydrochlorothiazide (PRINZIDE) " 10-12.5 MG per tablet; TAKE ONE TABLET BY MOUTH DAILY (EVERY 24 HOURS) (STOP LISINOPRIL)  Dispense: 90 Tablet; Refill: 3    4. Need for vaccination    Vaccine was administered today without adverse event.    - INFLUENZA VACCINE, HIGH DOSE (65+ ONLY)        Instructed to Follow up in clinic or ER for worsening symptoms, difficulty breathing, lack of expected recovery, or should new symptoms or problems arise.    Followup: Return in about 6 months (around 4/18/2023) for Reevaluation, labs.

## 2022-10-20 NOTE — TELEPHONE ENCOUNTER
VOICEMAIL  1. Caller Name: Ellen Srivastava                        Call Back Number: [phone] (home) [phone] (work)      2. Message: Patient called stating she has yet to receive medication sent on the 18th. Called [facility]. Pharmacist states both are being filled. Called to inform patient. Patient also had a question states feeling sick after flu. Denies allergic reaction or severe reaction. Stated it may be one of two things: the vaccine takes a while to take effect, so patient might have already caught the flu, or the immune system might make patient a little tired, but shouldn't result in any long term consequences. Patient also wants refill of gabapentin as they are out. Please advise.    3. Patient approves office to leave a detailed voicemail/MyChart message: yes    Received request via: Patient    Was the patient seen in the last year in this department? Yes    Does the patient have an active prescription (recently filled or refills available) for medication(s) requested? No

## 2022-10-21 ENCOUNTER — TELEPHONE (OUTPATIENT)
Dept: MEDICAL GROUP | Age: 87
End: 2022-10-21
Payer: MEDICARE

## 2022-10-21 RX ORDER — GABAPENTIN 300 MG/1
300 CAPSULE ORAL 3 TIMES DAILY
Qty: 300 CAPSULE | Refills: 3 | Status: SHIPPED | OUTPATIENT
Start: 2022-10-21 | End: 2023-01-19

## 2022-10-21 NOTE — TELEPHONE ENCOUNTER
VOICEMAIL  1. Caller Name: Ellen Srivastava                        Call Back Number: [phone] (home) [phone] (work)      2. Message: Patient called stating gabapentin could not be refilled due to insurance. Called [facility] and pharmacy stated all should be good for patient to .    3. Patient approves office to leave a detailed voicemail/MyChart message: yes

## 2022-11-11 ENCOUNTER — PATIENT MESSAGE (OUTPATIENT)
Dept: HEALTH INFORMATION MANAGEMENT | Facility: OTHER | Age: 87
End: 2022-11-11

## 2022-12-31 DIAGNOSIS — F51.01 PRIMARY INSOMNIA: ICD-10-CM

## 2023-01-04 RX ORDER — HYDROXYZINE HYDROCHLORIDE 25 MG/1
TABLET, FILM COATED ORAL
Qty: 90 TABLET | Refills: 0 | Status: SHIPPED | OUTPATIENT
Start: 2023-01-04 | End: 2023-04-27 | Stop reason: SDUPTHER

## 2023-01-13 DIAGNOSIS — F41.8 DEPRESSION WITH ANXIETY: ICD-10-CM

## 2023-01-17 RX ORDER — ESCITALOPRAM OXALATE 20 MG/1
TABLET ORAL
Qty: 90 TABLET | Refills: 0 | Status: SHIPPED | OUTPATIENT
Start: 2023-01-17 | End: 2023-04-27 | Stop reason: SDUPTHER

## 2023-03-02 ENCOUNTER — HOSPITAL ENCOUNTER (EMERGENCY)
Facility: MEDICAL CENTER | Age: 88
End: 2023-03-02
Payer: MEDICARE

## 2023-03-02 ENCOUNTER — OFFICE VISIT (OUTPATIENT)
Dept: MEDICAL GROUP | Age: 88
End: 2023-03-02
Payer: MEDICARE

## 2023-03-02 ENCOUNTER — APPOINTMENT (OUTPATIENT)
Dept: RADIOLOGY | Facility: MEDICAL CENTER | Age: 88
End: 2023-03-02
Attending: EMERGENCY MEDICINE
Payer: MEDICARE

## 2023-03-02 VITALS
SYSTOLIC BLOOD PRESSURE: 122 MMHG | HEIGHT: 68 IN | HEART RATE: 58 BPM | WEIGHT: 127 LBS | TEMPERATURE: 98.4 F | BODY MASS INDEX: 19.25 KG/M2 | DIASTOLIC BLOOD PRESSURE: 76 MMHG | OXYGEN SATURATION: 96 %

## 2023-03-02 VITALS
SYSTOLIC BLOOD PRESSURE: 132 MMHG | OXYGEN SATURATION: 94 % | HEIGHT: 68 IN | TEMPERATURE: 97.9 F | WEIGHT: 129.41 LBS | RESPIRATION RATE: 18 BRPM | HEART RATE: 69 BPM | DIASTOLIC BLOOD PRESSURE: 81 MMHG | BODY MASS INDEX: 19.61 KG/M2

## 2023-03-02 DIAGNOSIS — R29.898 BILATERAL LEG WEAKNESS: ICD-10-CM

## 2023-03-02 DIAGNOSIS — L30.9 DERMATITIS: ICD-10-CM

## 2023-03-02 DIAGNOSIS — R41.0 ACUTE CONFUSION: ICD-10-CM

## 2023-03-02 DIAGNOSIS — R26.81 UNSTEADY GAIT: ICD-10-CM

## 2023-03-02 DIAGNOSIS — G89.4 CHRONIC PAIN SYNDROME: ICD-10-CM

## 2023-03-02 DIAGNOSIS — R51.9 SEVERE HEADACHE: ICD-10-CM

## 2023-03-02 LAB
ALBUMIN SERPL BCP-MCNC: 4 G/DL (ref 3.2–4.9)
ALBUMIN/GLOB SERPL: 1.3 G/DL
ALP SERPL-CCNC: 77 U/L (ref 30–99)
ALT SERPL-CCNC: 10 U/L (ref 2–50)
ANION GAP SERPL CALC-SCNC: 13 MMOL/L (ref 7–16)
APTT PPP: 31.2 SEC (ref 24.7–36)
AST SERPL-CCNC: 21 U/L (ref 12–45)
BASOPHILS # BLD AUTO: 0.6 % (ref 0–1.8)
BASOPHILS # BLD: 0.06 K/UL (ref 0–0.12)
BILIRUB SERPL-MCNC: 0.3 MG/DL (ref 0.1–1.5)
BUN SERPL-MCNC: 21 MG/DL (ref 8–22)
CALCIUM ALBUM COR SERPL-MCNC: 9.5 MG/DL (ref 8.5–10.5)
CALCIUM SERPL-MCNC: 9.5 MG/DL (ref 8.4–10.2)
CHLORIDE SERPL-SCNC: 101 MMOL/L (ref 96–112)
CO2 SERPL-SCNC: 27 MMOL/L (ref 20–33)
CREAT SERPL-MCNC: 1.16 MG/DL (ref 0.5–1.4)
EKG IMPRESSION: NORMAL
EOSINOPHIL # BLD AUTO: 0.18 K/UL (ref 0–0.51)
EOSINOPHIL NFR BLD: 1.7 % (ref 0–6.9)
ERYTHROCYTE [DISTWIDTH] IN BLOOD BY AUTOMATED COUNT: 46.7 FL (ref 35.9–50)
GFR SERPLBLD CREATININE-BSD FMLA CKD-EPI: 46 ML/MIN/1.73 M 2
GLOBULIN SER CALC-MCNC: 3 G/DL (ref 1.9–3.5)
GLUCOSE SERPL-MCNC: 76 MG/DL (ref 65–99)
HCT VFR BLD AUTO: 45.4 % (ref 37–47)
HGB BLD-MCNC: 14.9 G/DL (ref 12–16)
IMM GRANULOCYTES # BLD AUTO: 0.02 K/UL (ref 0–0.11)
IMM GRANULOCYTES NFR BLD AUTO: 0.2 % (ref 0–0.9)
INR PPP: 1 (ref 0.87–1.13)
LYMPHOCYTES # BLD AUTO: 4.41 K/UL (ref 1–4.8)
LYMPHOCYTES NFR BLD: 41 % (ref 22–41)
MCH RBC QN AUTO: 30.5 PG (ref 27–33)
MCHC RBC AUTO-ENTMCNC: 32.8 G/DL (ref 33.6–35)
MCV RBC AUTO: 92.8 FL (ref 81.4–97.8)
MONOCYTES # BLD AUTO: 0.62 K/UL (ref 0–0.85)
MONOCYTES NFR BLD AUTO: 5.8 % (ref 0–13.4)
NEUTROPHILS # BLD AUTO: 5.47 K/UL (ref 2–7.15)
NEUTROPHILS NFR BLD: 50.7 % (ref 44–72)
NRBC # BLD AUTO: 0 K/UL
NRBC BLD-RTO: 0 /100 WBC
PLATELET # BLD AUTO: 229 K/UL (ref 164–446)
PMV BLD AUTO: 9.5 FL (ref 9–12.9)
POTASSIUM SERPL-SCNC: 4 MMOL/L (ref 3.6–5.5)
PROT SERPL-MCNC: 7 G/DL (ref 6–8.2)
PROTHROMBIN TIME: 13.1 SEC (ref 12–14.6)
RBC # BLD AUTO: 4.89 M/UL (ref 4.2–5.4)
SODIUM SERPL-SCNC: 141 MMOL/L (ref 135–145)
TROPONIN T SERPL-MCNC: 7 NG/L (ref 6–19)
WBC # BLD AUTO: 10.8 K/UL (ref 4.8–10.8)

## 2023-03-02 PROCEDURE — 80053 COMPREHEN METABOLIC PANEL: CPT

## 2023-03-02 PROCEDURE — 70450 CT HEAD/BRAIN W/O DYE: CPT

## 2023-03-02 PROCEDURE — 93005 ELECTROCARDIOGRAM TRACING: CPT | Performed by: EMERGENCY MEDICINE

## 2023-03-02 PROCEDURE — 302449 STATCHG TRIAGE ONLY (STATISTIC)

## 2023-03-02 PROCEDURE — 85025 COMPLETE CBC W/AUTO DIFF WBC: CPT

## 2023-03-02 PROCEDURE — 85610 PROTHROMBIN TIME: CPT

## 2023-03-02 PROCEDURE — 99214 OFFICE O/P EST MOD 30 MIN: CPT | Performed by: INTERNAL MEDICINE

## 2023-03-02 PROCEDURE — 84484 ASSAY OF TROPONIN QUANT: CPT

## 2023-03-02 PROCEDURE — 85730 THROMBOPLASTIN TIME PARTIAL: CPT

## 2023-03-02 RX ORDER — CLOBETASOL PROPIONATE 0.5 MG/G
CREAM TOPICAL
Qty: 45 G | Refills: 1 | Status: SHIPPED | OUTPATIENT
Start: 2023-03-02

## 2023-03-02 ASSESSMENT — ENCOUNTER SYMPTOMS
CARDIOVASCULAR NEGATIVE: 1
WEAKNESS: 1
RESPIRATORY NEGATIVE: 1
DIZZINESS: 1
CONSTITUTIONAL NEGATIVE: 1
PSYCHIATRIC NEGATIVE: 1
HEADACHES: 1
GASTROINTESTINAL NEGATIVE: 1
MUSCULOSKELETAL NEGATIVE: 1
EYES NEGATIVE: 1

## 2023-03-02 ASSESSMENT — PATIENT HEALTH QUESTIONNAIRE - PHQ9
7. TROUBLE CONCENTRATING ON THINGS, SUCH AS READING THE NEWSPAPER OR WATCHING TELEVISION: NOT AT ALL
5. POOR APPETITE OR OVEREATING: NOT AT ALL
8. MOVING OR SPEAKING SO SLOWLY THAT OTHER PEOPLE COULD HAVE NOTICED. OR THE OPPOSITE, BEING SO FIGETY OR RESTLESS THAT YOU HAVE BEEN MOVING AROUND A LOT MORE THAN USUAL: NOT AT ALL
2. FEELING DOWN, DEPRESSED, IRRITABLE, OR HOPELESS: NOT AT ALL
SUM OF ALL RESPONSES TO PHQ QUESTIONS 1-9: 0
4. FEELING TIRED OR HAVING LITTLE ENERGY: NOT AT ALL
3. TROUBLE FALLING OR STAYING ASLEEP OR SLEEPING TOO MUCH: NOT AT ALL
9. THOUGHTS THAT YOU WOULD BE BETTER OFF DEAD, OR OF HURTING YOURSELF: NOT AT ALL
SUM OF ALL RESPONSES TO PHQ9 QUESTIONS 1 AND 2: 0
6. FEELING BAD ABOUT YOURSELF - OR THAT YOU ARE A FAILURE OR HAVE LET YOURSELF OR YOUR FAMILY DOWN: NOT AL ALL
1. LITTLE INTEREST OR PLEASURE IN DOING THINGS: NOT AT ALL

## 2023-03-02 ASSESSMENT — FIBROSIS 4 INDEX
FIB4 SCORE: 3.14
FIB4 SCORE: 3.14
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
## 2023-03-02 NOTE — ED TRIAGE NOTES
"Pt states she has had shingles to LUE x 2 mons    In addition, abd pain that just started a few minutes ago  \" I feel aweful \"  "

## 2023-04-07 ENCOUNTER — OFFICE VISIT (OUTPATIENT)
Dept: MEDICAL GROUP | Age: 88
End: 2023-04-07
Payer: MEDICARE

## 2023-04-07 VITALS
TEMPERATURE: 97.8 F | HEIGHT: 68 IN | BODY MASS INDEX: 19.73 KG/M2 | WEIGHT: 130.2 LBS | SYSTOLIC BLOOD PRESSURE: 116 MMHG | OXYGEN SATURATION: 91 % | DIASTOLIC BLOOD PRESSURE: 66 MMHG | HEART RATE: 83 BPM

## 2023-04-07 DIAGNOSIS — G62.9 NEUROPATHY: ICD-10-CM

## 2023-04-07 DIAGNOSIS — M54.6 ACUTE BILATERAL THORACIC BACK PAIN: ICD-10-CM

## 2023-04-07 DIAGNOSIS — Z09 HOSPITAL DISCHARGE FOLLOW-UP: ICD-10-CM

## 2023-04-07 DIAGNOSIS — G45.9 TIA (TRANSIENT ISCHEMIC ATTACK): ICD-10-CM

## 2023-04-07 PROCEDURE — 99214 OFFICE O/P EST MOD 30 MIN: CPT | Performed by: FAMILY MEDICINE

## 2023-04-07 RX ORDER — TIZANIDINE 4 MG/1
4 TABLET ORAL 2 TIMES DAILY PRN
COMMUNITY
End: 2023-04-07 | Stop reason: SDUPTHER

## 2023-04-07 RX ORDER — GABAPENTIN 300 MG/1
300 CAPSULE ORAL 3 TIMES DAILY
Qty: 90 CAPSULE | Refills: 0 | Status: SHIPPED | OUTPATIENT
Start: 2023-04-07 | End: 2024-01-04

## 2023-04-07 RX ORDER — GABAPENTIN 300 MG/1
300 CAPSULE ORAL DAILY
COMMUNITY
End: 2023-08-17

## 2023-04-07 RX ORDER — TIZANIDINE 4 MG/1
4 TABLET ORAL 2 TIMES DAILY
Qty: 60 TABLET | Refills: 2 | Status: SHIPPED
Start: 2023-04-07 | End: 2023-08-17

## 2023-04-07 RX ORDER — HYDROCODONE BITARTRATE AND ACETAMINOPHEN 10; 325 MG/1; MG/1
1-2 TABLET ORAL EVERY 6 HOURS PRN
COMMUNITY

## 2023-04-07 ASSESSMENT — FIBROSIS 4 INDEX: FIB4 SCORE: 2.49

## 2023-04-07 NOTE — PROGRESS NOTES
This medical record contains text that has been entered with the assistance of computer voice recognition and dictation software.  Therefore, it may contain unintended errors in text, spelling, punctuation, or grammar      Chief Complaint   Patient presents with    Follow-Up     2 week follow up    Medication Refill     Couple medication pt needs refilled         Ellen Srivastava is a 88 y.o. female here evaluation and management of: \      HPI:           1. Hospital discharge follow-up  2. Neuropathy  3. TIA (transient ischemic attack)  4. Acute bilateral thoracic back pain    Ellen is an 88-year-old female who was seen in our clinic on March 2, 2023 with a chief plaint of having confusion weakness instability disorientation.  She was sent to the emergency room where she began intake she had some labs done CT scan of the head which were all unremarkable.  Her labs were unremarkable, EKG revealed sinus rhythm with no ST segment abnormalities.  She states she was waiting for 4 hours so she just left AGAINST MEDICAL ADVICE and did not see the physician.  Since then she states the confusion is gone but she has some numbness and pain in the right toe.  She is in pain management she takes Norco 10 mg p.o. twice daily.  She denies any confusion today she continues to complain of some weakness, she denies any chest pain or shortness of breath.        CT scan of the head on March 2, 2023  IMPRESSION:        1. No acute intracranial findings. No evidence of acute intracranial hemorrhage or mass lesion.     2. White matter lucencies most consistent with chronic small vessel ischemic change.                          Exam Ended: 03/02/23  3:08 PM Last Resulted: 03/02/23  3:18 PM       Order Details     View Encounter     Lab and Collection Details     Routing     Result History     View Encounter Conversation           Result Care Coordination           Latest Reference Range & Units 03/02/23 14:22   Troponin T 6 -  19 ng/L 7   INR 0.87 - 1.13  1.00   PT 12.0 - 14.6 sec 13.1   APTT 24.7 - 36.0 sec 31.2       Current medicines (including changes today)  Current Outpatient Medications   Medication Sig Dispense Refill    HYDROcodone/acetaminophen (NORCO)  MG Tab Take 1-2 Tablets by mouth every 6 hours as needed.      gabapentin (NEURONTIN) 300 MG Cap Take 300 mg by mouth every day.      gabapentin (NEURONTIN) 300 MG Cap Take 1 Capsule by mouth 3 times a day. 90 Capsule 0    tizanidine (ZANAFLEX) 4 MG Tab Take 1 Tablet by mouth 2 times a day. 60 Tablet 2    escitalopram (LEXAPRO) 20 MG tablet TAKE ONE TABLET BY MOUTH DAILY 90 Tablet 0    hydrOXYzine HCl (ATARAX) 25 MG Tab TAKE ONE TABLET BY MOUTH AT BEDTIME 90 Tablet 0    lisinopril-hydrochlorothiazide (PRINZIDE) 10-12.5 MG per tablet TAKE ONE TABLET BY MOUTH DAILY (EVERY 24 HOURS) (STOP LISINOPRIL) 90 Tablet 3    rosuvastatin (CRESTOR) 20 MG Tab TAKE ONE TABLET BY MOUTH EVERY EVENING (CRESTOR) 90 Tablet 1    metoprolol SR (TOPROL XL) 50 MG TABLET SR 24 HR TAKE ONE TABLET BY MOUTH DAILY 90 Tablet 3    clobetasol (TEMOVATE) 0.05 % Cream APPLY TO AFFECTED AREA(S) TWO TIMES A DAY UP TO 14 DAYS THEN STOP 45 g 1     No current facility-administered medications for this visit.     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  She  has a past surgical history that includes breast biopsy (Left, 2015); lumbar decompression (1969, 1979); hysterectomy, total abdominal (1977); appendectomy (1945);  "parathyroid exploration (2010); paraesophageal hernia robotic (N/A, 10/12/2015); gastroscopy (10/23/2015); thoracoscopy (Left, 10/30/2015); gastroscopy-endo (2015); gastroscopy-endo (N/A, 12/10/2015); gastroscopy-endo (N/A, 2016); orif, ankle (Right, 2016); pr lap,esophagogast fundoplasty (N/A, 2020); and orif, ankle (Left, 2022).  Social History     Tobacco Use    Smoking status: Never    Smokeless tobacco: Former    Tobacco comments:     Nicotine    Vaping Use    Vaping Use: Never used   Substance Use Topics    Alcohol use: No     Alcohol/week: 0.0 oz    Drug use: No     Social History     Social History Narrative    Lives in [facility] and in [place].     Family History   Problem Relation Age of Onset    Hypertension Mother     Lung Disease Father     Hypertension Other     Lung Disease Other     Lung Disease Brother      Family Status   Relation Name Status    Mo   at age 86        Old Age    Fa   at age 83        Lung Disease    OTHER  (Not Specified)    OTHER  (Not Specified)    Bro           ROS    The pertinent  ROS findings can be seen in the HPI above.     All other systems reviewed and are negative     Objective:     /66 (BP Location: Right arm, Patient Position: Sitting, BP Cuff Size: Adult)   Pulse 83   Temp 36.6 °C (97.8 °F) (Temporal)   Ht 1.727 m (5' 8\")   Wt 59.1 kg (130 lb 3.2 oz)   SpO2 91%  Body mass index is 19.8 kg/m².      Physical Exam:    Constitutional: Alert, no distress alert and oriented x3, not confused.  Skin: No suspicious lesions  Eye: Equal, round and reactive, conjunctiva clear, lids normal.  ENMT: Lips without lesions, good dentition, oropharynx clear.  Neck: Trachea midline, no masses, no thyromegaly. No cervical or supraclavicular lymphadenopathy.  Respiratory: Unlabored respiratory effort, lungs clear to auscultation, no wheezes, no ronchi.  Cardiovascular: Normal S1, S2, no murmur, no edema  Abdomen: Soft, " non-tender, no masses, no hepatosplenomegaly.  Cranial nerves II to XII intact, normal gait      Assessment and Plan:   The following treatment plan was discussed    All recent labs and provider notes reviewed    1. Hospital discharge follow-up  2. Neuropathy    We will increase the gabapentin to 300 mg 3 times daily  Denies any need for pain and insomnia    - gabapentin (NEURONTIN) 300 MG Cap; Take 1 Capsule by mouth 3 times a day.  Dispense: 90 Capsule; Refill: 0  - tizanidine (ZANAFLEX) 4 MG Tab; Take 1 Tablet by mouth 2 times a day.  Dispense: 60 Tablet; Refill: 2    3. TIA (transient ischemic attack)  She is on moderate intensity statin therapy  She cannot take aspirin because she is allergic to it  She refuses to be seen in neurology  We discussed diet and exercise, cardiac restratification    4. Acute bilateral thoracic back pain  - tizanidine (ZANAFLEX) 4 MG Tab; Take 1 Tablet by mouth 2 times a day.  Dispense: 60 Tablet; Refill: 2    Other orders  - HYDROcodone/acetaminophen (NORCO)  MG Tab; Take 1-2 Tablets by mouth every 6 hours as needed.  - gabapentin (NEURONTIN) 300 MG Cap; Take 300 mg by mouth every day.             Instructed to Follow up in clinic or ER for worsening symptoms, difficulty breathing, lack of expected recovery, or should new symptoms or problems arise.    Followup: Return in about 6 months (around 10/7/2023) for Reevaluation, labs.

## 2023-04-27 DIAGNOSIS — F41.8 DEPRESSION WITH ANXIETY: ICD-10-CM

## 2023-04-27 DIAGNOSIS — F51.01 PRIMARY INSOMNIA: ICD-10-CM

## 2023-04-27 RX ORDER — HYDROXYZINE HYDROCHLORIDE 25 MG/1
25 TABLET, FILM COATED ORAL
Qty: 90 TABLET | Refills: 0 | Status: SHIPPED | OUTPATIENT
Start: 2023-04-27 | End: 2023-07-13

## 2023-04-27 RX ORDER — ESCITALOPRAM OXALATE 20 MG/1
20 TABLET ORAL DAILY
Qty: 90 TABLET | Refills: 0 | Status: SHIPPED | OUTPATIENT
Start: 2023-04-27 | End: 2023-05-22 | Stop reason: SDUPTHER

## 2023-05-19 ENCOUNTER — TELEPHONE (OUTPATIENT)
Dept: MEDICAL GROUP | Age: 88
End: 2023-05-19

## 2023-05-19 NOTE — TELEPHONE ENCOUNTER
I called the [facility] pharmacy in [facility] to ask about the escitalopram (LEXAPRO) 20 MG tablet that was sent on 4/27/23. The pharmacy said the medication was picked up by the patient on 5/5/23 with 90 tablets. I called the Patient to let her know and she said she doesn't have it and she doesn't remember picking up that medication.

## 2023-05-19 NOTE — TELEPHONE ENCOUNTER
"Patient's spouse stopped by the office stating that they have been trying to get a prescription filled for awhile. She needs Escitalopram 20mg sent to the [facility] on file.     She has no refills remaining, and she \"really needs it\" according to spouse.     I did call the pharmacy and asked them to fax over a new request.   "

## 2023-05-22 DIAGNOSIS — F41.8 DEPRESSION WITH ANXIETY: ICD-10-CM

## 2023-05-23 RX ORDER — ESCITALOPRAM OXALATE 20 MG/1
20 TABLET ORAL DAILY
Qty: 90 TABLET | Refills: 2 | Status: SHIPPED
Start: 2023-05-23 | End: 2023-08-17

## 2023-06-08 RX ORDER — METOPROLOL SUCCINATE 50 MG/1
TABLET, EXTENDED RELEASE ORAL
Qty: 90 TABLET | Refills: 3 | Status: SHIPPED | OUTPATIENT
Start: 2023-06-08

## 2023-07-12 DIAGNOSIS — F51.01 PRIMARY INSOMNIA: ICD-10-CM

## 2023-07-13 RX ORDER — HYDROXYZINE HYDROCHLORIDE 25 MG/1
TABLET, FILM COATED ORAL
Qty: 90 TABLET | Refills: 0 | Status: SHIPPED | OUTPATIENT
Start: 2023-07-13 | End: 2023-10-31 | Stop reason: SDUPTHER

## 2023-08-04 DIAGNOSIS — I10 ESSENTIAL HYPERTENSION: ICD-10-CM

## 2023-08-04 RX ORDER — LISINOPRIL AND HYDROCHLOROTHIAZIDE 12.5; 1 MG/1; MG/1
TABLET ORAL
Qty: 90 TABLET | Refills: 3 | Status: SHIPPED
Start: 2023-08-04 | End: 2023-08-17

## 2023-08-04 NOTE — TELEPHONE ENCOUNTER
Dieteramelia said she needed an appointment because she keeps passing out after standing up very quickly. I Told  that's it sounded like she was having orthostatic hypotension. I asked if that needed and appointment right away. He said no. The next available appointment was August 17th and he said she would be fine to wait until then.

## 2023-08-17 ENCOUNTER — OFFICE VISIT (OUTPATIENT)
Dept: MEDICAL GROUP | Age: 88
End: 2023-08-17
Payer: MEDICARE

## 2023-08-17 VITALS
DIASTOLIC BLOOD PRESSURE: 66 MMHG | BODY MASS INDEX: 19.55 KG/M2 | HEIGHT: 68 IN | TEMPERATURE: 97.5 F | SYSTOLIC BLOOD PRESSURE: 104 MMHG | HEART RATE: 63 BPM | WEIGHT: 129 LBS | OXYGEN SATURATION: 92 %

## 2023-08-17 DIAGNOSIS — F41.8 DEPRESSION WITH ANXIETY: ICD-10-CM

## 2023-08-17 DIAGNOSIS — R55 VASOVAGAL SYNCOPE: ICD-10-CM

## 2023-08-17 PROCEDURE — 99214 OFFICE O/P EST MOD 30 MIN: CPT | Performed by: FAMILY MEDICINE

## 2023-08-17 PROCEDURE — 3074F SYST BP LT 130 MM HG: CPT | Performed by: FAMILY MEDICINE

## 2023-08-17 PROCEDURE — 3078F DIAST BP <80 MM HG: CPT | Performed by: FAMILY MEDICINE

## 2023-08-17 RX ORDER — ESCITALOPRAM OXALATE 20 MG/1
20 TABLET ORAL DAILY
Qty: 90 TABLET | Refills: 2 | Status: SHIPPED | OUTPATIENT
Start: 2023-08-17

## 2023-08-17 ASSESSMENT — FIBROSIS 4 INDEX: FIB4 SCORE: 2.49

## 2023-08-17 NOTE — PROGRESS NOTES
This medical record contains text that has been entered with the assistance of computer voice recognition and dictation software.  Therefore, it may contain unintended errors in text, spelling, punctuation, or grammar      Chief Complaint   Patient presents with    Syncope     PT says she keeps passing out after standing up quickly. PT said this happened twice.          Ellen Srivastava is a 88 y.o. female here evaluation and management of: Passing out      HPI:           1. Vasovagal syncope  Ellen is a very pleasant 88-year-old female who presents to clinic with a chief complaint of passing out 2 times in the past 2 months.  Patient states 1 time she was getting out of the car in the parking lot she got up and she just passed out.  She did not have any dizziness no feeling of spinning around the room or the room is spinning around her she just fell.  Another time she was getting really dizzy when she stood up and she sat down really quickly before she passed out.  She currently is on metoprolol 50 mg p.o. daily and Prinzide 10/12.5 mg p.o. daily  She denies any palpitations prior to these episodes, no recent nausea or vomiting or diarrhea.    Current medicines (including changes today)  Current Outpatient Medications   Medication Sig Dispense Refill    hydrOXYzine HCl (ATARAX) 25 MG Tab TAKE ONE TABLET BY MOUTH AT BEDTIME 90 Tablet 0    metoprolol SR (TOPROL XL) 50 MG TABLET SR 24 HR TAKE ONE TABLET BY MOUTH DAILY 90 Tablet 3    HYDROcodone/acetaminophen (NORCO)  MG Tab Take 1-2 Tablets by mouth every 6 hours as needed.      gabapentin (NEURONTIN) 300 MG Cap Take 1 Capsule by mouth 3 times a day. 90 Capsule 0    clobetasol (TEMOVATE) 0.05 % Cream APPLY TO AFFECTED AREA(S) TWO TIMES A DAY UP TO 14 DAYS THEN STOP 45 g 1     No current facility-administered medications for this visit.     She  has a past medical history of Anemia, Atrophic vaginitis (1/30/2010), Cerumen Impaction Recurrent  (2010), Depressive disorder, not elsewhere classified (2010), HDL lipoprotein deficiency (2010), High cholesterol (2020), HTN (hypertension) (2010), Hypertriglyceridemia (2010), Narcotic dependence (HCC) (2012), Osteopenia (2010), Pain (2020), Parathyroid hormone excess (HCC) (2010), Serum calcium elevated (2010), Tinnitus (2010), and Vertigo, intermittent (2012).  She  has a past surgical history that includes breast biopsy (Left, ); lumbar decompression (, ); hysterectomy, total abdominal (); appendectomy (); parathyroid exploration (2010); paraesophageal hernia robotic (N/A, 10/12/2015); gastroscopy (10/23/2015); thoracoscopy (Left, 10/30/2015); gastroscopy-endo (2015); gastroscopy-endo (N/A, 12/10/2015); gastroscopy-endo (N/A, 2016); orif, ankle (Right, 2016); pr lap,esophagogast fundoplasty (N/A, 2020); and orif, ankle (Left, 2022).  Social History     Tobacco Use    Smoking status: Never    Smokeless tobacco: Former    Tobacco comments:     Nicotine    Vaping Use    Vaping Use: Never used   Substance Use Topics    Alcohol use: No     Alcohol/week: 0.0 oz    Drug use: No     Social History     Social History Narrative    Lives in [facility] and in [place].     Family History   Problem Relation Age of Onset    Hypertension Mother     Lung Disease Father     Hypertension Other     Lung Disease Other     Lung Disease Brother      Family Status   Relation Name Status    Mo   at age 86        Old Age    Fa   at age 83        Lung Disease    OTHER  (Not Specified)    OTHER  (Not Specified)    Bro           ROS    The pertinent  ROS findings can be seen in the HPI above.     All other systems reviewed and are negative     Objective:     /66 (BP Location: Left arm, Patient Position: Standing, BP Cuff Size: Adult)   Pulse 63   Temp 36.4 °C (97.5 °F) (Temporal)   Ht 1.727 m (5'  "8\")   Wt 58.5 kg (129 lb)   SpO2 92%  Body mass index is 19.61 kg/m².      Physical Exam:    Constitutional: Alert, no distress.  Skin: No suspicious lesions  Eye: Equal, round and reactive, conjunctiva clear, lids normal.  ENMT: Lips without lesions, good dentition, oropharynx clear.  Neck: Trachea midline, no masses, no thyromegaly. No cervical or supraclavicular lymphadenopathy.  Respiratory: Unlabored respiratory effort, lungs clear to auscultation, no wheezes, no ronchi.  Cardiovascular: Normal S1, S2, no murmur, no edema  Abdomen: Soft, non-tender, no masses, no hepatosplenomegaly.    Patient did not have orthostatic hypotension    Assessment and Plan:   The following treatment plan was discussed    All recent labs and provider notes reviewed    1. Vasovagal syncope    We will obtain a Zio patch  Stop Prinzide  Continue metoprolol  Obtain echocardiogram and establish care in cardiology  Instructed the patient to maintain adequate hydration with water, always get up slowly.    - [facility] ZIO PATCH MONITOR; Future  - EC-ECHOCARDIOGRAM COMPLETE W/ CONT; Future  - REFERRAL TO CARDIOLOGY             Instructed to Follow up in clinic or ER for worsening symptoms, difficulty breathing, lack of expected recovery, or should new symptoms or problems arise.    Followup: Return in about 2 months (around 10/17/2023) for Reevaluation, labs.               "

## 2023-08-21 ENCOUNTER — HOSPITAL ENCOUNTER (OUTPATIENT)
Dept: CARDIOLOGY | Facility: MEDICAL CENTER | Age: 88
End: 2023-08-21
Attending: FAMILY MEDICINE
Payer: MEDICARE

## 2023-08-21 DIAGNOSIS — R55 VASOVAGAL SYNCOPE: ICD-10-CM

## 2023-08-21 LAB
LV EJECT FRACT  99904: 60
LV EJECT FRACT  99904: 60
LV EJECT FRACT MOD 2C 99903: 59.34
LV EJECT FRACT MOD 2C 99903: 59.34
LV EJECT FRACT MOD 4C 99902: 61.64
LV EJECT FRACT MOD 4C 99902: 61.64
LV EJECT FRACT MOD BP 99901: 62.28
LV EJECT FRACT MOD BP 99901: 62.28

## 2023-08-21 PROCEDURE — 93306 TTE W/DOPPLER COMPLETE: CPT | Mod: 26 | Performed by: INTERNAL MEDICINE

## 2023-08-21 PROCEDURE — 93306 TTE W/DOPPLER COMPLETE: CPT

## 2023-08-23 ENCOUNTER — TELEPHONE (OUTPATIENT)
Dept: HEALTH INFORMATION MANAGEMENT | Facility: OTHER | Age: 88
End: 2023-08-23
Payer: MEDICARE

## 2023-08-31 ENCOUNTER — NON-PROVIDER VISIT (OUTPATIENT)
Dept: CARDIOLOGY | Facility: MEDICAL CENTER | Age: 88
End: 2023-08-31
Attending: INTERNAL MEDICINE
Payer: MEDICARE

## 2023-08-31 DIAGNOSIS — I49.1 APC (ATRIAL PREMATURE CONTRACTIONS): ICD-10-CM

## 2023-08-31 DIAGNOSIS — I49.3 PVC'S (PREMATURE VENTRICULAR CONTRACTIONS): ICD-10-CM

## 2023-08-31 DIAGNOSIS — R55 VASOVAGAL SYNCOPE: ICD-10-CM

## 2023-08-31 DIAGNOSIS — I47.10 SVT (SUPRAVENTRICULAR TACHYCARDIA) (HCC): ICD-10-CM

## 2023-08-31 PROCEDURE — 93246 EXT ECG>7D<15D RECORDING: CPT | Performed by: FAMILY MEDICINE

## 2023-08-31 NOTE — PROGRESS NOTES
Patient enrolled in 14 day XT(XT/AT)  Zio Patch program per Denzel Patel.  In clinic hookup.  >Currently pending EOS.  Monitor serial number: [id number]

## 2023-09-25 ENCOUNTER — TELEPHONE (OUTPATIENT)
Dept: CARDIOLOGY | Facility: MEDICAL CENTER | Age: 88
End: 2023-09-25
Payer: MEDICARE

## 2023-09-26 PROCEDURE — 93248 EXT ECG>7D<15D REV&INTERPJ: CPT | Performed by: INTERNAL MEDICINE

## 2023-10-05 NOTE — ASSESSMENT & PLAN NOTE
Mild at this point no further intervention necessary.   Detail Level: Detailed Depth Of Biopsy: dermis Was A Bandage Applied: Yes Size Of Lesion In Cm: 1 X Size Of Lesion In Cm: 0 Anticipated Plan (Based On Presumed Biopsy Results): Excision Biopsy Type: H and E Biopsy Method: Dermablade Anesthesia Type: 1% lidocaine with epinephrine and a 1:10 solution of 8.4% sodium bicarbonate Anesthesia Volume In Cc (Will Not Render If 0): 0.5 Hemostasis: Electrodesiccation Wound Care: Petrolatum Dressing: bandage Destruction After The Procedure: No Type Of Destruction Used: Curettage Curettage Text: The wound bed was treated with curettage after the biopsy was performed. Cryotherapy Text: The wound bed was treated with cryotherapy after the biopsy was performed. Electrodesiccation Text: The wound bed was treated with electrodesiccation after the biopsy was performed. Electrodesiccation And Curettage Text: The wound bed was treated with electrodesiccation and curettage after the biopsy was performed. Silver Nitrate Text: The wound bed was treated with silver nitrate after the biopsy was performed. Lab: 6 Lab Facility: 3 Consent: Written consent was obtained and risks were reviewed including but not limited to scarring, infection, bleeding, scabbing, incomplete removal, nerve damage and allergy to anesthesia. Post-Care Instructions: I reviewed with the patient in detail post-care instructions. Patient is to cleanse biopsy site daily and apply Vaseline and a bandage until healed. Notification Instructions: Patient will be notified of biopsy results. However, patient instructed to call the office if not contacted within 2 weeks. Billing Type: Third-Party Bill Information: Selecting Yes will display possible errors in your note based on the variables you have selected. This validation is only offered as a suggestion for you. PLEASE NOTE THAT THE VALIDATION TEXT WILL BE REMOVED WHEN YOU FINALIZE YOUR NOTE. IF YOU WANT TO FAX A PRELIMINARY NOTE YOU WILL NEED TO TOGGLE THIS TO 'NO' IF YOU DO NOT WANT IT IN YOUR FAXED NOTE.

## 2023-10-31 DIAGNOSIS — F51.01 PRIMARY INSOMNIA: ICD-10-CM

## 2023-10-31 RX ORDER — HYDROXYZINE HYDROCHLORIDE 25 MG/1
25 TABLET, FILM COATED ORAL
Qty: 90 TABLET | Refills: 0 | Status: SHIPPED | OUTPATIENT
Start: 2023-10-31 | End: 2024-01-23 | Stop reason: SDUPTHER

## 2024-01-04 DIAGNOSIS — G62.9 NEUROPATHY: ICD-10-CM

## 2024-01-04 RX ORDER — GABAPENTIN 300 MG/1
300 CAPSULE ORAL 3 TIMES DAILY
Qty: 90 CAPSULE | Refills: 0 | Status: SHIPPED | OUTPATIENT
Start: 2024-01-04 | End: 2024-02-23 | Stop reason: SDUPTHER

## 2024-01-23 DIAGNOSIS — F51.01 PRIMARY INSOMNIA: ICD-10-CM

## 2024-01-23 NOTE — TELEPHONE ENCOUNTER
Received request via: Pharmacy    Was the patient seen in the last year in this department? Yes    Does the patient have an active prescription (recently filled or refills available) for medication(s) requested? No    Pharmacy Name: [facility]     Does the patient have intermediate Plus and need 100 day supply (blood pressure, diabetes and cholesterol meds only)? Patient does not have SCP

## 2024-01-24 ENCOUNTER — OFFICE VISIT (OUTPATIENT)
Dept: MEDICAL GROUP | Age: 89
End: 2024-01-24
Payer: MEDICARE

## 2024-01-24 VITALS
TEMPERATURE: 98 F | SYSTOLIC BLOOD PRESSURE: 134 MMHG | DIASTOLIC BLOOD PRESSURE: 70 MMHG | WEIGHT: 130 LBS | HEART RATE: 75 BPM | HEIGHT: 68 IN | OXYGEN SATURATION: 95 % | BODY MASS INDEX: 19.7 KG/M2

## 2024-01-24 DIAGNOSIS — Z91.81: ICD-10-CM

## 2024-01-24 DIAGNOSIS — L30.9 DERMATITIS: ICD-10-CM

## 2024-01-24 DIAGNOSIS — Z23 NEED FOR VACCINATION: ICD-10-CM

## 2024-01-24 PROCEDURE — 90746 HEPB VACCINE 3 DOSE ADULT IM: CPT | Performed by: FAMILY MEDICINE

## 2024-01-24 PROCEDURE — 3075F SYST BP GE 130 - 139MM HG: CPT | Performed by: FAMILY MEDICINE

## 2024-01-24 PROCEDURE — 3078F DIAST BP <80 MM HG: CPT | Performed by: FAMILY MEDICINE

## 2024-01-24 PROCEDURE — 99213 OFFICE O/P EST LOW 20 MIN: CPT | Mod: 25 | Performed by: FAMILY MEDICINE

## 2024-01-24 PROCEDURE — G0010 ADMIN HEPATITIS B VACCINE: HCPCS | Performed by: FAMILY MEDICINE

## 2024-01-24 RX ORDER — HYDROXYZINE HYDROCHLORIDE 25 MG/1
25 TABLET, FILM COATED ORAL
Qty: 90 TABLET | Refills: 0 | Status: SHIPPED | OUTPATIENT
Start: 2024-01-24

## 2024-01-24 ASSESSMENT — PATIENT HEALTH QUESTIONNAIRE - PHQ9: CLINICAL INTERPRETATION OF PHQ2 SCORE: 0

## 2024-01-24 ASSESSMENT — FIBROSIS 4 INDEX: FIB4 SCORE: 2.49

## 2024-01-24 NOTE — PROGRESS NOTES
This medical record contains text that has been entered with the assistance of computer voice recognition and dictation software.  Therefore, it may contain unintended errors in text, spelling, punctuation, or grammar      Chief Complaint   Patient presents with    Fall     PT is falling a lot.     Diarrhea     Once a week    Rash         Ellen Srivastava is a 88 y.o. female here evaluation and management of: Fall risk, rash      HPI:           1. At high risk for falls per Candis fall risk assessment scale  Ellen states that she was getting up from her toilet yesterday and she just fell she did not feel dizzy or lightheaded just got off balance.  She does have a bar on the wall next to the toilet that she uses.  She states she did not hit her head she was fine.  She denies any headaches no nausea or vomiting, there was no palpitations prior to this, no chest flutter, no nausea or vomiting.    2. Dermatitis  Ellen also states that she has had a rash on her left arm from her shoulder down to her elbow for about a year she has been using topical clobetasol which helps the itching but it does not resolve the rash.      3. Need for vaccination  Due for final hep B vaccine    Current medicines (including changes today)  Current Outpatient Medications   Medication Sig Dispense Refill    hydrOXYzine HCl (ATARAX) 25 MG Tab Take 1 Tablet by mouth at bedtime. 90 Tablet 0    gabapentin (NEURONTIN) 300 MG Cap TAKE ONE CAPSULE BY MOUTH THREE TIMES A DAY 90 Capsule 0    escitalopram (LEXAPRO) 20 MG tablet Take 1 Tablet by mouth every day. 90 Tablet 2    metoprolol SR (TOPROL XL) 50 MG TABLET SR 24 HR TAKE ONE TABLET BY MOUTH DAILY 90 Tablet 3    HYDROcodone/acetaminophen (NORCO)  MG Tab Take 1-2 Tablets by mouth every 6 hours as needed.      clobetasol (TEMOVATE) 0.05 % Cream APPLY TO AFFECTED AREA(S) TWO TIMES A DAY UP TO 14 DAYS THEN STOP 45 g 1     No current facility-administered medications for this  visit.     She  has a past medical history of Anemia, Atrophic vaginitis (2010), Cerumen Impaction Recurrent (2010), Depressive disorder, not elsewhere classified (2010), HDL lipoprotein deficiency (2010), High cholesterol (2020), HTN (hypertension) (2010), Hypertriglyceridemia (2010), Narcotic dependence (HCC) (2012), Osteopenia (2010), Pain (2020), Parathyroid hormone excess (HCC) (2010), Serum calcium elevated (2010), Tinnitus (2010), and Vertigo, intermittent (2012).  She  has a past surgical history that includes breast biopsy (Left, ); lumbar decompression (, ); hysterectomy, total abdominal (); appendectomy (); parathyroid exploration (2010); paraesophageal hernia robotic (N/A, 10/12/2015); gastroscopy (10/23/2015); thoracoscopy (Left, 10/30/2015); gastroscopy-endo (2015); gastroscopy-endo (N/A, 12/10/2015); gastroscopy-endo (N/A, 2016); orif, ankle (Right, 2016); pr lap,esophagogast fundoplasty (N/A, 2020); and orif, ankle (Left, 2022).  Social History     Tobacco Use    Smoking status: Never    Smokeless tobacco: Former    Tobacco comments:     Nicotine    Vaping Use    Vaping Use: Never used   Substance Use Topics    Alcohol use: No     Alcohol/week: 0.0 oz    Drug use: No     Social History     Social History Narrative    Lives in [facility] and in [place].     Family History   Problem Relation Age of Onset    Hypertension Mother     Lung Disease Father     Hypertension Other     Lung Disease Other     Lung Disease Brother      Family Status   Relation Name Status    Mo   at age 86        Old Age    Fa   at age 83        Lung Disease    OTHER  (Not Specified)    OTHER  (Not Specified)    Bro           ROS    The pertinent  ROS findings can be seen in the HPI above.     All other systems reviewed and are negative     Objective:     /70 (BP Location: Right arm,  "Patient Position: Sitting, BP Cuff Size: Adult)   Pulse 75   Temp 36.7 °C (98 °F) (Temporal)   Ht 1.727 m (5' 8\")   Wt 59 kg (130 lb)   SpO2 95%  Body mass index is 19.77 kg/m².      Physical Exam:    Constitutional: Alert, no distress.  Skin: There are about 8 nodular raised erythematous lesions on left arm    Eye: Equal, round and reactive, conjunctiva clear, lids normal.  ENMT: Lips without lesions, good dentition, oropharynx clear.  Neck: Trachea midline, no masses, no thyromegaly. No cervical or supraclavicular lymphadenopathy.  Respiratory: Unlabored respiratory effort, lungs clear to auscultation, no wheezes, no ronchi.  Cardiovascular: Normal S1, S2, no murmur, no edema  Abdomen: Soft, non-tender, no masses, no hepatosplenomegaly.  Neurological--get up and go test normal no difficulty, difficulty with Romberg difficulty with tandem gait, cranial nerves II through XII intact, normal gait      Assessment and Plan:   The following treatment plan was discussed    All recent labs and provider notes reviewed    1. At high risk for falls per Candis fall risk assessment scale    I recommended she have physical therapy for balance and coordination exercises  I also recommend she have a home safety evaluation but she refused    - Referral to Physical Therapy    2. Dermatitis  - Referral to Dermatology    3. Need for vaccination  - Hepatitis B Vaccine Adult IM             Instructed to Follow up in clinic or ER for worsening symptoms, difficulty breathing, lack of expected recovery, or should new symptoms or problems arise.    Followup: Return in about 3 months (around 4/24/2024) for Reevaluation.               "

## 2024-02-05 ENCOUNTER — APPOINTMENT (OUTPATIENT)
Dept: RADIOLOGY | Facility: MEDICAL CENTER | Age: 89
End: 2024-02-05
Attending: EMERGENCY MEDICINE
Payer: MEDICARE

## 2024-02-05 ENCOUNTER — HOSPITAL ENCOUNTER (EMERGENCY)
Facility: MEDICAL CENTER | Age: 89
End: 2024-02-05
Attending: EMERGENCY MEDICINE
Payer: MEDICARE

## 2024-02-05 VITALS
HEIGHT: 68 IN | OXYGEN SATURATION: 92 % | WEIGHT: 127.87 LBS | RESPIRATION RATE: 16 BRPM | BODY MASS INDEX: 19.38 KG/M2 | TEMPERATURE: 99.1 F | HEART RATE: 77 BPM | DIASTOLIC BLOOD PRESSURE: 75 MMHG | SYSTOLIC BLOOD PRESSURE: 119 MMHG

## 2024-02-05 DIAGNOSIS — R10.30 LOWER ABDOMINAL PAIN: ICD-10-CM

## 2024-02-05 DIAGNOSIS — K52.9 COLITIS: ICD-10-CM

## 2024-02-05 DIAGNOSIS — R19.7 DIARRHEA OF PRESUMED INFECTIOUS ORIGIN: ICD-10-CM

## 2024-02-05 LAB
ALBUMIN SERPL BCP-MCNC: 4.2 G/DL (ref 3.2–4.9)
ALBUMIN/GLOB SERPL: 1.4 G/DL
ALP SERPL-CCNC: 78 U/L (ref 30–99)
ALT SERPL-CCNC: 8 U/L (ref 2–50)
ANION GAP SERPL CALC-SCNC: 15 MMOL/L (ref 7–16)
AST SERPL-CCNC: 17 U/L (ref 12–45)
BASOPHILS # BLD AUTO: 0.4 % (ref 0–1.8)
BASOPHILS # BLD: 0.05 K/UL (ref 0–0.12)
BILIRUB SERPL-MCNC: 0.9 MG/DL (ref 0.1–1.5)
BUN SERPL-MCNC: 19 MG/DL (ref 8–22)
CALCIUM ALBUM COR SERPL-MCNC: 9.6 MG/DL (ref 8.5–10.5)
CALCIUM SERPL-MCNC: 9.8 MG/DL (ref 8.4–10.2)
CHLORIDE SERPL-SCNC: 100 MMOL/L (ref 96–112)
CO2 SERPL-SCNC: 21 MMOL/L (ref 20–33)
CREAT SERPL-MCNC: 1.23 MG/DL (ref 0.5–1.4)
EOSINOPHIL # BLD AUTO: 0.08 K/UL (ref 0–0.51)
EOSINOPHIL NFR BLD: 0.6 % (ref 0–6.9)
ERYTHROCYTE [DISTWIDTH] IN BLOOD BY AUTOMATED COUNT: 48.2 FL (ref 35.9–50)
GFR SERPLBLD CREATININE-BSD FMLA CKD-EPI: 43 ML/MIN/1.73 M 2
GLOBULIN SER CALC-MCNC: 3.1 G/DL (ref 1.9–3.5)
GLUCOSE SERPL-MCNC: 101 MG/DL (ref 65–99)
HCT VFR BLD AUTO: 48 % (ref 37–47)
HGB BLD-MCNC: 15.5 G/DL (ref 12–16)
IMM GRANULOCYTES # BLD AUTO: 0.04 K/UL (ref 0–0.11)
IMM GRANULOCYTES NFR BLD AUTO: 0.3 % (ref 0–0.9)
LACTATE SERPL-SCNC: 1.3 MMOL/L (ref 0.5–2)
LIPASE SERPL-CCNC: 7 U/L (ref 11–82)
LYMPHOCYTES # BLD AUTO: 2.28 K/UL (ref 1–4.8)
LYMPHOCYTES NFR BLD: 16.8 % (ref 22–41)
MCH RBC QN AUTO: 29.9 PG (ref 27–33)
MCHC RBC AUTO-ENTMCNC: 32.3 G/DL (ref 32.2–35.5)
MCV RBC AUTO: 92.7 FL (ref 81.4–97.8)
MONOCYTES # BLD AUTO: 0.76 K/UL (ref 0–0.85)
MONOCYTES NFR BLD AUTO: 5.6 % (ref 0–13.4)
NEUTROPHILS # BLD AUTO: 10.37 K/UL (ref 1.82–7.42)
NEUTROPHILS NFR BLD: 76.3 % (ref 44–72)
NRBC # BLD AUTO: 0 K/UL
NRBC BLD-RTO: 0 /100 WBC (ref 0–0.2)
PLATELET # BLD AUTO: 209 K/UL (ref 164–446)
PMV BLD AUTO: 9.2 FL (ref 9–12.9)
POTASSIUM SERPL-SCNC: 4.6 MMOL/L (ref 3.6–5.5)
PROCALCITONIN SERPL-MCNC: 0.1 NG/ML
PROT SERPL-MCNC: 7.3 G/DL (ref 6–8.2)
RBC # BLD AUTO: 5.18 M/UL (ref 4.2–5.4)
SODIUM SERPL-SCNC: 136 MMOL/L (ref 135–145)
WBC # BLD AUTO: 13.6 K/UL (ref 4.8–10.8)

## 2024-02-05 PROCEDURE — A9270 NON-COVERED ITEM OR SERVICE: HCPCS | Performed by: EMERGENCY MEDICINE

## 2024-02-05 PROCEDURE — 700117 HCHG RX CONTRAST REV CODE 255: Performed by: EMERGENCY MEDICINE

## 2024-02-05 PROCEDURE — 83690 ASSAY OF LIPASE: CPT

## 2024-02-05 PROCEDURE — 74177 CT ABD & PELVIS W/CONTRAST: CPT

## 2024-02-05 PROCEDURE — 80053 COMPREHEN METABOLIC PANEL: CPT

## 2024-02-05 PROCEDURE — 36415 COLL VENOUS BLD VENIPUNCTURE: CPT

## 2024-02-05 PROCEDURE — 85025 COMPLETE CBC W/AUTO DIFF WBC: CPT

## 2024-02-05 PROCEDURE — 84145 PROCALCITONIN (PCT): CPT

## 2024-02-05 PROCEDURE — 700105 HCHG RX REV CODE 258: Performed by: EMERGENCY MEDICINE

## 2024-02-05 PROCEDURE — 83605 ASSAY OF LACTIC ACID: CPT

## 2024-02-05 PROCEDURE — 99285 EMERGENCY DEPT VISIT HI MDM: CPT

## 2024-02-05 PROCEDURE — 700102 HCHG RX REV CODE 250 W/ 637 OVERRIDE(OP): Performed by: EMERGENCY MEDICINE

## 2024-02-05 RX ORDER — DICYCLOMINE HCL 20 MG
20 TABLET ORAL ONCE
Status: COMPLETED | OUTPATIENT
Start: 2024-02-05 | End: 2024-02-05

## 2024-02-05 RX ORDER — SODIUM CHLORIDE, SODIUM LACTATE, POTASSIUM CHLORIDE, CALCIUM CHLORIDE 600; 310; 30; 20 MG/100ML; MG/100ML; MG/100ML; MG/100ML
1000 INJECTION, SOLUTION INTRAVENOUS ONCE
Status: COMPLETED | OUTPATIENT
Start: 2024-02-05 | End: 2024-02-05

## 2024-02-05 RX ORDER — AMOXICILLIN AND CLAVULANATE POTASSIUM 875; 125 MG/1; MG/1
1 TABLET, FILM COATED ORAL ONCE
Status: COMPLETED | OUTPATIENT
Start: 2024-02-05 | End: 2024-02-05

## 2024-02-05 RX ORDER — AMOXICILLIN AND CLAVULANATE POTASSIUM 875; 125 MG/1; MG/1
1 TABLET, FILM COATED ORAL 2 TIMES DAILY
Qty: 14 TABLET | Refills: 0 | Status: ACTIVE | OUTPATIENT
Start: 2024-02-05 | End: 2024-02-12

## 2024-02-05 RX ORDER — AMOXICILLIN AND CLAVULANATE POTASSIUM 875; 125 MG/1; MG/1
1 TABLET, FILM COATED ORAL 2 TIMES DAILY
Qty: 14 TABLET | Refills: 0 | Status: SHIPPED | OUTPATIENT
Start: 2024-02-05 | End: 2024-02-05

## 2024-02-05 RX ADMIN — SODIUM CHLORIDE, POTASSIUM CHLORIDE, SODIUM LACTATE AND CALCIUM CHLORIDE 1000 ML: 600; 310; 30; 20 INJECTION, SOLUTION INTRAVENOUS at 09:34

## 2024-02-05 RX ADMIN — AMOXICILLIN AND CLAVULANATE POTASSIUM 1 TABLET: 875; 125 TABLET, FILM COATED ORAL at 12:18

## 2024-02-05 RX ADMIN — DICYCLOMINE HYDROCHLORIDE 20 MG: 20 TABLET ORAL at 09:27

## 2024-02-05 RX ADMIN — IOHEXOL 100 ML: 350 INJECTION, SOLUTION INTRAVENOUS at 11:05

## 2024-02-05 ASSESSMENT — FIBROSIS 4 INDEX: FIB4 SCORE: 2.49

## 2024-02-05 NOTE — ED TRIAGE NOTES
"Chief Complaint   Patient presents with    Abdominal Cramping     C/o lower abd cramping started yesterday    Diarrhea     Reports 2 episodes of uncontrolled diarrhea this am  \"It just comes out\"     /63   Pulse 85   Temp 37.9 °C (100.3 °F) (Oral)   Resp 16   Ht 1.727 m (5' 8\")   Wt 58 kg (127 lb 13.9 oz)   LMP 03/13/1970   SpO2 94%   BMI 19.44 kg/m²     Pt to ED via WC w/ visitor for c/o diarrhea x 2 this am, states it \"just comes out.\"  Pt denies any recent antibx use, denies hx of same.    "

## 2024-02-05 NOTE — ED NOTES
SL placed  bld drawn, sent to lab  PO med given and taken well by pt  IV LR up and infusing well  pt and  aware of POC

## 2024-02-05 NOTE — DISCHARGE INSTRUCTIONS
Your CT scan shows changes in your colon suggesting early colitis or diverticulitis.  Your lab work is otherwise very reassuring and this could also be viral in nature however with the changes on CT we will prescribe you some antibiotics that will help with discomfort and should help relieve some of the diarrheal frequency.  Antibiotics can cause diarrhea as a side effect, so I would recommend staying well-hydrated, introducing lots of fiber into your diet.

## 2024-02-05 NOTE — ED PROVIDER NOTES
"ED Provider Note    CHIEF COMPLAINT  Chief Complaint   Patient presents with    Abdominal Cramping     C/o lower abd cramping started yesterday    Diarrhea     Reports 2 episodes of uncontrolled diarrhea this am  \"It just comes out\"       EXTERNAL RECORDS REVIEWED  Outpatient Notes from 1/24 were the patient was seen for recurrent falls, went to rehab and diarrhea at that time.  Is noted that she was not having any dizziness or lightheadedness nor she had any chest discomfort, palpitations, nauseousness or vomiting prior to her feelings of uneasiness with walking.    HPI/ROS  LIMITATION TO HISTORY   Select: : None  OUTSIDE HISTORIAN(S):  Significant other at bedside    Ellen Srivastava is a 88 y.o. female who presents to the emergency room for evaluation of lower abdominal discomfort and diarrheal illness.  The patient has stated this has been going on for several days, sometimes has pain and discomfort was most notable yesterday associated with several episodes of uncontrolled liquidy stools.  \"It just comes out of me\" though she denies bloody bowel movements, she does not have any vomiting, she has not had recurrent lower abdominal pains has not noticed any abdominal bloating or nauseousness.  She took some Imodium yesterday with no improvement after several doses throughout the day and this morning.  She feels like she is getting dehydrated and notes that her lips are chapped and teeth and mouth feel dry.  Denies being on any antibiotics within the last several months, has not been hospitalized recently,  She is denying any urinary symptoms at this time.    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    SURGICAL HISTORY   has a past surgical history that includes breast biopsy (Left, 2015); lumbar decompression (1969, 1979); hysterectomy, total abdominal (1977); appendectomy (1945); parathyroid exploration (6/16/2010); paraesophageal hernia robotic (N/A, 10/12/2015); gastroscopy (10/23/2015); thoracoscopy (Left, 10/30/2015); gastroscopy-endo (11/1/2015); gastroscopy-endo (N/A, 12/10/2015); gastroscopy-endo (N/A, 2/26/2016); orif, ankle (Right, 9/20/2016); lap,esophagogast fundoplasty (N/A, 8/24/2020); and orif, ankle (Left, 1/28/2022).    FAMILY HISTORY  Family History   Problem Relation Age of Onset    Hypertension Mother     Lung Disease Father     Hypertension Other     Lung Disease Other     Lung Disease Brother        SOCIAL HISTORY  Social History     Tobacco Use    Smoking status: Never    Smokeless tobacco: Former    Tobacco comments:     Nicotine    Vaping Use    Vaping Use: Never used   Substance and Sexual Activity    Alcohol use: No     Alcohol/week: 0.0 oz    Drug use: No    Sexual activity: Never     Partners: Male     CURRENT MEDICATIONS  Home Medications    **Home medications have not yet been reviewed for this encounter**       ALLERGIES  Allergies   Allergen Reactions    Asa [Aspirin] Unspecified     Headache     PHYSICAL EXAM  VITAL SIGNS: /75   Pulse 77   Temp 37.3 °C (99.1 °F) (Temporal)   Resp 16   Ht 1.727 m (5' 8\")   Wt 58 kg (127 lb 13.9 oz)   LMP 03/13/1970   SpO2 92%   BMI 19.44 kg/m²    Genl: F sitting in gurney uncomfortably, speaking clearly, appears in very mild distress   Head: NC/AT   ENT: Mucous membranes dry, posterior pharynx clear, uvula midline, nares patent bilaterally   Eyes: Normal sclera, pupils equal round reactive to light  Neck: Supple, FROM, no LAD " appreciated   Pulmonary: Lungs are clear to auscultation bilaterally  Chest: No TTP  CV:  RRR, no murmur appreciated, pulses 2+ in both upper and lower extremities,  Abdomen: soft, suprapubic and LLQ pain.  ND; no rebound/guarding, no masses palpated, no HSM. No pain with bilateral leg raise  : no CVA or suprapubic tenderness   Musculoskeletal: Pain free ROM of the neck. Moving upper and lower extremities in spontaneous and coordinated fashion  Neuro: A&Ox4 (person, place, time, situation), speech fluent, gait not assessed, no focal deficits appreciated  Skin: No rash or lesions.  No pallor or jaundice.  No cyanosis.  Warm and dry.     DIAGNOSTIC STUDIES / PROCEDURES    LABS  Labs Reviewed   CBC WITH DIFFERENTIAL - Abnormal; Notable for the following components:       Result Value    WBC 13.6 (*)     Hematocrit 48.0 (*)     Neutrophils-Polys 76.30 (*)     Lymphocytes 16.80 (*)     Neutrophils (Absolute) 10.37 (*)     All other components within normal limits   COMP METABOLIC PANEL - Abnormal; Notable for the following components:    Glucose 101 (*)     All other components within normal limits   LIPASE - Abnormal; Notable for the following components:    Lipase 7 (*)     All other components within normal limits   ESTIMATED GFR - Abnormal; Notable for the following components:    GFR (CKD-EPI) 43 (*)     All other components within normal limits   LACTIC ACID   PROCALCITONIN     RADIOLOGY  I have independently interpreted the diagnostic imaging associated with this visit and am waiting the final reading from the radiologist.   My preliminary interpretation is as follows: Findings ABD wall of the sigmoid colon consistent with possible early colitis versus diverticular disease.  No abscess.  Gallbladder distention with no signs of obstruction.  Incidental hepatic cyst,  Radiologist interpretation:   CT-ABDOMEN-PELVIS WITH   Final Result      1.  Thickening of the wall of the sigmoid colon and rectum with some  inflammatory stranding seen in the area of the sigmoid colon are there is also scattered diverticulosis. Consideration should be given for diverticulitis as well as proctosigmoid    colitis.      2.  Distended gallbladder with common bile duct dilated to a diameter of 11 mm and some central biliary ductal dilatation. No obstructing stone or mass is seen.      3.  1 cm simple hepatic cyst.      4.  Thickening of the wall of the distal esophagus with surgical change in that area.      5.  Punctate bilateral renal calyceal stones measuring up to 2 mm.      6.  Scarring and volume loss within the left lung base. There are 6 mm left lower lobe pulmonary nodule.      Fleischner Society pulmonary nodule recommendations:   Low Risk: CT at 6-12 months, then consider CT at 18-24 months      High Risk: CT at 6-12 months, then CT at 18-24 months      Low Risk - Minimal or absent history of smoking and of other known risk factors.      High Risk - History of smoking or of other known risk factors.      Note: These recommendations do not apply to lung cancer screening, patients with immunosuppression, or patients with known primary cancer.      Fleischner Society 2017 Guidelines for Management of Incidentally Detected Pulmonary Nodules in Adults           COURSE & MEDICAL DECISION MAKING    ED Observation Status? No; Patient does not meet criteria for ED Observation.     INITIAL ASSESSMENT, COURSE AND PLAN  Dehydration, viral illness, diverticular disease, colitis, mesenteric ischemia considered but unlikely, UTI, kidney injury    Care Narrative: The emergency room for some abdominal discomfort primarily with some loose stools.  Patient has had difficulty with making it to the bathroom but is not been on antibiotics or in the hospital recently and has no prior history of C. difficile colitis or other concerning risk factors.  Her abdomen is without significant distention or localizing findings of acute peritonitis and she remains  without any tachycardia, no febrile illness and no hemodynamic instability.  Lab work obtained showed slight leukocytosis of 13.6, leftward shift with no bandemia.  Reassuring lactate and procalcitonin with a very slight GFR decreased but normal creatinine function and no evidence of obstructive hepatobiliary process or pancreatitis.  Throughout her time in the emergency department she remains afebrile, normotensive, with no inappropriate tachycardia or tachypnea.  With the patient's leukocytosis and no urinary symptoms, CT scan was obtained which showed some nonspecific inflammatory changes down in the rectosigmoid colon consistent with early colitis or possible diverticular disease.  With the patient's pain and diarrheal illness I have elected to treat her with antibiotics first dose given here.  She feels improved following fluid resuscitation via IV and is transition to p.o. medications and tolerates them well.  She has been using briefs to help collect samples and continues to have no bloody bowel movements while here in the emergency department.  She and her  are updated regarding the current diagnosis, the need for taking Augmentin for treatment of colitis over the next 10 days and will be given strict return precautions should she develop worsening pain, fevers or inability to tolerate p.o. intake.  Questions addressed that she is discharged home in stable condition.      HYDRATION: Based on the patient's presentation of Acute Diarrhea the patient was given IV fluids. IV Hydration was used because oral hydration was not adequate alone. Upon recheck following hydration, the patient was improving.    DISPOSITION AND DISCUSSIONS  I have discussed management of the patient with the following physicians and UDAY's:  none    Discussion of management with other QHP or appropriate source(s): None     Escalation of care considered, and ultimately not performed:acute inpatient care management, however at this  time, the patient is most appropriate for outpatient management    Barriers to care at this time, including but not limited to: none.     Decision tools and prescription drugs considered including, but not limited to: abx - augmentin.    FINAL DIAGNOSIS  1. Diarrhea of presumed infectious origin    2. Lower abdominal pain    3. Colitis      Electronically signed by: Harrison Adan M.D., 2/5/2024 9:02 AM

## 2024-02-05 NOTE — ED NOTES
MD at  for re-evaluation and discussion of POC for CT  warm blankets given for her c/o being cold

## 2024-02-05 NOTE — ED NOTES
Diarrhea started 4 days ago and getting worse. Has been using imodium starting yesterday with no improvement.

## 2024-02-16 ENCOUNTER — APPOINTMENT (RX ONLY)
Dept: URBAN - METROPOLITAN AREA CLINIC 15 | Facility: CLINIC | Age: 89
Setting detail: DERMATOLOGY
End: 2024-02-16

## 2024-02-16 DIAGNOSIS — L28.1 PRURIGO NODULARIS: ICD-10-CM | Status: INADEQUATELY CONTROLLED

## 2024-02-16 PROCEDURE — ? COUNSELING

## 2024-02-16 PROCEDURE — ? PRESCRIPTION MEDICATION MANAGEMENT

## 2024-02-16 PROCEDURE — ? ADDITIONAL NOTES

## 2024-02-16 PROCEDURE — ? PRESCRIPTION

## 2024-02-16 PROCEDURE — 99204 OFFICE O/P NEW MOD 45 MIN: CPT

## 2024-02-16 RX ORDER — CLOBETASOL PROPIONATE 0.5 MG/G
OINTMENT TOPICAL
Qty: 60 | Refills: 0 | Status: ERX | COMMUNITY
Start: 2024-02-16

## 2024-02-16 RX ADMIN — CLOBETASOL PROPIONATE: 0.5 OINTMENT TOPICAL at 00:00

## 2024-02-16 ASSESSMENT — LOCATION SIMPLE DESCRIPTION DERM: LOCATION SIMPLE: LEFT POSTERIOR UPPER ARM

## 2024-02-16 ASSESSMENT — LOCATION ZONE DERM: LOCATION ZONE: ARM

## 2024-02-16 ASSESSMENT — LOCATION DETAILED DESCRIPTION DERM: LOCATION DETAILED: LEFT DISTAL POSTERIOR UPPER ARM

## 2024-02-16 NOTE — PROCEDURE: PRESCRIPTION MEDICATION MANAGEMENT
Detail Level: Zone
Continue Regimen: Clobatasol 2x daily for 2 weeks. Recommend ace bandage at night
Render In Strict Bullet Format?: No
Plan: Patient will follow up in 6 weeks

## 2024-02-16 NOTE — PROCEDURE: MIPS QUALITY
Quality 130: Documentation Of Current Medications In The Medical Record: Current Medications Documented
Detail Level: Detailed
Quality 226: Preventive Care And Screening: Tobacco Use: Screening And Cessation Intervention: Patient screened for tobacco use and is an ex/non-smoker
Quality 111:Pneumonia Vaccination Status For Older Adults: Patient received any pneumococcal conjugate or polysaccharide vaccine on or after their 60th birthday and before the end of the measurement period
Discharged

## 2024-02-16 NOTE — PROCEDURE: ADDITIONAL NOTES
Render Risk Assessment In Note?: no
Detail Level: Simple
Additional Notes: Patient has been using Triamcinalone and clobatasol alternating.

## 2024-02-23 ENCOUNTER — OFFICE VISIT (OUTPATIENT)
Dept: MEDICAL GROUP | Age: 89
End: 2024-02-23
Payer: MEDICARE

## 2024-02-23 VITALS
SYSTOLIC BLOOD PRESSURE: 112 MMHG | HEIGHT: 68 IN | WEIGHT: 127 LBS | TEMPERATURE: 97.5 F | DIASTOLIC BLOOD PRESSURE: 64 MMHG | BODY MASS INDEX: 19.25 KG/M2 | HEART RATE: 67 BPM | OXYGEN SATURATION: 94 %

## 2024-02-23 DIAGNOSIS — F41.8 DEPRESSION WITH ANXIETY: ICD-10-CM

## 2024-02-23 DIAGNOSIS — G62.9 NEUROPATHY: ICD-10-CM

## 2024-02-23 PROCEDURE — 3074F SYST BP LT 130 MM HG: CPT | Performed by: FAMILY MEDICINE

## 2024-02-23 PROCEDURE — 3078F DIAST BP <80 MM HG: CPT | Performed by: FAMILY MEDICINE

## 2024-02-23 PROCEDURE — 99214 OFFICE O/P EST MOD 30 MIN: CPT | Performed by: FAMILY MEDICINE

## 2024-02-23 RX ORDER — GABAPENTIN 300 MG/1
300 CAPSULE ORAL 3 TIMES DAILY
Qty: 90 CAPSULE | Refills: 0 | Status: SHIPPED | OUTPATIENT
Start: 2024-02-23

## 2024-02-23 RX ORDER — ALPRAZOLAM 0.25 MG/1
0.25 TABLET ORAL NIGHTLY PRN
Qty: 30 TABLET | Refills: 0 | Status: SHIPPED | OUTPATIENT
Start: 2024-02-23 | End: 2024-02-27

## 2024-02-23 RX ORDER — ESCITALOPRAM OXALATE 20 MG/1
20 TABLET ORAL DAILY
Qty: 90 TABLET | Refills: 2 | Status: CANCELLED | OUTPATIENT
Start: 2024-02-23

## 2024-02-23 RX ORDER — GABAPENTIN 300 MG/1
300 CAPSULE ORAL 3 TIMES DAILY
Qty: 90 CAPSULE | Refills: 0 | OUTPATIENT
Start: 2024-02-23

## 2024-02-23 ASSESSMENT — FIBROSIS 4 INDEX: FIB4 SCORE: 2.47

## 2024-02-23 NOTE — TELEPHONE ENCOUNTER
Received request via: Patient    Was the patient seen in the last year in this department? Yes    Does the patient have an active prescription (recently filled or refills available) for medication(s) requested? No    [email or web address]     Does the patient have care home Plus and need 100 day supply (blood pressure, diabetes and cholesterol meds only)? Patient does not have SCP

## 2024-02-23 NOTE — PROGRESS NOTES
This medical record contains text that has been entered with the assistance of computer voice recognition and dictation software.  Therefore, it may contain unintended errors in text, spelling, punctuation, or grammar      Chief Complaint   Patient presents with    Anxiety         Ellen Srivastava is a 88 y.o. female here evaluation and management of: Anxiety      HPI:           1. Depression with anxiety  Ellen is a very pleasant 88-year-old female who has been taking Lexapro 20 mg daily for her anxiety and depression.  She states lately her anxiety has become worse she is wondering if she can go back to diazepam.  She states that she lives with her , this past week very hectic and nerve-racking with a lot of places to travel to.  This is causing panic attacks preventing her from sleeping.        Current medicines (including changes today)  Current Outpatient Medications   Medication Sig Dispense Refill    gabapentin (NEURONTIN) 300 MG Cap Take 1 Capsule by mouth 3 times a day. 90 Capsule 0    ALPRAZolam (XANAX) 0.25 MG Tab Take 1 Tablet by mouth at bedtime as needed for Sleep for up to 30 days. 30 Tablet 0    hydrOXYzine HCl (ATARAX) 25 MG Tab Take 1 Tablet by mouth at bedtime. 90 Tablet 0    escitalopram (LEXAPRO) 20 MG tablet Take 1 Tablet by mouth every day. 90 Tablet 2    metoprolol SR (TOPROL XL) 50 MG TABLET SR 24 HR TAKE ONE TABLET BY MOUTH DAILY 90 Tablet 3    HYDROcodone/acetaminophen (NORCO)  MG Tab Take 1-2 Tablets by mouth every 6 hours as needed.      clobetasol (TEMOVATE) 0.05 % Cream APPLY TO AFFECTED AREA(S) TWO TIMES A DAY UP TO 14 DAYS THEN STOP 45 g 1     No current facility-administered medications for this visit.     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  She  has a past surgical history that includes breast biopsy (Left, ); lumbar decompression (, ); hysterectomy, total abdominal (); appendectomy (); parathyroid exploration (2010); paraesophageal hernia robotic (N/A, 10/12/2015); gastroscopy (10/23/2015); thoracoscopy (Left, 10/30/2015); gastroscopy-endo (2015); gastroscopy-endo (N/A, 12/10/2015); gastroscopy-endo (N/A, 2016); orif, ankle (Right, 2016); pr lap,esophagogast fundoplasty (N/A, 2020); and orif, ankle (Left, 2022).  Social History     Tobacco Use    Smoking status: Never    Smokeless tobacco: Former    Tobacco comments:     Nicotine    Vaping Use    Vaping Use: Never used   Substance Use Topics    Alcohol use: No     Alcohol/week: 0.0 oz    Drug use: No     Social History     Social History Narrative    Lives in [facility] and in [place].     Family History   Problem Relation Age of Onset    Hypertension Mother     Lung Disease Father     Hypertension Other     Lung Disease Other     Lung Disease Brother      Family Status   Relation Name Status    Mo   at age 86        Old Age    Fa   at age 83        Lung Disease    OTHER  (Not Specified)    OTHER  (Not Specified)    Bro           ROS    The pertinent  ROS findings can be seen in the HPI above.     All other systems reviewed and are negative     Objective:     /64 (BP Location: Left arm, Patient Position: Sitting, BP Cuff Size: Adult)   Pulse 67   Temp 36.4 °C (97.5 °F) (Temporal)   Ht 1.727 m (5' 8\")   Wt 57.6 kg (127 lb)   SpO2 94%  Body mass index is 19.31 kg/m².      Physical Exam:    Constitutional: Alert, no distress.  Skin: No " suspicious lesions  Eye: Equal, round and reactive, conjunctiva clear, lids normal.  ENMT: Lips without lesions, good dentition, oropharynx clear.  Neck: Trachea midline, no masses, no thyromegaly. No cervical or supraclavicular lymphadenopathy.  Respiratory: Unlabored respiratory effort, lungs clear to auscultation, no wheezes, no ronchi.  Cardiovascular: Normal S1, S2, no murmur, no edema  Abdomen: Soft, non-tender, no masses, no hepatosplenomegaly.        Assessment and Plan:   The following treatment plan was discussed    All recent labs and provider notes reviewed    1. Depression with anxiety    Ellen stated that she wanted to go back to Paxil but we discussed that beers criteria recommends against Paxil use in the elderly.  So we will use Xanax judiciously not for daily use just for severe panic    - ALPRAZolam (XANAX) 0.25 MG Tab; Take 1 Tablet by mouth at bedtime as needed for Sleep for up to 30 days.  Dispense: 30 Tablet; Refill: 0    2. Neuropathy  - gabapentin (NEURONTIN) 300 MG Cap; Take 1 Capsule by mouth 3 times a day.  Dispense: 90 Capsule; Refill: 0             Instructed to Follow up in clinic or ER for worsening symptoms, difficulty breathing, lack of expected recovery, or should new symptoms or problems arise.    Followup: Return in about 3 months (around 5/23/2024) for Reevaluation, labs.

## 2024-02-27 ENCOUNTER — TELEPHONE (OUTPATIENT)
Dept: MEDICAL GROUP | Age: 89
End: 2024-02-27
Payer: MEDICARE

## 2024-02-27 DIAGNOSIS — F41.8 DEPRESSION WITH ANXIETY: ICD-10-CM

## 2024-02-27 NOTE — TELEPHONE ENCOUNTER
Ellen's pain management doctor was upset that she was prescribed Alprazolam. According to the PT he would like her to be on PAXIL instead. She used to take PAXIL and it worked well for her.

## 2024-04-15 DIAGNOSIS — G62.9 NEUROPATHY: ICD-10-CM

## 2024-04-16 RX ORDER — GABAPENTIN 300 MG/1
300 CAPSULE ORAL 3 TIMES DAILY
Qty: 90 CAPSULE | Refills: 0 | Status: SHIPPED | OUTPATIENT
Start: 2024-04-16 | End: 2024-04-24 | Stop reason: SDUPTHER

## 2024-04-24 ENCOUNTER — OFFICE VISIT (OUTPATIENT)
Dept: MEDICAL GROUP | Age: 89
End: 2024-04-24
Payer: MEDICARE

## 2024-04-24 VITALS
HEART RATE: 64 BPM | TEMPERATURE: 97.4 F | HEIGHT: 68 IN | DIASTOLIC BLOOD PRESSURE: 80 MMHG | SYSTOLIC BLOOD PRESSURE: 124 MMHG | BODY MASS INDEX: 20.16 KG/M2 | OXYGEN SATURATION: 95 % | WEIGHT: 133 LBS

## 2024-04-24 DIAGNOSIS — F41.8 DEPRESSION WITH ANXIETY: ICD-10-CM

## 2024-04-24 DIAGNOSIS — F51.01 PRIMARY INSOMNIA: ICD-10-CM

## 2024-04-24 DIAGNOSIS — G62.9 NEUROPATHY: ICD-10-CM

## 2024-04-24 PROCEDURE — 3079F DIAST BP 80-89 MM HG: CPT | Performed by: FAMILY MEDICINE

## 2024-04-24 PROCEDURE — 99214 OFFICE O/P EST MOD 30 MIN: CPT | Performed by: FAMILY MEDICINE

## 2024-04-24 PROCEDURE — 3074F SYST BP LT 130 MM HG: CPT | Performed by: FAMILY MEDICINE

## 2024-04-24 RX ORDER — HYDROXYZINE HYDROCHLORIDE 25 MG/1
25 TABLET, FILM COATED ORAL
Qty: 90 TABLET | Refills: 0 | Status: SHIPPED | OUTPATIENT
Start: 2024-04-24

## 2024-04-24 RX ORDER — GABAPENTIN 300 MG/1
300 CAPSULE ORAL 3 TIMES DAILY
Qty: 90 CAPSULE | Refills: 0 | Status: SHIPPED | OUTPATIENT
Start: 2024-04-24

## 2024-04-24 RX ORDER — ESCITALOPRAM OXALATE 20 MG/1
20 TABLET ORAL DAILY
Qty: 90 TABLET | Refills: 2 | Status: SHIPPED | OUTPATIENT
Start: 2024-04-24

## 2024-04-24 ASSESSMENT — FIBROSIS 4 INDEX: FIB4 SCORE: 2.5

## 2024-04-24 NOTE — PROGRESS NOTES
This medical record contains text that has been entered with the assistance of computer voice recognition and dictation software.  Therefore, it may contain unintended errors in text, spelling, punctuation, or grammar      Chief Complaint   Patient presents with    Medication Refill     Med refill    Lab Results     Lab review         Ellen Srivastava is a 89 y.o. female here evaluation and management of: labs      HPI:           1. Depression with anxiety  2. Neuropathy  3. Primary insomnia    History of Present Illness  The patient is an 89-year-old female who presents for evaluation of multiple medical concerns.    The patient was previously under the care of a pain management specialist, Dr. Selby, but was subsequently transferred to another provider.  She states she does not get along with that provider and would like to change.  The patient expresses a desire to discontinue her pain medication. She has an upcoming appointment with a new provider on the 1st of the month and is seeking a refill of her anxiety medication.    The patient reports occasional difficulty in urination, although she typically manages to hydrate adequately. However, Dr. Selby often results in prolonged urination.    Supplemental Information  She has bumps on her arm which are painful to touch. She has been using a medication prescribed by a dermatologist without much benefit. Her last blood test was normal.        Current medicines (including changes today)  Current Outpatient Medications   Medication Sig Dispense Refill    escitalopram (LEXAPRO) 20 MG tablet Take 1 Tablet by mouth every day. 90 Tablet 2    gabapentin (NEURONTIN) 300 MG Cap Take 1 Capsule by mouth 3 times a day. 90 Capsule 0    hydrOXYzine HCl (ATARAX) 25 MG Tab Take 1 Tablet by mouth at bedtime. 90 Tablet 0    metoprolol SR (TOPROL XL) 50 MG TABLET SR 24 HR TAKE ONE TABLET BY MOUTH DAILY 90 Tablet 3    HYDROcodone/acetaminophen (NORCO)  MG Tab Take 1-2  Tablets by mouth every 6 hours as needed.      clobetasol (TEMOVATE) 0.05 % Cream APPLY TO AFFECTED AREA(S) TWO TIMES A DAY UP TO 14 DAYS THEN STOP 45 g 1     No current facility-administered medications for this visit.     She  has a past medical history of Anemia, Atrophic vaginitis (2010), Cerumen Impaction Recurrent (2010), Depressive disorder, not elsewhere classified (2010), HDL lipoprotein deficiency (2010), High cholesterol (2020), HTN (hypertension) (2010), Hypertriglyceridemia (2010), Narcotic dependence (HCC) (2012), Osteopenia (2010), Pain (2020), Parathyroid hormone excess (HCC) (2010), Serum calcium elevated (2010), Tinnitus (2010), and Vertigo, intermittent (2012).  She  has a past surgical history that includes breast biopsy (Left, ); lumbar decompression (, ); hysterectomy, total abdominal (); appendectomy (); parathyroid exploration (2010); paraesophageal hernia robotic (N/A, 10/12/2015); gastroscopy (10/23/2015); thoracoscopy (Left, 10/30/2015); gastroscopy-endo (2015); gastroscopy-endo (N/A, 12/10/2015); gastroscopy-endo (N/A, 2016); orif, ankle (Right, 2016); pr lap,esophagogast fundoplasty (N/A, 2020); and orif, ankle (Left, 2022).  Social History     Tobacco Use    Smoking status: Never    Smokeless tobacco: Former    Tobacco comments:     Nicotine    Vaping Use    Vaping Use: Never used   Substance Use Topics    Alcohol use: No     Alcohol/week: 0.0 oz    Drug use: No     Social History     Social History Narrative    Lives in [facility] and in [place].     Family History   Problem Relation Age of Onset    Hypertension Mother     Lung Disease Father     Hypertension Other     Lung Disease Other     Lung Disease Brother      Family Status   Relation Name Status    Mo   at age 86        Old Age    Fa   at age 83        Lung Disease    OTHER  (Not Specified)     "OTHER  (Not Specified)    Bro           ROS    The pertinent  ROS findings can be seen in the HPI above.     All other systems reviewed and are negative     Objective:     /80 (BP Location: Right arm, Patient Position: Sitting, BP Cuff Size: Large adult)   Pulse 64   Temp 36.3 °C (97.4 °F) (Temporal)   Ht 1.727 m (5' 8\")   Wt 60.3 kg (133 lb)   SpO2 95%  Body mass index is 20.22 kg/m².      Physical Exam:    Constitutional: Alert, no distress.  Skin: No suspicious lesions  Eye: Equal, round and reactive, conjunctiva clear, lids normal.  ENMT: Lips without lesions, good dentition, oropharynx clear.  Neck: Trachea midline, no masses, no thyromegaly. No cervical or supraclavicular lymphadenopathy.  Respiratory: Unlabored respiratory effort, lungs clear to auscultation, no wheezes, no ronchi.  Cardiovascular: Normal S1, S2, no murmur, no edema  Abdomen: Soft, non-tender, no masses, no hepatosplenomegaly.        Assessment and Plan:   The following treatment plan was discussed    All recent labs and provider notes reviewed    1. Depression with anxiety    Patient has been stable with current management  We will make no changes for now    - escitalopram (LEXAPRO) 20 MG tablet; Take 1 Tablet by mouth every day.  Dispense: 90 Tablet; Refill: 2    2. Neuropathy    She would like a new pain management provider  She also wants to wean off of the Norco    - gabapentin (NEURONTIN) 300 MG Cap; Take 1 Capsule by mouth 3 times a day.  Dispense: 90 Capsule; Refill: 0  - Referral to Pain Management    3. Primary insomnia    Patient has been stable with current management  We will make no changes for now    - hydrOXYzine HCl (ATARAX) 25 MG Tab; Take 1 Tablet by mouth at bedtime.  Dispense: 90 Tablet; Refill: 0             Instructed to Follow up in clinic or ER for worsening symptoms, difficulty breathing, lack of expected recovery, or should new symptoms or problems arise.    Followup: Return in about 6 months " (around 10/24/2024) for Reevaluation, labs.

## 2024-04-30 ENCOUNTER — TELEPHONE (OUTPATIENT)
Dept: MEDICAL GROUP | Age: 89
End: 2024-04-30
Payer: MEDICARE

## 2024-04-30 NOTE — TELEPHONE ENCOUNTER
Phone Number Called: [phone]     Call outcome: Spoke to patient regarding message below.    Message: [facility] CALLED PATIENT IS ARGUING ABOUT MEDICATION SHE IS CURRENTLY TAKING FOR PAIN. MEDICAL ASSISTANT LEAD WOULD LIKE US TO SCHEDULE APPOINTMENT AND CONFIRM A MED REVIEW. PATIENT WAS LAST SEEN 04/24/2024

## 2024-06-11 RX ORDER — METOPROLOL SUCCINATE 50 MG/1
TABLET, EXTENDED RELEASE ORAL
Qty: 90 TABLET | Refills: 3 | Status: SHIPPED | OUTPATIENT
Start: 2024-06-11

## 2024-07-16 DIAGNOSIS — F51.01 PRIMARY INSOMNIA: ICD-10-CM

## 2024-07-16 DIAGNOSIS — G62.9 NEUROPATHY: ICD-10-CM

## 2024-07-17 ENCOUNTER — TELEPHONE (OUTPATIENT)
Dept: MEDICAL GROUP | Age: 89
End: 2024-07-17
Payer: MEDICARE

## 2024-07-17 DIAGNOSIS — L30.9 DERMATITIS: ICD-10-CM

## 2024-07-17 RX ORDER — HYDROXYZINE HYDROCHLORIDE 25 MG/1
25 TABLET, FILM COATED ORAL
Qty: 90 TABLET | Refills: 2 | Status: SHIPPED | OUTPATIENT
Start: 2024-07-17

## 2024-07-17 RX ORDER — CLOBETASOL PROPIONATE 0.5 MG/G
CREAM TOPICAL
Qty: 45 G | Refills: 1 | Status: SHIPPED | OUTPATIENT
Start: 2024-07-17

## 2024-07-17 RX ORDER — GABAPENTIN 300 MG/1
300 CAPSULE ORAL 3 TIMES DAILY
Qty: 90 CAPSULE | Refills: 2 | Status: SHIPPED | OUTPATIENT
Start: 2024-07-17

## 2024-09-11 ENCOUNTER — OFFICE VISIT (OUTPATIENT)
Dept: MEDICAL GROUP | Age: 89
End: 2024-09-11
Payer: MEDICARE

## 2024-09-11 ENCOUNTER — APPOINTMENT (OUTPATIENT)
Dept: MEDICAL GROUP | Age: 89
End: 2024-09-11
Payer: MEDICARE

## 2024-09-11 VITALS
DIASTOLIC BLOOD PRESSURE: 70 MMHG | BODY MASS INDEX: 19.7 KG/M2 | TEMPERATURE: 98 F | SYSTOLIC BLOOD PRESSURE: 120 MMHG | OXYGEN SATURATION: 93 % | HEART RATE: 67 BPM | HEIGHT: 68 IN | WEIGHT: 130 LBS

## 2024-09-11 DIAGNOSIS — I10 PRIMARY HYPERTENSION: ICD-10-CM

## 2024-09-11 DIAGNOSIS — G62.9 NEUROPATHY: ICD-10-CM
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
## 2024-09-11 DIAGNOSIS — M95.0 NOSE DEFORMITY: ICD-10-CM

## 2024-09-11 DIAGNOSIS — F11.20 NARCOTIC DEPENDENCE (HCC): ICD-10-CM

## 2024-09-11 DIAGNOSIS — Z79.891 CHRONIC USE OF OPIATE DRUG FOR THERAPEUTIC PURPOSE: ICD-10-CM

## 2024-09-11 DIAGNOSIS — K21.9 GASTROESOPHAGEAL REFLUX DISEASE WITHOUT ESOPHAGITIS: ICD-10-CM

## 2024-09-11 DIAGNOSIS — J34.89 NOSE PAIN: ICD-10-CM

## 2024-09-11 PROCEDURE — G2211 COMPLEX E/M VISIT ADD ON: HCPCS | Performed by: STUDENT IN AN ORGANIZED HEALTH CARE EDUCATION/TRAINING PROGRAM

## 2024-09-11 PROCEDURE — 3074F SYST BP LT 130 MM HG: CPT | Performed by: STUDENT IN AN ORGANIZED HEALTH CARE EDUCATION/TRAINING PROGRAM

## 2024-09-11 PROCEDURE — 3078F DIAST BP <80 MM HG: CPT | Performed by: STUDENT IN AN ORGANIZED HEALTH CARE EDUCATION/TRAINING PROGRAM

## 2024-09-11 PROCEDURE — 99214 OFFICE O/P EST MOD 30 MIN: CPT | Performed by: STUDENT IN AN ORGANIZED HEALTH CARE EDUCATION/TRAINING PROGRAM

## 2024-09-11 ASSESSMENT — FIBROSIS 4 INDEX: FIB4 SCORE: 2.5

## 2024-09-11 ASSESSMENT — ENCOUNTER SYMPTOMS
CHILLS: 0
BRUISES/BLEEDS EASILY: 0
FEVER: 0
HEARTBURN: 0
BLURRED VISION: 0
DIZZINESS: 0
DEPRESSION: 0
FALLS: 1
BACK PAIN: 0
WEAKNESS: 0
DOUBLE VISION: 0
SHORTNESS OF BREATH: 0
PALPITATIONS: 0
HEADACHES: 0
BLOOD IN STOOL: 0

## 2024-09-11 NOTE — PATIENT INSTRUCTIONS
- Continue home meds  - Schedule Imaging  - OK to take OTC tylenol  - OK to take Oxy but limit to twice daily

## 2024-09-12 NOTE — PROGRESS NOTES
"Subjective:     CC: Mechanical fall follow-up    HPI:   History of Present Illness  The patient is an 89-year-old female presenting for pain after a mechanical fall.    She experienced a fall last Friday while walking to the restroom, during which she lost her balance and injured her face. She did not seek immediate medical attention at a hospital. The impact resulted in a nosebleed and discomfort in her eyes and nose. A tooth was also dislodged, causing difficulty in eating. She has consulted a dentist for this issue. Her hands were not affected by the fall. She has been managing the pain with Norco, Advil, and Tylenol.       ROS:  Review of Systems   Constitutional:  Negative for chills, fever and malaise/fatigue.   HENT:  Negative for nosebleeds and tinnitus.    Eyes:  Negative for blurred vision and double vision.   Respiratory:  Negative for shortness of breath.    Cardiovascular:  Negative for chest pain, palpitations and leg swelling.   Gastrointestinal:  Negative for blood in stool, heartburn and melena.   Genitourinary:  Negative for dysuria and urgency.   Musculoskeletal:  Positive for falls. Negative for back pain and joint pain.   Skin:  Negative for rash.   Neurological:  Negative for dizziness, weakness and headaches.   Endo/Heme/Allergies:  Does not bruise/bleed easily.   Psychiatric/Behavioral:  Negative for depression and suicidal ideas.        Objective:     Exam:  /70 (BP Location: Left arm, Patient Position: Sitting, BP Cuff Size: Large adult)   Pulse 67   Temp 36.7 °C (98 °F) (Temporal)   Ht 1.727 m (5' 8\")   Wt 59 kg (130 lb)   LMP 03/13/1970   SpO2 93%   BMI 19.77 kg/m²  Body mass index is 19.77 kg/m².    Physical Exam  Vitals reviewed.   Constitutional:       General: She is not in acute distress.  HENT:      Head: Normocephalic and atraumatic.        Comments: Multiple hematomas in her face after suffering mechanical fall.  Visual loss is tender to palpation, but no obvious " deformity is noted.     Right Ear: Tympanic membrane and ear canal normal.      Left Ear: Tympanic membrane and ear canal normal.      Mouth/Throat:      Mouth: Mucous membranes are moist.      Pharynx: Oropharynx is clear.   Eyes:      General: No scleral icterus.     Extraocular Movements: Extraocular movements intact.      Pupils: Pupils are equal, round, and reactive to light.   Cardiovascular:      Rate and Rhythm: Normal rate and regular rhythm.      Pulses: Normal pulses.      Heart sounds: Normal heart sounds. No murmur heard.  Pulmonary:      Effort: Pulmonary effort is normal. No respiratory distress.      Breath sounds: Normal breath sounds. No wheezing.   Abdominal:      General: There is no distension.      Palpations: Abdomen is soft.      Tenderness: There is no abdominal tenderness. There is no guarding or rebound.   Musculoskeletal:      Cervical back: Normal range of motion and neck supple.      Right lower leg: No edema.      Left lower leg: No edema.   Skin:     General: Skin is warm.      Capillary Refill: Capillary refill takes less than 2 seconds.      Coloration: Skin is not jaundiced.   Neurological:      General: No focal deficit present.      Mental Status: She is alert.      Cranial Nerves: No cranial nerve deficit.      Sensory: No sensory deficit.   Psychiatric:         Mood and Affect: Mood normal.         Behavior: Behavior normal.       Labs: Reviewed    Assessment & Plan:     89 y.o. female with the following -     1. Nose pain  2. Fall (on)(from) sidewalk curb, sequela  3. Nose deformity  -Patient states following a mechanical fall last Friday while walking to the restroom, during which she lost her balance and injured her face. She did not seek immediate medical attention at a hospital. The impact resulted in a nosebleed and discomfort in her eyes and nose. A tooth was also dislodged, causing difficulty in eating. She has consulted a dentist for this issue. Her hands were not  affected by the fall. She has been managing the pain with Norco, Advil, and Tylenol.  -Patient states significant pain in her face, physical semination is obvious hematomas around her nose and below her chin.  -Patient denies trauma to the hands or arms  -Patient denies loss of consciousness, she also denies headaches or confusion.  -Her  also states not noticing signs of confusion or exercise behavior  -Recommend a CT scan of the face to rule out nasal fracture  -Try to continue over-the-counter Tylenol and ibuprofen  -May continue taking Norco as prescribed by your PCP  -Follow-up with your PCP within 2 weeks    -CT-MAXILLOFACIAL W/O PLUS RECONS; Future    4. Chronic use of opiate drug for therapeutic purpose  5. Narcotic dependence (HCC)  -Chronic, stable  -Patient at risk of mechanical falls given narcotic use  -Patient states taking 1 pill daily  -Her PCP in the past refer her to PT for balance exercises  -Unclear if patient is taking appropriate dose  -Patient also aware that this medication can cause mechanical falls  -I encouraged her to take this medication only for significant pain given various side effects    6. Primary hypertension  -Chronic, stable  -Apparently her blood pressure has been normal  -Patient denies episodes of hypotension  -BP today was 120/70  -Currently taking metoprolol 50 mg daily  -Continue monitoring blood pressure    7. Neuropathy  -Chronic, stable  -Currently taking gabapentin  -Unclear if neuropathy played a role in mechanical fall  -Encouraged to continue PT  -Continue same therapy    8. Gastroesophageal reflux disease without esophagitis  -Chronic, stable  -Currently not taking any medications for GERD      Return in about 3 weeks (around 10/2/2024) for imaging.    Secondary to the complexity of this patient's illnesses and their interactions.  All problems listed were discussed during the office visit, medications were evaluated and complexities were discussed as well  as plan for the future.      Please note that this dictation was created using voice recognition software. I have made every reasonable attempt to correct obvious errors, but I expect that there are errors of grammar and possibly content that I did not discover before finalizing the note.

## 2024-09-13 DIAGNOSIS — S09.93XA BLUNT TRAUMA OF FACE, INITIAL ENCOUNTER: ICD-10-CM

## 2024-09-13 DIAGNOSIS — G44.019 EPISODIC CLUSTER HEADACHE, NOT INTRACTABLE: ICD-10-CM

## 2024-09-13 DIAGNOSIS — M95.0 NOSE DEFORMITY: ICD-10-CM

## 2024-09-13 DIAGNOSIS — S09.93XD FACIAL INJURY, SUBSEQUENT ENCOUNTER: ICD-10-CM
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
## 2024-09-17 ENCOUNTER — APPOINTMENT (OUTPATIENT)
Dept: RADIOLOGY | Facility: MEDICAL CENTER | Age: 89
End: 2024-09-17
Attending: EMERGENCY MEDICINE
Payer: MEDICARE

## 2024-09-17 ENCOUNTER — HOSPITAL ENCOUNTER (EMERGENCY)
Facility: MEDICAL CENTER | Age: 89
End: 2024-09-17
Attending: EMERGENCY MEDICINE
Payer: MEDICARE

## 2024-09-17 VITALS
BODY MASS INDEX: 20.11 KG/M2 | TEMPERATURE: 97.8 F | HEIGHT: 68 IN | OXYGEN SATURATION: 95 % | HEART RATE: 69 BPM | SYSTOLIC BLOOD PRESSURE: 144 MMHG | RESPIRATION RATE: 19 BRPM | DIASTOLIC BLOOD PRESSURE: 79 MMHG | WEIGHT: 132.72 LBS

## 2024-09-17 DIAGNOSIS — S02.2XXA CLOSED FRACTURE OF NASAL BONE, INITIAL ENCOUNTER: ICD-10-CM

## 2024-09-17 DIAGNOSIS — S09.90XA CLOSED HEAD INJURY, INITIAL ENCOUNTER: ICD-10-CM
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
## 2024-09-17 PROCEDURE — 70450 CT HEAD/BRAIN W/O DYE: CPT

## 2024-09-17 PROCEDURE — 99284 EMERGENCY DEPT VISIT MOD MDM: CPT

## 2024-09-17 PROCEDURE — 70486 CT MAXILLOFACIAL W/O DYE: CPT

## 2024-09-17 ASSESSMENT — FIBROSIS 4 INDEX: FIB4 SCORE: 2.5

## 2024-09-18 NOTE — ED NOTES
Pt. Verbalizes understanding of discharge instructions. accompanied to lobby with spouse. Pt. Alert/awake in NAD.  All questions answered and understood. Advised to ff-up with ENT information and referral was given to the patient.

## 2024-09-18 NOTE — ED PROVIDER NOTES
ER Provider Note    Scribed for Dr. Perez Hoang D.O. by Dl Warner. 9/17/2024  6:42 PM    Primary Care Provider: Denzel Patel M.D.    CHIEF COMPLAINT  Chief Complaint   Patient presents with    Fall     Fell on face, fear she fractured her nose. Broken teeth appreciated.   Bruising noted surrounding nose.      HPI/ROS  LIMITATION TO HISTORY   None    OUTSIDE HISTORIAN(S):  None    Ellen Sirvastava is a 89 y.o. female who presents to the Emergency Department for evaluation following a fall onset last month. The patient reports that she was walking up to a restaurant when she fell onto her face. She notes that since the fall she has had headaches and thinks her nose is broken. Patient adds that her front right tooth fell out as a result of the fall. Denies blood thinners but reports long term Norco use due to a broken back years ago.    ROS as per HPI.    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    Serum calcium elevated 1/14/2010    Tinnitus 1/14/2010    Vertigo, intermittent 4/28/2012     SURGICAL HISTORY  Past Surgical History:   Procedure Laterality Date    ORIF, ANKLE Left 1/28/2022    Procedure: ORIF, ANKLE-Bimalleolar;  Surgeon: Rayshawn Celis M.D.;  Location: SURGERY [facility];  Service: Orthopedics    GA LAP ESOPHAGOGAST FUNDOPLASTY N/A 8/24/2020    Procedure: FUNDOPLICATION, NISSEN, LAPAROSCOPIC - REPAIR RECURRENT PARAESOPHGEAL HERNIA WITH MESH, POSS FUNDOPLICATION, POSS RESECTION DISTAL ESOPHAGUS;  Surgeon: John H Ganser, M.D.;   Location: SURGERY [facility];  Service: General    ORIF, ANKLE Right 9/20/2016    Procedure: ANKLE ORIF;  Surgeon: Stef Bray M.D.;  Location: SURGERY [facility];  Service:     GASTROSCOPY-ENDO N/A 2/26/2016    Procedure: GASTROSCOPY-ENDO W/ESOPHAGEAL METAL STENT REMOVAL;  Surgeon: Jossue Ruggiero M.D.;  Location: SURGERY [facility];  Service:     GASTROSCOPY-ENDO N/A 12/10/2015    Procedure: GASTROSCOPY-ENDO;  Surgeon: Heri Velasco M.D.;  Location: SURGERY [facility];  Service:     GASTROSCOPY-ENDO  11/1/2015    Procedure: GASTROSCOPY-ENDO;  Surgeon: Conner Joe M.D.;  Location: [facility];  Service:     THORACOSCOPY Left 10/30/2015    Procedure: THORACOSCOPY;  Surgeon: Blaine Waterman D.O.;  Location: SURGERY [facility];  Service:     GASTROSCOPY  10/23/2015    Procedure: GASTROSCOPY- EGD with esophageal stent placement ;  Surgeon: Jossue Ruggiero M.D.;  Location: SURGERY [facility];  Service:     PARAESOPHAGEAL HERNIA ROBOTIC N/A 10/12/2015    Procedure: PARAESOPHAGEAL HERNIA REPAIR ROBOTIC resection of esophageal diverticulum  repair of hiatal hernia for anterior fundoplasty with mesh  ;  Surgeon: Ulysses Simpson M.D.;  Location: SURGERY [facility];  Service:     BREAST BIOPSY Left 2015    Benign x3 surgeries    PARATHYROID EXPLORATION  6/16/2010    Performed by AYSE CARROLL at SURGERY SAME DAY [facility]    HYSTERECTOMY, TOTAL ABDOMINAL  1977    APPENDECTOMY  1945    LUMBAR DECOMPRESSION  1969, 1979     FAMILY HISTORY  Family History   Problem Relation Age of Onset    Hypertension Mother     Lung Disease Father     Hypertension Other     Lung Disease Other     Lung Disease Brother      SOCIAL HISTORY   reports that she has never smoked. She has quit using smokeless tobacco. She reports that she does not drink alcohol and does not use drugs.    CURRENT MEDICATIONS  Discharge Medication List as of 9/17/2024  7:47  "PM        CONTINUE these medications which have NOT CHANGED    Details   hydrOXYzine HCl (ATARAX) 25 MG Tab Take 1 Tablet by mouth at bedtime., Disp-90 Tablet, R-2, Normal      gabapentin (NEURONTIN) 300 MG Cap Take 1 Capsule by mouth 3 times a day., Disp-90 Capsule, R-2, Normal      clobetasol (TEMOVATE) 0.05 % Cream APPLY TO AFFECTED AREA(S) TWO TIMES A DAY UP TO 14 DAYS THEN STOP, Disp-45 g, R-1, Normal      metoprolol SR (TOPROL XL) 50 MG TABLET SR 24 HR TAKE 1 TABLET BY MOUTH DAILY, Disp-90 Tablet, R-3, Normal      escitalopram (LEXAPRO) 20 MG tablet Take 1 Tablet by mouth every day., Disp-90 Tablet, R-2, Normal      HYDROcodone/acetaminophen (NORCO)  MG Tab Take 1-2 Tablets by mouth every 6 hours as needed., Historical Med           ALLERGIES  Asa [aspirin]    PHYSICAL EXAM  BP (!) 148/83   Pulse 63   Temp 36.6 °C (97.8 °F) (Temporal)   Resp 18   Ht 1.727 m (5' 8\")   Wt 60.2 kg (132 lb 11.5 oz)   LMP 03/13/1970   SpO2 92%   BMI 20.18 kg/m²     General: No acute distress.  HENT: Normocephalic, Mucus membranes are moist. Left maxillary ecchymosis. Nasal bone tenderness.  Chest: Lungs have even and unlabored respirations, Clear to auscultation.   Cardiovascular: Regular rate and regular rhythm, No peripheral cyanosis.  Abdomen: Non distended.  Neuro: Awake, Conversive, Able to relay recent events. A&O x 4.  Psychiatric: Calm and cooperative.     INITIAL ASSESSMENT  Patient had a fall one month ago. She has had a continuous headache and concerns about fractures to the face. Will evaluate with CT.     ED Observation Status? No; Patient does not meet criteria for ED Observation.     DIAGNOSTIC STUDIES    Radiology:   The attending emergency physician has independently interpreted the diagnostic imaging associated with this visit and am waiting the final reading from the radiologist.   Preliminary interpretation is as follows: CT shows no cerebral hemorrhage  Radiologist interpretation: "   CT-MAXILLOFACIAL W/O PLUS RECONS   Final Result      1.  Acute bilateral nasal bone fractures.   2.  No other acute maxillofacial or mandibular fracture evident.      CT-HEAD W/O   Final Result      1.  Cerebral atrophy.      2.  White matter lucencies most consistent with small vessel ischemic change versus demyelination or gliosis.      3.  Otherwise, Head CT without contrast with no acute findings. No evidence of acute cerebral infarction, hemorrhage or mass lesion.      4. Acute nasal bone fractures                 COURSE & MEDICAL DECISION MAKING     COURSE AND PLAN  6:42 PM - Patient seen and examined at bedside. Discussed plan of care, including obtaining imaging in order to further evaluate. Patient agrees to the plan of care. Ordered for CT-Head w/o and CT-Maxillofacial w/o Plus Recons to evaluate her symptoms.     7:43 PM - I reevaluated the patient at bedside. I discussed the patient's diagnostic study results which show acute nasal bone fractures, but no evidence of cerebral infarction, hemorrhage, or mass lesion. I discussed plan for discharge and follow up as outlined below. The patient is stable for discharge at this time and will return for any new or worsening symptoms. Patient verbalizes understanding and support with my plan for discharge.     ED Summary: This patient presents with a fall as almost a month old.  She has complaints of nasal pain and continued headache.  CT shows no cerebral hemorrhage areas nasal bone fracture.  There is some swelling to the nasal area but it is well aligned.  With this the patient is stable for discharge home, referral has been placed for plastic surgery for follow-up.      DISPOSITION AND DISCUSSIONS  I have discussed management of the patient with the following physicians and UDAY's: None    Discussion of management with other QHP or appropriate source(s): None    Barriers to care at this time, including but not limited to: No known barriers to care     The  patient will return for new or worsening symptoms and is stable at the time of discharge.    The patient is referred to a primary physician for blood pressure management, diabetic screening, and for all other preventative health concerns.    DISPOSITION:  Patient will be discharged home in stable condition.    FOLLOW UP:  [facility]  [address] [place] [address]  [phone]        OUTPATIENT MEDICATIONS:  Discharge Medication List as of 9/17/2024  7:47 PM        FINAL DIAGNOSIS  1. Fall, initial encounter    2. Closed head injury, initial encounter    3. Closed fracture of nasal bone, initial encounter      Dl BAUM (Jourdan), am scribing for, and in the presence of, Dr. Perez Hoang D.O..    Electronically signed by: Dl Warner (Jourdan), 9/17/2024    Dr. Perez BAUM D.O. personally performed the services described in this documentation, as scribed by Dl Warner in my presence, and it is both accurate and complete.     The note accurately reflects work and decisions made by me.  Perez Hoang D.O.  9/17/2024  8:41 PM

## 2024-09-18 NOTE — DISCHARGE INSTRUCTIONS
CT showed acute fracture of the nasal bone.  This requires further evaluation by specialist.  Continue Motrin and Tylenol for pain.  Please call the number provided above to make a follow-up appointment and evaluation for the facial fracture

## 2024-09-18 NOTE — ED TRIAGE NOTES
"Chief Complaint   Patient presents with    Fall     Fell on face, fear she fractured her nose. Broken teeth appreciated.   Bruising noted surrounding nose.      BP (!) 148/83   Pulse 63   Temp 36.6 °C (97.8 °F) (Temporal)   Resp 18   Ht 1.727 m (5' 8\")   Wt 60.2 kg (132 lb 11.5 oz)   LMP 03/13/1970   SpO2 92%   BMI 20.18 kg/m²     "

## 2024-10-02 ENCOUNTER — APPOINTMENT (OUTPATIENT)
Dept: MEDICAL GROUP | Age: 89
End: 2024-10-02
Payer: MEDICARE

## 2024-11-07 DIAGNOSIS — M54.6 ACUTE BILATERAL THORACIC BACK PAIN: ICD-10-CM

## 2024-11-07 DIAGNOSIS — G62.9 NEUROPATHY: ICD-10-CM

## 2024-11-07 NOTE — TELEPHONE ENCOUNTER
Received request via: Pharmacy    Was the patient seen in the last year in this department? Yes    Does the patient have an active prescription (recently filled or refills available) for medication(s) requested? No    Pharmacy Name: [facility]    Does the patient have [facility] and need 100-day supply? (This applies to ALL medications) Patient does not have SCP

## 2024-11-20 ENCOUNTER — APPOINTMENT (OUTPATIENT)
Dept: RADIOLOGY | Facility: MEDICAL CENTER | Age: 89
End: 2024-11-20
Attending: INTERNAL MEDICINE
Payer: MEDICARE

## 2024-11-20 ENCOUNTER — APPOINTMENT (OUTPATIENT)
Dept: RADIOLOGY | Facility: MEDICAL CENTER | Age: 89
End: 2024-11-20
Attending: EMERGENCY MEDICINE
Payer: MEDICARE

## 2024-11-20 ENCOUNTER — HOSPITAL ENCOUNTER (INPATIENT)
Facility: MEDICAL CENTER | Age: 89
LOS: 4 days | End: 2024-11-24
Attending: EMERGENCY MEDICINE | Admitting: INTERNAL MEDICINE
Payer: MEDICARE

## 2024-11-20 DIAGNOSIS — S22.49XA CLOSED FRACTURE OF MULTIPLE RIBS, UNSPECIFIED LATERALITY, INITIAL ENCOUNTER: ICD-10-CM

## 2024-11-20 DIAGNOSIS — S22.42XA CLOSED FRACTURE OF MULTIPLE RIBS OF LEFT SIDE, INITIAL ENCOUNTER: ICD-10-CM

## 2024-11-20 DIAGNOSIS — R91.1 LUNG NODULE: ICD-10-CM

## 2024-11-20 DIAGNOSIS — S27.0XXA TRAUMATIC PNEUMOTHORAX, INITIAL ENCOUNTER: ICD-10-CM
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
## 2024-11-20 DIAGNOSIS — R09.02 HYPOXEMIA: ICD-10-CM

## 2024-11-20 DIAGNOSIS — J18.9 PNEUMONIA DUE TO INFECTIOUS ORGANISM, UNSPECIFIED LATERALITY, UNSPECIFIED PART OF LUNG: ICD-10-CM

## 2024-11-20 PROBLEM — S22.39XA RIB FRACTURE: Status: ACTIVE | Noted: 2024-11-20

## 2024-11-20 PROBLEM — J93.9 PNEUMOTHORAX: Status: ACTIVE | Noted: 2024-11-20

## 2024-11-20 PROBLEM — Z71.89 ADVANCE CARE PLANNING: Status: ACTIVE | Noted: 2024-11-20

## 2024-11-20 PROCEDURE — 700111 HCHG RX REV CODE 636 W/ 250 OVERRIDE (IP): Mod: JZ | Performed by: EMERGENCY MEDICINE

## 2024-11-20 PROCEDURE — 770020 HCHG ROOM/CARE - TELE (206)

## 2024-11-20 PROCEDURE — 71250 CT THORAX DX C-: CPT

## 2024-11-20 PROCEDURE — A9270 NON-COVERED ITEM OR SERVICE: HCPCS | Performed by: INTERNAL MEDICINE

## 2024-11-20 PROCEDURE — 71045 X-RAY EXAM CHEST 1 VIEW: CPT

## 2024-11-20 PROCEDURE — 96374 THER/PROPH/DIAG INJ IV PUSH: CPT

## 2024-11-20 PROCEDURE — 700101 HCHG RX REV CODE 250: Performed by: EMERGENCY MEDICINE

## 2024-11-20 PROCEDURE — 99285 EMERGENCY DEPT VISIT HI MDM: CPT

## 2024-11-20 PROCEDURE — 99497 ADVNCD CARE PLAN 30 MIN: CPT | Performed by: INTERNAL MEDICINE

## 2024-11-20 PROCEDURE — 700111 HCHG RX REV CODE 636 W/ 250 OVERRIDE (IP): Performed by: INTERNAL MEDICINE

## 2024-11-20 PROCEDURE — 700101 HCHG RX REV CODE 250: Performed by: INTERNAL MEDICINE

## 2024-11-20 PROCEDURE — 94760 N-INVAS EAR/PLS OXIMETRY 1: CPT

## 2024-11-20 PROCEDURE — 96375 TX/PRO/DX INJ NEW DRUG ADDON: CPT

## 2024-11-20 PROCEDURE — 700102 HCHG RX REV CODE 250 W/ 637 OVERRIDE(OP): Performed by: INTERNAL MEDICINE

## 2024-11-20 PROCEDURE — 99223 1ST HOSP IP/OBS HIGH 75: CPT | Mod: 25,AI | Performed by: INTERNAL MEDICINE

## 2024-11-20 PROCEDURE — 99222 1ST HOSP IP/OBS MODERATE 55: CPT | Performed by: INTERNAL MEDICINE

## 2024-11-20 RX ORDER — ACETAMINOPHEN 325 MG/1
650 TABLET ORAL EVERY 6 HOURS PRN
Status: DISCONTINUED | OUTPATIENT
Start: 2024-11-20 | End: 2024-11-20

## 2024-11-20 RX ORDER — ONDANSETRON 2 MG/ML
4 INJECTION INTRAMUSCULAR; INTRAVENOUS ONCE
Status: COMPLETED | OUTPATIENT
Start: 2024-11-20 | End: 2024-11-20

## 2024-11-20 RX ORDER — HYDROXYZINE HYDROCHLORIDE 25 MG/1
25 TABLET, FILM COATED ORAL
Status: DISCONTINUED | OUTPATIENT
Start: 2024-11-20 | End: 2024-11-24 | Stop reason: HOSPADM

## 2024-11-20 RX ORDER — GABAPENTIN 300 MG/1
300 CAPSULE ORAL 2 TIMES DAILY
Status: DISCONTINUED | OUTPATIENT
Start: 2024-11-20 | End: 2024-11-24 | Stop reason: HOSPADM

## 2024-11-20 RX ORDER — AMOXICILLIN 250 MG
2 CAPSULE ORAL EVERY EVENING
Status: DISCONTINUED | OUTPATIENT
Start: 2024-11-20 | End: 2024-11-23

## 2024-11-20 RX ORDER — HYDROCODONE BITARTRATE AND ACETAMINOPHEN 10; 325 MG/1; MG/1
1 TABLET ORAL 2 TIMES DAILY
Status: DISCONTINUED | OUTPATIENT
Start: 2024-11-20 | End: 2024-11-20

## 2024-11-20 RX ORDER — ACETAMINOPHEN 500 MG
1000 TABLET ORAL 3 TIMES DAILY
Status: DISCONTINUED | OUTPATIENT
Start: 2024-11-20 | End: 2024-11-24 | Stop reason: HOSPADM

## 2024-11-20 RX ORDER — LIDOCAINE 4 G/G
1 PATCH TOPICAL EVERY 24 HOURS
Status: DISCONTINUED | OUTPATIENT
Start: 2024-11-20 | End: 2024-11-20

## 2024-11-20 RX ORDER — HYDROMORPHONE HYDROCHLORIDE 1 MG/ML
0.25 INJECTION, SOLUTION INTRAMUSCULAR; INTRAVENOUS; SUBCUTANEOUS EVERY 6 HOURS PRN
Status: DISCONTINUED | OUTPATIENT
Start: 2024-11-20 | End: 2024-11-24 | Stop reason: HOSPADM

## 2024-11-20 RX ORDER — ESCITALOPRAM OXALATE 10 MG/1
20 TABLET ORAL DAILY
Status: DISCONTINUED | OUTPATIENT
Start: 2024-11-20 | End: 2024-11-24 | Stop reason: HOSPADM

## 2024-11-20 RX ORDER — OXYCODONE HYDROCHLORIDE 5 MG/1
2.5 TABLET ORAL EVERY 6 HOURS PRN
Status: DISCONTINUED | OUTPATIENT
Start: 2024-11-20 | End: 2024-11-24 | Stop reason: HOSPADM

## 2024-11-20 RX ORDER — METOPROLOL SUCCINATE 25 MG/1
50 TABLET, EXTENDED RELEASE ORAL DAILY
Status: DISCONTINUED | OUTPATIENT
Start: 2024-11-20 | End: 2024-11-24 | Stop reason: HOSPADM

## 2024-11-20 RX ORDER — LIDOCAINE 4 G/G
1 PATCH TOPICAL EVERY 24 HOURS
Status: DISCONTINUED | OUTPATIENT
Start: 2024-11-20 | End: 2024-11-24 | Stop reason: HOSPADM

## 2024-11-20 RX ORDER — OXYCODONE HYDROCHLORIDE 5 MG/1
5 TABLET ORAL EVERY 6 HOURS PRN
Status: DISCONTINUED | OUTPATIENT
Start: 2024-11-20 | End: 2024-11-24 | Stop reason: HOSPADM

## 2024-11-20 RX ORDER — MORPHINE SULFATE 4 MG/ML
2 INJECTION INTRAVENOUS
Status: DISCONTINUED | OUTPATIENT
Start: 2024-11-20 | End: 2024-11-20

## 2024-11-20 RX ORDER — POLYETHYLENE GLYCOL 3350 17 G/17G
1 POWDER, FOR SOLUTION ORAL
Status: DISCONTINUED | OUTPATIENT
Start: 2024-11-20 | End: 2024-11-23

## 2024-11-20 RX ORDER — OXYCODONE HYDROCHLORIDE 5 MG/1
5 TABLET ORAL 2 TIMES DAILY
Status: DISCONTINUED | OUTPATIENT
Start: 2024-11-20 | End: 2024-11-24 | Stop reason: HOSPADM

## 2024-11-20 RX ADMIN — ONDANSETRON 4 MG: 2 INJECTION INTRAMUSCULAR; INTRAVENOUS at 08:34

## 2024-11-20 RX ADMIN — HYDROMORPHONE HYDROCHLORIDE 0.25 MG: 1 INJECTION, SOLUTION INTRAMUSCULAR; INTRAVENOUS; SUBCUTANEOUS at 15:25

## 2024-11-20 RX ADMIN — MORPHINE SULFATE 2 MG: 4 INJECTION, SOLUTION INTRAMUSCULAR; INTRAVENOUS at 08:36

## 2024-11-20 RX ADMIN — LIDOCAINE 1 PATCH: 4 PATCH TOPICAL at 15:26

## 2024-11-20 RX ADMIN — HYDROXYZINE HYDROCHLORIDE 25 MG: 25 TABLET, FILM COATED ORAL at 20:47

## 2024-11-20 RX ADMIN — METOPROLOL SUCCINATE 50 MG: 25 TABLET, EXTENDED RELEASE ORAL at 12:10

## 2024-11-20 RX ADMIN — OXYCODONE HYDROCHLORIDE 5 MG: 5 TABLET ORAL at 12:11

## 2024-11-20 RX ADMIN — POLYETHYLENE GLYCOL 3350 1 PACKET: 17 POWDER, FOR SOLUTION ORAL at 17:02

## 2024-11-20 RX ADMIN — OXYCODONE 5 MG: 5 TABLET ORAL at 14:09

## 2024-11-20 RX ADMIN — ESCITALOPRAM OXALATE 20 MG: 10 TABLET ORAL at 12:11

## 2024-11-20 RX ADMIN — GABAPENTIN 300 MG: 300 CAPSULE ORAL at 14:08

## 2024-11-20 RX ADMIN — ACETAMINOPHEN 1000 MG: 500 TABLET ORAL at 20:46

## 2024-11-20 RX ADMIN — OXYCODONE HYDROCHLORIDE 5 MG: 5 TABLET ORAL at 17:01

## 2024-11-20 RX ADMIN — LIDOCAINE 1 PATCH: 4 PATCH TOPICAL at 09:54

## 2024-11-20 RX ADMIN — ACETAMINOPHEN 1000 MG: 500 TABLET ORAL at 12:09

## 2024-11-20 RX ADMIN — OXYCODONE 5 MG: 5 TABLET ORAL at 20:46

## 2024-11-20 SDOH — ECONOMIC STABILITY: TRANSPORTATION INSECURITY
IN THE PAST 12 MONTHS, HAS LACK OF RELIABLE TRANSPORTATION KEPT YOU FROM MEDICAL APPOINTMENTS, MEETINGS, WORK OR FROM GETTING THINGS NEEDED FOR DAILY LIVING?: NO

## 2024-11-20 ASSESSMENT — LIFESTYLE VARIABLES
TOTAL SCORE: 0
SUBSTANCE_ABUSE: 0
ALCOHOL_USE: YES
TOTAL SCORE: 0
HAVE YOU EVER FELT YOU SHOULD CUT DOWN ON YOUR DRINKING: NO
TOTAL SCORE: 0
EVER HAD A DRINK FIRST THING IN THE MORNING TO STEADY YOUR NERVES TO GET RID OF A HANGOVER: NO
AVERAGE NUMBER OF DAYS PER WEEK YOU HAVE A DRINK CONTAINING ALCOHOL: 1
EVER FELT BAD OR GUILTY ABOUT YOUR DRINKING: NO
HAVE PEOPLE ANNOYED YOU BY CRITICIZING YOUR DRINKING: NO
CONSUMPTION TOTAL: NEGATIVE
DOES PATIENT WANT TO STOP DRINKING: NO
ON A TYPICAL DAY WHEN YOU DRINK ALCOHOL HOW MANY DRINKS DO YOU HAVE: 1
HOW MANY TIMES IN THE PAST YEAR HAVE YOU HAD 5 OR MORE DRINKS IN A DAY: 0

## 2024-11-20 ASSESSMENT — SOCIAL DETERMINANTS OF HEALTH (SDOH)
IN THE PAST 12 MONTHS, HAS THE ELECTRIC, GAS, OIL, OR WATER COMPANY THREATENED TO SHUT OFF SERVICE IN YOUR HOME?: NO
WITHIN THE PAST 12 MONTHS, THE FOOD YOU BOUGHT JUST DIDN'T LAST AND YOU DIDN'T HAVE MONEY TO GET MORE: NEVER TRUE
WITHIN THE LAST YEAR, HAVE YOU BEEN AFRAID OF YOUR PARTNER OR EX-PARTNER?: NO
WITHIN THE LAST YEAR, HAVE TO BEEN RAPED OR FORCED TO HAVE ANY KIND OF SEXUAL ACTIVITY BY YOUR PARTNER OR EX-PARTNER?: NO
WITHIN THE PAST 12 MONTHS, YOU WORRIED THAT YOUR FOOD WOULD RUN OUT BEFORE YOU GOT THE MONEY TO BUY MORE: NEVER TRUE
WITHIN THE LAST YEAR, HAVE YOU BEEN HUMILIATED OR EMOTIONALLY ABUSED IN OTHER WAYS BY YOUR PARTNER OR EX-PARTNER?: NO
WITHIN THE LAST YEAR, HAVE YOU BEEN KICKED, HIT, SLAPPED, OR OTHERWISE PHYSICALLY HURT BY YOUR PARTNER OR EX-PARTNER?: NO

## 2024-11-20 ASSESSMENT — ENCOUNTER SYMPTOMS
ORTHOPNEA: 0
PALPITATIONS: 0
BLURRED VISION: 0
ABDOMINAL PAIN: 0
HEARTBURN: 0
WHEEZING: 0
FALLS: 1
NERVOUS/ANXIOUS: 0
VOMITING: 0
WEAKNESS: 0
CHILLS: 0
HEADACHES: 0
DEPRESSION: 0
FEVER: 0
COUGH: 0
NAUSEA: 0
DOUBLE VISION: 0
DIZZINESS: 0
SHORTNESS OF BREATH: 0
BACK PAIN: 1
HEMOPTYSIS: 0

## 2024-11-20 ASSESSMENT — COGNITIVE AND FUNCTIONAL STATUS - GENERAL
DAILY ACTIVITIY SCORE: 24
SUGGESTED CMS G CODE MODIFIER MOBILITY: CI
SUGGESTED CMS G CODE MODIFIER DAILY ACTIVITY: CH
CLIMB 3 TO 5 STEPS WITH RAILING: A LITTLE
MOBILITY SCORE: 23

## 2024-11-20 ASSESSMENT — PAIN DESCRIPTION - PAIN TYPE
TYPE: ACUTE PAIN

## 2024-11-20 ASSESSMENT — FIBROSIS 4 INDEX
FIB4 SCORE: 2.5
FIB4 SCORE: 2.5

## 2024-11-20 NOTE — ED PROVIDER NOTES
CHIEF COMPLAINT  Chief Complaint   Patient presents with    T-5000 FALL    Rib Pain       LIMITATION TO HISTORY   Select: none    HPI    Ellen Srivastava is a 89 y.o. female who presents to the Emergency Department sided chest wall pain after fall last night.  Yesterday/last night she was walking around and tripped over her vacuum and landed on her left side.  Since then she has been having increasing pain in the left side in her rib area denies any significant shortness of breath just difficulty in moving this morning so an ambulance was called the patient was brought to the emerged part for evaluation.  Upon arrival here she just describes left-sided chest wall pain that radiates to her back denies any other injuries.    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    SURGICAL HISTORY  Past Surgical History:   Procedure Laterality Date    ORIF, ANKLE Left 1/28/2022    Procedure: ORIF, ANKLE-Bimalleolar;  Surgeon: Rayshawn Celis M.D.;  Location: SURGERY [facility];  Service: Orthopedics    MS LAP ESOPHAGOGAST FUNDOPLASTY N/A 8/24/2020    Procedure: FUNDOPLICATION, NISSEN, LAPAROSCOPIC - REPAIR RECURRENT PARAESOPHGEAL HERNIA WITH MESH, POSS FUNDOPLICATION, POSS RESECTION  DISTAL ESOPHAGUS;  Surgeon: John H Ganser, M.D.;  Location: SURGERY [facility];  Service: General    ORIF, ANKLE Right 9/20/2016    Procedure: ANKLE ORIF;  Surgeon: Stef Bray M.D.;  Location: SURGERY [facility];  Service:     GASTROSCOPY-ENDO N/A 2/26/2016    Procedure: GASTROSCOPY-ENDO W/ESOPHAGEAL METAL STENT REMOVAL;  Surgeon: Jossue Ruggiero M.D.;  Location: SURGERY [facility];  Service:     GASTROSCOPY-ENDO N/A 12/10/2015    Procedure: GASTROSCOPY-ENDO;  Surgeon: Heri Velasco M.D.;  Location: SURGERY [facility];  Service:     GASTROSCOPY-ENDO  11/1/2015    Procedure: GASTROSCOPY-ENDO;  Surgeon: Conner Joe M.D.;  Location: ENDOSCOPY [facility];  Service:     THORACOSCOPY Left 10/30/2015    Procedure: THORACOSCOPY;  Surgeon: Blaine Waterman D.O.;  Location: SURGERY [facility];  Service:     GASTROSCOPY  10/23/2015    Procedure: GASTROSCOPY- EGD with esophageal stent placement ;  Surgeon: Jossue Ruggiero M.D.;  Location: [facility];  Service:     PARAESOPHAGEAL HERNIA ROBOTIC N/A 10/12/2015    Procedure: PARAESOPHAGEAL HERNIA REPAIR ROBOTIC resection of esophageal diverticulum  repair of hiatal hernia for anterior fundoplasty with mesh  ;  Surgeon: Ulysses Simpson M.D.;  Location: SURGERY [facility];  Service:     BREAST BIOPSY Left 2015    Benign x3 surgeries    PARATHYROID EXPLORATION  6/16/2010    Performed by AYSE CARROLL at SURGERY SAME DAY [facility]    HYSTERECTOMY, TOTAL ABDOMINAL  1977    APPENDECTOMY  1945    LUMBAR DECOMPRESSION  1969, 1979         FAMILY HISTORY  Family History   Problem Relation Age of Onset    Hypertension Mother     Lung Disease Father     Hypertension Other     Lung Disease Other     Lung Disease Brother           SOCIAL HISTORY  Social History     Socioeconomic History    Marital status:      Spouse name: Not on file    Number of children: Not on file    Years of education:  Not on file    Highest education level: Not on file   Occupational History    Not on file   Tobacco Use    Smoking status: Never    Smokeless tobacco: Former    Tobacco comments:     Nicotine    Vaping Use    Vaping status: Never Used   Substance and Sexual Activity    Alcohol use: Yes    Drug use: Yes     Comment: gummies    Sexual activity: Never     Partners: Male   Other Topics Concern    Not on file   Social History Narrative    Lives in [facility] and in [place].     Social Drivers of Health     Financial Resource Strain: Not on file   Food Insecurity: No Food Insecurity (9/13/2020)    Hunger Vital Sign     Worried About Running Out of Food in the Last Year: Never true     Ran Out of Food in the Last Year: Never true   Transportation Needs: No Transportation Needs (9/13/2020)    PRAPARE - Transportation     Lack of Transportation (Medical): No     Lack of Transportation (Non-Medical): No   Physical Activity: Not on file   Stress: Not on file   Social Connections: Not on file   Intimate Partner Violence: Not on file   Housing Stability: Not on file         CURRENT MEDICATIONS  No current facility-administered medications on file prior to encounter.     Current Outpatient Medications on File Prior to Encounter   Medication Sig Dispense Refill    Acetaminophen (TYLENOL PO) Take 1 Tablet by mouth 2 times a day as needed (For pain). Pt is not sure the strength (OTC)      NON SPECIFIED Apply 1 Application topically 2 times a day as needed (Apply's on left rib). Over the counter cream for pain (pt is not sure the name)      tizanidine (ZANAFLEX) 4 MG Tab TAKE ONE TABLET BY MOUTH TWICE A DAY (Patient taking differently: Take 4 mg by mouth every evening.) 60 Tablet 2    hydrOXYzine HCl (ATARAX) 25 MG Tab Take 1 Tablet by mouth at bedtime. 90 Tablet 2    gabapentin (NEURONTIN) 300 MG Cap Take 1 Capsule by mouth 3 times a day. (Patient taking differently: Take 300 mg by mouth 2 times a day.) 90 Capsule 2    metoprolol SR  "(TOPROL XL) 50 MG TABLET SR 24 HR TAKE 1 TABLET BY MOUTH DAILY (Patient taking differently: Take 50 mg by mouth every day. TAKE 1 TABLET BY MOUTH DAILY) 90 Tablet 3    escitalopram (LEXAPRO) 20 MG tablet Take 1 Tablet by mouth every day. 90 Tablet 2    HYDROcodone/acetaminophen (NORCO)  MG Tab Take 1 Tablet by mouth 2 times a day.      clobetasol (TEMOVATE) 0.05 % Cream APPLY TO AFFECTED AREA(S) TWO TIMES A DAY UP TO 14 DAYS THEN STOP (Patient not taking: Reported on 11/20/2024) 45 g 1           ALLERGIES  Allergies   Allergen Reactions    Asa [Aspirin] Unspecified     Pt reports that she is not sure what happen       PHYSICAL EXAM  VITAL SIGNS:/71   Pulse 67   Temp 37.1 °C (98.7 °F) (Temporal)   Resp 16   Ht 1.74 m (5' 8.5\")   Wt 59 kg (130 lb)   LMP 03/13/1970   SpO2 96%   BMI 19.48 kg/m²     Constitutional: Well developed, Well nourished elderly, No acute distress, Non-toxic appearance.   HENT: Normocephalic, Atraumatic, Bilateral external ears normal, oropharynx moist, No oral exudates, Nose normal.   Eyes: Pupils are equal round and react to light, extraocular motions are intact, conjunctiva is normal, there are no signs of exudate.   Neck: Non tender midline, trachea is midline  Cardiovascular: Regular rate and rhythm without murmurs gallops or rubs.   Thorax & Lungs: No respiratory distress. Breathing comfortably. Lungs are clear to auscultation bilaterally, there are no wheezes no rales.  Is tender about the left lateral rib margin there is no crepitance felt.  Atraumatic.   Abdomen: Soft, nontender, nondistended. Bowel sounds are present. Atraumatic.   Skin: Warm, Dry, No erythema,   Back: No midline tenderness  Musculoskeletal: Good range of motion in all major joints. No tenderness to palpation or major deformities noted. Intact distal pulses, no clubbing, no cyanosis, no edema,   Neurologic: Alert & oriented x 3, Normal motor function, Normal sensory function, No focal deficits noted. " GCS 15  Psychiatric: Affect normal, Judgment normal, Mood normal.       DIAGNOSTIC STUDIES / PROCEDURES    RADIOLOGY    I have independently interpreted the diagnostic imaging associated with this visit and am waiting the final reading from the radiologist.   My preliminary interpretation is as follows: Patient has 4 rib fractures and left side with a trace pneumothorax no signs of hemothorax.    Radiologist interpretation:  DX-CHEST-PORTABLE (1 VIEW)   Final Result      Atelectasis involving the left lung base.      CT-CHEST (THORAX) W/O   Final Result      1.  Acute fractures of the left lateral fifth through eighth ribs.      2.  Trace left-sided pneumothorax at the site of the rib fractures.      3.  Minimal left pleural effusion.      4.  Bibasilar atelectasis, left greater than right.      5.  Bilateral pulmonary nodules measuring up to 8 mm in size, unchanged. These are stable since 2020 consistent with benign process and are no follow-up.      6.  This was discussed with DARIA RICHMOND at 9:22 AM on 11/20/2024.            COURSE & MEDICAL DECISION MAKING    ED COURSE:    ED Observation Status?  No, the patient does not meet observation criteria.  INTERVENTIONS BY ME:  Medications   Pharmacy Consult Request ...Pain Management Review 1 Each (has no administration in time range)   oxyCODONE immediate-release (Roxicodone) tablet 2.5 mg (has no administration in time range)     Or   oxyCODONE immediate-release (Roxicodone) tablet 5 mg (has no administration in time range)     Or   HYDROmorphone (Dilaudid) injection 0.25 mg (has no administration in time range)   senna-docusate (Pericolace Or Senokot S) 8.6-50 MG per tablet 2 Tablet (has no administration in time range)     And   polyethylene glycol/lytes (Miralax) Packet 1 Packet (has no administration in time range)   escitalopram (Lexapro) tablet 20 mg (20 mg Oral Given 11/20/24 1211)   gabapentin (Neurontin) capsule 300 mg (has no administration in time  range)   hydrOXYzine HCl (Atarax) tablet 25 mg (has no administration in time range)   metoprolol SR (Toprol XL) tablet 50 mg (50 mg Oral Given 11/20/24 1210)   acetaminophen (Tylenol) tablet 1,000 mg (1,000 mg Oral Given 11/20/24 1209)   lidocaine (Asperflex) 4 % patch 1 Patch (0 Patches Transdermal See ADS/Cabinet Pull 11/20/24 1100)   oxyCODONE immediate-release (Roxicodone) tablet 5 mg (5 mg Oral Given 11/20/24 1211)   ondansetron (Zofran) syringe/vial injection 4 mg (4 mg Intravenous Given 11/20/24 0834)           INITIAL ASSESSMENT, COURSE AND PLAN  Care Narrative: Patient presents emergency department for evaluation initially she was about 86% on room air needing about 3 to 4 L to maintain saturations about mid 90s.  CT scan confirms 4 rib fractures and a very trace pneumothorax.  After pain control however the patient's room air oxygen saturations are about 82%.  Because of the hypoxemia and multiple rib fractures I do feel the patient will require further pain control in the hospital.  I placed a lighted Derm patch on the area and at this point I will admit the patient to the hospitalist for further treatment and care and pain control.        ADDITIONAL PROBLEM LIST  None  DISPOSITION AND DISCUSSIONS  Patient will be admitted for further treatment and care.  I have discussed management of the patient with the following physicians and UDAY's:  hospitalist for admission    FINAL DIAGNOSIS  1. Closed fracture of multiple ribs of left side, initial encounter    2.  Trace traumatic pneumothorax, initial encounter    3. Hypoxemia              Electronically signed by: Blaine Sosa M.D.,1:00 PM 11/20/24

## 2024-11-20 NOTE — DISCHARGE PLANNING
ER CM  met with  pt at bedside. AOX4. Lives in a 2 story apt that does not have a elevator. She has been able to take the stairs. [address]. Has Wheelchair and FWW. Has had Home health with [facility] in remote past.  RX [facility]. PCP Denzel Patel. She is having rib pain and wants patch. She notes Spouse is helpful.  Care Transition Team Assessment    Information Source  Orientation Level: Oriented X4  Information Given By: Patient  Informant's Name: Ellen  Who is responsible for making decisions for patient? : Patient              Interdisciplinary Discharge Planning  Primary Care Physician: Denzel Patel  Lives with - Patient's Self Care Capacity: Spouse  Support Systems: Spouse / Significant Other  Housing / Facility: 2 Story House (no elevator able to use stairs without issues)  Do You Take your Prescribed Medications Regularly: Yes  Mobility Issues: No  Prior Services: None (FWW and w/C at home. HH in remote past  [facility])    Discharge Preparedness  What is your plan after discharge?: Home with help  What are your discharge supports?: Spouse  Prior Functional Level: Ambulatory, Independent with Activities of Daily Living, Independent with Medication Management    Functional Assesment  Prior Functional Level: Ambulatory, Independent with Activities of Daily Living, Independent with Medication Management    Finances  Prescription Coverage: Yes                   Domestic Abuse  Have you ever been the victim of abuse or violence?: No              Anticipated Discharge Information  Discharge Disposition: Discharged to home/self care (01)

## 2024-11-20 NOTE — PROGRESS NOTES
4 Eyes Skin Assessment Completed by SAGRARIO Ziegler and SAGRARIO Lezama.    Head WDL  Ears WDL  Nose WDL  Mouth WDL  Neck WDL  Breast/Chest WDL  Shoulder Blades WDL  Spine WDL  (R) Arm/Elbow/Hand WDL  (L) Arm/Elbow/Hand WDL  Abdomen Scar  Groin WDL  Scrotum/Coccyx/Buttocks Redness and Blanching  (R) Leg WDL  (L) Leg WDL  (R) Heel/Foot/Toe Redness and Blanching  (L) Heel/Foot/Toe Redness and Blanching          Devices In Places Nasal Cannula      Interventions In Place NC W/Ear Foams    Possible Skin Injury No    Pictures Uploaded Into Epic Yes  Wound Consult Placed N/A  RN Wound Prevention Protocol Ordered No

## 2024-11-20 NOTE — ASSESSMENT & PLAN NOTE
11/19: I discussed with patient for at least 16 minutes further goals of care.  This included CODE STATUS which includes full code and DNR/DNI.  The patient like to be DNR/DNI.  She understands what this means.  She does not want to be resuscitated or intubated.  Patient's  is also at bedside the plan of our conversation.  We also discussed further treatment goals.  For now the patient want to have full treatment options.  This includes invasive or noninvasive procedures as needed.  In the event that the patient decisions for herself she would like her  Sarkis to make decisions for her.

## 2024-11-20 NOTE — ED NOTES
PIV established.  Medicated as ordered.  O2 sats are 86-87% on R/A. Placed on 2 liters O2 and sats are now above 90%.

## 2024-11-20 NOTE — ED NOTES
Medication history reviewed with pt. Med rec is complete.  Allergies reviewed, per pt  Interviewed pt with  at bedside with permission from pt.    Pt reports that she has not used her CLOBETASOL 0.05% cream in the last 30 days or longer.  Pt reports that she takes her GABAPENTIN 300MG 1 capsule in the AM and in the afternoon, pt also take TIZANIDINE 4MG 1 tablet in the evening.    Patient has not had any outpatient antibiotics in the last 30 days.    Pt is not on any anticoagulants

## 2024-11-20 NOTE — ED TRIAGE NOTES
Ellen Ratliff [facility]  89 y.o.  Chief Complaint   Patient presents with    T-5000 FALL    Rib Pain     Pt RYNE PANDEYSA from home.  She was vacuuming yesterday and tripped on the vacuum.  This happened at 1500 yesterday.  Pt reports L sided rib pain.  No interventions by EMS.

## 2024-11-20 NOTE — ASSESSMENT & PLAN NOTE
Monitor on telemetry for now  Supplemental oxygen as needed  Pain management  Serial Xrays if needed    -- We have consulted pulmonary, we recommend repeat x-ray at 3 PM today to monitor pneumothorax.  And also tomorrow morning.    11/21: Continue pain management and incentive spirometer.  We appreciate further recommendations by pulmonary.    11/22: Repeat x-ray does not report pneumothorax as well as pulmonary evaluation of the x-ray.  Continue with pain management for rib fracture and incentive spirometer.

## 2024-11-20 NOTE — H&P
Hospital Medicine History & Physical Note    Date of Service  11/20/2024    Primary Care Physician  Denzel Patel M.D.    Consultants  pulmonary        Code Status  DNAR/DNI    Chief Complaint  Chief Complaint   Patient presents with    T-5000 FALL    Rib Pain       History of Presenting Illness  Ellen Srivastava is a 89 y.o. female with PMH of HTN, Depression/Anxiety, narcotic dependence who presented 11/20/2024 with fall and rib fracture.    The patient states she was using her vacuum last night and fell to her side. She denies hitting her head.  Because of worsening pain the patient decided to come to the ER for further assessment and evaluation.  The patient denies headache, sick contacts, fever or any other focal neurological deficit.    In the ER the patient had a CT scan which was found to have acute fractures of the left lateral 5th through 8th ribs, trace left-sided pneumothorax at the site of the rib fractures, bibasilar atelectasis and bilateral pulmonary nodules that have been unchanged since 2020.  We have consulted pulmonary who recommended repeat chest x-ray around 3 PM today and 1 tomorrow morning.  Will monitor closely on telemetry for now.    I discussed the plan of care with patient, family, bedside RN, , and ER Physician and Pulmonary.    Review of Systems  Review of Systems   Constitutional:  Positive for malaise/fatigue. Negative for chills and fever.   HENT:  Negative for hearing loss and nosebleeds.    Eyes:  Negative for blurred vision and double vision.   Respiratory:  Negative for cough and shortness of breath.    Cardiovascular:  Negative for chest pain and palpitations.   Gastrointestinal:  Negative for abdominal pain, heartburn and vomiting.   Genitourinary:  Negative for dysuria and urgency.   Musculoskeletal:  Positive for falls and joint pain.   Skin:  Negative for itching and rash.   Neurological:  Negative for dizziness and headaches.    Psychiatric/Behavioral:  Negative for substance abuse. The patient is not nervous/anxious.    All other systems reviewed and are negative.      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    Surgical History   has a past surgical history that includes breast biopsy (Left, 2015); lumbar decompression (1969, 1979); hysterectomy, total abdominal (1977); appendectomy (1945); parathyroid exploration (6/16/2010); paraesophageal hernia robotic (N/A, 10/12/2015); gastroscopy (10/23/2015); thoracoscopy (Left, 10/30/2015); gastroscopy-endo (11/1/2015); gastroscopy-endo (N/A, 12/10/2015); gastroscopy-endo (N/A, 2/26/2016); orif, ankle (Right, 9/20/2016); pr lap esophagogast fundoplasty (N/A, 8/24/2020); and orif, ankle (Left, 1/28/2022).     Family History  family history includes Hypertension in her mother and another family member; Lung Disease in her brother, father, and another family member.       Social History   reports that she has never smoked. She has quit using smokeless tobacco. She reports current alcohol use. She reports current drug use.    Allergies  Allergies   Allergen Reactions    Asa [Aspirin] Unspecified     Pt reports that she is not sure what happen       Medications  Prior to Admission Medications   Prescriptions Last Dose Informant Patient Reported? Taking?   Acetaminophen (TYLENOL PO) 11/20/2024 at  7:00 AM Patient Yes Yes   Sig: Take 1 Tablet by mouth 2 times a day as needed (For pain). Pt is not  sure the strength (OTC)   HYDROcodone/acetaminophen (NORCO)  MG Tab 11/19/2024 at  6:00 PM Patient Yes Yes   Sig: Take 1 Tablet by mouth 2 times a day.   NON SPECIFIED 11/20/2024 at  7:30 AM Patient Yes Yes   Sig: Apply 1 Application topically 2 times a day as needed (Apply's on left rib). Over the counter cream for pain (pt is not sure the name)   clobetasol (TEMOVATE) 0.05 % Cream Not Taking Patient No No   Sig: APPLY TO AFFECTED AREA(S) TWO TIMES A DAY UP TO 14 DAYS THEN STOP   Patient not taking: Reported on 11/20/2024   escitalopram (LEXAPRO) 20 MG tablet 11/19/2024 at  7:00 AM Patient No Yes   Sig: Take 1 Tablet by mouth every day.   gabapentin (NEURONTIN) 300 MG Cap 11/19/2024 at  1:00 PM Patient No Yes   Sig: Take 1 Capsule by mouth 3 times a day.   Patient taking differently: Take 300 mg by mouth 2 times a day.   hydrOXYzine HCl (ATARAX) 25 MG Tab 11/19/2024 at  7:00 PM Patient No Yes   Sig: Take 1 Tablet by mouth at bedtime.   metoprolol SR (TOPROL XL) 50 MG TABLET SR 24 HR 11/19/2024 at  7:00 AM Patient No Yes   Sig: TAKE 1 TABLET BY MOUTH DAILY   Patient taking differently: Take 50 mg by mouth every day. TAKE 1 TABLET BY MOUTH DAILY   tizanidine (ZANAFLEX) 4 MG Tab 11/19/2024 at  7:00 PM Patient No Yes   Sig: TAKE ONE TABLET BY MOUTH TWICE A DAY   Patient taking differently: Take 4 mg by mouth every evening.      Facility-Administered Medications: None       Physical Exam  Temp:  [37.1 °C (98.7 °F)] 37.1 °C (98.7 °F)  Pulse:  [58-67] 66  Resp:  [16-18] 16  BP: (119-160)/(63-77) 119/63  SpO2:  [87 %-94 %] 94 %  Blood Pressure : 119/63   Temperature: 37.1 °C (98.7 °F)   Pulse: 66   Respiration: 16   Pulse Oximetry: 94 %       Physical Exam  Vitals and nursing note reviewed.   Constitutional:       General: She is not in acute distress.     Appearance: She is ill-appearing.   HENT:      Head: Normocephalic and atraumatic.      Right Ear: External ear normal.      Left Ear: External ear normal.       "Mouth/Throat:      Pharynx: No oropharyngeal exudate or posterior oropharyngeal erythema.   Eyes:      General:         Right eye: No discharge.         Left eye: No discharge.   Cardiovascular:      Rate and Rhythm: Normal rate and regular rhythm.      Pulses: Normal pulses.      Heart sounds: No murmur heard.     No gallop.   Pulmonary:      Effort: No respiratory distress.      Comments: Supplemental oxygen  Abdominal:      General: Bowel sounds are normal. There is no distension.      Palpations: Abdomen is soft.      Tenderness: There is no abdominal tenderness. There is no guarding.   Musculoskeletal:         General: Tenderness present. No swelling.      Cervical back: Normal range of motion and neck supple. No tenderness.   Skin:     General: Skin is warm and dry.   Neurological:      General: No focal deficit present.      Mental Status: She is alert and oriented to person, place, and time. Mental status is at baseline.      Motor: No weakness.   Psychiatric:         Mood and Affect: Mood normal.         Behavior: Behavior normal.         Laboratory:          No results for input(s): \"ALTSGPT\", \"ASTSGOT\", \"ALKPHOSPHAT\", \"TBILIRUBIN\", \"DBILIRUBIN\", \"GAMMAGT\", \"AMYLASE\", \"LIPASE\", \"ALB\", \"PREALBUMIN\", \"GLUCOSE\" in the last 72 hours.      No results for input(s): \"NTPROBNP\" in the last 72 hours.      No results for input(s): \"TROPONINT\" in the last 72 hours.    Imaging:  DX-CHEST-PORTABLE (1 VIEW)   Final Result      Atelectasis involving the left lung base.      CT-CHEST (THORAX) W/O   Final Result      1.  Acute fractures of the left lateral fifth through eighth ribs.      2.  Trace left-sided pneumothorax at the site of the rib fractures.      3.  Minimal left pleural effusion.      4.  Bibasilar atelectasis, left greater than right.      5.  Bilateral pulmonary nodules measuring up to 8 mm in size, unchanged. These are stable since 2020 consistent with benign process and are no follow-up.      6.  This was " discussed with DARIA RICHMOND at 9:22 AM on 11/20/2024.              Assessment/Plan:      * Pneumothorax  Assessment & Plan  Monitor on telemetry for now  Supplemental oxygen as needed  Pain management  Serial Xrays if needed    -- We have consulted pulmonary, we recommend repeat x-ray at 3 PM today to monitor pneumothorax.  And also tomorrow morning.    Rib fracture  Assessment & Plan  Due to fall  Pain management    Fall  Assessment & Plan  PT/OT    Lung nodule  Assessment & Plan  As per CT seem to be stable    Neuropathy- (present on admission)  Assessment & Plan  Resume home dose of gabapentin    Depression with anxiety- (present on admission)  Assessment & Plan  Resume home medications    Narcotic dependence (HCC)- (present on admission)  Assessment & Plan  Due to chronic back pain  Resume home dose of norco    HTN (hypertension)- (present on admission)  Assessment & Plan  Resume metoprolol    Advance care planning  Assessment & Plan  11/19: I discussed with patient for at least 16 minutes further goals of care.  This included CODE STATUS which includes full code and DNR/DNI.  The patient like to be DNR/DNI.  She understands what this means.  She does not want to be resuscitated or intubated.  Patient's  is also at bedside the plan of our conversation.  We also discussed further treatment goals.  For now the patient want to have full treatment options.  This includes invasive or noninvasive procedures as needed.  In the event that the patient decisions for herself she would like her  Sarkis to make decisions for her.        VTE prophylaxis: SCDs/TEDs      I spend at least 76 minutes providing care for this patient.  This included face-to-face interview, physical examination.  Review of CT scan.  Consulting and discussing with pulmonary.  Discussing with multidisciplinary team including case management, nursing staff and pharmacy.  Creating plan of care, reviewing orders.    Moreover, I discussed  with patient for at least 16 minutes further goals of care.  This included CODE STATUS which includes full code and DNR/DNI.  The patient like to be DNR/DNI.  She understands what this means.  She does not want to be resuscitated or intubated.  Patient's  is also at bedside the plan of our conversation.  We also discussed further treatment goals.  For now the patient want to have full treatment options.  This includes invasive or noninvasive procedures as needed.  In the event that the patient decisions for herself she would like her  Sarkis to make decisions for her.

## 2024-11-21 ENCOUNTER — APPOINTMENT (OUTPATIENT)
Dept: RADIOLOGY | Facility: MEDICAL CENTER | Age: 89
End: 2024-11-21
Attending: INTERNAL MEDICINE
Payer: MEDICARE

## 2024-11-21 PROBLEM — J18.9 PNEUMONIA: Status: ACTIVE | Noted: 2024-11-21

## 2024-11-21 LAB
ALBUMIN SERPL BCP-MCNC: 3.4 G/DL (ref 3.2–4.9)
ALBUMIN/GLOB SERPL: 1.3 G/DL
ALP SERPL-CCNC: 69 U/L (ref 30–99)
ALT SERPL-CCNC: <5 U/L (ref 2–50)
ANION GAP SERPL CALC-SCNC: 6 MMOL/L (ref 7–16)
AST SERPL-CCNC: 16 U/L (ref 12–45)
BILIRUB SERPL-MCNC: 0.4 MG/DL (ref 0.1–1.5)
BUN SERPL-MCNC: 21 MG/DL (ref 8–22)
CALCIUM ALBUM COR SERPL-MCNC: 9.7 MG/DL (ref 8.5–10.5)
CALCIUM SERPL-MCNC: 9.2 MG/DL (ref 8.4–10.2)
CHLORIDE SERPL-SCNC: 104 MMOL/L (ref 96–112)
CO2 SERPL-SCNC: 29 MMOL/L (ref 20–33)
CREAT SERPL-MCNC: 1.23 MG/DL (ref 0.5–1.4)
ERYTHROCYTE [DISTWIDTH] IN BLOOD BY AUTOMATED COUNT: 49.2 FL (ref 35.9–50)
GFR SERPLBLD CREATININE-BSD FMLA CKD-EPI: 42 ML/MIN/1.73 M 2
GLOBULIN SER CALC-MCNC: 2.6 G/DL (ref 1.9–3.5)
GLUCOSE SERPL-MCNC: 105 MG/DL (ref 65–99)
HCT VFR BLD AUTO: 41.1 % (ref 37–47)
HGB BLD-MCNC: 13.1 G/DL (ref 12–16)
MAGNESIUM SERPL-MCNC: 2.1 MG/DL (ref 1.5–2.5)
MCH RBC QN AUTO: 30.8 PG (ref 27–33)
MCHC RBC AUTO-ENTMCNC: 31.9 G/DL (ref 32.2–35.5)
MCV RBC AUTO: 96.7 FL (ref 81.4–97.8)
PLATELET # BLD AUTO: 191 K/UL (ref 164–446)
PMV BLD AUTO: 10 FL (ref 9–12.9)
POTASSIUM SERPL-SCNC: 5.2 MMOL/L (ref 3.6–5.5)
PROT SERPL-MCNC: 6 G/DL (ref 6–8.2)
RBC # BLD AUTO: 4.25 M/UL (ref 4.2–5.4)
SODIUM SERPL-SCNC: 139 MMOL/L (ref 135–145)
WBC # BLD AUTO: 11.5 K/UL (ref 4.8–10.8)

## 2024-11-21 PROCEDURE — 770020 HCHG ROOM/CARE - TELE (206)

## 2024-11-21 PROCEDURE — 80053 COMPREHEN METABOLIC PANEL: CPT

## 2024-11-21 PROCEDURE — 99233 SBSQ HOSP IP/OBS HIGH 50: CPT | Performed by: INTERNAL MEDICINE

## 2024-11-21 PROCEDURE — 92610 EVALUATE SWALLOWING FUNCTION: CPT

## 2024-11-21 PROCEDURE — 97535 SELF CARE MNGMENT TRAINING: CPT

## 2024-11-21 PROCEDURE — 83735 ASSAY OF MAGNESIUM: CPT

## 2024-11-21 PROCEDURE — A9270 NON-COVERED ITEM OR SERVICE: HCPCS | Performed by: STUDENT IN AN ORGANIZED HEALTH CARE EDUCATION/TRAINING PROGRAM

## 2024-11-21 PROCEDURE — 85027 COMPLETE CBC AUTOMATED: CPT

## 2024-11-21 PROCEDURE — 94760 N-INVAS EAR/PLS OXIMETRY 1: CPT

## 2024-11-21 PROCEDURE — 36415 COLL VENOUS BLD VENIPUNCTURE: CPT

## 2024-11-21 PROCEDURE — 71045 X-RAY EXAM CHEST 1 VIEW: CPT

## 2024-11-21 PROCEDURE — 700102 HCHG RX REV CODE 250 W/ 637 OVERRIDE(OP): Performed by: STUDENT IN AN ORGANIZED HEALTH CARE EDUCATION/TRAINING PROGRAM

## 2024-11-21 PROCEDURE — 97165 OT EVAL LOW COMPLEX 30 MIN: CPT

## 2024-11-21 PROCEDURE — 97162 PT EVAL MOD COMPLEX 30 MIN: CPT

## 2024-11-21 PROCEDURE — 700101 HCHG RX REV CODE 250: Performed by: INTERNAL MEDICINE

## 2024-11-21 PROCEDURE — 700111 HCHG RX REV CODE 636 W/ 250 OVERRIDE (IP): Performed by: INTERNAL MEDICINE

## 2024-11-21 PROCEDURE — A9270 NON-COVERED ITEM OR SERVICE: HCPCS | Performed by: INTERNAL MEDICINE

## 2024-11-21 PROCEDURE — 700102 HCHG RX REV CODE 250 W/ 637 OVERRIDE(OP): Performed by: INTERNAL MEDICINE

## 2024-11-21 RX ORDER — ENOXAPARIN SODIUM 100 MG/ML
30 INJECTION SUBCUTANEOUS DAILY
Status: DISCONTINUED | OUTPATIENT
Start: 2024-11-21 | End: 2024-11-22

## 2024-11-21 RX ADMIN — ACETAMINOPHEN 1000 MG: 500 TABLET ORAL at 20:42

## 2024-11-21 RX ADMIN — OXYCODONE 5 MG: 5 TABLET ORAL at 15:48

## 2024-11-21 RX ADMIN — OXYCODONE 5 MG: 5 TABLET ORAL at 01:40

## 2024-11-21 RX ADMIN — POLYETHYLENE GLYCOL 3350 1 PACKET: 17 POWDER, FOR SOLUTION ORAL at 17:14

## 2024-11-21 RX ADMIN — GABAPENTIN 300 MG: 300 CAPSULE ORAL at 15:49

## 2024-11-21 RX ADMIN — LIDOCAINE 1 PATCH: 4 PATCH TOPICAL at 10:26

## 2024-11-21 RX ADMIN — HYDROXYZINE HYDROCHLORIDE 25 MG: 25 TABLET, FILM COATED ORAL at 20:42

## 2024-11-21 RX ADMIN — AMOXICILLIN AND CLAVULANATE POTASSIUM 1 TABLET: 875; 125 TABLET, FILM COATED ORAL at 07:40

## 2024-11-21 RX ADMIN — Medication 5 MG: at 20:42

## 2024-11-21 RX ADMIN — ESCITALOPRAM OXALATE 20 MG: 10 TABLET ORAL at 05:10

## 2024-11-21 RX ADMIN — OXYCODONE HYDROCHLORIDE 5 MG: 5 TABLET ORAL at 07:40

## 2024-11-21 RX ADMIN — OXYCODONE 5 MG: 5 TABLET ORAL at 05:08

## 2024-11-21 RX ADMIN — METOPROLOL SUCCINATE 50 MG: 25 TABLET, EXTENDED RELEASE ORAL at 05:10

## 2024-11-21 RX ADMIN — ENOXAPARIN SODIUM 30 MG: 100 INJECTION SUBCUTANEOUS at 17:15

## 2024-11-21 RX ADMIN — Medication 5 MG: at 02:14

## 2024-11-21 RX ADMIN — AMOXICILLIN AND CLAVULANATE POTASSIUM 1 TABLET: 875; 125 TABLET, FILM COATED ORAL at 17:14

## 2024-11-21 RX ADMIN — GABAPENTIN 300 MG: 300 CAPSULE ORAL at 05:09

## 2024-11-21 RX ADMIN — OXYCODONE HYDROCHLORIDE 5 MG: 5 TABLET ORAL at 17:15

## 2024-11-21 ASSESSMENT — ENCOUNTER SYMPTOMS
DOUBLE VISION: 0
BLURRED VISION: 0
SHORTNESS OF BREATH: 1
VOMITING: 0
HEADACHES: 0
FEVER: 0
BACK PAIN: 0
CHILLS: 0
HEARTBURN: 0
NERVOUS/ANXIOUS: 0
FALLS: 0
DIZZINESS: 0
ABDOMINAL PAIN: 0
COUGH: 1

## 2024-11-21 ASSESSMENT — COGNITIVE AND FUNCTIONAL STATUS - GENERAL
HELP NEEDED FOR BATHING: A LITTLE
DRESSING REGULAR LOWER BODY CLOTHING: A LITTLE
STANDING UP FROM CHAIR USING ARMS: A LITTLE
WALKING IN HOSPITAL ROOM: A LITTLE
SUGGESTED CMS G CODE MODIFIER MOBILITY: CJ
TOILETING: A LITTLE
DRESSING REGULAR UPPER BODY CLOTHING: A LITTLE
DAILY ACTIVITIY SCORE: 19
CLIMB 3 TO 5 STEPS WITH RAILING: A LITTLE
PERSONAL GROOMING: A LITTLE
MOBILITY SCORE: 21
SUGGESTED CMS G CODE MODIFIER DAILY ACTIVITY: CK

## 2024-11-21 ASSESSMENT — GAIT ASSESSMENTS
DISTANCE (FEET): 120
GAIT LEVEL OF ASSIST: STANDBY ASSIST
DEVIATION: DECREASED BASE OF SUPPORT
DISTANCE (FEET): 15

## 2024-11-21 ASSESSMENT — LIFESTYLE VARIABLES: SUBSTANCE_ABUSE: 0

## 2024-11-21 ASSESSMENT — ACTIVITIES OF DAILY LIVING (ADL): TOILETING: INDEPENDENT

## 2024-11-21 ASSESSMENT — PAIN DESCRIPTION - PAIN TYPE
TYPE: ACUTE PAIN

## 2024-11-21 ASSESSMENT — FIBROSIS 4 INDEX: FIB4 SCORE: 3.44

## 2024-11-21 NOTE — THERAPY
"Speech Language Pathology   Clinical Swallow Evaluation     Patient Name: Ellen Srivastava  AGE:  89 y.o., SEX:  female  Medical Record #: [id number]  Date of Service: 11/21/2024      History of Present Illness  88 y/o F admitted 11/20/24 L lateral 5th through 8th rib fractures and pneumothorax s/p GLF while vacuuming.    PMHx: HTN, depression/anxiety, narcotic dependence    CXR 11/21: 1.  Hazy left lower lobe infiltrates.  2.  Small left pleural effusion  3.  Left rib fractures  4.  Cardiomegaly  5.  Atherosclerosis    SLP Hx: CSE done 1/27/2022 after admission for GLF. Recs for soft/bite sized solids, thin liquids, which is pt's baseline diet due to esophageal dysphagia for which she was followed by [facility].    Barium swallow exam done 6/15/20 revealed: \"high grade stricture, hiatal hernia, esophageal dysmotility and aspiration\" and \"oropharyngeal transit normal.\"      General Information:  Vitals  O2 (LPM): 1  O2 Delivery Device: Silicone Nasal Cannula  Level of Consciousness: Alert  Patient Behaviors:  (calm, cooperative)  Orientation: Oriented x 4  Follows Directives: Yes    Prior Living Situation & Level of Function:  Housing / Facility: 2 Story Apartment / Condo  Lives with - Patient's Self Care Capacity: Spouse  Communication: WFL  Swallowing: hx esophageal dysphagia; eats softer foods at baseline     Oral Mechanism Evaluation:  Dentition: Fair, Upper partial, Missing posterior dentition   Facial Symmetry: Equal  Facial Sensation: Equal     Labial Observations: WFL   Lingual Observations: Midline  Motor Speech: WFL       Laryngeal Function:  Secretion Management: Adequate  Voice Quality: WFL  Cough: Perceptually WNL     Subjective  Patient reports a hx of esophageal dysphagia, reporting a doctor \"cut my esophagus\" awhile ago and \"I aspirated everything into my lungs\" and had to be in the ICU for months. Patient does have a hx of esophageal perforation in 2015, which occurred after an esophageal " diverticula was removed and her diet was advanced too quickly, per EMR review. She denies a hx of PNA since then and states she just has to be very careful when she eats but she never experiences any coughing, choking or regurgitation. She consumes mostly soft solids at home.    Assessment  Current Method of Nutrition: Oral diet (Regular easy to chew, Thin liquids)  Positioning: Celestin's (60-90 degrees)  Bolus Administration: Patient  O2 (LPM): 1 O2 Delivery Device: Silicone Nasal Cannula  Factor(s) Affecting Performance: None    Swallowing Trials:  Swallowing Trials  Thin Liquid (TN0): WFL  Pureed (PU4): WFL  Soft & Bite Sized (SB6): WFL  Regular (RG7): WFL    Comments: Patient was able to self-feed at an appropriate rate and bite size. Oral phase was notable for slow but adequate masticiation of solids with complete oral clearance. Pharyngeal swallow response appeared timely. There were no signs of pharyngeal inefficiency or airway invasion. Pt denied globus sensation, feelings of food getting stuck or regurgitation. Vocal quality was clear throughout intake and vitals were stable. Pt reports she feels she is swallowing normally, not having any difficulty.     Clinical Impressions  Patient presents with a functional oropharyngeal swallow. There is a known hx of esophageal dysphagia, though pt does not present with any concerning signs of retrograde flow, regurgitation or ascending aspiration today. Suspicion for aspiration contributing to possible pneumonia is low. Will adjust pt's diet to reflect her preference for softer foods, which is her baseline. SLP will not actively follow as pt appears to be at her baseline.     Recommendations  Diet Consistency: Soft and bite sized solids, thin liquids (most consistent with pt's baseline)  Instrumentation: None indicated at this time  Medication: Whole with liquid  Supervision: Independent  Positioning: Fully upright and midline during oral intake, Remain upright for 45  minutes after oral intake  Risk Management : Small bites/sips, Slow rate of intake, Physical mobility, as tolerated  Oral Care: BID    SLP Treatment Plan  Treatment Plan: None Indicated  SLP Frequency: N/A - Evaluation Only  Estimated Duration: N/A - Evaluation Only    Anticipated Discharge Needs  Discharge Recommendations: Anticipate that the patient will have no further speech therapy needs after discharge from the hospital   Therapy Recommendations Upon DC: Not Indicated      Charo Allison MS,CCC-SLP

## 2024-11-21 NOTE — THERAPY
Physical Therapy   Initial Evaluation     Patient Name: Ellen Srivastava  Age:  89 y.o., Sex:  female  Medical Record #: [id number]  Today's Date: 11/21/2024     Precautions  Precautions: (P) Fall Risk  Comments: (P) desats with activity    Assessment  Patient is 89 y.o. female who was recently admitted for GLF and presented with L rib fractures, pneumothorax, and hypoxia. Pt was initially hesitant for therapy evaluation and requested therapist to come back. Attempted second time and pt stated she will be going home and she does not need to walk. Pt educated on role of therapy in acute setting and was agreeable to mobilize for evaluation. Pt presented to PT with slightly impaired balance, pain, and poor activity tolerance. Pt was primarily limited by L rib pain and was provided with education on bracing to reduce pain with bed mobility and coughing/sneezing. During ambulation pt demonstrated with slight veering, however, balance appeared to improve with continued ambulation. Pt had most difficulty with steps and required CGA for management. Pt encouraged to have spouse to assist during stair management and use of railings. Pt was agreeable. Pt is in no acute skilled PT needs, anticipate pt to d/c home once medically clear. Recommend home health transitional care for continued physical therapy services.     Plan    Physical Therapy Initial Treatment Plan   Duration: (P) Discharge Needs Only    DC Equipment Recommendations: (P) None  Discharge Recommendations: (P) Recommend home health for continued physical therapy services    Objective       11/21/24 1054   Initial Contact Note    Initial Contact Note Order Received and Verified, Physical Therapy Evaluation in Progress with Full Report to Follow.   Precautions   Precautions Fall Risk   Comments desats with activity   Vitals   O2 (LPM) 2   O2 Delivery Device Silicone Nasal Cannula   Pain 0 - 10 Group   Location Rib Cage   Location Orientation Left   Description  Jimmy   Therapist Pain Assessment Prior to Activity;During Activity;Post Activity;Nurse Notified;3   Prior Living Situation   Housing / Facility 2 Story Apartment / Condo   Steps Into Home 20   Steps In Home 0   Rail Right Rail (Steps into Home)   Equipment Owned Front-Wheel Walker   Lives with - Patient's Self Care Capacity Spouse   Comments pt states she lives with her spouse who is able to assist upon d/c to home   Prior Level of Functional Mobility   Bed Mobility Independent   Transfer Status Independent   Ambulation Independent   Ambulation Distance   (community)   Assistive Devices Used None   Stairs Independent   History of Falls   History of Falls Yes   Date of Last Fall   (recent GLF leading to L rib fractures)   Cognition    Cognition / Consciousness X   Level of Consciousness Alert   Safety Awareness Impaired   Comments poor insight into current deficits and frustrated with use of O2   Passive ROM Lower Body   Passive ROM Lower Body WDL   Active ROM Lower Body    Active ROM Lower Body  WDL   Strength Lower Body   Lower Body Strength  WDL   Sensation Lower Body   Lower Extremity Sensation   WDL   Lower Body Muscle Tone   Lower Body Muscle Tone  WDL   Neurological Concerns   Neurological Concerns No   Coordination Upper Body   Coordination WDL   Coordination Lower Body    Coordination Lower Body  WDL   Balance Assessment   Sitting Balance (Static) Good   Sitting Balance (Dynamic) Good   Standing Balance (Static) Good   Standing Balance (Dynamic) Fair +   Weight Shift Sitting Good   Weight Shift Standing Good   Comments no ad use, demonstrates with slight veering, however, appears to improve with continued ambulation   Bed Mobility    Supine to Sit Standby Assist   Sit to Supine Standby Assist   Scooting Standby Assist   Gait Analysis   Gait Level Of Assist Standby Assist   Assistive Device None   Distance (Feet) 120   # of Times Distance was Traveled 1   Deviation Decreased Base Of Support   # of Stairs  Climbed 5   Level of Assist with Stairs Contact Guard Assist   Weight Bearing Status fwb   Functional Mobility   Sit to Stand Standby Assist   Bed, Chair, Wheelchair Transfer Standby Assist   Transfer Method Stand Step   Mobility no ad use   6 Clicks Assessment - How much HELP from from another person do you currently need... (If the patient hasn't done an activity recently, how much help from another person do you think he/she would need if he/she tried?)   Turning from your back to your side while in a flat bed without using bedrails? 4   Moving from lying on your back to sitting on the side of a flat bed without using bedrails? 4   Moving to and from a bed to a chair (including a wheelchair)? 4   Standing up from a chair using your arms (e.g., wheelchair, or bedside chair)? 3   Walking in hospital room? 3   Climbing 3-5 steps with a railing? 3   6 clicks Mobility Score 21   Activity Tolerance   Sitting in Chair NT   Sitting Edge of Bed 5 mins   Standing 8 mins   Comments limited by pain and desats with activity   Edema / Skin Assessment   Edema / Skin  Not Assessed   Education Group   Education Provided Role of Physical Therapist   Role of Physical Therapist Patient Response Patient;Acceptance;Explanation;Demonstration;Verbal Demonstration;Action Demonstration   Additional Comments Pt provided with education on use of pillow for bracing during coughs and sneezing. Pt educated on managing O2 line when d/c home around cluttered enviornment   Physical Therapy Initial Treatment Plan    Duration Discharge Needs Only   Anticipated Discharge Equipment and Recommendations   DC Equipment Recommendations None   Discharge Recommendations Recommend home health for continued physical therapy services   Interdisciplinary Plan of Care Collaboration   IDT Collaboration with  Nursing   Patient Position at End of Therapy In Bed;Call Light within Reach;Tray Table within Reach;Phone within Reach;Bed Alarm On   Collaboration Comments  aware of visit and recs   Session Information   Date / Session Number  11/21 d/c needs only

## 2024-11-21 NOTE — PROGRESS NOTES
Pt stating that they want to go home, but states that they refuse to use home 02. Education provided regarding hypoxia, incentive spirometer and risks of premature DC. Pt has difficult time understanding POC and has poor safety awareness generally.

## 2024-11-21 NOTE — CARE PLAN
The patient is Watcher - Medium risk of patient condition declining or worsening    Shift Goals  Clinical Goals: wean o2  Patient Goals: pain control, rest    Progress made toward(s) clinical / shift goals:  Patient o2 sats monitored continuously throughout shift. Patient unable to take deep breaths due to pain.     Patient is not progressing towards the following goals:      Problem: Pain - Standard  Goal: Alleviation of pain or a reduction in pain to the patient’s comfort goal  Outcome: Not Met

## 2024-11-21 NOTE — THERAPY
Occupational Therapy   Initial Evaluation     Patient Name: Ellen Srivastava  Age:  89 y.o., Sex:  female  Medical Record #: [id number]  Today's Date: 11/21/2024     Precautions  Precautions: Fall Risk  Comments: Desat with activity    Assessment  Patient is 89 y.o. female who presented 11/20/24 secondary GLF resulting in L side rib fx.  Admitted with a diagnosis of pneumothorax.  PMH - HTN, depression, Anxiety, Anemia, HDL, osteopenia, narcotic dependence.  Additional factors influencing patient status / progress: Left side/rib pain, limited activity tolerance, desat w/ activity.  Pt and  reside ii a 2nd floor apartment w/ stair access only.   is available to assist as needed.  PLOF Mod I to Indep for ADL's, transfers and ambulation w/out a device.  Pt demonstrated SBA supine to EOB sitting/Min assist EOB sitting to supine, SBA sit/stand, Sup ambulation in room w/out a device, CGA toilet transfer, Min assist U/LBCM, CGA toileting.  Therapist reviewed environmental/safety awareness, fall precautions, AE/DME, ADL's and transfers.      Plan    Occupational Therapy Initial Treatment Plan   Treatment Interventions: (P) Self Care / Activities of Daily Living, Adaptive Equipment, Neuro Re-Education / Balance, Therapeutic Activity  Treatment Frequency: (P) 3 Times per Week  Duration: (P) Until Therapy Goals Met    DC Equipment Recommendations: (P) Unable to determine at this time  Discharge Recommendations: (P) Recommend home health for continued occupational therapy services     Subjective    Pt was alert and cooperative w/ tx.     Objective       11/21/24 0820    Services   Is patient using  services for this encounter? No   Initial Contact Note    Initial Contact Note Order Received and Verified, Occupational Therapy Evaluation in Progress with Full Report to Follow.   Prior Living Situation   Housing / Facility 2 Story Apartment / Condo   Steps Into Home 20   Steps In Home 0    Rail Right Rail (Steps into Home)   Elevator No   Bathroom Set up Walk In Shower;Grab Bars;Shower Chair   Equipment Owned Front-Wheel Walker;Wheelchair;Grab Bar(s) In Tub / Shower;Tub / Shower Seat;Grab Bar(s) By Toilet   Lives with - Patient's Self Care Capacity Spouse   Comments Pt and  reside ii a 2nd floor apartment w/ stair access only.   is available to assist as needed.  PLOF Mod I to Indep for ADL's, transfers and ambulation w/out a device.   Prior Level of ADL Function   Self Feeding Independent   Grooming / Hygiene Independent   Bathing Independent   Dressing Independent   Toileting Independent   Prior Level of IADL Function   Medication Management Independent   Laundry Independent   Kitchen Mobility Independent   Finances Independent   Home Management Independent   Shopping Independent   Prior Level Of Mobility Independent Without Device in Community;Independent With Steps in Community;Independent Without Device in Home;Independent With Steps in Home   Driving / Transportation Driving Independent   History of Falls   History of Falls Yes   Date of Last Fall 11/20/24  (GLF resulting in L side fx'd ribs)   Precautions   Precautions Fall Risk   Comments Pt desat w/ activity to mid 70%.   Vitals   Patient BP Position Sitting   Pulse Oximetry 92 %   O2 (LPM) 1   O2 Delivery Device Silicone Nasal Cannula   Pain   Intervention Rest;Repositioned   Pain 0 - 10 Group   Location Rib Cage   Location Orientation Left   Description Aching   Comfort Goal Comfort with Movement;Perform Activity   Cognition    Cognition / Consciousness X   Level of Consciousness Alert   Safety Awareness Impaired   Active ROM Upper Body   Active ROM Upper Body  X   Dominant Hand Right   Comments Limited LUE above shldr level secondary L side rib fx   Balance Assessment   Sitting Balance (Static) Good   Sitting Balance (Dynamic) Fair +   Standing Balance (Static) Fair +   Standing Balance (Dynamic) Fair   Weight Shift Sitting  Good   Weight Shift Standing Fair   Comments OOB w/out device   Bed Mobility    Supine to Sit Standby Assist   Sit to Supine Minimal Assist   ADL Assessment   Upper Body Dressing Minimal Assist   Lower Body Dressing Minimal Assist   Toileting Contact Guard Assist   How much help from another person does the patient currently need...   Putting on and taking off regular lower body clothing? 3   Bathing (including washing, rinsing, and drying)? 3   Toileting, which includes using a toilet, bedpan, or urinal? 3   Putting on and taking off regular upper body clothing? 3   Taking care of personal grooming such as brushing teeth? 3   Eating meals? 4   6 Clicks Daily Activity Score 19   Functional Mobility   Sit to Stand Standby Assist   Bed, Chair, Wheelchair Transfer Standby Assist   Toilet Transfers Contact Guard Assist   Transfer Method Stand Step   Mobility Sup ambulation w/out a device   Distance (Feet) 15   # of Times Distance was Traveled 2   Edema / Skin Assessment   Edema / Skin  Not Assessed   Short Term Goals   Short Term Goal # 1 Mod I bed mobility (to/from supine/EOB sitting)   Short Term Goal # 2 Mod I UBCM   Short Term Goal # 3 Mod I LB clothing management via AE/DME PRN   Short Term Goal # 4 Sup toilet transfer/bathroom via DME PRN   Short Term Goal # 5 Sup toileting/bathroom via AE/DME PRN   Education Group   Education Provided Transfers;Home Safety;Role of Occupational Therapist;Activities of Daily Living;Adaptive Equipment;Use of Call Light   Role of Occupational Therapist Patient Response Patient;Acceptance;Explanation;Verbal Demonstration   Home Safety Patient Response Patient;Acceptance;Explanation;Demonstration;Verbal Demonstration   Transfers Patient Response Patient;Acceptance;Explanation;Demonstration;Teach Back;Verbal Demonstration;Action Demonstration;Reinforcement Needed   ADL Patient Response Patient;Acceptance;Explanation;Demonstration;Teach Back;Verbal Demonstration;Action  Demonstration;Reinforcement Needed   Adaptive Equipment Patient Response Patient;Acceptance;Explanation;Demonstration;Teach Back;Verbal Demonstration;Action Demonstration   Use of Call Light Patient Response Patient;Acceptance;Explanation;Demonstration;Verbal Demonstration   Occupational Therapy Initial Treatment Plan    Treatment Interventions Self Care / Activities of Daily Living;Adaptive Equipment;Neuro Re-Education / Balance;Therapeutic Activity   Treatment Frequency 3 Times per Week   Duration Until Therapy Goals Met   Problem List   Problem List Decreased Active Daily Living Skills;Decreased Functional Mobility;Decreased Activity Tolerance;Safety Awareness Deficits / Cognition;Impaired Postural Control / Balance   Anticipated Discharge Equipment and Recommendations   DC Equipment Recommendations Unable to determine at this time   Discharge Recommendations Recommend home health for continued occupational therapy services

## 2024-11-21 NOTE — DIETARY
"Nutrition Services: Initial Assessment     Day of admit. Ellen Srivastava is 89 y.o., female with admitting DX of Pneumothorax [J93.9].    Consult Received for...: BMI    Current Hospital Problems List:  pneumothorax, rib fracture s/p fall, pneumonia, lung nodule, neuropathy, depression w/ anxiety, narcotic dependence, HTN       Nutrition Assessment:      Height: 174 cm (5' 8.5\")  Weight: 56.6 kg (124 lb 12.5 oz)  Weight taken via: Bed Scale  BMI Calculated: 18.7  BMI Classification: Underweight, For age group       Weight Readings from Last 5 encounters:   Wt Readings from Last 5 Encounters:   11/21/24 56.6 kg (124 lb 12.5 oz)   09/17/24 60.2 kg (132 lb 11.5 oz)   09/11/24 59 kg (130 lb)   04/24/24 60.3 kg (133 lb)   02/23/24 57.6 kg (127 lb)       Objective:   Pertinent Medical Hx: HTN, Depression/Anxiety, narcotic dependence   Pertinent Labs: 11/21/24: potassium=5.2, glucose=105, Bun=21, creatinine=1.23, GFR=42, mag=2.1  Pertinent Meds: Augmentin, Atarax, metoprolol SR, oxycodone, senna-docusate  Last BM:     11/18/24     Current Diet Order/Intake:   Regular diet. PO 50-75% of breakfast this morning. Lunch tray at bedside had very little of the meal consumed.     Subjective:   Patient reported UBW: 120-130 lb  Dietary Recall/Energy Intake: Pt reports that currently she has a poor appetite due to rib pain. Prior to hospitalization she was eating fine. She said that she and her  go out to breakfast every morning and will eat a smaller dinner of soup in the evening. This indicates Sufficient energy intake prior to hospitalization.   Pt said that she normally eats softer foods. She agreed to Level 7-Easy to Chew diet. She also agreed to 1 Ensure supplement daily for additional kcals and protein.     Nutrition Focused Physical Exam (NFPE)  Weight Loss: Pt said that her weight has been stable. Current wt falls w/in pt's reported UBW range.  Muscle Mass: Mild Wasting at [place]. This may be r/t to pt's " age  Subcutaneous Fat: Well Nourished  Fluid Accumulation: none noted  Reduced  Strength: N/A in acute care setting.    Nutrition Diagnosis:      Inadequate Oral Intake related to pain as evidenced by very little of pt's lunch was eaten today.      Based on RD assessment at this time, Patient does not meet criteria in congruence with ASPEN/Academy guidelines for malnutrition    Nutrition Interventions:      1. Change diet to Level 7-Easy to Chew   2. Recommend Ensure Plus High Protein (provides 350 calories) 20 g protein per 8 fl oz) QD.  3. Patient aware of active plan of care as appropriate.     Nutrition Monitoring and Evaluation:      Monitor nutrition POC, goal for >50% intake from meals and supplements.  Additional fluids per MD/DO  Monitor vital signs pertinent to nutrition.    RD following and will provide updated recommendations as indicated.      Alida Irwin R.D.                                         ASPEN/AND CRITERIA FOR MALNUTRITION

## 2024-11-21 NOTE — PROGRESS NOTES
[facility] Medicine Daily Progress Note    Date of Service  11/21/2024    Chief Complaint  Ellen Srivastava is a 89 y.o. female admitted 11/20/2024 with fall and rib fracture    Hospital Course  89 y.o. female with PMH of HTN, Depression/Anxiety, narcotic dependence who presented 11/20/2024 with fall and rib fracture.     The patient states she was using her vacuum last night and fell to her side. She denies hitting her head.  Because of worsening pain the patient decided to come to the ER for further assessment and evaluation.  The patient denies headache, sick contacts, fever or any other focal neurological deficit.     In the ER the patient had a CT scan which was found to have acute fractures of the left lateral 5th through 8th ribs, trace left-sided pneumothorax at the site of the rib fractures, bibasilar atelectasis and bilateral pulmonary nodules that have been unchanged since 2020.    Interval Problem Update  11/21: Patient seen at bedside this morning.  Still desaturating into the 70s when walking as per nursing.  No chest x-ray concerning for possible pneumonia.  We have started the patient on Augmentin.  We appreciate further recommendation by pulmonary.  Continue to monitor closely.  We will encourage incentive spirometry.    I have discussed this patient's plan of care and discharge plan at IDT rounds today with Case Management, Nursing, Nursing leadership, and other members of the IDT team.    Consultants/Specialty  Pulmonary    Code Status  DNAR/DNI    Disposition  The patient is not medically cleared for discharge to home or a post-acute facility.          Review of Systems  Review of Systems   Constitutional:  Positive for malaise/fatigue. Negative for chills and fever.   HENT:  Negative for hearing loss and nosebleeds.    Eyes:  Negative for blurred vision and double vision.   Respiratory:  Positive for cough and shortness of breath.    Cardiovascular:  Positive for chest pain.    Gastrointestinal:  Negative for abdominal pain, heartburn and vomiting.   Genitourinary:  Negative for dysuria and urgency.   Musculoskeletal:  Negative for back pain and falls.   Skin:  Negative for itching and rash.   Neurological:  Negative for dizziness and headaches.   Psychiatric/Behavioral:  Negative for substance abuse. The patient is not nervous/anxious.    All other systems reviewed and are negative.       Physical Exam  Temp:  [36.7 °C (98 °F)-36.8 °C (98.2 °F)] 36.7 °C (98 °F)  Pulse:  [58-68] 58  Resp:  [16-18] 18  BP: (113-163)/(60-83) 127/64  SpO2:  [91 %-96 %] 91 %    Physical Exam  Vitals and nursing note reviewed.   Constitutional:       General: She is not in acute distress.     Appearance: She is ill-appearing.   HENT:      Head: Normocephalic and atraumatic.      Right Ear: External ear normal.      Left Ear: External ear normal.      Mouth/Throat:      Pharynx: No oropharyngeal exudate or posterior oropharyngeal erythema.   Eyes:      General:         Right eye: No discharge.         Left eye: No discharge.   Cardiovascular:      Rate and Rhythm: Normal rate and regular rhythm.      Pulses: Normal pulses.      Heart sounds: No murmur heard.     No gallop.   Pulmonary:      Breath sounds: Rhonchi present.   Abdominal:      General: Bowel sounds are normal. There is no distension.      Palpations: Abdomen is soft.      Tenderness: There is no abdominal tenderness. There is no guarding.   Musculoskeletal:         General: No swelling or tenderness. Normal range of motion.      Cervical back: Normal range of motion and neck supple. No tenderness.   Skin:     General: Skin is warm and dry.   Neurological:      General: No focal deficit present.      Mental Status: She is alert and oriented to person, place, and time. Mental status is at baseline.      Motor: No weakness.   Psychiatric:         Mood and Affect: Mood normal.         Behavior: Behavior normal.         Fluids    Intake/Output Summary  (Last 24 hours) at 11/21/2024 1025  Last data filed at 11/21/2024 0829  Gross per 24 hour   Intake 240 ml   Output --   Net 240 ml        Laboratory  Recent Labs     11/21/24  0206   WBC 11.5*   RBC 4.25   HEMOGLOBIN 13.1   HEMATOCRIT 41.1   MCV 96.7   MCH 30.8   MCHC 31.9*   RDW 49.2   PLATELETCT 191   MPV 10.0     Recent Labs     11/21/24  0206   SODIUM 139   POTASSIUM 5.2   CHLORIDE 104   CO2 29   GLUCOSE 105*   BUN 21   CREATININE 1.23   CALCIUM 9.2                   Imaging  DX-CHEST-PORTABLE (1 VIEW)   Final Result         1.  Hazy left lower lobe infiltrates.   2.  Small left pleural effusion   3.  Left rib fractures   4.  Cardiomegaly   5.  Atherosclerosis      DX-CHEST-PORTABLE (1 VIEW)   Final Result      Atelectasis involving the left lung base.      CT-CHEST (THORAX) W/O   Final Result      1.  Acute fractures of the left lateral fifth through eighth ribs.      2.  Trace left-sided pneumothorax at the site of the rib fractures.      3.  Minimal left pleural effusion.      4.  Bibasilar atelectasis, left greater than right.      5.  Bilateral pulmonary nodules measuring up to 8 mm in size, unchanged. These are stable since 2020 consistent with benign process and are no follow-up.      6.  This was discussed with DARIA RICHMOND at 9:22 AM on 11/20/2024.           Assessment/Plan  * Pneumothorax  Assessment & Plan  Monitor on telemetry for now  Supplemental oxygen as needed  Pain management  Serial Xrays if needed    -- We have consulted pulmonary, we recommend repeat x-ray at 3 PM today to monitor pneumothorax.  And also tomorrow morning.    11/21: Continue pain management and incentive spirometer.  We appreciate further recommendations by pulmonary.    Rib fracture  Assessment & Plan  Due to fall  Pain management    11/21: Continue pain management and incentive spirometer.     Fall  Assessment & Plan  PT/OT    Pneumonia  Assessment & Plan  11/21: Concern for pneumonia on xray  We have started  augmentin  monitor    Lung nodule  Assessment & Plan  As per CT seem to be stable    11/21: Referral to lung nodule clinic placed    Neuropathy- (present on admission)  Assessment & Plan  11/21: Continue home dose of gabapentin    Depression with anxiety- (present on admission)  Assessment & Plan  continue home medications    Narcotic dependence (HCC)- (present on admission)  Assessment & Plan  Due to chronic back pain    11/21: continue home dose of norco    HTN (hypertension)- (present on admission)  Assessment & Plan  Continue metoprolol    Advance care planning  Assessment & Plan  11/19: I discussed with patient for at least 16 minutes further goals of care.  This included CODE STATUS which includes full code and DNR/DNI.  The patient like to be DNR/DNI.  She understands what this means.  She does not want to be resuscitated or intubated.  Patient's  is also at bedside the plan of our conversation.  We also discussed further treatment goals.  For now the patient want to have full treatment options.  This includes invasive or noninvasive procedures as needed.  In the event that the patient decisions for herself she would like her  Sarkis to make decisions for her.         VTE prophylaxis: SCDs    I have performed a physical exam and reviewed and updated ROS and Plan today (11/21/2024). In review of yesterday's note (11/20/2024), there are no changes except as documented above.      I spend at least 51 minutes providing care for this patient.  This included face-to-face interview, physical examination.  Review of lab work including CBC, CMP, magnesium.  Discussing with multidisciplinary team including case management, nursing staff and pharmacy.  Creating plan of care, reviewing orders.

## 2024-11-21 NOTE — PROGRESS NOTES
Telemetry Summary    Rhythm: SR, SB 1st degree  Rate: 50's-60's  Ectopy: --  Measurements: (.20/.08/.44)          Normal Values  Rhythm: SR  Rate:   Measurements: 0.12-0.20/0.06-0.10/0.30-0.52

## 2024-11-21 NOTE — CARE PLAN
The patient is Stable - Low risk of patient condition declining or worsening    Shift Goals  Clinical Goals: Wean 02, tele monitoring  Patient Goals: DC, pain mgmt    Progress made toward(s) clinical / shift goals:  Pt aox4, exhibiting signs of memory loss and difficulty following/remembering POC and safety concepts. Bed alarm on, bed in lowest position, call light within reach. Pain managed with PRN Rx. JOANN.     Pt took 02 off and desatted to 80%.     Patient is not progressing towards the following goals:      Problem: Knowledge Deficit - Standard  Goal: Patient and family/care givers will demonstrate understanding of plan of care, disease process/condition, diagnostic tests and medications  Outcome: Progressing  Frequent education provided     Problem: Pain - Standard  Goal: Alleviation of pain or a reduction in pain to the patient’s comfort goal  Outcome: Progressing

## 2024-11-21 NOTE — PROGRESS NOTES
Telemetry Shift Summary    Per monitor tech:  Rhythm SR  HR Range 60s  Ectopy n/a  Measurements 0.20/0.08/0.40      Normal Values  Rhythm SR  HR Range:   Measurements: 0.12-0.20/0.06-0.10/0.30-0.52

## 2024-11-21 NOTE — CONSULTS
Pulmonary Consultation    Date of consult: 11/20/2024    Referring Physician  Adams Tesfaye*    Reason for Consultation  Small pneumothorax    History of Presenting Illness  89 y.o. female who presented 11/20/2024 with right sided chest pain after a fall.  She tripped over her vacuum and landed on her left side.  She was brought to the ER and found to have fractures of ribs 5 through 8 on the left side and a trace left-sided pneumothorax.  Patient was mildly hypoxic requiring 2 L on admission, and expressing significant pain.  She was admitted for monitoring and ongoing care.    Code Status  DNAR/DNI    Review of Systems   Constitutional:  Positive for malaise/fatigue. Negative for chills and fever.   Respiratory:  Negative for cough, hemoptysis, shortness of breath and wheezing.    Cardiovascular:  Positive for chest pain. Negative for orthopnea.   Gastrointestinal:  Negative for nausea and vomiting.   Musculoskeletal:  Positive for back pain and joint pain.   Neurological:  Negative for dizziness, weakness and headaches.   Psychiatric/Behavioral:  Negative for depression and suicidal ideas.        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    Surgical History   has a past surgical history that includes breast biopsy (Left, 2015); lumbar decompression (1969, 1979); hysterectomy, total abdominal (1977); appendectomy (1945); parathyroid exploration (6/16/2010); paraesophageal hernia robotic (N/A,  10/12/2015); gastroscopy (10/23/2015); thoracoscopy (Left, 10/30/2015); gastroscopy-endo (11/1/2015); gastroscopy-endo (N/A, 12/10/2015); gastroscopy-endo (N/A, 2/26/2016); orif, ankle (Right, 9/20/2016); pr lap esophagogast fundoplasty (N/A, 8/24/2020); and orif, ankle (Left, 1/28/2022).    Family History  family history includes Hypertension in her mother and another family member; Lung Disease in her brother, father, and another family member.    Social History   reports that she has never smoked. She has quit using smokeless tobacco. She reports current alcohol use. She reports current drug use.    Medications  Home Medications       Reviewed by Aftab Hernandez (Pharmacy Tech) on 11/20/24 at 0943  Med List Status: Complete     Medication Last Dose Status   Acetaminophen (TYLENOL PO) 11/20/2024 Active   clobetasol (TEMOVATE) 0.05 % Cream Not Taking Active   escitalopram (LEXAPRO) 20 MG tablet 11/19/2024 Active   gabapentin (NEURONTIN) 300 MG Cap 11/19/2024 Active   HYDROcodone/acetaminophen (NORCO)  MG Tab 11/19/2024 Active   hydrOXYzine HCl (ATARAX) 25 MG Tab 11/19/2024 Active   metoprolol SR (TOPROL XL) 50 MG TABLET SR 24 HR 11/19/2024 Active   NON SPECIFIED 11/20/2024 Active   tizanidine (ZANAFLEX) 4 MG Tab 11/19/2024 Active                  Audit from Redirected Encounters    **Home medications have not yet been reviewed for this encounter**       Current Facility-Administered Medications   Medication Dose Route Frequency Provider Last Rate Last Admin    Pharmacy Consult Request ...Pain Management Review 1 Each  1 Each Other PHARMACY TO DOSE Adams Camacho M.D.        oxyCODONE immediate-release (Roxicodone) tablet 2.5 mg  2.5 mg Oral Q6HRS PRN Adams Camacho M.D.        Or    oxyCODONE immediate-release (Roxicodone) tablet 5 mg  5 mg Oral Q6HRS PRN Adams Camacho M.D.   5 mg at 11/20/24 1409    Or    HYDROmorphone (Dilaudid) injection 0.25 mg  0.25 mg  Intravenous Q6HRS PRN Adams Camacho M.D.   0.25 mg at 11/20/24 1525    senna-docusate (Pericolace Or Senokot S) 8.6-50 MG per tablet 2 Tablet  2 Tablet Oral Q EVENING Adams Camacho M.D.        And    polyethylene glycol/lytes (Miralax) Packet 1 Packet  1 Packet Oral QDAY PRN Adams Camacho M.D.        escitalopram (Lexapro) tablet 20 mg  20 mg Oral DAILY Adams Camacho M.D.   20 mg at 11/20/24 1211    gabapentin (Neurontin) capsule 300 mg  300 mg Oral BID Adams Camacho M.D.   300 mg at 11/20/24 1408    hydrOXYzine HCl (Atarax) tablet 25 mg  25 mg Oral QHS Adams Camacho M.D.        metoprolol SR (Toprol XL) tablet 50 mg  50 mg Oral DAILY Adams Camacho M.D.   50 mg at 11/20/24 1210    acetaminophen (Tylenol) tablet 1,000 mg  1,000 mg Oral TID Adams Camacho M.D.   1,000 mg at 11/20/24 1209    lidocaine (Asperflex) 4 % patch 1 Patch  1 Patch Transdermal Q24HR Adams Camacho M.D.   1 Patch at 11/20/24 1526    oxyCODONE immediate-release (Roxicodone) tablet 5 mg  5 mg Oral BID Adams Camacho M.D.   5 mg at 11/20/24 1211       Allergies  Allergies   Allergen Reactions    Asa [Aspirin] Unspecified     Pt reports that she is not sure what happen       Vital Signs last 24 hours  Temp:  [36.7 °C (98 °F)-37.1 °C (98.7 °F)] 36.7 °C (98 °F)  Pulse:  [58-67] 65  Resp:  [16-18] 16  BP: (113-160)/(60-83) 138/83  SpO2:  [87 %-96 %] 94 %    Physical Exam  Vitals and nursing note reviewed.   Constitutional:       Appearance: She is ill-appearing.   HENT:      Head: Normocephalic.   Eyes:      Conjunctiva/sclera: Conjunctivae normal.   Cardiovascular:      Rate and Rhythm: Normal rate and regular rhythm.   Pulmonary:      Effort: Pulmonary effort is normal. No respiratory distress.      Breath sounds: Normal breath sounds. No wheezing.   Abdominal:      Palpations: Abdomen is soft.   Skin:     General: Skin is  warm and dry.   Neurological:      General: No focal deficit present.      Mental Status: She is alert. Mental status is at baseline.   Psychiatric:         Mood and Affect: Mood normal.         Behavior: Behavior normal.         Fluids  No intake or output data in the 24 hours ending 11/20/24 1638    Laboratory  No results found for this or any previous visit (from the past 48 hours).    Imaging  DX-CHEST-PORTABLE (1 VIEW)   Final Result      Atelectasis involving the left lung base.      CT-CHEST (THORAX) W/O   Final Result      1.  Acute fractures of the left lateral fifth through eighth ribs.      2.  Trace left-sided pneumothorax at the site of the rib fractures.      3.  Minimal left pleural effusion.      4.  Bibasilar atelectasis, left greater than right.      5.  Bilateral pulmonary nodules measuring up to 8 mm in size, unchanged. These are stable since 2020 consistent with benign process and are no follow-up.      6.  This was discussed with DARIA RICHMOND at 9:22 AM on 11/20/2024.          Assessment/Plan  #Small left sided traumatic pneumothorax   #Rib fractures on left side, no sign of pulmonary contusion at this point   #Acute hypoxemic respiratory failure   #Pulmonary nodules, stable on CT     Titrate O2 to maintain sats 88-92%  Repeat CXR at 1700, but I am not able to appreciate the ptx on the CT or CXR   Pain management   Ambulate   IS   Outpatient follow up for nodules   __________  This note was generated using voice recognition software which has a chance of producing errors of grammar and content.  I have made every reasonable attempt to find and correct any errors, but it should be expected that some may not be found prior to finalization of this note.    Tati Nguyễn, DO   Pulmonary and Critical Care

## 2024-11-21 NOTE — ASSESSMENT & PLAN NOTE
11/21: Concern for pneumonia on xray  We have started augmentin  Monitor    11/23: Continue augmentin

## 2024-11-22 ENCOUNTER — APPOINTMENT (OUTPATIENT)
Dept: RADIOLOGY | Facility: MEDICAL CENTER | Age: 89
End: 2024-11-22
Attending: INTERNAL MEDICINE
Payer: MEDICARE

## 2024-11-22 LAB
ANION GAP SERPL CALC-SCNC: 6 MMOL/L (ref 7–16)
BUN SERPL-MCNC: 20 MG/DL (ref 8–22)
CALCIUM SERPL-MCNC: 9 MG/DL (ref 8.4–10.2)
CHLORIDE SERPL-SCNC: 101 MMOL/L (ref 96–112)
CO2 SERPL-SCNC: 31 MMOL/L (ref 20–33)
CREAT SERPL-MCNC: 0.96 MG/DL (ref 0.5–1.4)
ERYTHROCYTE [DISTWIDTH] IN BLOOD BY AUTOMATED COUNT: 49 FL (ref 35.9–50)
GFR SERPLBLD CREATININE-BSD FMLA CKD-EPI: 57 ML/MIN/1.73 M 2
GLUCOSE SERPL-MCNC: 100 MG/DL (ref 65–99)
HCT VFR BLD AUTO: 40.5 % (ref 37–47)
HGB BLD-MCNC: 12.9 G/DL (ref 12–16)
MCH RBC QN AUTO: 30.6 PG (ref 27–33)
MCHC RBC AUTO-ENTMCNC: 31.9 G/DL (ref 32.2–35.5)
MCV RBC AUTO: 96.2 FL (ref 81.4–97.8)
PLATELET # BLD AUTO: 176 K/UL (ref 164–446)
PMV BLD AUTO: 9.9 FL (ref 9–12.9)
POTASSIUM SERPL-SCNC: 4.6 MMOL/L (ref 3.6–5.5)
RBC # BLD AUTO: 4.21 M/UL (ref 4.2–5.4)
SODIUM SERPL-SCNC: 138 MMOL/L (ref 135–145)
WBC # BLD AUTO: 10.4 K/UL (ref 4.8–10.8)

## 2024-11-22 PROCEDURE — 80048 BASIC METABOLIC PNL TOTAL CA: CPT

## 2024-11-22 PROCEDURE — 99233 SBSQ HOSP IP/OBS HIGH 50: CPT | Performed by: INTERNAL MEDICINE

## 2024-11-22 PROCEDURE — 71045 X-RAY EXAM CHEST 1 VIEW: CPT

## 2024-11-22 PROCEDURE — 36415 COLL VENOUS BLD VENIPUNCTURE: CPT

## 2024-11-22 PROCEDURE — 700101 HCHG RX REV CODE 250: Performed by: INTERNAL MEDICINE

## 2024-11-22 PROCEDURE — 700102 HCHG RX REV CODE 250 W/ 637 OVERRIDE(OP): Performed by: INTERNAL MEDICINE

## 2024-11-22 PROCEDURE — 770020 HCHG ROOM/CARE - TELE (206)

## 2024-11-22 PROCEDURE — 94760 N-INVAS EAR/PLS OXIMETRY 1: CPT

## 2024-11-22 PROCEDURE — 85027 COMPLETE CBC AUTOMATED: CPT

## 2024-11-22 PROCEDURE — 700111 HCHG RX REV CODE 636 W/ 250 OVERRIDE (IP): Mod: JZ | Performed by: INTERNAL MEDICINE

## 2024-11-22 PROCEDURE — A9270 NON-COVERED ITEM OR SERVICE: HCPCS | Performed by: STUDENT IN AN ORGANIZED HEALTH CARE EDUCATION/TRAINING PROGRAM

## 2024-11-22 PROCEDURE — 700102 HCHG RX REV CODE 250 W/ 637 OVERRIDE(OP): Performed by: STUDENT IN AN ORGANIZED HEALTH CARE EDUCATION/TRAINING PROGRAM

## 2024-11-22 PROCEDURE — A9270 NON-COVERED ITEM OR SERVICE: HCPCS | Performed by: INTERNAL MEDICINE

## 2024-11-22 RX ORDER — ENOXAPARIN SODIUM 100 MG/ML
40 INJECTION SUBCUTANEOUS DAILY
Status: DISCONTINUED | OUTPATIENT
Start: 2024-11-22 | End: 2024-11-24 | Stop reason: HOSPADM

## 2024-11-22 RX ORDER — LACTULOSE 10 G/15ML
30 SOLUTION ORAL 3 TIMES DAILY
Status: DISCONTINUED | OUTPATIENT
Start: 2024-11-22 | End: 2024-11-23

## 2024-11-22 RX ORDER — BISACODYL 10 MG
10 SUPPOSITORY, RECTAL RECTAL DAILY
Status: DISCONTINUED | OUTPATIENT
Start: 2024-11-22 | End: 2024-11-23

## 2024-11-22 RX ORDER — ONDANSETRON 4 MG/1
4 TABLET, ORALLY DISINTEGRATING ORAL EVERY 6 HOURS PRN
Status: DISCONTINUED | OUTPATIENT
Start: 2024-11-22 | End: 2024-11-24 | Stop reason: HOSPADM

## 2024-11-22 RX ADMIN — Medication 5 MG: at 20:31

## 2024-11-22 RX ADMIN — LACTULOSE 30 ML: 20 SOLUTION ORAL at 17:08

## 2024-11-22 RX ADMIN — ESCITALOPRAM OXALATE 20 MG: 10 TABLET ORAL at 05:27

## 2024-11-22 RX ADMIN — LACTULOSE 30 ML: 20 SOLUTION ORAL at 13:48

## 2024-11-22 RX ADMIN — ENOXAPARIN SODIUM 40 MG: 100 INJECTION SUBCUTANEOUS at 17:08

## 2024-11-22 RX ADMIN — ACETAMINOPHEN 1000 MG: 500 TABLET ORAL at 14:20

## 2024-11-22 RX ADMIN — HYDROXYZINE HYDROCHLORIDE 25 MG: 25 TABLET, FILM COATED ORAL at 20:31

## 2024-11-22 RX ADMIN — ACETAMINOPHEN 1000 MG: 500 TABLET ORAL at 20:31

## 2024-11-22 RX ADMIN — AMOXICILLIN AND CLAVULANATE POTASSIUM 1 TABLET: 875; 125 TABLET, FILM COATED ORAL at 05:27

## 2024-11-22 RX ADMIN — BISACODYL 10 MG: 10 SUPPOSITORY RECTAL at 10:23

## 2024-11-22 RX ADMIN — OXYCODONE 5 MG: 5 TABLET ORAL at 20:31

## 2024-11-22 RX ADMIN — GABAPENTIN 300 MG: 300 CAPSULE ORAL at 14:20

## 2024-11-22 RX ADMIN — METOPROLOL SUCCINATE 50 MG: 25 TABLET, EXTENDED RELEASE ORAL at 05:26

## 2024-11-22 RX ADMIN — OXYCODONE HYDROCHLORIDE 5 MG: 5 TABLET ORAL at 17:08

## 2024-11-22 RX ADMIN — OXYCODONE HYDROCHLORIDE 5 MG: 5 TABLET ORAL at 05:26

## 2024-11-22 RX ADMIN — POLYETHYLENE GLYCOL 3350 1 PACKET: 17 POWDER, FOR SOLUTION ORAL at 16:55

## 2024-11-22 RX ADMIN — AMOXICILLIN AND CLAVULANATE POTASSIUM 1 TABLET: 875; 125 TABLET, FILM COATED ORAL at 17:08

## 2024-11-22 RX ADMIN — ACETAMINOPHEN 1000 MG: 500 TABLET ORAL at 08:38

## 2024-11-22 RX ADMIN — OXYCODONE 5 MG: 5 TABLET ORAL at 10:46

## 2024-11-22 RX ADMIN — LIDOCAINE 1 PATCH: 4 PATCH TOPICAL at 10:38

## 2024-11-22 RX ADMIN — SENNOSIDES AND DOCUSATE SODIUM 2 TABLET: 50; 8.6 TABLET ORAL at 17:08

## 2024-11-22 RX ADMIN — GABAPENTIN 300 MG: 300 CAPSULE ORAL at 05:27

## 2024-11-22 ASSESSMENT — FIBROSIS 4 INDEX: FIB4 SCORE: 3.73

## 2024-11-22 ASSESSMENT — PAIN DESCRIPTION - PAIN TYPE
TYPE: ACUTE PAIN

## 2024-11-22 ASSESSMENT — ENCOUNTER SYMPTOMS
ABDOMINAL PAIN: 0
DOUBLE VISION: 0
SHORTNESS OF BREATH: 1
HEARTBURN: 0
BLURRED VISION: 0
HEADACHES: 0
VOMITING: 0
CHILLS: 0
FEVER: 0
DIZZINESS: 0
FALLS: 0
BACK PAIN: 0
COUGH: 1
NERVOUS/ANXIOUS: 0

## 2024-11-22 ASSESSMENT — LIFESTYLE VARIABLES: SUBSTANCE_ABUSE: 0

## 2024-11-22 NOTE — PROGRESS NOTES
Telemetry shift summary    Rhythm: sb/sr  HR:   Ectopy:     Measurements: .14 / .12 / .46    Normal values  Rhythm SR  HR   Measurements: 0.12-0.20/0.08-0.10/0.30-0.52

## 2024-11-22 NOTE — PROGRESS NOTES
Telemetry Shift Summary    Per monitor tech:  Rhythm SB/SR  HR Range 50s-70s  Ectopy rPAC  Measurements 0.18/0.08/0.40      Normal Values  Rhythm SR  HR Range:   Measurements: 0.12-0.20/0.06-0.10/0.30-0.52

## 2024-11-22 NOTE — DISCHARGE PLANNING
Case Management Discharge Planning    Admission Date: 11/20/2024  GMLOS: 4.4  ALOS: 2    6-Clicks ADL Score: 19  6-Clicks Mobility Score: 21      Anticipated Discharge Dispo: Discharge Disposition: D/T to home under HHA care in anticipation of covered skilled care (06)     @1142: Spoke to patient at bedside and obtained choice for oxygen and home health. Home Health: [facility] (1) and [facility] (2). DME: [facility] (1) and [facility] (2). I requested oxygen orders be placed as soon as possible today during IDT rounds as I am an early out. Patient to discharge tomorrow.     @1256: Home health referral accepted by [facility].    @1259: DME orders placed. Requested [facility] send out referral.     @1402: Spoke to Tatiana at [facility]; she reports they will try to deliver tonight, but that their  is slammed, so if it can't happen tonight it will happen before [id number] tomorrow. Oxygen approved.

## 2024-11-22 NOTE — FACE TO FACE
"Face to Face Note  -  Durable Medical Equipment    Adams Camacho M.D. - NPI: [id number]  I certify that this patient is under my care and that they had a durable medical equipment(DME)face to face encounter by myself that meets the physician DME face-to-face encounter requirements with this patient on:    Date of encounter:   Patient:                    MRN:                       YOB: 2022  Ellen Srivastava  [id number]  1935     The encounter with the patient was in whole, or in part, for the following medical condition, which is the primary reason for durable medical equipment:  Other - rib fracture, pneumonia    I certify that, based on my findings, the following durable medical equipment is medically necessary:    Oxygen   HOME O2 Saturation Measurements:(Values must be present for Home Oxygen orders)  Room air sat at rest: 88  Room air sat with amb: 86  With liters of O2: 3, O2 sat at rest with O2: 94  With Liters of O2: 3, O2 sat with amb with O2 : 92  Is the patient mobile?: Yes  If patient feels more short of breath, they can go up to 6 liters per minute and contact healthcare provider.    Supporting Symptoms: The patient requires supplemental oxygen, as the following interventions have been tried with limited or no improvement: \"Ambulation with oximetry.    My Clinical findings support the need for the above equipment due to:  Hypoxia  "

## 2024-11-22 NOTE — PROGRESS NOTES
I was asked by Dr. Zelaya to review the CXR. I agree with the radiology read that there is no visible PTX. I recommend pain control and good pulmonary toilet since the patient is at risk for worsening pulmonary contusions from rib fractures. No contra-indication for discharge from my standpoint.     Domingo Lei DO, [facility]  Staff Pulmonologist and Intensivist  [facility]     Please note that this dictation was created using voice recognition software. The accuracy of the dictation is limited to the abilities of the software. I have made every reasonable attempt to correct obvious errors, but I expect that there are errors of grammar and possibly content that I did not discover before finalizing the note.

## 2024-11-22 NOTE — CARE PLAN
The patient is Stable - Low risk of patient condition declining or worsening    Shift Goals  Clinical Goals: Wean O2, bowel movement  Patient Goals: Pain management, comfort, rest  Family Goals: ARLIN    Progress made toward(s) clinical / shift goals:      Problem: Knowledge Deficit - Standard  Goal: Patient and family/care givers will demonstrate understanding of plan of care, disease process/condition, diagnostic tests and medications  11/22/2024 0906 by Joan Michele R.N.  Outcome: Progressing  11/22/2024 0905 by Joan Michele R.N.  Outcome: Not Progressing     Problem: Fall Risk  Goal: Patient will remain free from falls  11/22/2024 0906 by Joan Michele R.N.  Outcome: Progressing  11/22/2024 0905 by DACIA NavarroN.  Outcome: Not Progressing       Patient is not progressing towards the following goals:      Problem: Pain - Standard  Goal: Alleviation of pain or a reduction in pain to the patient’s comfort goal  Outcome: Not Progressing  Note: Patient endorsing pain to L side d/t rib fractures. Scheduled and PRN pain medications given as ordered. Non-pharmacological interventions utilized as well, including food and repositioning.

## 2024-11-22 NOTE — PROGRESS NOTES
[facility] Medicine Daily Progress Note    Date of Service  11/22/2024    Chief Complaint  Ellen Srivastava is a 89 y.o. female admitted 11/20/2024 with fall and rib fracture    Hospital Course  89 y.o. female with PMH of HTN, Depression/Anxiety, narcotic dependence who presented 11/20/2024 with fall and rib fracture.     The patient states she was using her vacuum last night and fell to her side. She denies hitting her head.  Because of worsening pain the patient decided to come to the ER for further assessment and evaluation.  The patient denies headache, sick contacts, fever or any other focal neurological deficit.     In the ER the patient had a CT scan which was found to have acute fractures of the left lateral 5th through 8th ribs, trace left-sided pneumothorax at the site of the rib fractures, bibasilar atelectasis and bilateral pulmonary nodules that have been unchanged since 2020.    Interval Problem Update  11/21: Patient seen at bedside this morning.  Still desaturating into the 70s when walking as per nursing.  No chest x-ray concerning for possible pneumonia.  We have started the patient on Augmentin.  We appreciate further recommendation by pulmonary.  Continue to monitor closely.  We will encourage incentive spirometry.    11/22: Patient seen at bedside this morning.  Still complaining of pain however she says she feels better than yesterday.  Will do home O2 evaluation.  Continue antibiotics.  Repeat x-ray does not report further pneumothorax.  Continue pain management and incentive spirometer.  We appreciate further recommendation by pulmonary.    I have discussed this patient's plan of care and discharge plan at IDT rounds today with Case Management, Nursing, Nursing leadership, and other members of the IDT team.    Consultants/Specialty  Pulmonary    Code Status  DNAR/DNI    Disposition  The patient is not medically cleared for discharge to home or a post-acute facility.          Review of  Systems  Review of Systems   Constitutional:  Positive for malaise/fatigue. Negative for chills and fever.   HENT:  Negative for hearing loss and nosebleeds.    Eyes:  Negative for blurred vision and double vision.   Respiratory:  Positive for cough and shortness of breath.    Cardiovascular:  Positive for chest pain.   Gastrointestinal:  Negative for abdominal pain, heartburn and vomiting.   Genitourinary:  Negative for dysuria and urgency.   Musculoskeletal:  Negative for back pain and falls.   Skin:  Negative for itching and rash.   Neurological:  Negative for dizziness and headaches.   Psychiatric/Behavioral:  Negative for substance abuse. The patient is not nervous/anxious.    All other systems reviewed and are negative.       Physical Exam  Temp:  [36.3 °C (97.4 °F)-36.9 °C (98.4 °F)] 36.8 °C (98.3 °F)  Pulse:  [58-70] 70  Resp:  [16] 16  BP: (130-160)/(66-90) 148/82  SpO2:  [92 %-95 %] 94 %    Physical Exam  Vitals and nursing note reviewed.   Constitutional:       General: She is not in acute distress.     Appearance: She is ill-appearing.   HENT:      Head: Normocephalic and atraumatic.      Right Ear: External ear normal.      Left Ear: External ear normal.      Mouth/Throat:      Pharynx: No oropharyngeal exudate or posterior oropharyngeal erythema.   Eyes:      General:         Right eye: No discharge.         Left eye: No discharge.   Cardiovascular:      Rate and Rhythm: Normal rate and regular rhythm.      Pulses: Normal pulses.      Heart sounds: No murmur heard.     No gallop.   Pulmonary:      Breath sounds: Rhonchi present.   Abdominal:      General: Bowel sounds are normal. There is no distension.      Palpations: Abdomen is soft.      Tenderness: There is no abdominal tenderness. There is no guarding.   Musculoskeletal:         General: No swelling or tenderness. Normal range of motion.      Cervical back: Normal range of motion and neck supple. No tenderness.   Skin:     General: Skin is warm  and dry.   Neurological:      General: No focal deficit present.      Mental Status: She is alert and oriented to person, place, and time. Mental status is at baseline.      Motor: No weakness.   Psychiatric:         Mood and Affect: Mood normal.         Behavior: Behavior normal.         Fluids  No intake or output data in the 24 hours ending 11/22/24 1252       Laboratory  Recent Labs     11/21/24  0206 11/22/24  0043   WBC 11.5* 10.4   RBC 4.25 4.21   HEMOGLOBIN 13.1 12.9   HEMATOCRIT 41.1 40.5   MCV 96.7 96.2   MCH 30.8 30.6   MCHC 31.9* 31.9*   RDW 49.2 49.0   PLATELETCT 191 176   MPV 10.0 9.9     Recent Labs     11/21/24  0206 11/22/24  0043   SODIUM 139 138   POTASSIUM 5.2 4.6   CHLORIDE 104 101   CO2 29 31   GLUCOSE 105* 100*   BUN 21 20   CREATININE 1.23 0.96   CALCIUM 9.2 9.0                   Imaging  DX-CHEST-PORTABLE (1 VIEW)   Final Result         1.  Hazy left lower lobe infiltrates, stable since prior study.   2.  Small left pleural effusion, stable   3.  Left rib fractures   4.  Cardiomegaly   5.  Atherosclerosis      DX-CHEST-PORTABLE (1 VIEW)   Final Result         1.  Hazy left lower lobe infiltrates.   2.  Small left pleural effusion   3.  Left rib fractures   4.  Cardiomegaly   5.  Atherosclerosis      DX-CHEST-PORTABLE (1 VIEW)   Final Result      Atelectasis involving the left lung base.      CT-CHEST (THORAX) W/O   Final Result      1.  Acute fractures of the left lateral fifth through eighth ribs.      2.  Trace left-sided pneumothorax at the site of the rib fractures.      3.  Minimal left pleural effusion.      4.  Bibasilar atelectasis, left greater than right.      5.  Bilateral pulmonary nodules measuring up to 8 mm in size, unchanged. These are stable since 2020 consistent with benign process and are no follow-up.      6.  This was discussed with DARIA RICHMOND at 9:22 AM on 11/20/2024.           Assessment/Plan  * Pneumothorax  Assessment & Plan  Monitor on telemetry for  now  Supplemental oxygen as needed  Pain management  Serial Xrays if needed    -- We have consulted pulmonary, we recommend repeat x-ray at 3 PM today to monitor pneumothorax.  And also tomorrow morning.    11/21: Continue pain management and incentive spirometer.  We appreciate further recommendations by pulmonary.    11/22: Repeat x-ray does not report pneumothorax as well as pulmonary evaluation of the x-ray.  Continue with pain management for rib fracture and incentive spirometer.    Rib fracture  Assessment & Plan  Due to fall  Pain management    11/22: Continue pain management and incentive spirometer.     Fall  Assessment & Plan  PT/OT    Pneumonia  Assessment & Plan  11/21: Concern for pneumonia on xray  We have started augmentin  Monitor    11/22: Continue augmentin    Lung nodule  Assessment & Plan  As per CT seem to be stable    11/21: Referral to lung nodule clinic placed    Neuropathy- (present on admission)  Assessment & Plan  11/22: Continue home dose of gabapentin    Depression with anxiety- (present on admission)  Assessment & Plan  continue home medications    Narcotic dependence (HCC)- (present on admission)  Assessment & Plan  Due to chronic back pain    11/22: continue home dose of norco    HTN (hypertension)- (present on admission)  Assessment & Plan  Continue metoprolol    Advance care planning  Assessment & Plan  11/19: I discussed with patient for at least 16 minutes further goals of care.  This included CODE STATUS which includes full code and DNR/DNI.  The patient like to be DNR/DNI.  She understands what this means.  She does not want to be resuscitated or intubated.  Patient's  is also at bedside the plan of our conversation.  We also discussed further treatment goals.  For now the patient want to have full treatment options.  This includes invasive or noninvasive procedures as needed.  In the event that the patient decisions for herself she would like her  Sarkis to make  decisions for her.         VTE prophylaxis: Lovenox    I have performed a physical exam and reviewed and updated ROS and Plan today (11/22/2024). In review of yesterday's note (11/21/2024), there are no changes except as documented above.      I spend at least 52 minutes providing care for this patient.  This included face-to-face interview, physical examination.  Review of lab work including CBC, and BMP. Discussing with Pulmonary. Discussing with multidisciplinary team including case management, nursing staff and pharmacy.  Creating plan of care, reviewing orders.

## 2024-11-22 NOTE — CARE PLAN
Problem: Pain - Standard  Goal: Alleviation of pain or a reduction in pain to the patient’s comfort goal  Outcome: Progressing     Problem: Knowledge Deficit - Standard  Goal: Patient and family/care givers will demonstrate understanding of plan of care, disease process/condition, diagnostic tests and medications  Outcome: Progressing   The patient is Stable - Low risk of patient condition declining or worsening    Shift Goals  Clinical Goals: Wean 02  Patient Goals: DC without 02    Progress made toward(s) clinical / shift goals:  pt educated on plan of care and medications. No quesations at this time    Patient is not progressing towards the following goals:

## 2024-11-22 NOTE — CARE PLAN
The patient is Stable - Low risk of patient condition declining or worsening    Shift Goals  Clinical Goals: Wean O2, bowel movement  Patient Goals: Pain management, comfort, rest  Family Goals: ARLIN    Progress made toward(s) clinical / shift goals:        Patient is not progressing towards the following goals:      Problem: Pain - Standard  Goal: Alleviation of pain or a reduction in pain to the patient’s comfort goal  Outcome: Not Progressing  Note: Patient endorsing pain to L side d/t rib fractures. Scheduled and PRN pain medications given as ordered. Non-pharmacological interventions utilized as well, including food and repositioning.      Problem: Knowledge Deficit - Standard  Goal: Patient and family/care givers will demonstrate understanding of plan of care, disease process/condition, diagnostic tests and medications  Outcome: Not Progressing     Problem: Fall Risk  Goal: Patient will remain free from falls  Outcome: Not Progressing

## 2024-11-22 NOTE — CARE PLAN
The patient is Stable - Low risk of patient condition declining or worsening    Shift Goals  Clinical Goals: Wean 02  Patient Goals: DC without 02    Progress made toward(s) clinical / shift goals:  Pt aox4. Education provided and reinforced regarding POC and pulmonary rehab. Continued reinforcement needed. Pt c/o rib pain that is managed well with PRN Rx. JOANN.     Patient is not progressing towards the following goals:      Problem: Knowledge Deficit - Standard  Goal: Patient and family/care givers will demonstrate understanding of plan of care, disease process/condition, diagnostic tests and medications  Outcome: Progressing     Problem: Pain - Standard  Goal: Alleviation of pain or a reduction in pain to the patient’s comfort goal  Outcome: Progressing

## 2024-11-23 LAB
ERYTHROCYTE [DISTWIDTH] IN BLOOD BY AUTOMATED COUNT: 48.5 FL (ref 35.9–50)
HCT VFR BLD AUTO: 41.9 % (ref 37–47)
HGB BLD-MCNC: 13.3 G/DL (ref 12–16)
MCH RBC QN AUTO: 30.5 PG (ref 27–33)
MCHC RBC AUTO-ENTMCNC: 31.7 G/DL (ref 32.2–35.5)
MCV RBC AUTO: 96.1 FL (ref 81.4–97.8)
PLATELET # BLD AUTO: 179 K/UL (ref 164–446)
PMV BLD AUTO: 9.8 FL (ref 9–12.9)
RBC # BLD AUTO: 4.36 M/UL (ref 4.2–5.4)
WBC # BLD AUTO: 9.6 K/UL (ref 4.8–10.8)

## 2024-11-23 PROCEDURE — 85027 COMPLETE CBC AUTOMATED: CPT

## 2024-11-23 PROCEDURE — 700101 HCHG RX REV CODE 250: Performed by: INTERNAL MEDICINE

## 2024-11-23 PROCEDURE — 94760 N-INVAS EAR/PLS OXIMETRY 1: CPT

## 2024-11-23 PROCEDURE — 700102 HCHG RX REV CODE 250 W/ 637 OVERRIDE(OP): Performed by: STUDENT IN AN ORGANIZED HEALTH CARE EDUCATION/TRAINING PROGRAM

## 2024-11-23 PROCEDURE — 700111 HCHG RX REV CODE 636 W/ 250 OVERRIDE (IP): Mod: JZ | Performed by: INTERNAL MEDICINE

## 2024-11-23 PROCEDURE — 700102 HCHG RX REV CODE 250 W/ 637 OVERRIDE(OP): Performed by: INTERNAL MEDICINE

## 2024-11-23 PROCEDURE — A9270 NON-COVERED ITEM OR SERVICE: HCPCS | Performed by: INTERNAL MEDICINE

## 2024-11-23 PROCEDURE — 770020 HCHG ROOM/CARE - TELE (206)

## 2024-11-23 PROCEDURE — 36415 COLL VENOUS BLD VENIPUNCTURE: CPT

## 2024-11-23 PROCEDURE — 99233 SBSQ HOSP IP/OBS HIGH 50: CPT | Performed by: INTERNAL MEDICINE

## 2024-11-23 PROCEDURE — A9270 NON-COVERED ITEM OR SERVICE: HCPCS | Performed by: STUDENT IN AN ORGANIZED HEALTH CARE EDUCATION/TRAINING PROGRAM

## 2024-11-23 RX ADMIN — LIDOCAINE 1 PATCH: 4 PATCH TOPICAL at 12:47

## 2024-11-23 RX ADMIN — METOPROLOL SUCCINATE 50 MG: 25 TABLET, EXTENDED RELEASE ORAL at 05:22

## 2024-11-23 RX ADMIN — ACETAMINOPHEN 1000 MG: 500 TABLET ORAL at 20:46

## 2024-11-23 RX ADMIN — ACETAMINOPHEN 1000 MG: 500 TABLET ORAL at 14:53

## 2024-11-23 RX ADMIN — ONDANSETRON 4 MG: 4 TABLET, ORALLY DISINTEGRATING ORAL at 07:42

## 2024-11-23 RX ADMIN — GABAPENTIN 300 MG: 300 CAPSULE ORAL at 05:22

## 2024-11-23 RX ADMIN — GABAPENTIN 300 MG: 300 CAPSULE ORAL at 14:53

## 2024-11-23 RX ADMIN — HYDROXYZINE HYDROCHLORIDE 25 MG: 25 TABLET, FILM COATED ORAL at 20:46

## 2024-11-23 RX ADMIN — ENOXAPARIN SODIUM 40 MG: 100 INJECTION SUBCUTANEOUS at 16:56

## 2024-11-23 RX ADMIN — AMOXICILLIN AND CLAVULANATE POTASSIUM 1 TABLET: 875; 125 TABLET, FILM COATED ORAL at 05:22

## 2024-11-23 RX ADMIN — OXYCODONE HYDROCHLORIDE 5 MG: 5 TABLET ORAL at 16:55

## 2024-11-23 RX ADMIN — AMOXICILLIN AND CLAVULANATE POTASSIUM 1 TABLET: 875; 125 TABLET, FILM COATED ORAL at 16:55

## 2024-11-23 RX ADMIN — Medication 5 MG: at 20:46

## 2024-11-23 RX ADMIN — OXYCODONE HYDROCHLORIDE 5 MG: 5 TABLET ORAL at 05:21

## 2024-11-23 RX ADMIN — ESCITALOPRAM OXALATE 20 MG: 10 TABLET ORAL at 05:22

## 2024-11-23 ASSESSMENT — ENCOUNTER SYMPTOMS
BLURRED VISION: 0
DIARRHEA: 1
DIZZINESS: 0
ABDOMINAL PAIN: 0
HEARTBURN: 0
VOMITING: 0
NERVOUS/ANXIOUS: 0
FEVER: 0
CHILLS: 0
FALLS: 0
SHORTNESS OF BREATH: 1
DOUBLE VISION: 0
HEADACHES: 0
COUGH: 1
BACK PAIN: 0

## 2024-11-23 ASSESSMENT — PAIN DESCRIPTION - PAIN TYPE
TYPE: ACUTE PAIN

## 2024-11-23 ASSESSMENT — LIFESTYLE VARIABLES: SUBSTANCE_ABUSE: 0

## 2024-11-23 NOTE — PROGRESS NOTES
[facility] Medicine Daily Progress Note    Date of Service  11/23/2024    Chief Complaint  Ellen Srivastava is a 89 y.o. female admitted 11/20/2024 with fall and rib fracture    Hospital Course  89 y.o. female with PMH of HTN, Depression/Anxiety, narcotic dependence who presented 11/20/2024 with fall and rib fracture.     The patient states she was using her vacuum last night and fell to her side. She denies hitting her head.  Because of worsening pain the patient decided to come to the ER for further assessment and evaluation.  The patient denies headache, sick contacts, fever or any other focal neurological deficit.     In the ER the patient had a CT scan which was found to have acute fractures of the left lateral 5th through 8th ribs, trace left-sided pneumothorax at the site of the rib fractures, bibasilar atelectasis and bilateral pulmonary nodules that have been unchanged since 2020.    Interval Problem Update  11/21: Patient seen at bedside this morning.  Still desaturating into the 70s when walking as per nursing.  No chest x-ray concerning for possible pneumonia.  We have started the patient on Augmentin.  We appreciate further recommendation by pulmonary.  Continue to monitor closely.  We will encourage incentive spirometry.    11/22: Patient seen at bedside this morning.  Still complaining of pain however she says she feels better than yesterday.  Will do home O2 evaluation.  Continue antibiotics.  Repeat x-ray does not report further pneumothorax.  Continue pain management and incentive spirometer.  We appreciate further recommendation by pulmonary.    11/23: Patient seen at bedside this morning.  The patient is now having diarrhea, this was due to most likely the laxatives given yesterday as the patient was constipated.  Continue to monitor closely, will encourage p.o. intake.  Depending on clinical status will consider IV fluids.  Continue to monitor closely.  It seems oxygen has been delivered  at bedside already.    I have discussed this patient's plan of care and discharge plan at IDT rounds today with Case Management, Nursing, Nursing leadership, and other members of the IDT team.    Consultants/Specialty  Pulmonary    Code Status  DNAR/DNI    Disposition  The patient is not medically cleared for discharge to home or a post-acute facility.          Review of Systems  Review of Systems   Constitutional:  Positive for malaise/fatigue. Negative for chills and fever.   HENT:  Negative for hearing loss and nosebleeds.    Eyes:  Negative for blurred vision and double vision.   Respiratory:  Positive for cough and shortness of breath.    Cardiovascular:  Positive for chest pain.   Gastrointestinal:  Positive for diarrhea. Negative for abdominal pain, heartburn and vomiting.   Genitourinary:  Negative for dysuria and urgency.   Musculoskeletal:  Negative for back pain and falls.   Skin:  Negative for itching and rash.   Neurological:  Negative for dizziness and headaches.   Psychiatric/Behavioral:  Negative for substance abuse. The patient is not nervous/anxious.    All other systems reviewed and are negative.       Physical Exam  Temp:  [35.9 °C (96.7 °F)-36.8 °C (98.3 °F)] 36.5 °C (97.7 °F)  Pulse:  [60-71] 67  Resp:  [16-18] 18  BP: (132-153)/(71-84) 149/80  SpO2:  [94 %-99 %] 99 %    Physical Exam  Vitals and nursing note reviewed.   Constitutional:       General: She is not in acute distress.     Appearance: She is ill-appearing.   HENT:      Head: Normocephalic and atraumatic.      Right Ear: External ear normal.      Left Ear: External ear normal.      Mouth/Throat:      Pharynx: No oropharyngeal exudate or posterior oropharyngeal erythema.   Eyes:      General:         Right eye: No discharge.         Left eye: No discharge.   Cardiovascular:      Rate and Rhythm: Normal rate and regular rhythm.      Pulses: Normal pulses.      Heart sounds: No murmur heard.     No gallop.   Pulmonary:      Breath  sounds: Rhonchi present.   Abdominal:      General: Bowel sounds are normal. There is no distension.      Palpations: Abdomen is soft.      Tenderness: There is no abdominal tenderness. There is no guarding.   Musculoskeletal:         General: No swelling or tenderness. Normal range of motion.      Cervical back: Normal range of motion and neck supple. No tenderness.   Skin:     General: Skin is warm and dry.   Neurological:      General: No focal deficit present.      Mental Status: She is alert and oriented to person, place, and time. Mental status is at baseline.      Motor: No weakness.   Psychiatric:         Mood and Affect: Mood normal.         Behavior: Behavior normal.         Fluids    Intake/Output Summary (Last 24 hours) at 11/23/2024 1130  Last data filed at 11/22/2024 1844  Gross per 24 hour   Intake 924 ml   Output 2 ml   Net 922 ml          Laboratory  Recent Labs     11/21/24  0206 11/22/24  0043 11/23/24  0042   WBC 11.5* 10.4 9.6   RBC 4.25 4.21 4.36   HEMOGLOBIN 13.1 12.9 13.3   HEMATOCRIT 41.1 40.5 41.9   MCV 96.7 96.2 96.1   MCH 30.8 30.6 30.5   MCHC 31.9* 31.9* 31.7*   RDW 49.2 49.0 48.5   PLATELETCT 191 176 179   MPV 10.0 9.9 9.8     Recent Labs     11/21/24  0206 11/22/24  0043   SODIUM 139 138   POTASSIUM 5.2 4.6   CHLORIDE 104 101   CO2 29 31   GLUCOSE 105* 100*   BUN 21 20   CREATININE 1.23 0.96   CALCIUM 9.2 9.0                   Imaging  DX-CHEST-PORTABLE (1 VIEW)   Final Result         1.  Hazy left lower lobe infiltrates, stable since prior study.   2.  Small left pleural effusion, stable   3.  Left rib fractures   4.  Cardiomegaly   5.  Atherosclerosis      DX-CHEST-PORTABLE (1 VIEW)   Final Result         1.  Hazy left lower lobe infiltrates.   2.  Small left pleural effusion   3.  Left rib fractures   4.  Cardiomegaly   5.  Atherosclerosis      DX-CHEST-PORTABLE (1 VIEW)   Final Result      Atelectasis involving the left lung base.      CT-CHEST (THORAX) W/O   Final Result      1.   Acute fractures of the left lateral fifth through eighth ribs.      2.  Trace left-sided pneumothorax at the site of the rib fractures.      3.  Minimal left pleural effusion.      4.  Bibasilar atelectasis, left greater than right.      5.  Bilateral pulmonary nodules measuring up to 8 mm in size, unchanged. These are stable since 2020 consistent with benign process and are no follow-up.      6.  This was discussed with DARIA RICHMOND at 9:22 AM on 11/20/2024.           Assessment/Plan  * Pneumothorax  Assessment & Plan  Monitor on telemetry for now  Supplemental oxygen as needed  Pain management  Serial Xrays if needed    -- We have consulted pulmonary, we recommend repeat x-ray at 3 PM today to monitor pneumothorax.  And also tomorrow morning.    11/21: Continue pain management and incentive spirometer.  We appreciate further recommendations by pulmonary.    11/22: Repeat x-ray does not report pneumothorax as well as pulmonary evaluation of the x-ray.  Continue with pain management for rib fracture and incentive spirometer.    Rib fracture  Assessment & Plan  Due to fall  Pain management    11/23: Continue pain management and incentive spirometer.     Fall  Assessment & Plan  PT/OT    Pneumonia  Assessment & Plan  11/21: Concern for pneumonia on xray  We have started augmentin  Monitor    11/23: Continue augmentin    Lung nodule  Assessment & Plan  As per CT seem to be stable    11/21: Referral to lung nodule clinic placed    Diarrhea- (present on admission)  Assessment & Plan  Most likely induced by laxatives as patient was constipated  monitor    Neuropathy- (present on admission)  Assessment & Plan  11/23: Continue home dose of gabapentin    Depression with anxiety- (present on admission)  Assessment & Plan  continue home medications    Narcotic dependence (HCC)- (present on admission)  Assessment & Plan  Due to chronic back pain    11/23: continue home dose of norco    HTN (hypertension)- (present on  admission)  Assessment & Plan  Continue metoprolol    Advance care planning  Assessment & Plan  11/19: I discussed with patient for at least 16 minutes further goals of care.  This included CODE STATUS which includes full code and DNR/DNI.  The patient like to be DNR/DNI.  She understands what this means.  She does not want to be resuscitated or intubated.  Patient's  is also at bedside the plan of our conversation.  We also discussed further treatment goals.  For now the patient want to have full treatment options.  This includes invasive or noninvasive procedures as needed.  In the event that the patient decisions for herself she would like her  Sarkis to make decisions for her.         VTE prophylaxis: Lovenox    I have performed a physical exam and reviewed and updated ROS and Plan today (11/23/2024). In review of yesterday's note (11/22/2024), there are no changes except as documented above.      I spend at least 51 minutes providing care for this patient.  This included face-to-face interview, physical examination.  Review of lab work including CBC. Discussing with multidisciplinary team including case management, nursing staff and pharmacy.  Creating plan of care, reviewing orders.

## 2024-11-23 NOTE — CARE PLAN
The patient is Stable - Low risk of patient condition declining or worsening    Shift Goals  Clinical Goals: pain management, safety  Patient Goals: Pain management, comfort, rest  Family Goals: ARLIN    Progress made toward(s) clinical / shift goals:        Problem: Pain - Standard  Goal: Alleviation of pain or a reduction in pain to the patient’s comfort goal  Outcome: Progressing   Patient reports a reduction in left flank pain post PRN medication.   Problem: Fall Risk  Goal: Patient will remain free from falls  Outcome: Progressing   Bed locked and low, alarms in place. Educated on the use of call light prior to getting up.     Patient is not progressing towards the following goals:

## 2024-11-23 NOTE — PROGRESS NOTES
Telemetry Shift Summary     Rhythm: SB/SR  Rate: 55-64  Measurements: .14/.08/.40  Ectopy (reported by Monitor Tech): rPAC/rPVC     Normal Values  Rhythm: Sinus  HR:   Measurements: 0.12-0.20/0.06-0.10/0.30-0.52

## 2024-11-23 NOTE — CARE PLAN
The patient is Stable - Low risk of patient condition declining or worsening    Shift Goals  Clinical Goals: manage pain  Patient Goals: stay another day to get better  Family Goals: ARLIN    Progress made toward(s) clinical / shift goals:  Pt not having anymore diarrhea this shift. Starting to feel better. Plan to dc tomorrow.  Problem: Pain - Standard  Goal: Alleviation of pain or a reduction in pain to the patient’s comfort goal  Outcome: Progressing     Problem: Knowledge Deficit - Standard  Goal: Patient and family/care givers will demonstrate understanding of plan of care, disease process/condition, diagnostic tests and medications  Outcome: Progressing     Problem: Fall Risk  Goal: Patient will remain free from falls  Outcome: Progressing       Patient is not progressing towards the following goals:

## 2024-11-23 NOTE — DISCHARGE PLANNING
Case Management Discharge Planning    Admission Date: 11/20/2024  GMLOS: 4.4  ALOS: 3    6-Clicks ADL Score: 19  6-Clicks Mobility Score: 21      Anticipated Discharge Dispo: Discharge Disposition: D/T to home under [facility] care in anticipation of covered skilled care (06)    DME Needed: Yes    DME Ordered: Yes    Action(s) Taken:     0926: Bedside RN confirmed O2 concentrator and tank delivered by [facility]. No other CM needs at this time.    Escalations Completed: None    Medically Clear: Yes    Next Steps: Plan for DC home today with [facility] and home O2 from [facility].    Barriers to Discharge: None

## 2024-11-24 ENCOUNTER — PHARMACY VISIT (OUTPATIENT)
Dept: PHARMACY | Facility: MEDICAL CENTER | Age: 89
End: 2024-11-24
Payer: COMMERCIAL

## 2024-11-24 VITALS
OXYGEN SATURATION: 97 % | HEIGHT: 69 IN | TEMPERATURE: 97.5 F | HEART RATE: 68 BPM | BODY MASS INDEX: 20.6 KG/M2 | RESPIRATION RATE: 20 BRPM | WEIGHT: 139.11 LBS | DIASTOLIC BLOOD PRESSURE: 65 MMHG | SYSTOLIC BLOOD PRESSURE: 115 MMHG

## 2024-11-24 LAB
ALBUMIN SERPL BCP-MCNC: 3.1 G/DL (ref 3.2–4.9)
ALBUMIN/GLOB SERPL: 1.1 G/DL
ALP SERPL-CCNC: 75 U/L (ref 30–99)
ALT SERPL-CCNC: 10 U/L (ref 2–50)
ANION GAP SERPL CALC-SCNC: 6 MMOL/L (ref 7–16)
AST SERPL-CCNC: 17 U/L (ref 12–45)
BILIRUB SERPL-MCNC: 0.2 MG/DL (ref 0.1–1.5)
BUN SERPL-MCNC: 23 MG/DL (ref 8–22)
CALCIUM ALBUM COR SERPL-MCNC: 9.8 MG/DL (ref 8.5–10.5)
CALCIUM SERPL-MCNC: 9.1 MG/DL (ref 8.4–10.2)
CHLORIDE SERPL-SCNC: 104 MMOL/L (ref 96–112)
CO2 SERPL-SCNC: 29 MMOL/L (ref 20–33)
CREAT SERPL-MCNC: 0.84 MG/DL (ref 0.5–1.4)
ERYTHROCYTE [DISTWIDTH] IN BLOOD BY AUTOMATED COUNT: 49 FL (ref 35.9–50)
GFR SERPLBLD CREATININE-BSD FMLA CKD-EPI: 67 ML/MIN/1.73 M 2
GLOBULIN SER CALC-MCNC: 2.9 G/DL (ref 1.9–3.5)
GLUCOSE SERPL-MCNC: 97 MG/DL (ref 65–99)
HCT VFR BLD AUTO: 41.1 % (ref 37–47)
HGB BLD-MCNC: 13.1 G/DL (ref 12–16)
MAGNESIUM SERPL-MCNC: 1.9 MG/DL (ref 1.5–2.5)
MCH RBC QN AUTO: 30.6 PG (ref 27–33)
MCHC RBC AUTO-ENTMCNC: 31.9 G/DL (ref 32.2–35.5)
MCV RBC AUTO: 96 FL (ref 81.4–97.8)
PLATELET # BLD AUTO: 172 K/UL (ref 164–446)
PMV BLD AUTO: 9.6 FL (ref 9–12.9)
POTASSIUM SERPL-SCNC: 4.7 MMOL/L (ref 3.6–5.5)
PROT SERPL-MCNC: 6 G/DL (ref 6–8.2)
RBC # BLD AUTO: 4.28 M/UL (ref 4.2–5.4)
SODIUM SERPL-SCNC: 139 MMOL/L (ref 135–145)
WBC # BLD AUTO: 9.8 K/UL (ref 4.8–10.8)

## 2024-11-24 PROCEDURE — 83735 ASSAY OF MAGNESIUM: CPT

## 2024-11-24 PROCEDURE — 700102 HCHG RX REV CODE 250 W/ 637 OVERRIDE(OP): Performed by: INTERNAL MEDICINE

## 2024-11-24 PROCEDURE — 700101 HCHG RX REV CODE 250: Performed by: INTERNAL MEDICINE

## 2024-11-24 PROCEDURE — 36415 COLL VENOUS BLD VENIPUNCTURE: CPT

## 2024-11-24 PROCEDURE — 80053 COMPREHEN METABOLIC PANEL: CPT

## 2024-11-24 PROCEDURE — RXMED WILLOW AMBULATORY MEDICATION CHARGE: Performed by: INTERNAL MEDICINE

## 2024-11-24 PROCEDURE — 94760 N-INVAS EAR/PLS OXIMETRY 1: CPT

## 2024-11-24 PROCEDURE — 99239 HOSP IP/OBS DSCHRG MGMT >30: CPT | Performed by: INTERNAL MEDICINE

## 2024-11-24 PROCEDURE — 85027 COMPLETE CBC AUTOMATED: CPT

## 2024-11-24 PROCEDURE — 700111 HCHG RX REV CODE 636 W/ 250 OVERRIDE (IP): Performed by: INTERNAL MEDICINE

## 2024-11-24 PROCEDURE — A9270 NON-COVERED ITEM OR SERVICE: HCPCS | Performed by: INTERNAL MEDICINE

## 2024-11-24 RX ORDER — MAGNESIUM SULFATE 1 G/100ML
1 INJECTION INTRAVENOUS ONCE
Status: COMPLETED | OUTPATIENT
Start: 2024-11-24 | End: 2024-11-24

## 2024-11-24 RX ORDER — ACETAMINOPHEN 500 MG
1000 TABLET ORAL 2 TIMES DAILY
Status: SHIPPED
Start: 2024-11-24

## 2024-11-24 RX ORDER — LIDOCAINE 4 G/G
1 PATCH TOPICAL EVERY 24 HOURS
Qty: 30 PATCH | Refills: 0 | Status: SHIPPED | OUTPATIENT
Start: 2024-11-24

## 2024-11-24 RX ORDER — OXYCODONE HYDROCHLORIDE 5 MG/1
5 TABLET ORAL EVERY 12 HOURS PRN
Qty: 6 TABLET | Refills: 0 | Status: SHIPPED | OUTPATIENT
Start: 2024-11-24 | End: 2024-12-04 | Stop reason: SDUPTHER

## 2024-11-24 RX ADMIN — ACETAMINOPHEN 1000 MG: 500 TABLET ORAL at 08:26

## 2024-11-24 RX ADMIN — METOPROLOL SUCCINATE 50 MG: 25 TABLET, EXTENDED RELEASE ORAL at 05:53

## 2024-11-24 RX ADMIN — OXYCODONE HYDROCHLORIDE 5 MG: 5 TABLET ORAL at 05:53

## 2024-11-24 RX ADMIN — ESCITALOPRAM OXALATE 20 MG: 10 TABLET ORAL at 05:54

## 2024-11-24 RX ADMIN — OXYCODONE 5 MG: 5 TABLET ORAL at 13:05

## 2024-11-24 RX ADMIN — LIDOCAINE 1 PATCH: 4 PATCH TOPICAL at 13:06

## 2024-11-24 RX ADMIN — MAGNESIUM SULFATE IN DEXTROSE 1 G: 10 INJECTION, SOLUTION INTRAVENOUS at 05:58

## 2024-11-24 RX ADMIN — AMOXICILLIN AND CLAVULANATE POTASSIUM 1 TABLET: 875; 125 TABLET, FILM COATED ORAL at 05:54

## 2024-11-24 RX ADMIN — OXYCODONE 5 MG: 5 TABLET ORAL at 03:32

## 2024-11-24 RX ADMIN — GABAPENTIN 300 MG: 300 CAPSULE ORAL at 05:53

## 2024-11-24 ASSESSMENT — PAIN DESCRIPTION - PAIN TYPE
TYPE: ACUTE PAIN

## 2024-11-24 NOTE — PROGRESS NOTES
Telemetry Shift Summary     Rhythm SR  HR Range 64-97  Ectopy [facility]  Measurements  .16/.08/.36  Per strip printed 1600     Normal Values  Rhythm SR  HR Range    Measurements 0.12-0.20 / 0.06-0.10  / 0.30-0.52

## 2024-11-24 NOTE — PROGRESS NOTES
Discharge instructions reviewed with patient and family. All prescriptions reviewed. Pt and family instructed on home oxygen and concentrator use and verbalize understanding. Pt's family provided transportation home. Pt discharges from unit via wheelchair with all prescriptions and home O2 tanks and concentrator.

## 2024-11-24 NOTE — DISCHARGE SUMMARY
Discharge Summary    CHIEF COMPLAINT ON ADMISSION  Chief Complaint   Patient presents with    T-5000 FALL    Rib Pain       Reason for Admission  EMS     Admission Date  11/20/2024    CODE STATUS  DNAR/DNI    HPI & HOSPITAL COURSE  89 y.o. female with PMH of HTN, Depression/Anxiety, narcotic dependence who presented 11/20/2024 with fall and rib fracture.     The patient states she was using her vacuum last night and fell to her side. She denies hitting her head.  Because of worsening pain the patient decided to come to the ER for further assessment and evaluation.  The patient denies headache, sick contacts, fever or any other focal neurological deficit.     In the ER the patient had a CT scan which was found to have acute fractures of the left lateral 5th through 8th ribs, trace left-sided pneumothorax at the site of the rib fractures, bibasilar atelectasis and bilateral pulmonary nodules that have been unchanged since 2020.    The patient was admitted for further management and care.  Pulmonary was consulted.  We repeated chest x-ray throughout hospitalization, repeated x-ray did not report pneumothorax.  As per pulmonary the patient can be discharged home, they do not suspect the patient with ongoing pneumothorax they recommended incentive spirometer and pain management for the rib fractures.    Of note the patient was constipated while hospitalized so she received multiple medications for constipation.  Eventually the patient developed diarrhea yesterday, however today the diarrhea has resolved as per the patient.  I do not suspect this is infectious most likely due to the laxative medications that she received.    The patient was found to have pulmonary nodules as well on CT scan.  As per pulmonary the patient will require close follow-up as an outpatient.  We have placed referral to lung nodule clinic as an outpatient.    The patient requiring oxygen.  Oxygen has been delivered to the patient.  The patient  will be discharged with pain management with multiple approaches including lidocaine patch, opiate medication for breakthrough pain medication, scheduled Tylenol, gabapentin.    The patient seen at bedside this morning.  Overall the patient feels much better and would like to go home.  The patient will call close follow-up with PCP and lung nodule clinic as an outpatient.    The patient was advised to come back to the hospital if she develop worsening pain, shortness of breath or clinically deteriorated.  She understands.    Home health has been ordered.    Of note x-ray concerning for infiltrate so the patient will complete a course of Augmentin as an outpatient as well.    Therefore, she is discharged in fair and stable condition to home with organized home healthcare and close outpatient follow-up.    The patient met 2-midnight criteria for an inpatient stay at the time of discharge.    Discharge Date  11/24/2024    FOLLOW UP ITEMS POST DISCHARGE  Patient required close follow-up with PCP and lung nodule clinic as an outpatient.    DISCHARGE DIAGNOSES  Principal Problem:    Pneumothorax (POA: Unknown)  Active Problems:    Fall (POA: Unknown)    Rib fracture (POA: Unknown)    HTN (hypertension) (POA: Yes)    Narcotic dependence (HCC) (POA: Yes)      Overview: Chronic back pain          Depression with anxiety (POA: Yes)      Overview: Severe at Baseline       Continue current antidepressant/sedation regimen      Psych consulted 11/19, continues to follow      Does have capacity. Does not want to be intubated. Does not want       palliation at this time.    Neuropathy (POA: Yes)    Diarrhea (POA: Yes)    Lung nodule (POA: Unknown)    Pneumonia (POA: Unknown)    Advance care planning (POA: Unknown)  Resolved Problems:    * No resolved hospital problems. *      FOLLOW UP    [facility]  [address]  [facility] [address]  [phone]        Lung nodule clinic    Schedule an appointment as  soon as possible for a visit      Denzel Patel M.D.  [address] [place] [address] [address] [place] [id number] [phone]    Schedule an appointment as soon as possible for a visit        MEDICATIONS ON DISCHARGE     Medication List        START taking these medications        Instructions   amoxicillin-clavulanate 875-125 MG Tabs  Commonly known as: Augmentin   Take 1 Tablet by mouth every 12 hours for 2 days.  Dose: 1 Tablet     lidocaine 4 % Ptch  Commonly known as: Asperflex   Place 1 Patch on the skin every 24 hours.  Dose: 1 Patch     oxyCODONE immediate-release 5 MG Tabs  Commonly known as: Roxicodone   Take 1 Tablet by mouth every 12 hours as needed for Severe Pain for up to 3 days.  Dose: 5 mg            CHANGE how you take these medications        Instructions   acetaminophen 500 MG Tabs  What changed:   medication strength  how much to take  when to take this  reasons to take this  additional instructions  Commonly known as: Tylenol   Take 2 Tablets by mouth 2 times a day.  Dose: 1,000 mg     metoprolol SR 50 MG Tb24  What changed:   how much to take  how to take this  when to take this  Commonly known as: Toprol XL   TAKE 1 TABLET BY MOUTH DAILY            CONTINUE taking these medications        Instructions   escitalopram 20 MG tablet  Commonly known as: Lexapro   Take 1 Tablet by mouth every day.  Dose: 20 mg     gabapentin 300 MG Caps  Commonly known as: Neurontin   Take 1 Capsule by mouth 3 times a day.  Dose: 300 mg     HYDROcodone/acetaminophen  MG Tabs  Commonly known as: Norco   Take 1 Tablet by mouth 2 times a day.  Dose: 1 Tablet     hydrOXYzine HCl 25 MG Tabs  Commonly known as: Atarax   Take 1 Tablet by mouth at bedtime.  Dose: 25 mg     NON SPECIFIED   Apply 1 Application topically 2 times a day as needed (Apply's on left rib). Over the counter cream for pain (pt is not sure the name)  Dose: 1 Application     tizanidine 4 MG Tabs  Commonly known as: Zanaflex   TAKE ONE TABLET BY MOUTH TWICE A  DAY  Dose: 4 mg            ASK your doctor about these medications        Instructions   clobetasol 0.05 % Crea  Commonly known as: Temovate   APPLY TO AFFECTED AREA(S) TWO TIMES A DAY UP TO 14 DAYS THEN STOP              Allergies  Allergies   Allergen Reactions    Asa [Aspirin] Unspecified     Pt reports that she is not sure what happen       DIET  Orders Placed This Encounter   Procedures    Diet Order Diet: Level 6 - Soft and Bite Sized; Liquid level: Level 0 - Thin     Standing Status:   Standing     Number of Occurrences:   1     Order Specific Question:   Diet:     Answer:   Level 6 - Soft and Bite Sized [23]     Order Specific Question:   Liquid level     Answer:   Level 0 - Thin       ACTIVITY  As tolerated.      CONSULTATIONS  Pulmonary    PROCEDURES      DX-CHEST-PORTABLE (1 VIEW)   Final Result         1.  Hazy left lower lobe infiltrates, stable since prior study.   2.  Small left pleural effusion, stable   3.  Left rib fractures   4.  Cardiomegaly   5.  Atherosclerosis      DX-CHEST-PORTABLE (1 VIEW)   Final Result         1.  Hazy left lower lobe infiltrates.   2.  Small left pleural effusion   3.  Left rib fractures   4.  Cardiomegaly   5.  Atherosclerosis      DX-CHEST-PORTABLE (1 VIEW)   Final Result      Atelectasis involving the left lung base.      CT-CHEST (THORAX) W/O   Final Result      1.  Acute fractures of the left lateral fifth through eighth ribs.      2.  Trace left-sided pneumothorax at the site of the rib fractures.      3.  Minimal left pleural effusion.      4.  Bibasilar atelectasis, left greater than right.      5.  Bilateral pulmonary nodules measuring up to 8 mm in size, unchanged. These are stable since 2020 consistent with benign process and are no follow-up.      6.  This was discussed with DARIA RICHMOND at 9:22 AM on 11/20/2024.           LABORATORY  Lab Results   Component Value Date    SODIUM 139 11/24/2024    POTASSIUM 4.7 11/24/2024    CHLORIDE 104 11/24/2024    CO2  29 11/24/2024    GLUCOSE 97 11/24/2024    BUN 23 (H) 11/24/2024    CREATININE 0.84 11/24/2024    CREATININE 0.75 09/17/2014        Lab Results   Component Value Date    WBC 9.8 11/24/2024    HEMOGLOBIN 13.1 11/24/2024    HEMATOCRIT 41.1 11/24/2024    PLATELETCT 172 11/24/2024        Total time of the discharge process exceeds 31 minutes.

## 2024-11-24 NOTE — PROGRESS NOTES
Telemetry Shift Summary     Rhythm: SR  HR: 62-69  Ectopy: r PAC, r PVC    Measurements: 0.14/0.08/0.38    Normal Values  Rhythm: SR  HR:   Measurements: 0.12-0.20/0.08-0.10/0.30-0.52

## 2024-11-24 NOTE — CARE PLAN
The patient is Stable - Low risk of patient condition declining or worsening    Shift Goals  Clinical Goals: pain management  Patient Goals: stay another day to get better  Family Goals: ARLIN    Progress made toward(s) clinical / shift goals:        Problem: Pain - Standard  Goal: Alleviation of pain or a reduction in pain to the patient’s comfort goal  Outcome: Progressing   Patient reports pain levels are tolerable throughout the shift.   Problem: Knowledge Deficit - Standard  Goal: Patient and family/care givers will demonstrate understanding of plan of care, disease process/condition, diagnostic tests and medications  Outcome: Progressing   Patient verbalized understanding of current plan of care, expressed a need for more education on home oxygen set up prior to discharge.     Patient is not progressing towards the following goals:

## 2024-11-25 ENCOUNTER — PATIENT OUTREACH (OUTPATIENT)
Dept: MEDICAL GROUP | Age: 89
End: 2024-11-25
Payer: MEDICARE

## 2024-11-25 NOTE — PROGRESS NOTES
Transitional Care Management  TCM Outreach Date and Time: Filed (11/25/2024 11:35 AM)    Discharge Questions  Actual Discharge Date: 11/24/24  Now that you are home, how are you feeling?: Fair (Continues to have significant left side rib pain; is limited in mobility; daughter with her as caregiver.  Using 3L 02 continuously.  Able to eat and drink with poor appetite.  Discussed need to FF; offered dietary ideas for patient.)  Did you receive any new prescriptions?: Yes  Were you able to get them filled?: Yes  Meds to Bed or Pharmacy filled?: Meds to Bed  Do you have any questions about your current medications or new medications (Review Med Rec)?: Yes (Please explain) (Pt concerned with having only 6 Oxycodone.  Reviewed Q12 H schedule and discussed how to seek refill if needed.  Did Med Rec with daughter.  She will set up pt's meds)  Did you have any durable medical equipment ordered?: Yes (Home 02 from [facility]:  #[phone])  Did you receive it?: Yes  Do you have a follow up appointment scheduled with your PCP?: Yes  Appointment Date: 12/05/24  Appointment Time: 1500  Any issues or paperwork you wish to discuss with your PCP?: No  Are you (patient) able to get to the appointment?: Yes  If Home Health was ordered, have they contacted you (Patient): Yes ([facility]:  #[phone])  Did you have enough support after your last discharge?: Yes  Does this patient qualify for the CCM program?: No    Transitional Care  Number of attempts made to contact patient: 1  Current or previous attempts completed within two business days of discharge? : Yes  Provided education regarding treatment plan, medications, self-management, ADLs?: Yes  Has patient completed an Advanced Directive?: Yes  Is the patient's advanced directive on file?: Yes  Has the Care Manager's phone number provided?: No  Is there anything else I can help you with?: No    Discharge Summary  Chief Complaint: GLF with rib pain  Admitting Diagnosis:  Left rib fxs. 5th-8th; Trace pneumothorax; Bibasilar atelectasis; Lung nodules present on CT scan; HX:  HTN; Depression  Discharge Diagnosis: Pneumothorax                                                                                                                                                                                                                                                                                                                                                                                                                                                                                                               .

## 2024-11-30 NOTE — PROGRESS NOTES
Pulmonary Clinic- Initial Consult    Date of Service: 12/3/24    Referring Physician: Adams Flor*    Reason for Consult: Pneumothorax    Chief Complaint:   Chief Complaint   Patient presents with    Hospital Follow-up     11/20/24-11/24/24. Pulm consult: 11/20/24       HPI:   Ellen Srivastava is a 89 y.o. female who is followed by Dr. Patel and is referred to the pulmonary clinic for pneumothorax, post hospitalization. She comes in today for follow up from her recent hospitalization for a fall where she landed on her vacuum  on her left side causing 4 rib fractures and pulmonary contusion.  She still has significant pain at night despite using a lidocaine patch and was only given 6 oxy tablets for home.  Her CT is read as having emphysematous changes which I cannot appreciate.  She is a never smoker. She denies significant childhood illnesses and has never been on oxygen prior to this fall. I suspect her oxygen needs are due to hypoventilation from pain.        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;  Service: Orthopedics    ID LAP ESOPHAGOGAST FUNDOPLASTY N/A 8/24/2020    Procedure: FUNDOPLICATION, NISSEN, LAPAROSCOPIC - REPAIR RECURRENT PARAESOPHGEAL HERNIA WITH MESH,  POSS FUNDOPLICATION, POSS RESECTION DISTAL ESOPHAGUS;  Surgeon: John H Ganser, M.D.;  Location: SURGERY [facility];  Service: General    ORIF, ANKLE Right 9/20/2016    Procedure: ANKLE ORIF;  Surgeon: Stef Bray M.D.;  Location: [facility];  Service:     GASTROSCOPY-ENDO N/A 2/26/2016    Procedure: GASTROSCOPY-ENDO W/ESOPHAGEAL METAL STENT REMOVAL;  Surgeon: Jossue Ruggiero M.D.;  Location: SURGERY [facility];  Service:     GASTROSCOPY-ENDO N/A 12/10/2015    Procedure: GASTROSCOPY-ENDO;  Surgeon: Heri Velasco M.D.;  Location: SURGERY [facility];  Service:     GASTROSCOPY-ENDO  11/1/2015    Procedure: GASTROSCOPY-ENDO;  Surgeon: Conner Joe M.D.;  Location: [facility];  Service:     THORACOSCOPY Left 10/30/2015    Procedure: THORACOSCOPY;  Surgeon: Blaine Waterman D.O.;  Location: SURGERY [facility];  Service:     GASTROSCOPY  10/23/2015    Procedure: GASTROSCOPY- EGD with esophageal stent placement ;  Surgeon: Jossue Ruggiero M.D.;  Location: [facility];  Service:     PARAESOPHAGEAL HERNIA ROBOTIC N/A 10/12/2015    Procedure: PARAESOPHAGEAL HERNIA REPAIR ROBOTIC resection of esophageal diverticulum  repair of hiatal hernia for anterior fundoplasty with mesh  ;  Surgeon: Ulysses Simpson M.D.;  Location: SURGERY [facility];  Service:     BREAST BIOPSY Left 2015    Benign x3 surgeries    PARATHYROID EXPLORATION  6/16/2010    Performed by AYSE CARROLL at SURGERY SAME DAY [facility]    HYSTERECTOMY, TOTAL ABDOMINAL  1977    APPENDECTOMY  1945    LUMBAR DECOMPRESSION  1969, 1979       Social History     Socioeconomic History    Marital status:      Spouse name: Not on file    Number of children: Not on file    Years of education: Not on file    Highest education level: Not on file   Occupational History    Not on file   Tobacco Use    Smoking status: Never     Passive exposure: Never    Smokeless  tobacco: Former    Tobacco comments:     Nicotine    Vaping Use    Vaping status: Never Used   Substance and Sexual Activity    Alcohol use: Yes     Comment: occasionally    Drug use: Yes     Comment: gummies    Sexual activity: Never     Partners: Male   Other Topics Concern    Not on file   Social History Narrative    Lives in [place] [facility] and in [place].     Social Drivers of Health     Financial Resource Strain: Not on file   Food Insecurity: No Food Insecurity (11/20/2024)    Hunger Vital Sign     Worried About Running Out of Food in the Last Year: Never true     Ran Out of Food in the Last Year: Never true   Transportation Needs: No Transportation Needs (11/20/2024)    PRAPARE - Transportation     Lack of Transportation (Medical): No     Lack of Transportation (Non-Medical): No   Physical Activity: Not on file   Stress: Not on file   Social Connections: Not on file   Intimate Partner Violence: Not At Risk (11/20/2024)    Humiliation, Afraid, Rape, and Kick questionnaire     Fear of Current or Ex-Partner: No     Emotionally Abused: No     Physically Abused: No     Sexually Abused: No   Housing Stability: Low Risk  (11/20/2024)    Housing Stability Vital Sign     Unable to Pay for Housing in the Last Year: No     Number of Times Moved in the Last Year: 0     Homeless in the Last Year: No          Family History   Problem Relation Age of Onset    Hypertension Mother     Lung Disease Father     Hypertension Other     Lung Disease Other     Lung Disease Brother        Current Outpatient Medications on File Prior to Visit   Medication Sig Dispense Refill    acetaminophen (TYLENOL) 500 MG Tab Take 2 Tablets by mouth 2 times a day.      lidocaine (ASPERFLEX) 4 % Patch Place 1 Patch on the skin every 24 hours. 30 Patch 0    tizanidine (ZANAFLEX) 4 MG Tab TAKE ONE TABLET BY MOUTH TWICE A DAY 60 Tablet 2    hydrOXYzine HCl (ATARAX) 25 MG Tab Take 1 Tablet by mouth at bedtime. 90 Tablet 2    gabapentin (NEURONTIN) 300 MG Cap  "Take 1 Capsule by mouth 3 times a day. 90 Capsule 2    metoprolol SR (TOPROL XL) 50 MG TABLET SR 24 HR TAKE 1 TABLET BY MOUTH DAILY 90 Tablet 3    escitalopram (LEXAPRO) 20 MG tablet Take 1 Tablet by mouth every day. 90 Tablet 2    HYDROcodone/acetaminophen (NORCO)  MG Tab Take 1 Tablet by mouth 2 times a day.      NON SPECIFIED Apply 1 Application topically 2 times a day as needed (Apply's on left rib). Over the counter cream for pain (pt is not sure the name)      clobetasol (TEMOVATE) 0.05 % Cream APPLY TO AFFECTED AREA(S) TWO TIMES A DAY UP TO 14 DAYS THEN STOP (Patient not taking: Reported on 12/3/2024) 45 g 1     No current facility-administered medications on file prior to visit.       Allergies: Asa [aspirin]      ROS:   Review of Systems   Constitutional:  Negative for fever.   Respiratory:  Negative for cough, sputum production and shortness of breath.    Cardiovascular:  Positive for chest pain.   Neurological:  Negative for dizziness and headaches.       Vitals:  /62 (BP Location: Left arm, Patient Position: Sitting, BP Cuff Size: Adult)   Pulse 65   Ht 1.74 m (5' 8.5\")   Wt 59 kg (130 lb)   SpO2 96%     Physical Exam:  Physical Exam  Constitutional:       Appearance: Normal appearance.   Cardiovascular:      Rate and Rhythm: Normal rate and regular rhythm.   Pulmonary:      Effort: Pulmonary effort is normal. No respiratory distress.      Breath sounds: Normal breath sounds. No wheezing or rales.   Abdominal:      General: Abdomen is flat.   Musculoskeletal:         General: No swelling.   Skin:     General: Skin is warm and dry.   Neurological:      General: No focal deficit present.      Mental Status: She is alert and oriented to person, place, and time.             Pertinent Studies:  Laboratory Data:    6MWT:    PFTs as reviewed by me personally show:    Imaging as reviewed by me personally show:    11/20/24:  IMPRESSION:     1.  Acute fractures of the left lateral fifth through " eighth ribs.     2.  Trace left-sided pneumothorax at the site of the rib fractures.     3.  Minimal left pleural effusion.     4.  Bibasilar atelectasis, left greater than right.     5.  Bilateral pulmonary nodules measuring up to 8 mm in size, unchanged. These are stable since 2020 consistent with benign process and are no follow-up.     6.  This was discussed with DARIA RICHMOND at 9:22 AM on 11/20/2024.    Pertinent Cardiac Studies:  Echo:      Assessment/Plan:    Problem List Items Addressed This Visit       Rib fracture     Still with significant pain.  She is reportedly on chronic Norco for back pain but could possibly use a few more nights of Oxy however my system will not allow me to prescribe these.    Plan:  - Multiox in clinic today demonstrates she no longer needs O2 at rest but should wear this at night until she can ambulate without needing it.  - CXR to assess for worsened infiltrate due to splinting  - Follow up with pulmonary PRN         Pneumothorax    Relevant Orders    Multiple Oximetry    DME O2 New Set Up    DX-CHEST-LIMITED (1 VIEW)    Lung nodule     There are read as stable compared to prior.  Patient is low risk.              Return if symptoms worsen or fail to improve.     This note was generated using voice recognition software which has a chance of producing errors of grammar and possibly content.  I have made every reasonable attempt to find and correct any obvious errors, but it should be expected that some may not be found prior to finalization of this note.    Time spent in record review prior to patient arrival, reviewing results, and in face-to-face encounter totaled 30 min, excluding any procedures if performed.      Penny Henderson MD RD  Pulmonary and Critical Care Medicine  [facility]

## 2024-12-03 ENCOUNTER — OFFICE VISIT (OUTPATIENT)
Dept: SLEEP MEDICINE | Facility: MEDICAL CENTER | Age: 89
End: 2024-12-03
Attending: INTERNAL MEDICINE
Payer: MEDICARE

## 2024-12-03 VITALS
HEART RATE: 65 BPM | BODY MASS INDEX: 19.26 KG/M2 | HEIGHT: 69 IN | OXYGEN SATURATION: 96 % | DIASTOLIC BLOOD PRESSURE: 62 MMHG | WEIGHT: 130 LBS | SYSTOLIC BLOOD PRESSURE: 102 MMHG

## 2024-12-03 DIAGNOSIS — R91.1 LUNG NODULE: ICD-10-CM

## 2024-12-03 DIAGNOSIS — S27.0XXD TRAUMATIC PNEUMOTHORAX, SUBSEQUENT ENCOUNTER: ICD-10-CM

## 2024-12-03 DIAGNOSIS — S22.49XA CLOSED FRACTURE OF MULTIPLE RIBS, UNSPECIFIED LATERALITY, INITIAL ENCOUNTER: ICD-10-CM

## 2024-12-03 PROCEDURE — 99203 OFFICE O/P NEW LOW 30 MIN: CPT | Performed by: INTERNAL MEDICINE

## 2024-12-03 PROCEDURE — 94761 N-INVAS EAR/PLS OXIMETRY MLT: CPT | Performed by: INTERNAL MEDICINE

## 2024-12-03 PROCEDURE — 99213 OFFICE O/P EST LOW 20 MIN: CPT | Mod: XU | Performed by: INTERNAL MEDICINE

## 2024-12-03 PROCEDURE — 3074F SYST BP LT 130 MM HG: CPT | Performed by: INTERNAL MEDICINE

## 2024-12-03 PROCEDURE — 3078F DIAST BP <80 MM HG: CPT | Performed by: INTERNAL MEDICINE

## 2024-12-03 ASSESSMENT — ENCOUNTER SYMPTOMS
DIZZINESS: 0
COUGH: 0
FEVER: 0
HEADACHES: 0
SHORTNESS OF BREATH: 0
SPUTUM PRODUCTION: 0

## 2024-12-03 ASSESSMENT — FIBROSIS 4 INDEX: FIB4 SCORE: 2.72

## 2024-12-04 DIAGNOSIS — S22.42XA CLOSED FRACTURE OF MULTIPLE RIBS OF LEFT SIDE, INITIAL ENCOUNTER: ICD-10-CM

## 2024-12-04 RX ORDER — OXYCODONE HYDROCHLORIDE 5 MG/1
5 TABLET ORAL EVERY 12 HOURS PRN
Qty: 6 TABLET | Refills: 0 | Status: SHIPPED | OUTPATIENT
Start: 2024-12-04 | End: 2024-12-07

## 2024-12-04 NOTE — ASSESSMENT & PLAN NOTE
Still with significant pain.  She is reportedly on chronic Norco for back pain but could possibly use a few more nights of Oxy however my system will not allow me to prescribe these.    Plan:  - Multiox in clinic today demonstrates she no longer needs O2 at rest but should wear this at night until she can ambulate without needing it.  - CXR to assess for worsened infiltrate due to splinting  - Follow up with pulmonary PRN

## 2024-12-04 NOTE — TELEPHONE ENCOUNTER
Caller Name: ELIA  Call Back Number: [phone]    How would the patient prefer to be contacted with a response: Phone call OK to leave a detailed message    PATIENT CALLED REQUESTING A REFILL ON HER PERCOCET SINCE SHE FELL AND HAS FRACTURED RIBS AND SHE CAN NOT STAND THE PAIN. SHE HAS AN UPCOMING APPOINTMENT TOMORROW 12/05/2024 FOR A TCM.

## 2024-12-04 NOTE — PROCEDURES
Multi-Ox Readings  Multi Ox #1 Room air   O2 sat % at rest 90   O2 sat % on exertion 88   O2 sat average on exertion     Multi Ox #2 2 LPM   O2 sat % at rest 95   O2 sat % on exertion 95   O2 sat average on exertion       Oxygen Use 3   Oxygen Frequency 24/7   Duration of need     Is the patient mobile within the home?     CPAP Use?     BIPAP Use?     Servo Titration

## 2024-12-05 ENCOUNTER — OFFICE VISIT (OUTPATIENT)
Dept: MEDICAL GROUP | Age: 89
End: 2024-12-05
Payer: MEDICARE

## 2024-12-05 ENCOUNTER — HOSPITAL ENCOUNTER (OUTPATIENT)
Dept: RADIOLOGY | Facility: MEDICAL CENTER | Age: 89
End: 2024-12-05
Attending: INTERNAL MEDICINE
Payer: MEDICARE

## 2024-12-05 VITALS
HEART RATE: 67 BPM | BODY MASS INDEX: 19.26 KG/M2 | DIASTOLIC BLOOD PRESSURE: 70 MMHG | SYSTOLIC BLOOD PRESSURE: 116 MMHG | OXYGEN SATURATION: 97 % | TEMPERATURE: 98.6 F | WEIGHT: 130 LBS | HEIGHT: 69 IN

## 2024-12-05 DIAGNOSIS — G62.9 NEUROPATHY: ICD-10-CM

## 2024-12-05 DIAGNOSIS — S27.0XXD TRAUMATIC PNEUMOTHORAX, SUBSEQUENT ENCOUNTER: ICD-10-CM

## 2024-12-05 DIAGNOSIS — Z09 HOSPITAL DISCHARGE FOLLOW-UP: ICD-10-CM

## 2024-12-05 DIAGNOSIS — S22.42XA CLOSED FRACTURE OF MULTIPLE RIBS OF LEFT SIDE, INITIAL ENCOUNTER: ICD-10-CM

## 2024-12-05 PROCEDURE — 71045 X-RAY EXAM CHEST 1 VIEW: CPT

## 2024-12-05 RX ORDER — GABAPENTIN 300 MG/1
300 CAPSULE ORAL 3 TIMES DAILY
Qty: 90 CAPSULE | Refills: 2 | Status: SHIPPED | OUTPATIENT
Start: 2024-12-05

## 2024-12-05 RX ORDER — OXYCODONE HYDROCHLORIDE 5 MG/1
5 TABLET ORAL EVERY 12 HOURS PRN
Qty: 6 TABLET | Refills: 0 | Status: SHIPPED | OUTPATIENT
Start: 2024-12-05 | End: 2024-12-08

## 2024-12-05 ASSESSMENT — FIBROSIS 4 INDEX: FIB4 SCORE: 2.72

## 2024-12-05 NOTE — PROGRESS NOTES
This medical record contains text that has been entered with the assistance of computer voice recognition and dictation software.  Therefore, it may contain unintended errors in text, spelling, punctuation, or grammar      Verbal consent was acquired by the patient to use [email or web address] ambient listening note generation during this visit Yes       Chief Complaint   Patient presents with    Transitional Care Management Hospital Follow-up     11/24/24-- RIB FRACTURE AND PNEUMONIA         Ellen Srivastava is a 89 y.o. female here evaluation and management of: hospital follow up      HPI:           1. Hospital discharge follow-up      2. Neuropathy      3. Closed fracture of multiple ribs of left side, initial encounter  The Patient is a very pleasant 89-year-old female who was vacuuming on 11/20/2024 when she fell onto her side.  She did not hit her head but because of the worsening of pain she came to the emergency room to be further evaluated.  In the ER CT scan found that she had acute fractures on the left lateral 5th through 8th ribs and a trace left-sided pneumothorax at the site of rib fracture.  CT scan also showed bilateral pulmonary nodules that have been unchanged since 2020.  Pulmonology was consulted repeat chest x-ray did not reveal a pneumothorax and the patient was cleared to discharge to home by pulmonology.  She was given an incentive spirometer and pain management for the rib fractures.  She was referred to the pulmonology nodule clinic she did have an oxygen requirement when she left the hospital.  She was given lidocaine patches for the pain as well as opiate pain meds for breakthrough pain.  He was discharged with instructions to follow-up with PCP.    Current medicines (including changes today)  Current Outpatient Medications   Medication Sig Dispense Refill    gabapentin (NEURONTIN) 300 MG Cap Take 1 Capsule by mouth 3 times a day. 90 Capsule 2    oxyCODONE immediate-release (ROXICODONE) 5  MG Tab Take 1 Tablet by mouth every 12 hours as needed for Severe Pain for up to 3 days. 6 Tablet 0    oxyCODONE immediate-release (ROXICODONE) 5 MG Tab Take 1 Tablet by mouth every 12 hours as needed for Severe Pain for up to 3 days. 6 Tablet 0    acetaminophen (TYLENOL) 500 MG Tab Take 2 Tablets by mouth 2 times a day.      lidocaine (ASPERFLEX) 4 % Patch Place 1 Patch on the skin every 24 hours. 30 Patch 0    tizanidine (ZANAFLEX) 4 MG Tab TAKE ONE TABLET BY MOUTH TWICE A DAY 60 Tablet 2    hydrOXYzine HCl (ATARAX) 25 MG Tab Take 1 Tablet by mouth at bedtime. 90 Tablet 2    clobetasol (TEMOVATE) 0.05 % Cream APPLY TO AFFECTED AREA(S) TWO TIMES A DAY UP TO 14 DAYS THEN STOP 45 g 1    metoprolol SR (TOPROL XL) 50 MG TABLET SR 24 HR TAKE 1 TABLET BY MOUTH DAILY 90 Tablet 3    escitalopram (LEXAPRO) 20 MG tablet Take 1 Tablet by mouth every day. 90 Tablet 2    HYDROcodone/acetaminophen (NORCO)  MG Tab Take 1 Tablet by mouth 2 times a day.      NON SPECIFIED Apply 1 Application topically 2 times a day as needed (Apply's on left rib). Over the counter cream for pain (pt is not sure the name) (Patient not taking: Reported on 12/5/2024)       No current facility-administered medications for this visit.     She  has a past medical history of Anemia, Atrophic vaginitis (01/30/2010), Cerumen Impaction Recurrent (01/30/2010), Depressive disorder, not elsewhere classified (01/30/2010), HDL lipoprotein deficiency (01/30/2010), High cholesterol (08/20/2020), HTN (hypertension) (01/26/2010), Hypertriglyceridemia (01/30/2010), Narcotic dependence (HCC) (04/28/2012), Osteopenia (04/26/2010), Pain (08/20/2020), Parathyroid hormone excess (HCC) (04/26/2010), Serum calcium elevated (01/14/2010), Tinnitus (01/14/2010), and Vertigo, intermittent (04/28/2012).    She has no past medical history of Cough, Painful breathing, Shortness of breath, Sputum production, or Wheezing.  She  has a past surgical history that includes breast  "biopsy (Left, ); lumbar decompression (, ); hysterectomy, total abdominal (); appendectomy (194); parathyroid exploration (2010); paraesophageal hernia robotic (N/A, 10/12/2015); gastroscopy (10/23/2015); thoracoscopy (Left, 10/30/2015); gastroscopy-endo (2015); gastroscopy-endo (N/A, 12/10/2015); gastroscopy-endo (N/A, 2016); orif, ankle (Right, 2016); pr lap esophagogast fundoplasty (N/A, 2020); and orif, ankle (Left, 2022).  Social History     Tobacco Use    Smoking status: Never     Passive exposure: Never    Smokeless tobacco: Former    Tobacco comments:     Nicotine    Vaping Use    Vaping status: Never Used   Substance Use Topics    Alcohol use: Yes     Comment: occasionally    Drug use: Yes     Comment: gummies     Social History     Social History Narrative    Lives in [facility] and in [place].     Family History   Problem Relation Age of Onset    Hypertension Mother     Lung Disease Father     Hypertension Other     Lung Disease Other     Lung Disease Brother      Family Status   Relation Name Status    Mo   at age 86        Old Age    Fa   at age 83        Lung Disease    OTHER  (Not Specified)    OTHER  (Not Specified)    Bro     No partnership data on file         ROS    The pertinent  ROS findings can be seen in the HPI above.     All other systems reviewed and are negative     Objective:     /70 (BP Location: Right arm, Patient Position: Sitting, BP Cuff Size: Adult)   Pulse 67   Temp 37 °C (98.6 °F) (Temporal)   Ht 1.74 m (5' 8.5\")   Wt 59 kg (130 lb)   SpO2 97%  Body mass index is 19.48 kg/m².      Physical Exam:    Constitutional: Alert, no distress.  Skin: No suspicious lesions  Eye: Equal, round and reactive, conjunctiva clear, lids normal.  ENMT: Lips without lesions, good dentition, oropharynx clear.  Neck: Trachea midline, no masses, no thyromegaly. No cervical or supraclavicular lymphadenopathy.  Respiratory: " Unlabored respiratory effort, lungs clear to auscultation, no wheezes, no ronchi.  Cardiovascular: Normal S1, S2, no murmur, no edema  Abdomen: Soft, non-tender, no masses, no hepatosplenomegaly.        Assessment and Plan:   The following treatment plan was discussed    All recent labs and provider notes reviewed    Assessment & Plan  1. Rib fracture.  Her breathing difficulty is attributed to the pain from the rib fracture. The use of an incentive spirometer is crucial to prevent pneumonia. Oxygen therapy is necessary when her oxygen saturation falls below 90 percent. She was advised to use the incentive spirometer and to continue with oxygen therapy. Physical therapy was recommended for balance and coordination as well as the  use of the incentive spirometer, there is a risk of developing     2. Dizziness.  She experiences dizziness before falling, which may contribute to her falls. Physical therapy was recommended to improve balance and coordination. But she refused            1. Hospital discharge follow-up    2. Neuropathy  - gabapentin (NEURONTIN) 300 MG Cap; Take 1 Capsule by mouth 3 times a day.  Dispense: 90 Capsule; Refill: 2    3. Closed fracture of multiple ribs of left side, initial encounter  - oxyCODONE immediate-release (ROXICODONE) 5 MG Tab; Take 1 Tablet by mouth every 12 hours as needed for Severe Pain for up to 3 days.  Dispense: 6 Tablet; Refill: 0             Instructed to Follow up in clinic or ER for worsening symptoms, difficulty breathing, lack of expected recovery, or should new symptoms or problems arise.    Followup: Return in about 3 months (around 3/5/2025) for Reevaluation.

## 2025-02-05 DIAGNOSIS — F41.8 DEPRESSION WITH ANXIETY: ICD-10-CM

## 2025-02-05 NOTE — TELEPHONE ENCOUNTER
Received request via: Pharmacy    Was the patient seen in the last year in this department? Yes    Does the patient have an active prescription (recently filled or refills available) for medication(s) requested?  yes    Ph[facility] Name[facility]    Does the patient have longterm Plus and need 100-day supply? (This applies to ALL medications) Patient does not have SCP

## 2025-02-06 ENCOUNTER — HOSPITAL ENCOUNTER (EMERGENCY)
Facility: MEDICAL CENTER | Age: 89
End: 2025-02-06
Attending: EMERGENCY MEDICINE
Payer: MEDICARE

## 2025-02-06 ENCOUNTER — APPOINTMENT (OUTPATIENT)
Dept: RADIOLOGY | Facility: MEDICAL CENTER | Age: 89
End: 2025-02-06
Attending: EMERGENCY MEDICINE
Payer: MEDICARE

## 2025-02-06 VITALS
TEMPERATURE: 97.8 F | RESPIRATION RATE: 18 BRPM | BODY MASS INDEX: 18.51 KG/M2 | WEIGHT: 125 LBS | OXYGEN SATURATION: 95 % | HEART RATE: 62 BPM | SYSTOLIC BLOOD PRESSURE: 133 MMHG | DIASTOLIC BLOOD PRESSURE: 76 MMHG | HEIGHT: 69 IN
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
## 2025-02-06 DIAGNOSIS — S93.402A SPRAIN OF LEFT ANKLE, UNSPECIFIED LIGAMENT, INITIAL ENCOUNTER: ICD-10-CM

## 2025-02-06 LAB
ALBUMIN SERPL BCP-MCNC: 3.3 G/DL (ref 3.2–4.9)
ALBUMIN/GLOB SERPL: 1.3 G/DL
ALP SERPL-CCNC: 108 U/L (ref 30–99)
ALT SERPL-CCNC: 8 U/L (ref 2–50)
ANION GAP SERPL CALC-SCNC: 9 MMOL/L (ref 7–16)
APPEARANCE UR: CLEAR
AST SERPL-CCNC: 20 U/L (ref 12–45)
BASOPHILS # BLD AUTO: 0.8 % (ref 0–1.8)
BASOPHILS # BLD: 0.07 K/UL (ref 0–0.12)
BILIRUB SERPL-MCNC: 0.3 MG/DL (ref 0.1–1.5)
BILIRUB UR QL STRIP.AUTO: NEGATIVE
BUN SERPL-MCNC: 14 MG/DL (ref 8–22)
CALCIUM ALBUM COR SERPL-MCNC: 9.4 MG/DL (ref 8.5–10.5)
CALCIUM SERPL-MCNC: 8.8 MG/DL (ref 8.4–10.2)
CHLORIDE SERPL-SCNC: 103 MMOL/L (ref 96–112)
CO2 SERPL-SCNC: 29 MMOL/L (ref 20–33)
COLOR UR: YELLOW
CREAT SERPL-MCNC: 1.15 MG/DL (ref 0.5–1.4)
EKG IMPRESSION: NORMAL
EOSINOPHIL # BLD AUTO: 0.17 K/UL (ref 0–0.51)
EOSINOPHIL NFR BLD: 1.9 % (ref 0–6.9)
ERYTHROCYTE [DISTWIDTH] IN BLOOD BY AUTOMATED COUNT: 51.8 FL (ref 35.9–50)
GFR SERPLBLD CREATININE-BSD FMLA CKD-EPI: 46 ML/MIN/1.73 M 2
GLOBULIN SER CALC-MCNC: 2.5 G/DL (ref 1.9–3.5)
GLUCOSE SERPL-MCNC: 111 MG/DL (ref 65–99)
GLUCOSE UR STRIP.AUTO-MCNC: NEGATIVE MG/DL
HCT VFR BLD AUTO: 43.8 % (ref 37–47)
HGB BLD-MCNC: 14.2 G/DL (ref 12–16)
IMM GRANULOCYTES # BLD AUTO: 0.02 K/UL (ref 0–0.11)
IMM GRANULOCYTES NFR BLD AUTO: 0.2 % (ref 0–0.9)
KETONES UR STRIP.AUTO-MCNC: NEGATIVE MG/DL
LEUKOCYTE ESTERASE UR QL STRIP.AUTO: NEGATIVE
LYMPHOCYTES # BLD AUTO: 3.08 K/UL (ref 1–4.8)
LYMPHOCYTES NFR BLD: 34.6 % (ref 22–41)
MCH RBC QN AUTO: 31.1 PG (ref 27–33)
MCHC RBC AUTO-ENTMCNC: 32.4 G/DL (ref 32.2–35.5)
MCV RBC AUTO: 96.1 FL (ref 81.4–97.8)
MICRO URNS: NORMAL
MONOCYTES # BLD AUTO: 0.48 K/UL (ref 0–0.85)
MONOCYTES NFR BLD AUTO: 5.4 % (ref 0–13.4)
NEUTROPHILS # BLD AUTO: 5.09 K/UL (ref 1.82–7.42)
NEUTROPHILS NFR BLD: 57.1 % (ref 44–72)
NITRITE UR QL STRIP.AUTO: NEGATIVE
NRBC # BLD AUTO: 0 K/UL
NRBC BLD-RTO: 0 /100 WBC (ref 0–0.2)
PH UR STRIP.AUTO: 8 [PH] (ref 5–8)
PLATELET # BLD AUTO: 204 K/UL (ref 164–446)
PMV BLD AUTO: 9.2 FL (ref 9–12.9)
POTASSIUM SERPL-SCNC: 5.1 MMOL/L (ref 3.6–5.5)
PROT SERPL-MCNC: 5.8 G/DL (ref 6–8.2)
PROT UR QL STRIP: NEGATIVE MG/DL
RBC # BLD AUTO: 4.56 M/UL (ref 4.2–5.4)
RBC UR QL AUTO: NEGATIVE
SODIUM SERPL-SCNC: 141 MMOL/L (ref 135–145)
SP GR UR STRIP.AUTO: 1.01
TROPONIN T SERPL-MCNC: 9 NG/L (ref 6–19)
UROBILINOGEN UR STRIP.AUTO-MCNC: 0.2 EU/DL
WBC # BLD AUTO: 8.9 K/UL (ref 4.8–10.8)

## 2025-02-06 PROCEDURE — 36415 COLL VENOUS BLD VENIPUNCTURE: CPT

## 2025-02-06 PROCEDURE — 700111 HCHG RX REV CODE 636 W/ 250 OVERRIDE (IP): Mod: JZ | Performed by: EMERGENCY MEDICINE

## 2025-02-06 PROCEDURE — 93005 ELECTROCARDIOGRAM TRACING: CPT | Mod: TC | Performed by: EMERGENCY MEDICINE

## 2025-02-06 PROCEDURE — 84484 ASSAY OF TROPONIN QUANT: CPT

## 2025-02-06 PROCEDURE — 81003 URINALYSIS AUTO W/O SCOPE: CPT

## 2025-02-06 PROCEDURE — 85025 COMPLETE CBC W/AUTO DIFF WBC: CPT

## 2025-02-06 PROCEDURE — 73610 X-RAY EXAM OF ANKLE: CPT | Mod: LT

## 2025-02-06 PROCEDURE — 96374 THER/PROPH/DIAG INJ IV PUSH: CPT

## 2025-02-06 PROCEDURE — 99285 EMERGENCY DEPT VISIT HI MDM: CPT

## 2025-02-06 PROCEDURE — 80053 COMPREHEN METABOLIC PANEL: CPT

## 2025-02-06 PROCEDURE — 94760 N-INVAS EAR/PLS OXIMETRY 1: CPT

## 2025-02-06 PROCEDURE — 96375 TX/PRO/DX INJ NEW DRUG ADDON: CPT

## 2025-02-06 RX ORDER — ONDANSETRON 2 MG/ML
4 INJECTION INTRAMUSCULAR; INTRAVENOUS ONCE
Status: COMPLETED | OUTPATIENT
Start: 2025-02-06 | End: 2025-02-06

## 2025-02-06 RX ORDER — ESCITALOPRAM OXALATE 20 MG/1
20 TABLET ORAL DAILY
Qty: 90 TABLET | Refills: 2 | Status: SHIPPED | OUTPATIENT
Start: 2025-02-06

## 2025-02-06 RX ORDER — MORPHINE SULFATE 4 MG/ML
4 INJECTION INTRAVENOUS ONCE
Status: COMPLETED | OUTPATIENT
Start: 2025-02-06 | End: 2025-02-06

## 2025-02-06 RX ADMIN — MORPHINE SULFATE 4 MG: 4 INJECTION, SOLUTION INTRAMUSCULAR; INTRAVENOUS at 06:45

## 2025-02-06 RX ADMIN — ONDANSETRON 4 MG: 2 INJECTION INTRAMUSCULAR; INTRAVENOUS at 06:45

## 2025-02-06 ASSESSMENT — FIBROSIS 4 INDEX: FIB4 SCORE: 2.72

## 2025-02-06 NOTE — ED TRIAGE NOTES
"Pt presented to triage in wheel chair with c/o    Chief Complaint   Patient presents with    Ankle Injury     Pt fell in her BR this morning; pt fell due to lightheadedness    Lightheadedness       Temp 36.5 °C (97.7 °F) (Temporal)   Resp 20   Ht 1.74 m (5' 8.5\")   Wt 56.7 kg (125 lb)   LMP 03/13/1970   BMI 18.73 kg/m²   "

## 2025-02-06 NOTE — ED NOTES
Pt endorses mutliple falls when examined by ERP-  says she has been falling more than once a month.  Pt states sometimes she is lightheaded when falls happen- other times pt just unsteady or trips.

## 2025-02-06 NOTE — ED NOTES
Med Rec completed per patient   Allergies reviewed  No ORAL antibiotics in last 30 days    Patient is not taking anticoagulants     Patient states that she is unsure if she has been out of her Lexapro or Metoprolol for the last 2 weeks

## 2025-02-06 NOTE — ED PROVIDER NOTES
ER Provider Note    Scribed for Dylon Nice M.D. by Scott Montanez. 2/6/2025   6:07 AM    Primary Care Provider: Denzel Patel M.D.    CHIEF COMPLAINT  Chief Complaint   Patient presents with    Ankle Injury     Pt fell in her BR this morning; pt fell due to lightheadedness    Lightheadedness     EXTERNAL RECORDS REVIEWED  Inpatient Notes Patient was admitted 11/20/2024 for pneumothorax and followed up for that 12/5/2024.    HPI/ROS  LIMITATION TO HISTORY   Select: : None  OUTSIDE HISTORIAN(S):  Significant other Brings the patient in to the ED today.     Ellen Srivastava is a 89 y.o. female who presents to the ED for evaluation of left ankle pain following a ground level fall onset prior to arrival this morning. Patient reports that she was in the bathroom this morning, and became lightheaded, which caused her to fall. She is now complaining of left ankle pain. She denies any pain in the leg or foot, and says the pain is only in her ankle. Patient did not hit her head today or lose consciousness, and denies any complaints related to her head. She has been falling more frequently due to lightheadedness, and reports that she fell last week and hit her head. Patient did not lose consciousness at that time and did not have a persisting headache. She has a walker at home, but does not use it often.       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    High cholesterol 08/20/2020    HTN (hypertension) 01/26/2010    Hypertriglyceridemia 01/30/2010    Narcotic dependence (HCC) 04/28/2012    Osteopenia 04/26/2010    Pain 08/20/2020    lower back, hip, legs    Parathyroid hormone excess (HCC) 04/26/2010    Serum calcium elevated 01/14/2010    Tinnitus 01/14/2010    Vertigo, intermittent 04/28/2012        SURGICAL HISTORY  Past Surgical History:   Procedure Laterality Date    ORIF, ANKLE Left 1/28/2022    Procedure: ORIF, ANKLE-Bimalleolar;  Surgeon: Rayshawn Celis M.D.;  Location: [facility];  Service: Orthopedics    SC LAP ESOPHAGOGAST FUNDOPLASTY N/A 8/24/2020    Procedure: FUNDOPLICATION, NISSEN, LAPAROSCOPIC - REPAIR RECURRENT PARAESOPHGEAL HERNIA WITH MESH, POSS FUNDOPLICATION, POSS RESECTION DISTAL ESOPHAGUS;  Surgeon: John H Ganser, M.D.;  Location: [facility];  Service: General    ORIF, ANKLE Right 9/20/2016    Procedure: ANKLE ORIF;  Surgeon: Stef Bray M.D.;  Location: [facility];  Service:     GASTROSCOPY-ENDO N/A 2/26/2016    Procedure: GASTROSCOPY-ENDO W/ESOPHAGEAL METAL STENT REMOVAL;  Surgeon: Jossue Ruggiero M.D.;  Location: [facility];  Service:     GASTROSCOPY-ENDO N/A 12/10/2015    Procedure: GASTROSCOPY-ENDO;  Surgeon: Heri Velasco M.D.;  Location: SURGERY [facility];  Service:     GASTROSCOPY-ENDO  11/1/2015    Procedure: GASTROSCOPY-ENDO;  Surgeon: Conner Joe M.D.;  Location: [facility];  Service:     THORACOSCOPY Left 10/30/2015    Procedure: THORACOSCOPY;  Surgeon: Blaine Waterman D.O.;  Location: SURGERY [facility];  Service:     GASTROSCOPY  10/23/2015    Procedure: GASTROSCOPY- EGD with esophageal stent placement ;  Surgeon: Jossue Ruggiero M.D.;  Location: SURGERY [facility];  Service:     PARAESOPHAGEAL HERNIA ROBOTIC N/A 10/12/2015    Procedure: PARAESOPHAGEAL HERNIA REPAIR ROBOTIC resection of esophageal diverticulum  repair of hiatal hernia for anterior fundoplasty with mesh  ;  Surgeon: Ulysses Simpson M.D.;  Location: SURGERY [facility];  Service:     BREAST BIOPSY Left 2015    Benign x3 surgeries    PARATHYROID EXPLORATION  6/16/2010    Performed by AYSE CARROLL at SURGERY SAME DAY [facility]    HYSTERECTOMY, TOTAL ABDOMINAL  1977     "APPENDECTOMY  1945    LUMBAR DECOMPRESSION  1969, 1979       FAMILY HISTORY  Family History   Problem Relation Age of Onset    Hypertension Mother     Lung Disease Father     Hypertension Other     Lung Disease Other     Lung Disease Brother        SOCIAL HISTORY   reports that she has never smoked. She has never been exposed to tobacco smoke. She has never used smokeless tobacco. She reports current alcohol use. She reports current drug use.    CURRENT MEDICATIONS  Previous Medications    CLOBETASOL (TEMOVATE) 0.05 % CREAM    APPLY TO AFFECTED AREA(S) TWO TIMES A DAY UP TO 14 DAYS THEN STOP    ESCITALOPRAM (LEXAPRO) 20 MG TABLET    Take 1 Tablet by mouth every day.    GABAPENTIN (NEURONTIN) 300 MG CAP    Take 1 Capsule by mouth 3 times a day.    HYDROCODONE/ACETAMINOPHEN (NORCO)  MG TAB    Take 1 Tablet by mouth 2 times a day.    HYDROXYZINE HCL (ATARAX) 25 MG TAB    Take 1 Tablet by mouth at bedtime.    METOPROLOL SR (TOPROL XL) 50 MG TABLET SR 24 HR    TAKE 1 TABLET BY MOUTH DAILY    TIZANIDINE (ZANAFLEX) 4 MG TAB    TAKE ONE TABLET BY MOUTH TWICE A DAY       ALLERGIES  Allergies   Allergen Reactions    Asa [Aspirin] Unspecified     Pt reports that she is not sure what happen        PHYSICAL EXAM  /62   Pulse 64   Temp 36.5 °C (97.7 °F) (Temporal)   Resp 20   Ht 1.74 m (5' 8.5\")   Wt 56.7 kg (125 lb)   LMP 03/13/1970   SpO2 92%   BMI 18.73 kg/m²    Nursing note and vitals reviewed.  Constitutional: Well-developed and well-nourished. No distress.   HENT: Head is normocephalic and atraumatic. Oropharynx is clear and moist without exudate or erythema.   Eyes: Pupils are equal, round, and reactive to light. Conjunctiva are normal.   Cardiovascular: Normal rate and regular rhythm. No murmur heard. Normal radial pulses.   Pulmonary/Chest: Breath sounds normal. No wheezes or rales. No chest wall tenderness.   Abdominal: Soft and non-tender. No distention   Musculoskeletal: No obvious deformities. " Tenderness diffusely over the anterior left ankle with minimal swelling. Post surgical changes are noted. No tenderness to the foot or proximal lower leg.   Neurological: Awake, alert and oriented to person, place, and time. No focal deficits noted.  Skin: Skin is warm and dry. No rash.   Psychiatric: Normal mood and affect. Appropriate for clinical situation     DIAGNOSTIC STUDIES    Labs:   Results for orders placed or performed during the hospital encounter of 02/06/25   Complete Metabolic Panel (CMP)    Collection Time: 02/06/25  6:28 AM   Result Value Ref Range    Sodium 141 135 - 145 mmol/L    Potassium 5.1 3.6 - 5.5 mmol/L    Chloride 103 96 - 112 mmol/L    Co2 29 20 - 33 mmol/L    Anion Gap 9.0 7.0 - 16.0    Glucose 111 (H) 65 - 99 mg/dL    Bun 14 8 - 22 mg/dL    Creatinine 1.15 0.50 - 1.40 mg/dL    Calcium 8.8 8.4 - 10.2 mg/dL    Correct Calcium 9.4 8.5 - 10.5 mg/dL    AST(SGOT) 20 12 - 45 U/L    ALT(SGPT) 8 2 - 50 U/L    Alkaline Phosphatase 108 (H) 30 - 99 U/L    Total Bilirubin 0.3 0.1 - 1.5 mg/dL    Albumin 3.3 3.2 - 4.9 g/dL    Total Protein 5.8 (L) 6.0 - 8.2 g/dL    Globulin 2.5 1.9 - 3.5 g/dL    A-G Ratio 1.3 g/dL   CBC w/ Differential    Collection Time: 02/06/25  6:28 AM   Result Value Ref Range    WBC 8.9 4.8 - 10.8 K/uL    RBC 4.56 4.20 - 5.40 M/uL    Hemoglobin 14.2 12.0 - 16.0 g/dL    Hematocrit 43.8 37.0 - 47.0 %    MCV 96.1 81.4 - 97.8 fL    MCH 31.1 27.0 - 33.0 pg    MCHC 32.4 32.2 - 35.5 g/dL    RDW 51.8 (H) 35.9 - 50.0 fL    Platelet Count 204 164 - 446 K/uL    MPV 9.2 9.0 - 12.9 fL    Neutrophils-Polys 57.10 44.00 - 72.00 %    Lymphocytes 34.60 22.00 - 41.00 %    Monocytes 5.40 0.00 - 13.40 %    Eosinophils 1.90 0.00 - 6.90 %    Basophils 0.80 0.00 - 1.80 %    Immature Granulocytes 0.20 0.00 - 0.90 %    Nucleated RBC 0.00 0.00 - 0.20 /100 WBC    Neutrophils (Absolute) 5.09 1.82 - 7.42 K/uL    Lymphs (Absolute) 3.08 1.00 - 4.80 K/uL    Monos (Absolute) 0.48 0.00 - 0.85 K/uL    Eos  (Absolute) 0.17 0.00 - 0.51 K/uL    Baso (Absolute) 0.07 0.00 - 0.12 K/uL    Immature Granulocytes (abs) 0.02 0.00 - 0.11 K/uL    NRBC (Absolute) 0.00 K/uL   Troponin - STAT Once    Collection Time: 02/06/25  6:28 AM   Result Value Ref Range    Troponin T 9 6 - 19 ng/L   ESTIMATED GFR    Collection Time: 02/06/25  6:28 AM   Result Value Ref Range    GFR (CKD-EPI) 46 (A) >60 mL/min/1.73 m 2   URINALYSIS (UA)    Collection Time: 02/06/25 10:05 AM    Specimen: Urine   Result Value Ref Range    Color Yellow     Character Clear     Specific Gravity 1.015 <1.035    Ph 8.0 5.0 - 8.0    Glucose Negative Negative mg/dL    Ketones Negative Negative mg/dL    Protein Negative Negative mg/dL    Bilirubin Negative Negative    Urobilinogen, Urine 0.2 <=1.0 EU/dL    Nitrite Negative Negative    Leukocyte Esterase Negative Negative    Occult Blood Negative Negative    Micro Urine Req see below      *Note: Due to a large number of results and/or encounters for the requested time period, some results have not been displayed. A complete set of results can be found in Results Review.       EKG:   I have independently interpreted this EKG as detailed above.     Radiology:   This attending emergency physician has independently interpreted the diagnostic imaging associated with this visit and is awaiting the final reading from the radiologist.   Preliminary interpretation is a follows: Ankle x-ray shows no evidence of fracture or dislocation    Radiologist interpretation:   DX-ANKLE 3+ VIEWS LEFT   Final Result         1.  No acute traumatic bony injury.              ASSESSMENT AND PLAN    6:07 AM - Patient was evaluated at bedside. Patient presents today for evaluation of left ankle pain after she became dizzy and fell over today. She did not hit her head today and has not complaints related to her head. Ordered for EKG, Troponin stat once, CBC with differential, CMP, UA, and DX-Ankle 3+ views left to evaluate. The patient will be  medicated with Morphine 4 mg and Zofran 4 mg for her symptoms. Patient verbalizes understanding and support with my plan of care.  Patient will undergo evaluation for traumatic injury.     10:50 AM - I reevaluated the patient at bedside. The patient informs me they feel improved following medication administration. I discussed the patient's diagnostic study results which show no acute fractures or concerning abnormalities. Patient requests a ankle splint, and she will be provided with one. I discussed plan for discharge and follow up as outlined below. The patient is stable for discharge at this time and will return for any new or worsening symptoms. Patient verbalizes understanding and support with my plan for discharge.        The patient was treated with Zofran for nausea.  Placed on a cardiac monitor to monitor for any arrhythmia associated with Zofran and QT prolongation.    The patient was treated with IV narcotics for pain control.  Placed on cardiac and blood pressure monitor to monitor for associated hypotension.  Also placed on pulse oximetry to monitor for hypoxia associated with medication administration/side effect.    DISPOSITION AND DISCUSSIONS    I have discussed management of the patient with the following physicians and UDAY's:  None.     Discussion of management with other QHP or appropriate source(s): None     Escalation of care considered, and ultimately not performed: acute inpatient care management, however at this time, the patient is most appropriate for outpatient management.    Barriers to care at this time, including but not limited to:  None are known .      The patient will return for new or worsening symptoms and is stable at the time of discharge.    DISPOSITION:  Patient will be discharged home in stable condition.    FOLLOW UP:  Denzel Patel M.D.  [address] [place] [address] [address] [place] [id number] [phone]    Schedule an appointment as soon as possible for a visit       [facility]  [facility], Emergency Dept  [address]  [place] [address]  [phone]    If symptoms worsen      FINAL DIAGNOSIS  1. Sprain of left ankle, unspecified ligament, initial encounter    2. Fall, initial encounter         Scott BAUM (Scribsylvia), am scribing for, and in the presence of, Dylon Nice M.D..    Electronically signed by: Scott Montanez (Kurtibe), 2/6/2025    IDylon M.D. personally performed the services described in this documentation, as scribed by Scott Montanez in my presence, and it is both accurate and complete.      The note accurately reflects work and decisions made by me.  Dylon Nice M.D.  2/6/2025  1:20 PM

## 2025-02-06 NOTE — ED NOTES
D/c pt home,NO rx given . Pt aware of f/u instructions , aware to return for any changes or concerns.     to come to ED and take her home

## 2025-02-19 ENCOUNTER — TELEPHONE (OUTPATIENT)
Dept: MEDICAL GROUP | Age: 89
End: 2025-02-19
Payer: MEDICARE

## 2025-02-19 DIAGNOSIS — S82.892A CLOSED LEFT ANKLE FRACTURE, INITIAL ENCOUNTER: ICD-10-CM

## 2025-02-19 NOTE — TELEPHONE ENCOUNTER
Caller Name: ELIA  Call Back Number: [phone]    How would the patient prefer to be contacted with a response: Phone call OK to leave a detailed message    PATIENT CALLED THAT SHE FELL AND SPRAINED HER ANKLE AND IS REQUESTING ASSISTANCE TO HELP HER GET AROUND AND RUN ERRANDS AND TO ASSIST WITH HER CARE. PLEASE ASSIST WITH OTHER RECOMMENDATIONS FOR HER ASSISTANCE

## 2025-04-11 DIAGNOSIS — M54.6 ACUTE BILATERAL THORACIC BACK PAIN: ICD-10-CM

## 2025-04-11 DIAGNOSIS — G62.9 NEUROPATHY: ICD-10-CM

## 2025-04-11 NOTE — TELEPHONE ENCOUNTER
Received request via: Patient    Was the patient seen in the last year in this department? Yes    Does the patient have an active prescription (recently filled or refills available) for medication(s) requested?  YES    Pharmacy Name: [facility]    Does the patient have group home Plus and need 100-day supply? (This applies to ALL medications) Patient does not have SCP

## 2025-04-17 DIAGNOSIS — G62.9 NEUROPATHY: ICD-10-CM

## 2025-04-17 NOTE — TELEPHONE ENCOUNTER
Received request via: Pharmacy    Was the patient seen in the last year in this department? Yes    Does the patient have an active prescription (recently filled or refills available) for medication(s) requested?  YES    Pharmacy Name: [facility]    Does the patient have [facility] Plus and need 100-day supply? (This applies to ALL medications) Patient does not have SCP

## 2025-04-18 RX ORDER — GABAPENTIN 300 MG/1
300 CAPSULE ORAL 2 TIMES DAILY
Qty: 180 CAPSULE | Refills: 2 | Status: SHIPPED | OUTPATIENT
Start: 2025-04-18

## 2025-05-02 DIAGNOSIS — M54.6 ACUTE BILATERAL THORACIC BACK PAIN: ICD-10-CM

## 2025-05-02 DIAGNOSIS — G62.9 NEUROPATHY: ICD-10-CM

## 2025-05-03 NOTE — TELEPHONE ENCOUNTER
Caller Name: ELIA  Call Back Number: [phone]    How would the patient prefer to be contacted with a response: Phone call OK to leave a detailed message    HER  PASSED AWAY 04/30/2025. SHE NEEDS TIZANIDINE URGENTLY TO GET THROUGH

## 2025-05-03 NOTE — TELEPHONE ENCOUNTER
Received request via: Patient    Was the patient seen in the last year in this department? Yes    Does the patient have an active prescription (recently filled or refills available) for medication(s) requested?  YES    Pharmacy Name: [facility]    Does the patient have residential Plus and need 100-day supply? (This applies to ALL medications) Patient does not have SCP

## 2025-05-22 DIAGNOSIS — F51.01 PRIMARY INSOMNIA: ICD-10-CM

## 2025-05-22 NOTE — TELEPHONE ENCOUNTER
Received request via: Pharmacy    Was the patient seen in the last year in this department? Yes    Does the patient have an active prescription (recently filled or refills available) for medication(s) requested? YES    Pharmacy Name: [facility]    Does the patient have halfway Plus and need 100-day supply? (This applies to ALL medications) Patient does not have SCP

## 2025-05-23 RX ORDER — HYDROXYZINE HYDROCHLORIDE 25 MG/1
25 TABLET, FILM COATED ORAL
Qty: 90 TABLET | Refills: 2 | Status: SHIPPED | OUTPATIENT
Start: 2025-05-23

## 2025-05-28 NOTE — ED TRIAGE NOTES
"Chief Complaint   Patient presents with   • Dizziness     pt got dizzy and fell three times today   • Ankle Pain     left ankle is deformed and swollen    • Foot Pain     Pt states that she fell and broke her foot in november and the foot is still painful      /68   Pulse (!) 57   Temp 36.1 °C (96.9 °F) (Temporal)   Resp 18   Ht 1.727 m (5' 8\")   Wt 61.2 kg (135 lb)   LMP 03/13/1970   SpO2 96%   BMI 20.53 kg/m²     Has this patient been vaccinated for COVID yes  If not, would they like to be vaccinated while in the ER if eligible?  no  Would the patient like to speak with the ERP about the possibility of receiving the COVID vaccine today before making a decision? no      Per pt her BP was in the 70's at home. Pt received 300mL IVF in route per [facility]. BP stable at this time.   "
220

## 2025-06-11 RX ORDER — METOPROLOL SUCCINATE 50 MG/1
TABLET, EXTENDED RELEASE ORAL
Qty: 90 TABLET | Refills: 3 | Status: SHIPPED | OUTPATIENT
Start: 2025-06-11

## 2025-07-10 ENCOUNTER — TELEMEDICINE (OUTPATIENT)
Dept: MEDICAL GROUP | Age: 89
End: 2025-07-10
Payer: MEDICARE

## 2025-07-10 VITALS — RESPIRATION RATE: 16 BRPM | BODY MASS INDEX: 18.51 KG/M2 | HEIGHT: 69 IN | WEIGHT: 125 LBS

## 2025-07-10 DIAGNOSIS — G62.9 NEUROPATHY: ICD-10-CM

## 2025-07-10 DIAGNOSIS — F51.01 PRIMARY INSOMNIA: Primary | ICD-10-CM

## 2025-07-10 DIAGNOSIS — M54.6 ACUTE BILATERAL THORACIC BACK PAIN: ICD-10-CM

## 2025-07-10 DIAGNOSIS — M79.10 MYALGIA: ICD-10-CM

## 2025-07-10 PROCEDURE — 99214 OFFICE O/P EST MOD 30 MIN: CPT | Mod: 95 | Performed by: FAMILY MEDICINE

## 2025-07-10 ASSESSMENT — FIBROSIS 4 INDEX: FIB4 SCORE: 3.05

## 2025-07-10 ASSESSMENT — PATIENT HEALTH QUESTIONNAIRE - PHQ9
4. FEELING TIRED OR HAVING LITTLE ENERGY: NOT AT ALL
7. TROUBLE CONCENTRATING ON THINGS, SUCH AS READING THE NEWSPAPER OR WATCHING TELEVISION: NOT AT ALL
SUM OF ALL RESPONSES TO PHQ9 QUESTIONS 1 AND 2: 1
6. FEELING BAD ABOUT YOURSELF - OR THAT YOU ARE A FAILURE OR HAVE LET YOURSELF OR YOUR FAMILY DOWN: NOT AL ALL
2. FEELING DOWN, DEPRESSED, IRRITABLE, OR HOPELESS: SEVERAL DAYS
1. LITTLE INTEREST OR PLEASURE IN DOING THINGS: NOT AT ALL
5. POOR APPETITE OR OVEREATING: NOT AT ALL
3. TROUBLE FALLING OR STAYING ASLEEP OR SLEEPING TOO MUCH: SEVERAL DAYS
8. MOVING OR SPEAKING SO SLOWLY THAT OTHER PEOPLE COULD HAVE NOTICED. OR THE OPPOSITE, BEING SO FIGETY OR RESTLESS THAT YOU HAVE BEEN MOVING AROUND A LOT MORE THAN USUAL: NOT AT ALL
9. THOUGHTS THAT YOU WOULD BE BETTER OFF DEAD, OR OF HURTING YOURSELF: NOT AT ALL
SUM OF ALL RESPONSES TO PHQ QUESTIONS 1-9: 2
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
## (undated) DEVICE — TUBING CLEARLINK DUO-VENT - C-FLO (48EA/CA)

## (undated) DEVICE — ENDOSTITCH LOAD UNIT 0 SURGI - 12/CA

## (undated) DEVICE — SPONGE GAUZESTER. 2X2 4-PL - (2/PK 50PK/BX 30BX/CS)

## (undated) DEVICE — SET TUBING PNEUMOCLEAR HIGH FLOW SMOKE EVACUATION (10EA/BX)

## (undated) DEVICE — MASK ANESTHESIA ADULT  - (100/CA)

## (undated) DEVICE — POUCH 750ML TISSUE 5 X 8

## (undated) DEVICE — TUBE CONNECTING SUCTION - CLEAR PLASTIC STERILE 72 IN (50EA/CA)

## (undated) DEVICE — STAPLER SKIN DISP - (6/BX 10BX/CA) VISISTAT

## (undated) DEVICE — LACTATED RINGERS INJ 1000 ML - (14EA/CA 60CA/PF)

## (undated) DEVICE — CHLORAPREP 26 ML APPLICATOR - ORANGE TINT(25/CA)

## (undated) DEVICE — ELECTRODE DUAL RETURN W/ CORD - (50/PK)

## (undated) DEVICE — WATER IRRIGATION STERILE 1000ML (12EA/CA)

## (undated) DEVICE — SET EXTENSION WITH 2 PORTS (48EA/CA) ***PART #2C8610 IS A SUBSTITUTE*****

## (undated) DEVICE — TROCAR Z THREAD11MM OPTICAL - NON BLADED(6/BX)

## (undated) DEVICE — KIT ANESTHESIA W/CIRCUIT & 3/LT BAG W/FILTER (20EA/CA)

## (undated) DEVICE — PACK LAP CHOLE OR - (2EA/CA)

## (undated) DEVICE — DRESSING TRANSPARENT FILM TEGADERM 2.375 X 2.75"  (100EA/BX)"

## (undated) DEVICE — BAG SPONGE COUNT 10.25 X 32 - BLUE (250/CA)

## (undated) DEVICE — ELECTRODE 850 FOAM ADHESIVE - HYDROGEL RADIOTRNSPRNT (50/PK)

## (undated) DEVICE — GLOVE BIOGEL INDICATOR SZ 8 SURGICAL PF LTX - (50/BX 4BX/CA)

## (undated) DEVICE — SUTURE 3-0 ETHILON FS-1 - (36/BX) 30 INCH

## (undated) DEVICE — SENSOR SPO2 NEO LNCS ADHESIVE (20/BX) SEE USER NOTES

## (undated) DEVICE — CANISTER SUCTION RIGID RED 1500CC (40EA/CA)

## (undated) DEVICE — SUTURE GENERAL

## (undated) DEVICE — SYRINGE TOOMEY (50EA/CA)

## (undated) DEVICE — PROTECTOR ULNA NERVE - (36PR/CA)

## (undated) DEVICE — SODIUM CHL IRRIGATION 0.9% 1000ML (12EA/CA)

## (undated) DEVICE — SUTURE 0 VICRYL PLUS CT-2 - 27 INCH (36/BX)

## (undated) DEVICE — GLOVE, LITE (PAIR)

## (undated) DEVICE — ENDOSTITCH10MM SUTURING DEVIC - (3/CA)

## (undated) DEVICE — DRAPE C-ARM LARGE 41IN X 74 IN - (10/BX 2BX/CA)

## (undated) DEVICE — SET LEADWIRE 5 LEAD BEDSIDE DISPOSABLE ECG (1SET OF 5/EA)

## (undated) DEVICE — SUCTION INSTRUMENT YANKAUER BULBOUS TIP W/O VENT (50EA/CA)

## (undated) DEVICE — TOWEL STOP TIMEOUT SAFETY FLAG (40EA/CA)

## (undated) DEVICE — SET SUCTION/IRRIGATION WITH DISPOSABLE TIP (6/CA )PART #0250-070-520 IS A SUB

## (undated) DEVICE — CANNULA W/SEAL 5X100 Z-THRE - ADED KII (12/BX)

## (undated) DEVICE — TROCAR 5X100 NON BLADED Z-TH - READ KII (6/BX)

## (undated) DEVICE — GLOVE BIOGEL SZ 7.5 SURGICAL PF LTX - (50PR/BX 4BX/CA)

## (undated) DEVICE — TUBE NG SALEM SUMP 16FR (50EA/CA)

## (undated) DEVICE — HUMID-VENT HEAT AND MOISTURE EXCHANGE- (50/BX)

## (undated) DEVICE — GUIDE PIN 1.25X150 THRD OIC - (6EA/BX) (5TX6=30)

## (undated) DEVICE — NEPTUNE 4 PORT MANIFOLD - (20/PK)

## (undated) DEVICE — SUTURE 4-0 VICRYL PLUS FS-2 - 27 INCH (36/BX)

## (undated) DEVICE — SUTURE 4-0 MONOCRYL PLUS PS-1 - 27 INCH (36/BX)

## (undated) DEVICE — GOWN WARMING STANDARD FLEX - (30/CA)

## (undated) DEVICE — PACK LOWER EXTREMITY - (2/CA)

## (undated) DEVICE — HEAD HOLDER JUNIOR/ADULT

## (undated) DEVICE — TUBING LAPAROSCOPIC PLUME DEVICE (10EA/CA)

## (undated) DEVICE — CANISTER SUCTION 3000ML MECHANICAL FILTER AUTO SHUTOFF MEDI-VAC NONSTERILE LF DISP  (40EA/CA)

## (undated) DEVICE — GLOVE BIOGEL M SZ 8 SURGICAL PF LTX - (50/BX 4BX/CA)

## (undated) DEVICE — SEALER VESSEL HARMONIC ACE PLUS WITH ADVANCED HEMOSTASIS 36CM (1/EA)

## (undated) DEVICE — SUTURE 2-0 VICRYL PLUS CT-1 - 8 X 18 INCH(12/BX)

## (undated) DEVICE — SLEEVE, VASO, THIGH, MED

## (undated) DEVICE — NEEDLE INSFL 120MM 14GA VRRS - (20/BX)